# Patient Record
Sex: MALE | Race: BLACK OR AFRICAN AMERICAN | Employment: OTHER | URBAN - NONMETROPOLITAN AREA
[De-identification: names, ages, dates, MRNs, and addresses within clinical notes are randomized per-mention and may not be internally consistent; named-entity substitution may affect disease eponyms.]

---

## 2017-02-09 ENCOUNTER — OFFICE VISIT (OUTPATIENT)
Dept: PHYSICAL MEDICINE AND REHAB | Age: 82
End: 2017-02-09

## 2017-02-09 VITALS
DIASTOLIC BLOOD PRESSURE: 77 MMHG | WEIGHT: 158 LBS | SYSTOLIC BLOOD PRESSURE: 134 MMHG | HEART RATE: 71 BPM | HEIGHT: 71 IN | BODY MASS INDEX: 22.12 KG/M2

## 2017-02-09 DIAGNOSIS — R41.840 POOR CONCENTRATION: ICD-10-CM

## 2017-02-09 DIAGNOSIS — R41.3 MEMORY PROBLEM: Primary | ICD-10-CM

## 2017-02-09 PROCEDURE — 4040F PNEUMOC VAC/ADMIN/RCVD: CPT | Performed by: PSYCHIATRY & NEUROLOGY

## 2017-02-09 PROCEDURE — G8484 FLU IMMUNIZE NO ADMIN: HCPCS | Performed by: PSYCHIATRY & NEUROLOGY

## 2017-02-09 PROCEDURE — 99213 OFFICE O/P EST LOW 20 MIN: CPT | Performed by: PSYCHIATRY & NEUROLOGY

## 2017-02-09 PROCEDURE — G8419 CALC BMI OUT NRM PARAM NOF/U: HCPCS | Performed by: PSYCHIATRY & NEUROLOGY

## 2017-02-09 PROCEDURE — 1123F ACP DISCUSS/DSCN MKR DOCD: CPT | Performed by: PSYCHIATRY & NEUROLOGY

## 2017-02-09 PROCEDURE — G8427 DOCREV CUR MEDS BY ELIG CLIN: HCPCS | Performed by: PSYCHIATRY & NEUROLOGY

## 2017-02-09 PROCEDURE — 1036F TOBACCO NON-USER: CPT | Performed by: PSYCHIATRY & NEUROLOGY

## 2017-02-09 RX ORDER — DONEPEZIL HYDROCHLORIDE 10 MG/1
TABLET, FILM COATED ORAL
Refills: 0 | Status: ON HOLD | COMMUNITY
Start: 2016-11-17 | End: 2017-04-28 | Stop reason: HOSPADM

## 2017-02-09 RX ORDER — TRAZODONE HYDROCHLORIDE 50 MG/1
TABLET ORAL
Refills: 0 | Status: ON HOLD | COMMUNITY
Start: 2017-01-19 | End: 2017-04-28 | Stop reason: HOSPADM

## 2017-02-14 ENCOUNTER — OFFICE VISIT (OUTPATIENT)
Dept: AUDIOLOGY | Age: 82
End: 2017-02-14

## 2017-02-14 DIAGNOSIS — H90.3 SENSORINEURAL HEARING LOSS, BILATERAL: Primary | ICD-10-CM

## 2017-02-23 ENCOUNTER — OFFICE VISIT (OUTPATIENT)
Dept: AUDIOLOGY | Age: 82
End: 2017-02-23

## 2017-02-23 DIAGNOSIS — H90.3 SENSORINEURAL HEARING LOSS, BILATERAL: Primary | ICD-10-CM

## 2017-02-28 ENCOUNTER — TELEPHONE (OUTPATIENT)
Dept: AUDIOLOGY | Age: 82
End: 2017-02-28

## 2017-03-20 ENCOUNTER — OFFICE VISIT (OUTPATIENT)
Dept: UROLOGY | Age: 82
End: 2017-03-20

## 2017-03-20 VITALS — BODY MASS INDEX: 22.05 KG/M2 | HEIGHT: 70 IN | WEIGHT: 154 LBS

## 2017-03-20 DIAGNOSIS — N40.1 BPH WITH URINARY OBSTRUCTION: Primary | ICD-10-CM

## 2017-03-20 DIAGNOSIS — N13.8 BPH WITH URINARY OBSTRUCTION: Primary | ICD-10-CM

## 2017-03-20 DIAGNOSIS — N52.9 ERECTILE DYSFUNCTION, UNSPECIFIED ERECTILE DYSFUNCTION TYPE: ICD-10-CM

## 2017-03-20 LAB
BILIRUBIN URINE: NEGATIVE
BLOOD URINE, POC: NEGATIVE
CHARACTER, URINE: CLEAR
COLOR, URINE: YELLOW
GLUCOSE URINE: NEGATIVE MG/DL
KETONES, URINE: NEGATIVE
LEUKOCYTE CLUMPS, URINE: NEGATIVE
NITRITE, URINE: NEGATIVE
PH, URINE: 5.5
POST VOID RESIDUAL (PVR): 81 ML
PROTEIN, URINE: NEGATIVE MG/DL
SPECIFIC GRAVITY, URINE: 1.01 (ref 1–1.03)
UROBILINOGEN, URINE: 0.2 EU/DL

## 2017-03-20 PROCEDURE — G8427 DOCREV CUR MEDS BY ELIG CLIN: HCPCS | Performed by: NURSE PRACTITIONER

## 2017-03-20 PROCEDURE — 51798 US URINE CAPACITY MEASURE: CPT | Performed by: NURSE PRACTITIONER

## 2017-03-20 PROCEDURE — 1036F TOBACCO NON-USER: CPT | Performed by: NURSE PRACTITIONER

## 2017-03-20 PROCEDURE — 4040F PNEUMOC VAC/ADMIN/RCVD: CPT | Performed by: NURSE PRACTITIONER

## 2017-03-20 PROCEDURE — 99213 OFFICE O/P EST LOW 20 MIN: CPT | Performed by: NURSE PRACTITIONER

## 2017-03-20 PROCEDURE — 1123F ACP DISCUSS/DSCN MKR DOCD: CPT | Performed by: NURSE PRACTITIONER

## 2017-03-20 PROCEDURE — G8484 FLU IMMUNIZE NO ADMIN: HCPCS | Performed by: NURSE PRACTITIONER

## 2017-03-20 PROCEDURE — G8419 CALC BMI OUT NRM PARAM NOF/U: HCPCS | Performed by: NURSE PRACTITIONER

## 2017-03-20 PROCEDURE — 81003 URINALYSIS AUTO W/O SCOPE: CPT | Performed by: NURSE PRACTITIONER

## 2017-04-27 PROBLEM — E86.0 DEHYDRATION: Status: ACTIVE | Noted: 2017-04-27

## 2017-04-27 PROBLEM — R29.6 FALLS: Status: ACTIVE | Noted: 2017-04-27
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
## 2017-05-24 ENCOUNTER — OFFICE VISIT (OUTPATIENT)
Dept: AUDIOLOGY | Age: 82
End: 2017-05-24

## 2017-05-24 DIAGNOSIS — H90.3 SENSORINEURAL HEARING LOSS, BILATERAL: Primary | ICD-10-CM

## 2017-05-25 ENCOUNTER — OFFICE VISIT (OUTPATIENT)
Dept: CARDIOLOGY | Age: 82
End: 2017-05-25

## 2017-05-25 VITALS
HEART RATE: 80 BPM | DIASTOLIC BLOOD PRESSURE: 68 MMHG | BODY MASS INDEX: 21 KG/M2 | WEIGHT: 150 LBS | HEIGHT: 71 IN | SYSTOLIC BLOOD PRESSURE: 134 MMHG

## 2017-05-25 DIAGNOSIS — I10 ESSENTIAL HYPERTENSION: Primary | ICD-10-CM

## 2017-05-25 DIAGNOSIS — I95.1 ORTHOSTATIC HYPOTENSION: ICD-10-CM

## 2017-05-25 DIAGNOSIS — E78.00 PURE HYPERCHOLESTEROLEMIA: ICD-10-CM

## 2017-05-25 DIAGNOSIS — R55 NEAR SYNCOPE: ICD-10-CM

## 2017-05-25 DIAGNOSIS — E86.0 DEHYDRATION: Primary | ICD-10-CM

## 2017-05-25 PROCEDURE — 1036F TOBACCO NON-USER: CPT | Performed by: INTERNAL MEDICINE

## 2017-05-25 PROCEDURE — 99214 OFFICE O/P EST MOD 30 MIN: CPT | Performed by: INTERNAL MEDICINE

## 2017-05-25 PROCEDURE — 1123F ACP DISCUSS/DSCN MKR DOCD: CPT | Performed by: INTERNAL MEDICINE

## 2017-05-25 PROCEDURE — G8427 DOCREV CUR MEDS BY ELIG CLIN: HCPCS | Performed by: INTERNAL MEDICINE

## 2017-05-25 PROCEDURE — 4040F PNEUMOC VAC/ADMIN/RCVD: CPT | Performed by: INTERNAL MEDICINE

## 2017-05-25 PROCEDURE — G8419 CALC BMI OUT NRM PARAM NOF/U: HCPCS | Performed by: INTERNAL MEDICINE

## 2017-05-25 PROCEDURE — 1111F DSCHRG MED/CURRENT MED MERGE: CPT | Performed by: INTERNAL MEDICINE

## 2017-05-25 RX ORDER — POTASSIUM CHLORIDE 20MEQ/15ML
40 LIQUID (ML) ORAL DAILY
Qty: 450 ML | Refills: 0 | Status: SHIPPED | OUTPATIENT
Start: 2017-05-25 | End: 2017-06-06 | Stop reason: ALTCHOICE

## 2017-05-25 RX ORDER — LANSOPRAZOLE 30 MG/1
30 CAPSULE, DELAYED RELEASE ORAL 2 TIMES DAILY
COMMUNITY
End: 2017-12-01 | Stop reason: ALTCHOICE

## 2017-06-06 RX ORDER — POTASSIUM CHLORIDE 20MEQ/15ML
LIQUID (ML) ORAL
COMMUNITY
End: 2017-06-06 | Stop reason: SDUPTHER

## 2017-06-06 RX ORDER — FLUDROCORTISONE ACETATE 0.1 MG/1
0.1 TABLET ORAL DAILY
COMMUNITY
End: 2017-06-06 | Stop reason: SDUPTHER

## 2017-06-06 RX ORDER — SODIUM CHLORIDE 1000 MG
1 TABLET, SOLUBLE MISCELLANEOUS 2 TIMES DAILY
COMMUNITY
End: 2017-06-06 | Stop reason: SDUPTHER

## 2017-06-06 RX ORDER — POTASSIUM CHLORIDE 20 MEQ/1
TABLET, EXTENDED RELEASE ORAL
COMMUNITY
End: 2017-06-06 | Stop reason: CLARIF

## 2017-06-07 RX ORDER — SODIUM CHLORIDE 1000 MG
1 TABLET, SOLUBLE MISCELLANEOUS 2 TIMES DAILY
Qty: 60 TABLET | Refills: 0 | Status: ON HOLD | OUTPATIENT
Start: 2017-06-07 | End: 2017-09-11 | Stop reason: ALTCHOICE

## 2017-06-07 RX ORDER — POTASSIUM CHLORIDE 20MEQ/15ML
LIQUID (ML) ORAL
Qty: 180 ML | Refills: 0 | Status: ON HOLD | OUTPATIENT
Start: 2017-06-07 | End: 2017-09-11 | Stop reason: ALTCHOICE

## 2017-06-07 RX ORDER — FLUDROCORTISONE ACETATE 0.1 MG/1
0.1 TABLET ORAL DAILY
Qty: 7 TABLET | Refills: 0 | Status: ON HOLD | OUTPATIENT
Start: 2017-06-07 | End: 2018-03-02 | Stop reason: HOSPADM

## 2017-06-13 ENCOUNTER — TELEPHONE (OUTPATIENT)
Dept: CARDIOLOGY | Age: 82
End: 2017-06-13

## 2017-06-19 ENCOUNTER — OFFICE VISIT (OUTPATIENT)
Dept: CARDIOLOGY | Age: 82
End: 2017-06-19

## 2017-06-19 VITALS
DIASTOLIC BLOOD PRESSURE: 62 MMHG | BODY MASS INDEX: 20.85 KG/M2 | SYSTOLIC BLOOD PRESSURE: 152 MMHG | HEART RATE: 64 BPM | WEIGHT: 147.4 LBS

## 2017-06-19 DIAGNOSIS — R55 NEAR SYNCOPE: ICD-10-CM

## 2017-06-19 DIAGNOSIS — I95.1 ORTHOSTATIC HYPOTENSION: Primary | ICD-10-CM

## 2017-06-19 DIAGNOSIS — E87.6 HYPOKALEMIA: Primary | ICD-10-CM

## 2017-06-19 DIAGNOSIS — E78.00 PURE HYPERCHOLESTEROLEMIA: ICD-10-CM

## 2017-06-19 DIAGNOSIS — I10 ESSENTIAL HYPERTENSION: ICD-10-CM

## 2017-06-19 DIAGNOSIS — E87.6 HYPOKALEMIA: ICD-10-CM

## 2017-06-19 PROCEDURE — G8428 CUR MEDS NOT DOCUMENT: HCPCS | Performed by: INTERNAL MEDICINE

## 2017-06-19 PROCEDURE — 99213 OFFICE O/P EST LOW 20 MIN: CPT | Performed by: INTERNAL MEDICINE

## 2017-06-19 PROCEDURE — G8420 CALC BMI NORM PARAMETERS: HCPCS | Performed by: INTERNAL MEDICINE

## 2017-06-19 PROCEDURE — 1123F ACP DISCUSS/DSCN MKR DOCD: CPT | Performed by: INTERNAL MEDICINE

## 2017-06-19 PROCEDURE — 4040F PNEUMOC VAC/ADMIN/RCVD: CPT | Performed by: INTERNAL MEDICINE

## 2017-06-19 PROCEDURE — 1036F TOBACCO NON-USER: CPT | Performed by: INTERNAL MEDICINE

## 2017-06-21 RX ORDER — POTASSIUM CHLORIDE 750 MG/1
10 TABLET, EXTENDED RELEASE ORAL DAILY
Qty: 30 TABLET | Refills: 12 | Status: ON HOLD | OUTPATIENT
Start: 2017-06-21 | End: 2021-07-15

## 2017-07-18 ENCOUNTER — HOSPITAL ENCOUNTER (OUTPATIENT)
Dept: NEUROLOGY | Age: 82
Discharge: HOME | End: 2017-07-18
Payer: MEDICARE

## 2017-07-18 PROCEDURE — 95819 EEG AWAKE AND ASLEEP: CPT

## 2017-07-22 ENCOUNTER — HOSPITAL ENCOUNTER (OUTPATIENT)
Age: 82
Discharge: HOME | End: 2017-07-22
Payer: MEDICARE

## 2017-07-22 DIAGNOSIS — E87.6 HYPOKALEMIA: ICD-10-CM

## 2017-07-22 LAB
ANION GAP SERPL CALCULATED.3IONS-SCNC: 12 MEQ/L (ref 8–16)
BUN BLDV-MCNC: 19 MG/DL (ref 7–22)
CALCIUM SERPL-MCNC: 8.6 MG/DL (ref 8.5–10.5)
CHLORIDE BLD-SCNC: 106 MEQ/L (ref 98–111)
CO2: 28 MEQ/L (ref 23–33)
CREAT SERPL-MCNC: 1 MG/DL (ref 0.4–1.2)
GFR SERPL CREATININE-BSD FRML MDRD: 87 ML/MIN/1.73M2
GLUCOSE BLD-MCNC: 89 MG/DL (ref 70–108)
POTASSIUM SERPL-SCNC: 3.4 MEQ/L (ref 3.5–5.2)
SODIUM BLD-SCNC: 146 MEQ/L (ref 135–145)

## 2017-07-22 PROCEDURE — 80048 BASIC METABOLIC PNL TOTAL CA: CPT

## 2017-07-22 PROCEDURE — 36415 COLL VENOUS BLD VENIPUNCTURE: CPT

## 2017-08-10 ENCOUNTER — APPOINTMENT (OUTPATIENT)
Dept: GENERAL RADIOLOGY | Age: 82
End: 2017-08-10
Payer: MEDICARE

## 2017-08-10 ENCOUNTER — HOSPITAL ENCOUNTER (EMERGENCY)
Age: 82
Discharge: HOME | End: 2017-08-10
Attending: FAMILY MEDICINE
Payer: MEDICARE

## 2017-08-10 VITALS
DIASTOLIC BLOOD PRESSURE: 71 MMHG | RESPIRATION RATE: 16 BRPM | OXYGEN SATURATION: 100 % | TEMPERATURE: 98.7 F | SYSTOLIC BLOOD PRESSURE: 151 MMHG | HEART RATE: 70 BPM

## 2017-08-10 DIAGNOSIS — R11.11 NON-INTRACTABLE VOMITING WITHOUT NAUSEA, UNSPECIFIED VOMITING TYPE: Primary | ICD-10-CM

## 2017-08-10 DIAGNOSIS — E87.0 HYPERNATREMIA: ICD-10-CM

## 2017-08-10 LAB
ALBUMIN SERPL-MCNC: 3.5 G/DL (ref 3.5–5.1)
ALP BLD-CCNC: 55 U/L (ref 38–126)
ALT SERPL-CCNC: 27 U/L (ref 11–66)
AMYLASE: 33 U/L (ref 20–104)
ANION GAP SERPL CALCULATED.3IONS-SCNC: 19 MEQ/L (ref 8–16)
APTT: 20.6 SECONDS (ref 22–38)
AST SERPL-CCNC: 46 U/L (ref 5–40)
BASOPHILS # BLD: 0.3 %
BASOPHILS ABSOLUTE: 0 THOU/MM3 (ref 0–0.1)
BILIRUB SERPL-MCNC: 1.1 MG/DL (ref 0.3–1.2)
BILIRUBIN URINE: NEGATIVE
BLOOD, URINE: NEGATIVE
BUN BLDV-MCNC: 17 MG/DL (ref 7–22)
CALCIUM SERPL-MCNC: 8.4 MG/DL (ref 8.5–10.5)
CHARACTER, URINE: CLEAR
CHLORIDE BLD-SCNC: 100 MEQ/L (ref 98–111)
CO2: 29 MEQ/L (ref 23–33)
COLOR: YELLOW
CREAT SERPL-MCNC: 0.9 MG/DL (ref 0.4–1.2)
EOSINOPHIL # BLD: 0.5 %
EOSINOPHILS ABSOLUTE: 0 THOU/MM3 (ref 0–0.4)
ETHYL ALCOHOL, SERUM: 0.04 %
GLUCOSE BLD-MCNC: 97 MG/DL (ref 70–108)
GLUCOSE URINE: NEGATIVE MG/DL
HCT VFR BLD CALC: 33 % (ref 42–52)
HEMOGLOBIN: 11.4 GM/DL (ref 14–18)
INR BLD: 1.33 (ref 0.85–1.13)
KETONES, URINE: NEGATIVE
LEUKOCYTE ESTERASE, URINE: NEGATIVE
LIPASE: 37.8 U/L (ref 5.6–51.3)
LYMPHOCYTES # BLD: 18.6 %
LYMPHOCYTES ABSOLUTE: 1.1 THOU/MM3 (ref 1–4.8)
MCH RBC QN AUTO: 33.8 PG (ref 27–31)
MCHC RBC AUTO-ENTMCNC: 34.5 GM/DL (ref 33–37)
MCV RBC AUTO: 98 FL (ref 80–94)
MONOCYTES # BLD: 8.7 %
MONOCYTES ABSOLUTE: 0.5 THOU/MM3 (ref 0.4–1.3)
NITRITE, URINE: NEGATIVE
NUCLEATED RED BLOOD CELLS: 0 /100 WBC
OSMOLALITY CALCULATION: 295.7 MOSMOL/KG (ref 275–300)
PDW BLD-RTO: 13.1 % (ref 11.5–14.5)
PH UA: 6
PLATELET # BLD: 179 THOU/MM3 (ref 130–400)
PMV BLD AUTO: 7.5 MCM (ref 7.4–10.4)
POTASSIUM SERPL-SCNC: 3.2 MEQ/L (ref 3.5–5.2)
PROTEIN UA: NEGATIVE
RBC # BLD: 3.37 MILL/MM3 (ref 4.7–6.1)
RBC # BLD: NORMAL 10*6/UL
SEG NEUTROPHILS: 71.9 %
SEGMENTED NEUTROPHILS ABSOLUTE COUNT: 4.2 THOU/MM3 (ref 1.8–7.7)
SODIUM BLD-SCNC: 148 MEQ/L (ref 135–145)
SPECIFIC GRAVITY, URINE: 1.01 (ref 1–1.03)
TOTAL PROTEIN: 6.5 G/DL (ref 6.1–8)
TROPONIN T: < 0.01 NG/ML
UROBILINOGEN, URINE: 0.2 EU/DL
WBC # BLD: 5.8 THOU/MM3 (ref 4.8–10.8)

## 2017-08-10 PROCEDURE — 96361 HYDRATE IV INFUSION ADD-ON: CPT

## 2017-08-10 PROCEDURE — 99284 EMERGENCY DEPT VISIT MOD MDM: CPT

## 2017-08-10 PROCEDURE — 96365 THER/PROPH/DIAG IV INF INIT: CPT

## 2017-08-10 PROCEDURE — G0480 DRUG TEST DEF 1-7 CLASSES: HCPCS

## 2017-08-10 PROCEDURE — 85730 THROMBOPLASTIN TIME PARTIAL: CPT

## 2017-08-10 PROCEDURE — 84484 ASSAY OF TROPONIN QUANT: CPT

## 2017-08-10 PROCEDURE — 2580000003 HC RX 258: Performed by: FAMILY MEDICINE

## 2017-08-10 PROCEDURE — 82150 ASSAY OF AMYLASE: CPT

## 2017-08-10 PROCEDURE — 96375 TX/PRO/DX INJ NEW DRUG ADDON: CPT

## 2017-08-10 PROCEDURE — 6360000002 HC RX W HCPCS: Performed by: FAMILY MEDICINE

## 2017-08-10 PROCEDURE — 85025 COMPLETE CBC W/AUTO DIFF WBC: CPT

## 2017-08-10 PROCEDURE — 71020 XR CHEST STANDARD TWO VW: CPT

## 2017-08-10 PROCEDURE — 93005 ELECTROCARDIOGRAM TRACING: CPT

## 2017-08-10 PROCEDURE — 80320 DRUG SCREEN QUANTALCOHOLS: CPT

## 2017-08-10 PROCEDURE — 83690 ASSAY OF LIPASE: CPT

## 2017-08-10 PROCEDURE — 80053 COMPREHEN METABOLIC PANEL: CPT

## 2017-08-10 PROCEDURE — 85610 PROTHROMBIN TIME: CPT

## 2017-08-10 PROCEDURE — 36415 COLL VENOUS BLD VENIPUNCTURE: CPT

## 2017-08-10 PROCEDURE — 81003 URINALYSIS AUTO W/O SCOPE: CPT

## 2017-08-10 PROCEDURE — 2500000003 HC RX 250 WO HCPCS: Performed by: FAMILY MEDICINE

## 2017-08-10 RX ORDER — ONDANSETRON 4 MG/1
4 TABLET, ORALLY DISINTEGRATING ORAL EVERY 8 HOURS PRN
Qty: 10 TABLET | Refills: 0 | Status: ON HOLD | OUTPATIENT
Start: 2017-08-10 | End: 2017-09-11 | Stop reason: ALTCHOICE

## 2017-08-10 RX ORDER — 0.9 % SODIUM CHLORIDE 0.9 %
1000 INTRAVENOUS SOLUTION INTRAVENOUS ONCE
Status: COMPLETED | OUTPATIENT
Start: 2017-08-10 | End: 2017-08-10

## 2017-08-10 RX ORDER — ONDANSETRON 2 MG/ML
4 INJECTION INTRAMUSCULAR; INTRAVENOUS EVERY 30 MIN PRN
Status: DISCONTINUED | OUTPATIENT
Start: 2017-08-10 | End: 2017-08-11 | Stop reason: HOSPADM

## 2017-08-10 RX ADMIN — ONDANSETRON 4 MG: 2 INJECTION INTRAMUSCULAR; INTRAVENOUS at 21:35

## 2017-08-10 RX ADMIN — SODIUM CHLORIDE 1000 ML: 9 INJECTION, SOLUTION INTRAVENOUS at 20:46

## 2017-08-10 RX ADMIN — FOLIC ACID: 5 INJECTION, SOLUTION INTRAMUSCULAR; INTRAVENOUS; SUBCUTANEOUS at 21:43

## 2017-08-10 ASSESSMENT — ENCOUNTER SYMPTOMS
SORE THROAT: 0
EYE DISCHARGE: 0
RHINORRHEA: 0
VOMITING: 1
DIARRHEA: 0
BACK PAIN: 0
WHEEZING: 0
ABDOMINAL PAIN: 0
EYE REDNESS: 0
SHORTNESS OF BREATH: 0
COUGH: 0
NAUSEA: 1

## 2017-08-11 ENCOUNTER — INITIAL CONSULT (OUTPATIENT)
Dept: NEUROLOGY | Age: 82
End: 2017-08-11
Payer: MEDICARE

## 2017-08-11 VITALS
HEART RATE: 74 BPM | WEIGHT: 145.6 LBS | BODY MASS INDEX: 20.38 KG/M2 | DIASTOLIC BLOOD PRESSURE: 69 MMHG | HEIGHT: 71 IN | SYSTOLIC BLOOD PRESSURE: 115 MMHG

## 2017-08-11 DIAGNOSIS — F10.930 ALCOHOL WITHDRAWAL, UNCOMPLICATED (HCC): Primary | ICD-10-CM

## 2017-08-11 LAB
EKG ATRIAL RATE: 74 BPM
EKG P AXIS: 72 DEGREES
EKG P-R INTERVAL: 142 MS
EKG Q-T INTERVAL: 458 MS
EKG QRS DURATION: 86 MS
EKG QTC CALCULATION (BAZETT): 508 MS
EKG R AXIS: -28 DEGREES
EKG T AXIS: 16 DEGREES
EKG VENTRICULAR RATE: 74 BPM

## 2017-08-11 PROCEDURE — G8427 DOCREV CUR MEDS BY ELIG CLIN: HCPCS | Performed by: PSYCHIATRY & NEUROLOGY

## 2017-08-11 PROCEDURE — 4040F PNEUMOC VAC/ADMIN/RCVD: CPT | Performed by: PSYCHIATRY & NEUROLOGY

## 2017-08-11 PROCEDURE — G8420 CALC BMI NORM PARAMETERS: HCPCS | Performed by: PSYCHIATRY & NEUROLOGY

## 2017-08-11 PROCEDURE — 1036F TOBACCO NON-USER: CPT | Performed by: PSYCHIATRY & NEUROLOGY

## 2017-08-11 PROCEDURE — 99214 OFFICE O/P EST MOD 30 MIN: CPT | Performed by: PSYCHIATRY & NEUROLOGY

## 2017-08-11 PROCEDURE — 1123F ACP DISCUSS/DSCN MKR DOCD: CPT | Performed by: PSYCHIATRY & NEUROLOGY

## 2017-08-25 ENCOUNTER — OFFICE VISIT (OUTPATIENT)
Dept: NEUROLOGY | Age: 82
End: 2017-08-25
Payer: MEDICARE

## 2017-08-25 VITALS
HEIGHT: 70 IN | DIASTOLIC BLOOD PRESSURE: 84 MMHG | SYSTOLIC BLOOD PRESSURE: 139 MMHG | HEART RATE: 80 BPM | WEIGHT: 146 LBS | BODY MASS INDEX: 20.9 KG/M2

## 2017-08-25 DIAGNOSIS — F10.930 ALCOHOL WITHDRAWAL, UNCOMPLICATED (HCC): Primary | ICD-10-CM

## 2017-08-25 PROCEDURE — 99214 OFFICE O/P EST MOD 30 MIN: CPT | Performed by: PSYCHIATRY & NEUROLOGY

## 2017-08-25 PROCEDURE — G8427 DOCREV CUR MEDS BY ELIG CLIN: HCPCS | Performed by: PSYCHIATRY & NEUROLOGY

## 2017-08-25 PROCEDURE — 4040F PNEUMOC VAC/ADMIN/RCVD: CPT | Performed by: PSYCHIATRY & NEUROLOGY

## 2017-08-25 PROCEDURE — 1123F ACP DISCUSS/DSCN MKR DOCD: CPT | Performed by: PSYCHIATRY & NEUROLOGY

## 2017-08-25 PROCEDURE — G8420 CALC BMI NORM PARAMETERS: HCPCS | Performed by: PSYCHIATRY & NEUROLOGY

## 2017-08-25 PROCEDURE — 1036F TOBACCO NON-USER: CPT | Performed by: PSYCHIATRY & NEUROLOGY

## 2017-08-27 RX ORDER — DISULFIRAM 250 MG/1
250 TABLET ORAL DAILY
Qty: 30 TABLET | Refills: 0 | Status: ON HOLD | OUTPATIENT
Start: 2017-08-27 | End: 2017-09-11

## 2017-09-01 ENCOUNTER — OFFICE VISIT (OUTPATIENT)
Dept: CARDIOLOGY CLINIC | Age: 82
End: 2017-09-01
Payer: MEDICARE

## 2017-09-01 VITALS — DIASTOLIC BLOOD PRESSURE: 72 MMHG | SYSTOLIC BLOOD PRESSURE: 148 MMHG | HEART RATE: 96 BPM

## 2017-09-01 DIAGNOSIS — I95.1 ORTHOSTATIC HYPOTENSION: Primary | ICD-10-CM

## 2017-09-01 DIAGNOSIS — E78.00 PURE HYPERCHOLESTEROLEMIA: ICD-10-CM

## 2017-09-01 DIAGNOSIS — I10 ESSENTIAL HYPERTENSION: ICD-10-CM

## 2017-09-01 LAB — GFR SERPL CREATININE-BSD FRML MDRD: 81 ML/MIN/1.73M2

## 2017-09-01 PROCEDURE — 4040F PNEUMOC VAC/ADMIN/RCVD: CPT | Performed by: INTERNAL MEDICINE

## 2017-09-01 PROCEDURE — 1036F TOBACCO NON-USER: CPT | Performed by: INTERNAL MEDICINE

## 2017-09-01 PROCEDURE — 99214 OFFICE O/P EST MOD 30 MIN: CPT | Performed by: INTERNAL MEDICINE

## 2017-09-01 PROCEDURE — G8427 DOCREV CUR MEDS BY ELIG CLIN: HCPCS | Performed by: INTERNAL MEDICINE

## 2017-09-01 PROCEDURE — 1123F ACP DISCUSS/DSCN MKR DOCD: CPT | Performed by: INTERNAL MEDICINE

## 2017-09-01 PROCEDURE — G8420 CALC BMI NORM PARAMETERS: HCPCS | Performed by: INTERNAL MEDICINE

## 2017-09-01 RX ORDER — DONEPEZIL HYDROCHLORIDE 10 MG/1
10 TABLET, FILM COATED ORAL NIGHTLY
Status: ON HOLD | COMMUNITY
Start: 2017-05-17 | End: 2018-03-02 | Stop reason: HOSPADM

## 2017-09-01 NOTE — MR AVS SNAPSHOT
After Visit Summary             Florentin Gauthier   2017 11:00 AM   Office Visit    Description:  Male : 1935   Provider:  Patti Bernal MD   Department:  [facility] [place] [address] and Future Appointments         Below is a list of your follow-up and future appointments. This may not be a complete list as you may have made appointments directly with providers that we are not aware of or your providers may have made some for you. Please call your providers to confirm appointments. It is important to keep your appointments. Please bring your current insurance card, photo ID, co-pay, and all medication bottles to your appointment. If self-pay, payment is expected at the time of service. Your To-Do List     Future Appointments Provider Department Dept Phone    9/15/2017 10:45 AM Judy Samuels MD  [facility] [phone]    Please arrive 15 minutes prior to appointment, bring photo ID and insurance card. 2017 12:00 PM Patti Bernal MD [facility]charity: water [phone]    Please arrive 15 minutes prior to appointment, bring photo ID and insurance card. 3/21/2018 10:45 AM Jv Coronel NP [facility] [employer] [facility] [phone]    Please arrive 15 minutes prior to appointment, bring photo ID and insurance card. Please arrive 15 minutes prior to appointment, bring photo ID and insurance card. Follow-Up    Return in about 3 months (around 2017). Information from Your Visit        Department     Name Address Phone Fax    [facility] 1  [facility] [employer] [address] [phone]      You Were Seen for:         Comments    Orthostatic hypotension   [458. 0. ICD-9-CM]         Vital Signs     Blood Pressure Pulse Smoking Status             148/72 80 Former Smoker            Medications and Orders      Your Current Medications Are              donepezil (ARICEPT) 10 MG tablet Take 10 mg by mouth disulfiram (ANTABUSE) 250 MG tablet Take 1 tablet by mouth daily    ondansetron (ZOFRAN ODT) 4 MG disintegrating tablet Take 1 tablet by mouth every 8 hours as needed for Nausea    potassium chloride (KLOR-CON M10) 10 MEQ extended release tablet Take 1 tablet by mouth daily    fludrocortisone (FLORINEF) 0.1 MG tablet Take 1 tablet by mouth daily    sodium chloride 1 G tablet Take 1 tablet by mouth 2 times daily    potassium chloride 20 MEQ/15ML (10%) oral solution Take 30 mLs by mouth twice daily for 3 days    lansoprazole (PREVACID) 30 MG delayed release capsule Take 30 mg by mouth 2 times daily    metoprolol tartrate (LOPRESSOR) 50 MG tablet Take 1 tablet by mouth 2 times daily    midodrine (PROAMATINE) 10 MG tablet Take 1 tablet by mouth 3 times daily (with meals)    atorvastatin (LIPITOR) 10 MG tablet Take 1 tablet by mouth daily    NIFEdipine (NIFEDICAL XL) 30 MG extended release tablet Take 1 tablet by mouth nightly    vitamin D (CHOLECALCIFEROL) 1000 UNIT TABS tablet Take 1 tablet by mouth daily    docusate sodium (COLACE) 100 MG capsule Take 1 capsule by mouth 2 times daily    pantoprazole (PROTONIX) 40 MG tablet Take 1 tablet by mouth every morning (before breakfast)    oxybutynin (DITROPAN-XL) 10 MG extended release tablet Take 1 tablet by mouth nightly    celecoxib (CELEBREX) 200 MG capsule Take 1 capsule by mouth daily    donepezil (ARICEPT) 10 MG tablet Take 1 tablet by mouth nightly    acetaminophen (TYLENOL) 325 MG tablet Take 2 tablets by mouth every 4 hours as needed for Pain    Multiple Vitamin (MULTIVITAMIN) tablet Take 1 tablet by mouth daily    vitamin B-1 (THIAMINE) 100 MG tablet Take 1 tablet by mouth daily      Allergies           No Known Allergies         Additional Information        Basic Information     Date Of Birth Sex Race Ethnicity Preferred Language Preferred Written Language    1935 Male Black Non-/Non  [place]

## 2017-09-01 NOTE — PROGRESS NOTES
Chief Complaint   Patient presents with    Other     6 week follow up-labs         The patient is a [de-identified] y.o. male patient with PMH HTN, HLP, CKD, dementia who presents to [facility] with frequent falls     Pt is ETOH and has been having frequent falls, poor appettite with component of dehydration - he is under hydration but is noted to have orthostatic hypotension   This am he stood up and had a near syncopal episode -      Cardiology asked to see DT this     Orthostatic vitals:yesterday  lying 146/69  Sitting 116/70  standing 96/46    In the last few days  There is orthostatic drop  But from HTN range to Normotensive state with no dizziness  The above is inhospital documentation    Patient here for 6 week follow up    Patient denies:chest pain,heart palpitations,SOB,peripheral edema    Patient complains of dizziness    \"Wife states he is not eating very much at all\"  Lost 7 pounds in 6 weeks.     Seen in  hosp  for orthostatic hypotension and near syncope          Patient Active Problem List   Diagnosis    Erectile dysfunction    BPH with urinary obstruction    Lower urinary tract symptoms (LUTS)    Nocturia    Urinary retention    Feeling of incomplete bladder emptying    Hyperlipemia    Frequency of urination    ETD (eustachian tube dysfunction)    Dysphagia    Dysphonia    GERD (gastroesophageal reflux disease)    Zenker diverticula    Presbyesophagus    Voice disturbance    Disease of larynx    Subjective tinnitus    Sensorineural hearing loss, bilateral    Otalgia of left ear    supine Hypertension    CKD (chronic kidney disease) stage 2, GFR 60-89 ml/min    Metabolic bone disease    Insomnia    Alcohol withdrawal (HCC)    Falls    Dehydration    Hist of Near syncope    Orthostatic hypotension    Low serum cortisol level (HCC)    Hypokalemia       Past Surgical History:   Procedure Laterality Date    COLONOSCOPY      ENDOSCOPY, COLON, DIAGNOSTIC      ESOPHAGUS SURGERY      NECK tablet by mouth 2 times daily 60 tablet 3    midodrine (PROAMATINE) 10 MG tablet Take 1 tablet by mouth 3 times daily (with meals)      atorvastatin (LIPITOR) 10 MG tablet Take 1 tablet by mouth daily 30 tablet 3    NIFEdipine (NIFEDICAL XL) 30 MG extended release tablet Take 1 tablet by mouth nightly 30 tablet 3    vitamin D (CHOLECALCIFEROL) 1000 UNIT TABS tablet Take 1 tablet by mouth daily 60 tablet     docusate sodium (COLACE) 100 MG capsule Take 1 capsule by mouth 2 times daily 100 capsule 3    pantoprazole (PROTONIX) 40 MG tablet Take 1 tablet by mouth every morning (before breakfast) 30 tablet 3    oxybutynin (DITROPAN-XL) 10 MG extended release tablet Take 1 tablet by mouth nightly 30 tablet 3    celecoxib (CELEBREX) 200 MG capsule Take 1 capsule by mouth daily 60 capsule 3    donepezil (ARICEPT) 10 MG tablet Take 1 tablet by mouth nightly 30 tablet 3    acetaminophen (TYLENOL) 325 MG tablet Take 2 tablets by mouth every 4 hours as needed for Pain 120 tablet 3    Multiple Vitamin (MULTIVITAMIN) tablet Take 1 tablet by mouth daily  0    vitamin B-1 (THIAMINE) 100 MG tablet Take 1 tablet by mouth daily 30 tablet 3     No current facility-administered medications for this visit. Review of Systems -     General ROS: negative  Psychological ROS: negative  Hematological and Lymphatic ROS: No history of blood clots or bleeding disorder. Respiratory ROS: no cough, shortness of breath, or wheezing  Cardiovascular ROS: no chest pain or dyspnea on exertion  Gastrointestinal ROS: negative  Genito-Urinary ROS: no dysuria, trouble voiding, or hematuria  Musculoskeletal ROS: negative  Neurological ROS: no TIA or stroke symptoms  Dermatological ROS: negative      Blood pressure (!) 148/72, pulse 96.         Physical Examination:    General appearance - alert, well appearing, and in no distress  Mental status - alert, oriented to person, place, and time  Neck - supple, no significant adenopathy, no JVD,

## 2017-09-09 ENCOUNTER — APPOINTMENT (OUTPATIENT)
Dept: CT IMAGING | Age: 82
End: 2017-09-09
Payer: MEDICARE

## 2017-09-09 ENCOUNTER — HOSPITAL ENCOUNTER (EMERGENCY)
Age: 82
Discharge: HOME | End: 2017-09-09
Attending: INTERNAL MEDICINE
Payer: MEDICARE

## 2017-09-09 ENCOUNTER — APPOINTMENT (OUTPATIENT)
Dept: GENERAL RADIOLOGY | Age: 82
End: 2017-09-09
Payer: MEDICARE

## 2017-09-09 VITALS
TEMPERATURE: 97.7 F | DIASTOLIC BLOOD PRESSURE: 77 MMHG | SYSTOLIC BLOOD PRESSURE: 181 MMHG | RESPIRATION RATE: 15 BRPM | HEART RATE: 74 BPM | OXYGEN SATURATION: 94 %

## 2017-09-09 DIAGNOSIS — E87.6 HYPOKALEMIA: ICD-10-CM

## 2017-09-09 DIAGNOSIS — M89.8X1 CHRONIC SCAPULAR PAIN: Primary | ICD-10-CM

## 2017-09-09 DIAGNOSIS — G89.29 CHRONIC SCAPULAR PAIN: Primary | ICD-10-CM

## 2017-09-09 LAB
ALBUMIN SERPL-MCNC: 3.7 G/DL (ref 3.5–5.1)
ALP BLD-CCNC: 58 U/L (ref 38–126)
ALT SERPL-CCNC: 26 U/L (ref 11–66)
ANION GAP SERPL CALCULATED.3IONS-SCNC: 14 MEQ/L (ref 8–16)
AST SERPL-CCNC: 59 U/L (ref 5–40)
BASOPHILS # BLD: 0.5 %
BASOPHILS ABSOLUTE: 0 THOU/MM3 (ref 0–0.1)
BILIRUB SERPL-MCNC: 1.4 MG/DL (ref 0.3–1.2)
BUN BLDV-MCNC: 12 MG/DL (ref 7–22)
CALCIUM SERPL-MCNC: 8.4 MG/DL (ref 8.5–10.5)
CHLORIDE BLD-SCNC: 98 MEQ/L (ref 98–111)
CO2: 30 MEQ/L (ref 23–33)
CREAT SERPL-MCNC: 0.9 MG/DL (ref 0.4–1.2)
D-DIMER QUANTITATIVE: 907 NG/ML FEU (ref 0–500)
EOSINOPHIL # BLD: 0.7 %
EOSINOPHILS ABSOLUTE: 0 THOU/MM3 (ref 0–0.4)
GFR SERPL CREATININE-BSD FRML MDRD: > 90 ML/MIN/1.73M2
GLUCOSE BLD-MCNC: 104 MG/DL (ref 70–108)
HCT VFR BLD CALC: 33.4 % (ref 42–52)
HEMOGLOBIN: 11.3 GM/DL (ref 14–18)
LYMPHOCYTES # BLD: 17.5 %
LYMPHOCYTES ABSOLUTE: 0.8 THOU/MM3 (ref 1–4.8)
MACROCYTES: ABNORMAL
MCH RBC QN AUTO: 34.3 PG (ref 27–31)
MCHC RBC AUTO-ENTMCNC: 33.9 GM/DL (ref 33–37)
MCV RBC AUTO: 101.1 FL (ref 80–94)
MONOCYTES # BLD: 9.5 %
MONOCYTES ABSOLUTE: 0.4 THOU/MM3 (ref 0.4–1.3)
NUCLEATED RED BLOOD CELLS: 0 /100 WBC
OSMOLALITY CALCULATION: 283.2 MOSMOL/KG (ref 275–300)
PDW BLD-RTO: 12.9 % (ref 11.5–14.5)
PLATELET # BLD: 140 THOU/MM3 (ref 130–400)
PMV BLD AUTO: 8.1 MCM (ref 7.4–10.4)
POTASSIUM SERPL-SCNC: 3.1 MEQ/L (ref 3.5–5.2)
RBC # BLD: 3.3 MILL/MM3 (ref 4.7–6.1)
RBC # BLD: NORMAL 10*6/UL
SEG NEUTROPHILS: 71.8 %
SEGMENTED NEUTROPHILS ABSOLUTE COUNT: 3.2 THOU/MM3 (ref 1.8–7.7)
SODIUM BLD-SCNC: 142 MEQ/L (ref 135–145)
TOTAL PROTEIN: 6.5 G/DL (ref 6.1–8)
TROPONIN T: < 0.01 NG/ML
WBC # BLD: 4.5 THOU/MM3 (ref 4.8–10.8)

## 2017-09-09 PROCEDURE — 96361 HYDRATE IV INFUSION ADD-ON: CPT

## 2017-09-09 PROCEDURE — 71101 X-RAY EXAM UNILAT RIBS/CHEST: CPT

## 2017-09-09 PROCEDURE — 85379 FIBRIN DEGRADATION QUANT: CPT

## 2017-09-09 PROCEDURE — 6360000004 HC RX CONTRAST MEDICATION: Performed by: INTERNAL MEDICINE

## 2017-09-09 PROCEDURE — 6370000000 HC RX 637 (ALT 250 FOR IP): Performed by: INTERNAL MEDICINE

## 2017-09-09 PROCEDURE — 85025 COMPLETE CBC W/AUTO DIFF WBC: CPT

## 2017-09-09 PROCEDURE — 96374 THER/PROPH/DIAG INJ IV PUSH: CPT

## 2017-09-09 PROCEDURE — 80053 COMPREHEN METABOLIC PANEL: CPT

## 2017-09-09 PROCEDURE — 84484 ASSAY OF TROPONIN QUANT: CPT

## 2017-09-09 PROCEDURE — 2580000003 HC RX 258: Performed by: INTERNAL MEDICINE

## 2017-09-09 PROCEDURE — 36415 COLL VENOUS BLD VENIPUNCTURE: CPT

## 2017-09-09 PROCEDURE — 99284 EMERGENCY DEPT VISIT MOD MDM: CPT

## 2017-09-09 PROCEDURE — 93005 ELECTROCARDIOGRAM TRACING: CPT

## 2017-09-09 PROCEDURE — 71275 CT ANGIOGRAPHY CHEST: CPT

## 2017-09-09 PROCEDURE — 6360000002 HC RX W HCPCS: Performed by: INTERNAL MEDICINE

## 2017-09-09 RX ORDER — 0.9 % SODIUM CHLORIDE 0.9 %
1000 INTRAVENOUS SOLUTION INTRAVENOUS ONCE
Status: COMPLETED | OUTPATIENT
Start: 2017-09-09 | End: 2017-09-09

## 2017-09-09 RX ORDER — KETOROLAC TROMETHAMINE 30 MG/ML
15 INJECTION, SOLUTION INTRAMUSCULAR; INTRAVENOUS ONCE
Status: COMPLETED | OUTPATIENT
Start: 2017-09-09 | End: 2017-09-09

## 2017-09-09 RX ORDER — POTASSIUM CHLORIDE 20 MEQ/1
TABLET, EXTENDED RELEASE ORAL
Status: DISCONTINUED
Start: 2017-09-09 | End: 2017-09-09 | Stop reason: HOSPADM

## 2017-09-09 RX ORDER — TIZANIDINE 4 MG/1
4 TABLET ORAL EVERY 6 HOURS PRN
Status: DISCONTINUED | OUTPATIENT
Start: 2017-09-09 | End: 2017-09-09 | Stop reason: HOSPADM

## 2017-09-09 RX ORDER — TIZANIDINE 4 MG/1
4 TABLET ORAL EVERY 8 HOURS PRN
Qty: 30 TABLET | Refills: 0 | Status: ON HOLD | OUTPATIENT
Start: 2017-09-09 | End: 2018-03-02 | Stop reason: HOSPADM

## 2017-09-09 RX ORDER — POTASSIUM CHLORIDE 20 MEQ/1
20 TABLET, EXTENDED RELEASE ORAL 2 TIMES DAILY WITH MEALS
Status: DISCONTINUED | OUTPATIENT
Start: 2017-09-09 | End: 2017-09-09 | Stop reason: HOSPADM

## 2017-09-09 RX ADMIN — IOPAMIDOL 80 ML: 755 INJECTION, SOLUTION INTRAVENOUS at 12:42

## 2017-09-09 RX ADMIN — TIZANIDINE 4 MG: 4 TABLET ORAL at 12:04

## 2017-09-09 RX ADMIN — POTASSIUM CHLORIDE 20 MEQ: 1500 TABLET, EXTENDED RELEASE ORAL at 13:15

## 2017-09-09 RX ADMIN — SODIUM CHLORIDE 1000 ML: 9 INJECTION, SOLUTION INTRAVENOUS at 13:13

## 2017-09-09 RX ADMIN — KETOROLAC TROMETHAMINE 15 MG: 30 INJECTION, SOLUTION INTRAMUSCULAR at 12:04

## 2017-09-09 ASSESSMENT — PAIN DESCRIPTION - ORIENTATION
ORIENTATION: LEFT;POSTERIOR
ORIENTATION: LEFT;POSTERIOR

## 2017-09-09 ASSESSMENT — PAIN DESCRIPTION - PAIN TYPE
TYPE: ACUTE PAIN

## 2017-09-09 ASSESSMENT — ENCOUNTER SYMPTOMS
SORE THROAT: 0
VOMITING: 0
DIARRHEA: 0
EYE PAIN: 0
SHORTNESS OF BREATH: 0
EYE DISCHARGE: 0
RHINORRHEA: 0
BACK PAIN: 1
WHEEZING: 0
COUGH: 0
NAUSEA: 0
ABDOMINAL PAIN: 0

## 2017-09-09 ASSESSMENT — PAIN SCALES - GENERAL
PAINLEVEL_OUTOF10: 3
PAINLEVEL_OUTOF10: 4
PAINLEVEL_OUTOF10: 3

## 2017-09-09 ASSESSMENT — PAIN DESCRIPTION - LOCATION
LOCATION: BACK;RIB CAGE
LOCATION: BACK;RIB CAGE

## 2017-09-09 NOTE — ED TRIAGE NOTES
Patient presents to ER with CC of back pain and rib pain on the left medial posterior side. Patient states this pain really began three weeks ago but since Thursday has become \"a lot worse\". Dr. Jay Jay Garcia at bedside to evaluation patient. EKG in process.

## 2017-09-09 NOTE — ED NOTES
Patient asleep at this time. No distress noted. Bolus Continues.       Tobi Quiroz, GLENROY  09/09/17 9290

## 2017-09-09 NOTE — ED NOTES
Patient resting with eyes closed at this time. No distress noted.       Zully Zacarias RN  09/09/17 1047

## 2017-09-09 NOTE — ED AVS SNAPSHOT
Take 1 tablet by mouth 3 times daily (with meals)       multivitamin tablet   Take 1 tablet by mouth daily       NIFEdipine 30 MG extended release tablet   Commonly known as:  NIFEDICAL XL   Take 1 tablet by mouth nightly       ondansetron 4 MG disintegrating tablet   Commonly known as:  ZOFRAN ODT   Take 1 tablet by mouth every 8 hours as needed for Nausea       oxybutynin 10 MG extended release tablet   Commonly known as:  DITROPAN-XL   Take 1 tablet by mouth nightly       pantoprazole 40 MG tablet   Commonly known as:  PROTONIX   Take 1 tablet by mouth every morning (before breakfast)       potassium chloride 10 MEQ extended release tablet   Commonly known as:  KLOR-CON M10   Take 1 tablet by mouth daily       potassium chloride 20 MEQ/15ML (10%) oral solution   Take 30 mLs by mouth twice daily for 3 days       sodium chloride 1 g tablet   Take 1 tablet by mouth 2 times daily       vitamin B-1 100 MG tablet   Commonly known as:  THIAMINE   Take 1 tablet by mouth daily       vitamin D 1000 UNIT Tabs tablet   Commonly known as:  CHOLECALCIFEROL   Take 1 tablet by mouth daily       * Notice: This list has 2 medication(s) that are the same as other medications prescribed for you. Read the directions carefully, and ask your doctor or other care provider to review them with you. Where to Get Your Medications      You can get these medications from any pharmacy     Bring a paper prescription for each of these medications     diclofenac 3 % gel    tiZANidine 4 MG tablet               Allergies as of 9/9/2017     No Known Allergies      Immunizations as of 9/9/2017     Name Date Dose VIS Date Route    Influenza Vaccine, unspecified formulation 11/3/2015 -- -- --    Comment: uploaded via claim file    Td 10/26/2014 0.5 mL 2/4/2014 Intramuscular         After Visit Summary    This summary was created for you. Thank you for entrusting your care to us.   The following information insurance card, photo ID, co-pay, and all medication bottles to your appointment. If self-pay, payment is expected at the time of service. Follow-up Information     Follow up with Abdiel Mackey MD. Go in 2 days. Specialty:  Internal Medicine    Contact information:    [address], [address] [place] [address]  [phone]        Future Appointments     9/15/2017 10:45 AM     Appointment with Hellen Schaumann, MD at [address] [place] [address] Neuro and Rehab ([phone])   Please arrive 15 minutes prior to appointment, bring photo ID and insurance card. [address] [facility] [address] [facility] [address]  [place] [address]       12/1/2017 12:00 PM     Appointment with Marcos Tian MD at [facility] ([phone])   Please arrive 15 minutes prior to appointment, bring photo ID and insurance card. [address] [facility] [address] [facility] [address]       3/21/2018 10:45 AM     Appointment with Kacy Chaparro NP at [facility] Urology ([phone])   Please arrive 15 minutes prior to appointment, bring photo ID and insurance card. Please arrive 15 minutes prior to appointment, bring photo ID and insurance card. [address] [facility]  [address]  [place] [address]         Preventive Care        Date Due    Zoster Vaccine 5/24/1996    Pneumococcal Vaccines (two) for all adults aged 72 and over (1 of 2 - PCV13) 5/24/2001    Tetanus Combination Vaccine (1 - Tdap) 10/27/2014    Yearly Flu Vaccine (1) 9/1/2017                 Care Plan Once You Return Home    This section includes instructions you will need to follow once you leave the hospital.  Your care team will discuss these with you, so you and those caring for you know how to best care for your health needs at home. This section may also include educational information about certain health topics that may be of help to you. Important Information if you smoke or are exposed to smoking       SMOKING: QUIT SMOKING.   THIS IS THE MOST IMPORTANT ACTION YOU CAN TAKE TO IMPROVE YOUR CURRENT AND FUTURE HEALTH. Call the [facility] at [place] NOW ([phone])    Smoking harms nonsmokers. When nonsmokers are around people who smoke, they absorb nicotine, carbon monoxide, and other ingredients of tobacco smoke. DO NOT SMOKE AROUND CHILDREN     Children exposed to secondhand smoke are at an increased risk of:  Sudden Infant Death Syndrome (SIDS), acute respiratory infections, inflammation of the middle ear, and severe asthma. Over a longer time, it causes heart disease and lung cancer. There is no safe level of exposure to secondhand smoke. Important information for a smoker       SMOKING: QUIT SMOKING. THIS IS THE MOST IMPORTANT ACTION YOU CAN TAKE TO IMPROVE YOUR CURRENT AND FUTURE HEALTH. Call the [address] [place] [address] at [place] NOW ([phone])    Smoking harms nonsmokers. When nonsmokers are around people who smoke, they absorb nicotine, carbon monoxide, and other ingredients of tobacco smoke. DO NOT SMOKE AROUND CHILDREN     Children exposed to secondhand smoke are at an increased risk of:  Sudden Infant Death Syndrome (SIDS), acute respiratory infections, inflammation of the middle ear, and severe asthma. Over a longer time, it causes heart disease and lung cancer. There is no safe level of exposure to secondhand smoke. [employer] Signup     Our records indicate that you have an active [employer] account. You can view your After Visit Summary by going to [email or web address] and logging in with your [employer] username and password. If you don't have a [employer] username and password but a parent or guardian has access to your record, the parent or guardian should login with their own [employer] username and password and access your record to view the After Visit Summary.      Additional Information If you have questions, please contact the physician practice where you receive care. Remember, MyChart is NOT to be used for urgent needs. For medical emergencies, dial 911. For questions regarding your MyChart account call [phone]. If you have a clinical question, please call your doctor's office. View your information online  ? Review your current list of  medications, immunization, and allergies. ? Review your future test results online . ? Review your discharge instructions provided by your caregivers at discharge    Certain functionality such as prescription refills, scheduling appointments or sending messages to your provider are not activated if your provider does not use [employer] in his/her office    For questions regarding your MyChart account call [phone]. If you have a clinical question, please call your doctor's office. The information on all pages of the After Visit Summary has been reviewed with me, the patient and/or responsible adult, by my health care provider(s). I had the opportunity to ask questions regarding this information. I understand I should dispose of my armband safely at home to protect my health information. A complete copy of the After Visit Summary has been given to me, the patient and/or responsible adult. Patient Signature/Responsible Adult: ___________________________________    Nurse Signature: ___________________________________  Resident/MLP Signature: ___________________________________  Attending Signature: ___________________________________    Date:____________Time:____________              Discharge Instructions            Hypokalemia: Care Instructions  Your Care Instructions  Hypokalemia (say \"zr-[id number]-qhw-WESLEY-april-uh\") is a low level of potassium. The heart, muscles, kidneys, and nervous system all need potassium to work well. This problem has many different causes.  Kidney problems, diet, and medicines like diuretics and laxatives can cause it. So can vomiting or diarrhea. In some cases, cancer is the cause. Your doctor may do tests to find the cause of your low potassium levels. You may need medicines to bring your potassium levels back to normal. You may also need regular blood tests to check your potassium. If you have very low potassium, you may need intravenous (IV) medicines. You also may need tests to check the electrical activity of your heart. Heart problems caused by low potassium levels can be very serious. Follow-up care is a key part of your treatment and safety. Be sure to make and go to all appointments, and call your doctor if you are having problems. It's also a good idea to know your test results and keep a list of the medicines you take. How can you care for yourself at home? · If your doctor recommends it, eat foods that have a lot of potassium. These include fresh fruits, juices, and vegetables. They also include nuts, beans, and milk. · Be safe with medicines. If your doctor prescribes medicines or potassium supplements, take them exactly as directed. Call your doctor if you have any problems with your medicines. · Get your potassium levels tested as often as your doctor tells you. When should you call for help? Call 911 anytime you think you may need emergency care. For example, call if:  · You feel like your heart is missing beats. Heart problems caused by low potassium can cause death. · You passed out (lost consciousness). · You have a seizure. Call your doctor now or seek immediate medical care if:  · You feel weak or unusually tired. · You have severe arm or leg cramps. · You have tingling or numbness. · You feel sick to your stomach, or you vomit. · You have belly cramps. · You feel bloated or constipated. · You have to urinate a lot. · You feel very thirsty most of the time. · You are dizzy or lightheaded, or you feel like you may faint. license by [facility] ([facility]). If you have questions about a medical condition or this instruction, always ask your healthcare professional. [place] [address] any warranty or liability for your use of this information.

## 2017-09-09 NOTE — ED PROVIDER NOTES
hypertrophy); CKD (chronic kidney disease) stage 2, GFR 60-89 ml/min; Dementia; Erectile dysfunction; [place] (hard of hearing); Hyperlipidemia; Hypertension; and Metabolic bone disease. SURGICAL HISTORY      has a past surgical history that includes shoulder surgery; Neck surgery; Tonsillectomy; TURP (5-31-12); Colonoscopy; Thyroid surgery; ostate surgery; Endoscopy, colon, diagnostic; and Esophagus surgery.     CURRENT MEDICATIONS       Previous Medications    ACETAMINOPHEN (TYLENOL) 325 MG TABLET    Take 2 tablets by mouth every 4 hours as needed for Pain    ATORVASTATIN (LIPITOR) 10 MG TABLET    Take 1 tablet by mouth daily    CELECOXIB (CELEBREX) 200 MG CAPSULE    Take 1 capsule by mouth daily    DISULFIRAM (ANTABUSE) 250 MG TABLET    Take 1 tablet by mouth daily    DOCUSATE SODIUM (COLACE) 100 MG CAPSULE    Take 1 capsule by mouth 2 times daily    DONEPEZIL (ARICEPT) 10 MG TABLET    Take 1 tablet by mouth nightly    DONEPEZIL (ARICEPT) 10 MG TABLET    Take 10 mg by mouth    FLUDROCORTISONE (FLORINEF) 0.1 MG TABLET    Take 1 tablet by mouth daily    LANSOPRAZOLE (PREVACID) 30 MG DELAYED RELEASE CAPSULE    Take 30 mg by mouth 2 times daily    METOPROLOL TARTRATE (LOPRESSOR) 50 MG TABLET    Take 1 tablet by mouth 2 times daily    MIDODRINE (PROAMATINE) 10 MG TABLET    Take 1 tablet by mouth 3 times daily (with meals)    MULTIPLE VITAMIN (MULTIVITAMIN) TABLET    Take 1 tablet by mouth daily    NIFEDIPINE (NIFEDICAL XL) 30 MG EXTENDED RELEASE TABLET    Take 1 tablet by mouth nightly    ONDANSETRON (ZOFRAN ODT) 4 MG DISINTEGRATING TABLET    Take 1 tablet by mouth every 8 hours as needed for Nausea    OXYBUTYNIN (DITROPAN-XL) 10 MG EXTENDED RELEASE TABLET    Take 1 tablet by mouth nightly    PANTOPRAZOLE (PROTONIX) 40 MG TABLET    Take 1 tablet by mouth every morning (before breakfast)    POTASSIUM CHLORIDE (KLOR-CON M10) 10 MEQ EXTENDED RELEASE TABLET    Take 1 tablet by mouth daily    POTASSIUM CHLORIDE 20 MEQ/15ML (10%) ORAL SOLUTION    Take 30 mLs by mouth twice daily for 3 days    SODIUM CHLORIDE 1 G TABLET    Take 1 tablet by mouth 2 times daily    VITAMIN B-1 (THIAMINE) 100 MG TABLET    Take 1 tablet by mouth daily    VITAMIN D (CHOLECALCIFEROL) 1000 UNIT TABS TABLET    Take 1 tablet by mouth daily       ALLERGIES     has No Known Allergies. FAMILY HISTORY     indicated that his mother is . He indicated that his father is . He indicated that three of his five sisters are alive. He indicated that all of his four brothers are alive. family history includes Alzheimer's Disease in his sister; Arthritis in his sister and sister; Cancer in his sister; Dementia in his brother; Heart Disease in his sister; High Blood Pressure in his father; No Known Problems in his brother and brother; Other in his brother; Stroke in his father and sister. SOCIAL HISTORY      reports that he quit smoking about 25 years ago. He has never used smokeless tobacco. He reports that he drinks alcohol. He reports that he does not use illicit drugs. PHYSICAL EXAM     INITIAL VITALS:  oral temperature is 97.7 °F (36.5 °C). His blood pressure is 181/77 (abnormal) and his pulse is 74. His respiration is 15 and oxygen saturation is 94%. Physical Exam   Constitutional: He is oriented to person, place, and time. He appears well-developed and well-nourished. HENT:   Head: Normocephalic and atraumatic. Right Ear: External ear normal.   Left Ear: External ear normal.   Eyes: Conjunctivae are normal. Right eye exhibits no discharge. Left eye exhibits no discharge. No scleral icterus. Neck: Normal range of motion. Neck supple. No JVD present. Cardiovascular: Normal rate, regular rhythm and normal heart sounds. Exam reveals no gallop and no friction rub. No murmur heard. Pulmonary/Chest: Effort normal and breath sounds normal. No respiratory distress. He has no wheezes. He has no rales.    Musculoskeletal:   Tenderness medial RBC 3.30 (*)     Hemoglobin 11.3 (*)     Hematocrit 33.4 (*)     .1 (*)     MCH 34.3 (*)     Lymphocytes # 0.8 (*)     All other components within normal limits   COMPREHENSIVE METABOLIC PANEL - Abnormal; Notable for the following:     Potassium 3.1 (*)     Calcium 8.4 (*)     AST 59 (*)     Total Bilirubin 1.4 (*)     All other components within normal limits   TROPONIN   ANION GAP   GLOMERULAR FILTRATION RATE, ESTIMATED   OSMOLALITY   URINE RT REFLEX TO CULTURE       EMERGENCY DEPARTMENT COURSE:   Vitals:    Vitals:    09/09/17 1122 09/09/17 1304 09/09/17 1414   BP: (!) 166/103 (!) 178/77 (!) 181/77   Pulse: 80 55 74   Resp: 20 15 15   Temp: 97.7 °F (36.5 °C)     TempSrc: Oral     SpO2: 98% 94% 94%       12:11 PM: The patient was seen and evaluated. Labs and imaging were ordered. The patient was given Toradol and Zanaflex while in the ED. MDM:  Patient was given Toradol and Zanaflex in the emergency department and patient pain was completely gone. Patient has been sleeping comfortably in the emergency department. Patient's x-ray was negative for any fracture. CT of the chest was done because of elevated d-dimer and it was negative for any pulmonary embolism. This is troponin was also negative. Patient's pain was just medial to the medial border of left scapula and was reproducible. Patient will be discharged home to follow with his primary care physician. Patient's wife was told to bring the patient back if the symptoms get worse. Patient CT scan also showed mild dependent bibasilar opacities they were more consistent with atelectasis since patient does not have leukocytosis or fever. CRITICAL CARE:   None     CONSULTS:  None    PROCEDURES:  None     FINAL IMPRESSION      1. Chronic scapular pain    2.  Hypokalemia          DISPOSITION/PLAN   Discharge    PATIENT REFERRED TO:  Marissa Hillman MD  [facility], [address]  [facility] [place] [facility] [address]  [phone]    Go in 2 days        DISCHARGE

## 2017-09-10 ENCOUNTER — HOSPITAL ENCOUNTER (INPATIENT)
Age: 82
LOS: 1 days | Discharge: HOME HEALTH | DRG: 884 | End: 2017-09-11
Attending: FAMILY MEDICINE | Admitting: INTERNAL MEDICINE
Payer: MEDICARE

## 2017-09-10 ENCOUNTER — APPOINTMENT (OUTPATIENT)
Dept: CT IMAGING | Age: 82
DRG: 884 | End: 2017-09-10
Payer: MEDICARE

## 2017-09-10 ENCOUNTER — APPOINTMENT (OUTPATIENT)
Dept: GENERAL RADIOLOGY | Age: 82
DRG: 884 | End: 2017-09-10
Payer: MEDICARE
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
## 2017-09-10 PROBLEM — R29.6 FREQUENT FALLS: Status: ACTIVE | Noted: 2017-09-10

## 2017-09-10 PROBLEM — E87.6 HYPOKALEMIA: Status: ACTIVE | Noted: 2017-09-10

## 2017-09-10 PROBLEM — M54.9 INTRACTABLE BACK PAIN: Status: ACTIVE | Noted: 2017-09-10

## 2017-09-10 LAB
ACETAMINOPHEN LEVEL: < 5 UG/ML (ref 0–20)
AMMONIA: 23 UMOL/L (ref 11–60)
ANION GAP SERPL CALCULATED.3IONS-SCNC: 11 MEQ/L (ref 8–16)
BACTERIA: NORMAL /HPF
BASOPHILS # BLD: 1.1 %
BASOPHILS ABSOLUTE: 0 THOU/MM3 (ref 0–0.1)
BILIRUBIN URINE: NEGATIVE
BLOOD, URINE: NEGATIVE
BUN BLDV-MCNC: 10 MG/DL (ref 7–22)
CALCIUM SERPL-MCNC: 8.4 MG/DL (ref 8.5–10.5)
CASTS 2: NORMAL /LPF
CASTS UA: NORMAL /LPF
CHARACTER, URINE: CLEAR
CHLORIDE BLD-SCNC: 98 MEQ/L (ref 98–111)
CO2: 30 MEQ/L (ref 23–33)
COLOR: YELLOW
CREAT SERPL-MCNC: 1 MG/DL (ref 0.4–1.2)
CRYSTALS, UA: NORMAL
EKG ATRIAL RATE: 89 BPM
EKG P AXIS: 69 DEGREES
EKG P-R INTERVAL: 132 MS
EKG Q-T INTERVAL: 418 MS
EKG QRS DURATION: 84 MS
EKG QTC CALCULATION (BAZETT): 508 MS
EKG R AXIS: 3 DEGREES
EKG T AXIS: -6 DEGREES
EKG VENTRICULAR RATE: 89 BPM
EOSINOPHIL # BLD: 0.6 %
EOSINOPHILS ABSOLUTE: 0 THOU/MM3 (ref 0–0.4)
EPITHELIAL CELLS, UA: NORMAL /HPF
ETHYL ALCOHOL, SERUM: < 0.01 %
FLU A ANTIGEN: NEGATIVE
FLU B ANTIGEN: NEGATIVE
GFR SERPL CREATININE-BSD FRML MDRD: 87 ML/MIN/1.73M2
GLUCOSE BLD-MCNC: 117 MG/DL (ref 70–108)
GLUCOSE URINE: NEGATIVE MG/DL
HCT VFR BLD CALC: 32.1 % (ref 42–52)
HEMOGLOBIN: 11 GM/DL (ref 14–18)
KETONES, URINE: NEGATIVE
LACTIC ACID: 1.6 MMOL/L (ref 0.5–2.2)
LEUKOCYTE ESTERASE, URINE: NEGATIVE
LYMPHOCYTES # BLD: 23.2 %
LYMPHOCYTES ABSOLUTE: 1 THOU/MM3 (ref 1–4.8)
MACROCYTES: ABNORMAL
MCH RBC QN AUTO: 34.6 PG (ref 27–31)
MCHC RBC AUTO-ENTMCNC: 34.1 GM/DL (ref 33–37)
MCV RBC AUTO: 101.4 FL (ref 80–94)
MISCELLANEOUS 2: NORMAL
MONOCYTES # BLD: 12.7 %
MONOCYTES ABSOLUTE: 0.5 THOU/MM3 (ref 0.4–1.3)
NITRITE, URINE: NEGATIVE
NUCLEATED RED BLOOD CELLS: 0 /100 WBC
OSMOLALITY CALCULATION: 277.6 MOSMOL/KG (ref 275–300)
PDW BLD-RTO: 13.2 % (ref 11.5–14.5)
PH UA: 8
PLATELET # BLD: 139 THOU/MM3 (ref 130–400)
PMV BLD AUTO: 8.3 MCM (ref 7.4–10.4)
POTASSIUM SERPL-SCNC: 3.2 MEQ/L (ref 3.5–5.2)
PROCALCITONIN: 0.07 NG/ML (ref 0.01–0.09)
PROLACTIN: 10 NG/ML
PROTEIN UA: NEGATIVE
RBC # BLD: 3.17 MILL/MM3 (ref 4.7–6.1)
RBC # BLD: NORMAL 10*6/UL
RBC URINE: NORMAL /HPF
RENAL EPITHELIAL, UA: NORMAL
SEG NEUTROPHILS: 62.4 %
SEGMENTED NEUTROPHILS ABSOLUTE COUNT: 2.6 THOU/MM3 (ref 1.8–7.7)
SODIUM BLD-SCNC: 139 MEQ/L (ref 135–145)
SPECIFIC GRAVITY, URINE: 1.01 (ref 1–1.03)
TROPONIN T: < 0.01 NG/ML
TSH SERPL DL<=0.05 MIU/L-ACNC: 1.47 UIU/ML (ref 0.4–4.2)
UROBILINOGEN, URINE: 1 EU/DL
WBC # BLD: 4.2 THOU/MM3 (ref 4.8–10.8)
WBC UA: NORMAL /HPF
YEAST: NORMAL

## 2017-09-10 PROCEDURE — 84146 ASSAY OF PROLACTIN: CPT

## 2017-09-10 PROCEDURE — 72128 CT CHEST SPINE W/O DYE: CPT

## 2017-09-10 PROCEDURE — 99285 EMERGENCY DEPT VISIT HI MDM: CPT

## 2017-09-10 PROCEDURE — 82140 ASSAY OF AMMONIA: CPT

## 2017-09-10 PROCEDURE — 84145 PROCALCITONIN (PCT): CPT

## 2017-09-10 PROCEDURE — 83605 ASSAY OF LACTIC ACID: CPT

## 2017-09-10 PROCEDURE — G0480 DRUG TEST DEF 1-7 CLASSES: HCPCS

## 2017-09-10 PROCEDURE — 36415 COLL VENOUS BLD VENIPUNCTURE: CPT

## 2017-09-10 PROCEDURE — 70450 CT HEAD/BRAIN W/O DYE: CPT

## 2017-09-10 PROCEDURE — 72125 CT NECK SPINE W/O DYE: CPT

## 2017-09-10 PROCEDURE — 84443 ASSAY THYROID STIM HORMONE: CPT

## 2017-09-10 PROCEDURE — 6370000000 HC RX 637 (ALT 250 FOR IP): Performed by: FAMILY MEDICINE

## 2017-09-10 PROCEDURE — 71020 XR CHEST STANDARD TWO VW: CPT

## 2017-09-10 PROCEDURE — 87804 INFLUENZA ASSAY W/OPTIC: CPT

## 2017-09-10 PROCEDURE — 81001 URINALYSIS AUTO W/SCOPE: CPT

## 2017-09-10 PROCEDURE — 80048 BASIC METABOLIC PNL TOTAL CA: CPT

## 2017-09-10 PROCEDURE — 85025 COMPLETE CBC W/AUTO DIFF WBC: CPT

## 2017-09-10 PROCEDURE — 1200000003 HC TELEMETRY R&B

## 2017-09-10 PROCEDURE — 84484 ASSAY OF TROPONIN QUANT: CPT

## 2017-09-10 PROCEDURE — 93005 ELECTROCARDIOGRAM TRACING: CPT

## 2017-09-10 RX ORDER — POTASSIUM CHLORIDE 750 MG/1
40 TABLET, FILM COATED, EXTENDED RELEASE ORAL ONCE
Status: COMPLETED | OUTPATIENT
Start: 2017-09-10 | End: 2017-09-10

## 2017-09-10 RX ADMIN — POTASSIUM CHLORIDE 40 MEQ: 750 TABLET, FILM COATED, EXTENDED RELEASE ORAL at 21:15

## 2017-09-10 ASSESSMENT — PAIN DESCRIPTION - ORIENTATION: ORIENTATION: LEFT;MID

## 2017-09-10 ASSESSMENT — ENCOUNTER SYMPTOMS
RHINORRHEA: 0
SHORTNESS OF BREATH: 0
BACK PAIN: 1
ABDOMINAL PAIN: 0
SORE THROAT: 0
WHEEZING: 0
DIARRHEA: 0
EYE REDNESS: 0
EYE DISCHARGE: 0
COUGH: 0
NAUSEA: 0
VOMITING: 0

## 2017-09-10 ASSESSMENT — PAIN SCALES - GENERAL: PAINLEVEL_OUTOF10: 4

## 2017-09-10 ASSESSMENT — PAIN DESCRIPTION - LOCATION: LOCATION: BACK

## 2017-09-10 ASSESSMENT — PAIN DESCRIPTION - PAIN TYPE: TYPE: ACUTE PAIN

## 2017-09-11 VITALS
OXYGEN SATURATION: 98 % | TEMPERATURE: 98.2 F | DIASTOLIC BLOOD PRESSURE: 79 MMHG | HEART RATE: 65 BPM | WEIGHT: 134.6 LBS | RESPIRATION RATE: 18 BRPM | BODY MASS INDEX: 18.84 KG/M2 | HEIGHT: 71 IN | SYSTOLIC BLOOD PRESSURE: 170 MMHG

## 2017-09-11 LAB
ANION GAP SERPL CALCULATED.3IONS-SCNC: 13 MEQ/L (ref 8–16)
BUN BLDV-MCNC: 8 MG/DL (ref 7–22)
CALCIUM SERPL-MCNC: 8.4 MG/DL (ref 8.5–10.5)
CHLORIDE BLD-SCNC: 103 MEQ/L (ref 98–111)
CO2: 27 MEQ/L (ref 23–33)
CREAT SERPL-MCNC: 0.9 MG/DL (ref 0.4–1.2)
EKG ATRIAL RATE: 54 BPM
EKG P AXIS: 66 DEGREES
EKG P-R INTERVAL: 124 MS
EKG Q-T INTERVAL: 510 MS
EKG QRS DURATION: 88 MS
EKG QTC CALCULATION (BAZETT): 483 MS
EKG R AXIS: -8 DEGREES
EKG T AXIS: -13 DEGREES
EKG VENTRICULAR RATE: 54 BPM
GFR SERPL CREATININE-BSD FRML MDRD: > 90 ML/MIN/1.73M2
GLUCOSE BLD-MCNC: 87 MG/DL (ref 70–108)
MAGNESIUM: 1.2 MG/DL (ref 1.6–2.4)
POTASSIUM SERPL-SCNC: 3.3 MEQ/L (ref 3.5–5.2)
SODIUM BLD-SCNC: 143 MEQ/L (ref 135–145)

## 2017-09-11 PROCEDURE — 97535 SELF CARE MNGMENT TRAINING: CPT

## 2017-09-11 PROCEDURE — 36415 COLL VENOUS BLD VENIPUNCTURE: CPT

## 2017-09-11 PROCEDURE — G8980 MOBILITY D/C STATUS: HCPCS

## 2017-09-11 PROCEDURE — 6370000000 HC RX 637 (ALT 250 FOR IP): Performed by: INTERNAL MEDICINE

## 2017-09-11 PROCEDURE — 97161 PT EVAL LOW COMPLEX 20 MIN: CPT

## 2017-09-11 PROCEDURE — 97530 THERAPEUTIC ACTIVITIES: CPT

## 2017-09-11 PROCEDURE — 97110 THERAPEUTIC EXERCISES: CPT

## 2017-09-11 PROCEDURE — 97165 OT EVAL LOW COMPLEX 30 MIN: CPT

## 2017-09-11 PROCEDURE — 80048 BASIC METABOLIC PNL TOTAL CA: CPT

## 2017-09-11 PROCEDURE — G8979 MOBILITY GOAL STATUS: HCPCS

## 2017-09-11 PROCEDURE — 2580000003 HC RX 258: Performed by: INTERNAL MEDICINE

## 2017-09-11 PROCEDURE — G8978 MOBILITY CURRENT STATUS: HCPCS

## 2017-09-11 PROCEDURE — 83735 ASSAY OF MAGNESIUM: CPT

## 2017-09-11 PROCEDURE — 6360000002 HC RX W HCPCS: Performed by: INTERNAL MEDICINE

## 2017-09-11 RX ORDER — METOPROLOL TARTRATE 50 MG/1
50 TABLET, FILM COATED ORAL 2 TIMES DAILY
Status: DISCONTINUED | OUTPATIENT
Start: 2017-09-11 | End: 2017-09-11 | Stop reason: HOSPADM

## 2017-09-11 RX ORDER — POTASSIUM CHLORIDE 750 MG/1
20 CAPSULE, EXTENDED RELEASE ORAL ONCE
Qty: 1 CAPSULE | Refills: 0 | Status: SHIPPED | OUTPATIENT
Start: 2017-09-11 | End: 2017-09-11 | Stop reason: HOSPADM

## 2017-09-11 RX ORDER — MAGNESIUM SULFATE IN WATER 40 MG/ML
4 INJECTION, SOLUTION INTRAVENOUS ONCE
Status: COMPLETED | OUTPATIENT
Start: 2017-09-11 | End: 2017-09-11

## 2017-09-11 RX ORDER — MIDODRINE HYDROCHLORIDE 10 MG/1
10 TABLET ORAL
Status: DISCONTINUED | OUTPATIENT
Start: 2017-09-11 | End: 2017-09-11 | Stop reason: HOSPADM

## 2017-09-11 RX ORDER — NIFEDIPINE 30 MG/1
30 TABLET, EXTENDED RELEASE ORAL NIGHTLY
Status: DISCONTINUED | OUTPATIENT
Start: 2017-09-11 | End: 2017-09-11 | Stop reason: HOSPADM

## 2017-09-11 RX ORDER — DISULFIRAM 250 MG/1
250 TABLET ORAL DAILY
Status: DISCONTINUED | OUTPATIENT
Start: 2017-09-11 | End: 2017-09-11 | Stop reason: HOSPADM

## 2017-09-11 RX ORDER — SODIUM CHLORIDE 0.9 % (FLUSH) 0.9 %
10 SYRINGE (ML) INJECTION EVERY 12 HOURS SCHEDULED
Status: DISCONTINUED | OUTPATIENT
Start: 2017-09-11 | End: 2017-09-11 | Stop reason: HOSPADM

## 2017-09-11 RX ORDER — OXYBUTYNIN CHLORIDE 10 MG/1
10 TABLET, EXTENDED RELEASE ORAL NIGHTLY
Status: DISCONTINUED | OUTPATIENT
Start: 2017-09-11 | End: 2017-09-11 | Stop reason: HOSPADM

## 2017-09-11 RX ORDER — DONEPEZIL HYDROCHLORIDE 10 MG/1
10 TABLET, FILM COATED ORAL NIGHTLY
Status: DISCONTINUED | OUTPATIENT
Start: 2017-09-11 | End: 2017-09-11 | Stop reason: HOSPADM

## 2017-09-11 RX ORDER — ONDANSETRON 2 MG/ML
4 INJECTION INTRAMUSCULAR; INTRAVENOUS EVERY 6 HOURS PRN
Status: DISCONTINUED | OUTPATIENT
Start: 2017-09-11 | End: 2017-09-11 | Stop reason: HOSPADM

## 2017-09-11 RX ORDER — FLUDROCORTISONE ACETATE 0.1 MG/1
0.1 TABLET ORAL DAILY
Status: DISCONTINUED | OUTPATIENT
Start: 2017-09-11 | End: 2017-09-11 | Stop reason: HOSPADM

## 2017-09-11 RX ORDER — ACETAMINOPHEN 325 MG/1
650 TABLET ORAL EVERY 4 HOURS PRN
Status: DISCONTINUED | OUTPATIENT
Start: 2017-09-11 | End: 2017-09-11 | Stop reason: HOSPADM

## 2017-09-11 RX ORDER — PANTOPRAZOLE SODIUM 40 MG/1
40 TABLET, DELAYED RELEASE ORAL
Status: DISCONTINUED | OUTPATIENT
Start: 2017-09-11 | End: 2017-09-11 | Stop reason: HOSPADM

## 2017-09-11 RX ORDER — SODIUM CHLORIDE 450 MG/100ML
INJECTION, SOLUTION INTRAVENOUS CONTINUOUS
Status: DISCONTINUED | OUTPATIENT
Start: 2017-09-11 | End: 2017-09-11 | Stop reason: HOSPADM

## 2017-09-11 RX ORDER — SODIUM CHLORIDE 0.9 % (FLUSH) 0.9 %
10 SYRINGE (ML) INJECTION PRN
Status: DISCONTINUED | OUTPATIENT
Start: 2017-09-11 | End: 2017-09-11 | Stop reason: HOSPADM

## 2017-09-11 RX ORDER — CELECOXIB 200 MG/1
200 CAPSULE ORAL DAILY
Status: DISCONTINUED | OUTPATIENT
Start: 2017-09-11 | End: 2017-09-11 | Stop reason: HOSPADM

## 2017-09-11 RX ORDER — MIDODRINE HYDROCHLORIDE 10 MG/1
10 TABLET ORAL 2 TIMES DAILY
COMMUNITY
End: 2017-12-01 | Stop reason: ALTCHOICE

## 2017-09-11 RX ORDER — DRONABINOL 2.5 MG/1
2.5 CAPSULE ORAL
Status: ON HOLD | COMMUNITY
End: 2018-03-02 | Stop reason: HOSPADM

## 2017-09-11 RX ORDER — SODIUM CHLORIDE 1000 MG
1 TABLET, SOLUBLE MISCELLANEOUS 2 TIMES DAILY
Status: DISCONTINUED | OUTPATIENT
Start: 2017-09-11 | End: 2017-09-11 | Stop reason: HOSPADM

## 2017-09-11 RX ORDER — POTASSIUM CHLORIDE 20 MEQ/1
20 TABLET, EXTENDED RELEASE ORAL ONCE
Status: COMPLETED | OUTPATIENT
Start: 2017-09-11 | End: 2017-09-11

## 2017-09-11 RX ORDER — DOCUSATE SODIUM 100 MG/1
100 CAPSULE, LIQUID FILLED ORAL 2 TIMES DAILY
Status: DISCONTINUED | OUTPATIENT
Start: 2017-09-11 | End: 2017-09-11 | Stop reason: HOSPADM

## 2017-09-11 RX ORDER — METOPROLOL TARTRATE 50 MG/1
50 TABLET, FILM COATED ORAL DAILY
Status: ON HOLD | COMMUNITY
End: 2018-03-02 | Stop reason: HOSPADM

## 2017-09-11 RX ORDER — MULTIVITAMIN WITH FOLIC ACID 400 MCG
1 TABLET ORAL DAILY
Status: DISCONTINUED | OUTPATIENT
Start: 2017-09-11 | End: 2017-09-11 | Stop reason: HOSPADM

## 2017-09-11 RX ORDER — TIZANIDINE 4 MG/1
4 TABLET ORAL EVERY 8 HOURS PRN
Status: DISCONTINUED | OUTPATIENT
Start: 2017-09-11 | End: 2017-09-11 | Stop reason: HOSPADM

## 2017-09-11 RX ORDER — TRAMADOL HYDROCHLORIDE 50 MG/1
50 TABLET ORAL EVERY 6 HOURS PRN
Qty: 30 TABLET | Refills: 0 | Status: SHIPPED | OUTPATIENT
Start: 2017-09-11 | End: 2017-09-21

## 2017-09-11 RX ORDER — ATORVASTATIN CALCIUM 10 MG/1
10 TABLET, FILM COATED ORAL DAILY
Status: DISCONTINUED | OUTPATIENT
Start: 2017-09-11 | End: 2017-09-11 | Stop reason: HOSPADM

## 2017-09-11 RX ORDER — POTASSIUM CHLORIDE 750 MG/1
10 TABLET, FILM COATED, EXTENDED RELEASE ORAL DAILY
Status: DISCONTINUED | OUTPATIENT
Start: 2017-09-12 | End: 2017-09-11 | Stop reason: HOSPADM

## 2017-09-11 RX ORDER — TRAMADOL HYDROCHLORIDE 50 MG/1
50 TABLET ORAL EVERY 6 HOURS PRN
Status: DISCONTINUED | OUTPATIENT
Start: 2017-09-11 | End: 2017-09-11 | Stop reason: HOSPADM

## 2017-09-11 RX ADMIN — TRAMADOL HYDROCHLORIDE 50 MG: 50 TABLET, FILM COATED ORAL at 04:25

## 2017-09-11 RX ADMIN — FLUDROCORTISONE ACETATE 0.1 MG: 0.1 TABLET ORAL at 09:25

## 2017-09-11 RX ADMIN — METOPROLOL TARTRATE 50 MG: 50 TABLET, FILM COATED ORAL at 09:25

## 2017-09-11 RX ADMIN — SODIUM CHLORIDE TAB 1 GM 1 G: 1 TAB at 09:28

## 2017-09-11 RX ADMIN — THERA TABS 1 TABLET: TAB at 09:25

## 2017-09-11 RX ADMIN — METOPROLOL TARTRATE 50 MG: 50 TABLET, FILM COATED ORAL at 04:19

## 2017-09-11 RX ADMIN — POTASSIUM CHLORIDE 20 MEQ: 1500 TABLET, EXTENDED RELEASE ORAL at 16:30

## 2017-09-11 RX ADMIN — CELECOXIB 200 MG: 200 CAPSULE ORAL at 09:25

## 2017-09-11 RX ADMIN — VITAMIN D, TAB 1000IU (100/BT) 1000 UNITS: 25 TAB at 09:25

## 2017-09-11 RX ADMIN — SODIUM CHLORIDE: 4.5 INJECTION, SOLUTION INTRAVENOUS at 02:28

## 2017-09-11 RX ADMIN — MAGNESIUM SULFATE HEPTAHYDRATE 4 G: 40 INJECTION, SOLUTION INTRAVENOUS at 09:25

## 2017-09-11 RX ADMIN — ENOXAPARIN SODIUM 40 MG: 40 INJECTION SUBCUTANEOUS at 09:40

## 2017-09-11 RX ADMIN — DOCUSATE SODIUM 100 MG: 100 CAPSULE ORAL at 09:25

## 2017-09-11 RX ADMIN — DISULFIRAM 250 MG: 250 TABLET ORAL at 09:25

## 2017-09-11 ASSESSMENT — PAIN SCALES - GENERAL
PAINLEVEL_OUTOF10: 2
PAINLEVEL_OUTOF10: 5
PAINLEVEL_OUTOF10: 6
PAINLEVEL_OUTOF10: 4
PAINLEVEL_OUTOF10: 0
PAINLEVEL_OUTOF10: 2

## 2017-09-11 ASSESSMENT — PAIN DESCRIPTION - ORIENTATION
ORIENTATION: LEFT;MID
ORIENTATION: LEFT;MID

## 2017-09-11 ASSESSMENT — PAIN DESCRIPTION - LOCATION
LOCATION: BACK;RIB CAGE
LOCATION: BACK;RIB CAGE

## 2017-09-11 ASSESSMENT — PAIN DESCRIPTION - DESCRIPTORS: DESCRIPTORS: ACHING;TENDER

## 2017-09-11 ASSESSMENT — PAIN DESCRIPTION - PROGRESSION: CLINICAL_PROGRESSION: NOT CHANGED

## 2017-09-11 ASSESSMENT — PAIN DESCRIPTION - PAIN TYPE
TYPE: ACUTE PAIN
TYPE: ACUTE PAIN

## 2017-09-11 ASSESSMENT — PAIN DESCRIPTION - FREQUENCY: FREQUENCY: CONTINUOUS

## 2017-09-29 ENCOUNTER — HOSPITAL ENCOUNTER (OUTPATIENT)
Age: 82
Setting detail: OBSERVATION
Discharge: HOME | End: 2017-09-30
Attending: FAMILY MEDICINE | Admitting: INTERNAL MEDICINE
Payer: MEDICARE

## 2017-09-29 ENCOUNTER — APPOINTMENT (OUTPATIENT)
Dept: CT IMAGING | Age: 82
End: 2017-09-29
Payer: MEDICARE

## 2017-09-29 DIAGNOSIS — F10.920 ACUTE ALCOHOLIC INTOXICATION, UNCOMPLICATED (HCC): Primary | ICD-10-CM

## 2017-09-29 LAB
ALBUMIN SERPL-MCNC: 4.1 G/DL (ref 3.5–5.1)
ALP BLD-CCNC: 51 U/L (ref 38–126)
ALT SERPL-CCNC: 15 U/L (ref 11–66)
AMMONIA: 34 UMOL/L (ref 11–60)
ANION GAP SERPL CALCULATED.3IONS-SCNC: 17 MEQ/L (ref 8–16)
AST SERPL-CCNC: 30 U/L (ref 5–40)
BASOPHILS # BLD: 0.1 %
BASOPHILS ABSOLUTE: 0 THOU/MM3 (ref 0–0.1)
BILIRUB SERPL-MCNC: 0.9 MG/DL (ref 0.3–1.2)
BILIRUBIN DIRECT: < 0.2 MG/DL (ref 0–0.3)
BUN BLDV-MCNC: 12 MG/DL (ref 7–22)
CALCIUM SERPL-MCNC: 8.7 MG/DL (ref 8.5–10.5)
CHLORIDE BLD-SCNC: 102 MEQ/L (ref 98–111)
CO2: 26 MEQ/L (ref 23–33)
CREAT SERPL-MCNC: 0.7 MG/DL (ref 0.4–1.2)
EOSINOPHIL # BLD: 0.5 %
EOSINOPHILS ABSOLUTE: 0 THOU/MM3 (ref 0–0.4)
ETHYL ALCOHOL, SERUM: 0.22 %
GFR SERPL CREATININE-BSD FRML MDRD: > 90 ML/MIN/1.73M2
GLUCOSE BLD-MCNC: 93 MG/DL (ref 70–108)
HCT VFR BLD CALC: 32 % (ref 42–52)
HEMOGLOBIN: 10.8 GM/DL (ref 14–18)
LIPASE: 46.1 U/L (ref 5.6–51.3)
LYMPHOCYTES # BLD: 11.8 %
LYMPHOCYTES ABSOLUTE: 0.8 THOU/MM3 (ref 1–4.8)
MACROCYTES: ABNORMAL
MAGNESIUM: 2 MG/DL (ref 1.6–2.4)
MCH RBC QN AUTO: 34 PG (ref 27–31)
MCHC RBC AUTO-ENTMCNC: 33.7 GM/DL (ref 33–37)
MCV RBC AUTO: 100.9 FL (ref 80–94)
MONOCYTES # BLD: 3.4 %
MONOCYTES ABSOLUTE: 0.2 THOU/MM3 (ref 0.4–1.3)
NUCLEATED RED BLOOD CELLS: 0 /100 WBC
OSMOLALITY CALCULATION: 288.2 MOSMOL/KG (ref 275–300)
PDW BLD-RTO: 12.8 % (ref 11.5–14.5)
PLATELET # BLD: 189 THOU/MM3 (ref 130–400)
PMV BLD AUTO: 8.1 MCM (ref 7.4–10.4)
POTASSIUM SERPL-SCNC: 3.3 MEQ/L (ref 3.5–5.2)
RBC # BLD: 3.17 MILL/MM3 (ref 4.7–6.1)
RBC # BLD: NORMAL 10*6/UL
SEG NEUTROPHILS: 84.2 %
SEGMENTED NEUTROPHILS ABSOLUTE COUNT: 6.1 THOU/MM3 (ref 1.8–7.7)
SODIUM BLD-SCNC: 145 MEQ/L (ref 135–145)
TOTAL CK: 135 U/L (ref 55–170)
TOTAL PROTEIN: 7 G/DL (ref 6.1–8)
TROPONIN T: < 0.01 NG/ML
WBC # BLD: 7.2 THOU/MM3 (ref 4.8–10.8)

## 2017-09-29 PROCEDURE — G0480 DRUG TEST DEF 1-7 CLASSES: HCPCS

## 2017-09-29 PROCEDURE — 96374 THER/PROPH/DIAG INJ IV PUSH: CPT

## 2017-09-29 PROCEDURE — 85025 COMPLETE CBC W/AUTO DIFF WBC: CPT

## 2017-09-29 PROCEDURE — 82550 ASSAY OF CK (CPK): CPT

## 2017-09-29 PROCEDURE — 82140 ASSAY OF AMMONIA: CPT

## 2017-09-29 PROCEDURE — 84484 ASSAY OF TROPONIN QUANT: CPT

## 2017-09-29 PROCEDURE — G0378 HOSPITAL OBSERVATION PER HR: HCPCS

## 2017-09-29 PROCEDURE — 72125 CT NECK SPINE W/O DYE: CPT

## 2017-09-29 PROCEDURE — 80053 COMPREHEN METABOLIC PANEL: CPT

## 2017-09-29 PROCEDURE — 82248 BILIRUBIN DIRECT: CPT

## 2017-09-29 PROCEDURE — 99285 EMERGENCY DEPT VISIT HI MDM: CPT

## 2017-09-29 PROCEDURE — 83735 ASSAY OF MAGNESIUM: CPT

## 2017-09-29 PROCEDURE — 6360000002 HC RX W HCPCS: Performed by: FAMILY MEDICINE

## 2017-09-29 PROCEDURE — 70450 CT HEAD/BRAIN W/O DYE: CPT

## 2017-09-29 PROCEDURE — 2580000003 HC RX 258: Performed by: FAMILY MEDICINE

## 2017-09-29 PROCEDURE — 36415 COLL VENOUS BLD VENIPUNCTURE: CPT

## 2017-09-29 PROCEDURE — 83690 ASSAY OF LIPASE: CPT

## 2017-09-29 RX ORDER — THIAMINE MONONITRATE (VIT B1) 100 MG
100 TABLET ORAL DAILY
Status: DISCONTINUED | OUTPATIENT
Start: 2017-09-30 | End: 2017-09-30 | Stop reason: HOSPADM

## 2017-09-29 RX ORDER — CELECOXIB 200 MG/1
200 CAPSULE ORAL DAILY
Status: DISCONTINUED | OUTPATIENT
Start: 2017-09-30 | End: 2017-09-30 | Stop reason: HOSPADM

## 2017-09-29 RX ORDER — PANTOPRAZOLE SODIUM 40 MG/1
40 TABLET, DELAYED RELEASE ORAL
Status: DISCONTINUED | OUTPATIENT
Start: 2017-09-30 | End: 2017-09-30 | Stop reason: HOSPADM

## 2017-09-29 RX ORDER — ATORVASTATIN CALCIUM 10 MG/1
10 TABLET, FILM COATED ORAL DAILY
Status: DISCONTINUED | OUTPATIENT
Start: 2017-09-30 | End: 2017-09-30 | Stop reason: HOSPADM

## 2017-09-29 RX ORDER — DOCUSATE SODIUM 100 MG/1
100 CAPSULE, LIQUID FILLED ORAL 2 TIMES DAILY
Status: DISCONTINUED | OUTPATIENT
Start: 2017-09-30 | End: 2017-09-30 | Stop reason: HOSPADM

## 2017-09-29 RX ORDER — MULTIVITAMIN WITH FOLIC ACID 400 MCG
1 TABLET ORAL DAILY
Status: DISCONTINUED | OUTPATIENT
Start: 2017-09-30 | End: 2017-09-30 | Stop reason: HOSPADM

## 2017-09-29 RX ORDER — NIFEDIPINE 30 MG/1
30 TABLET, EXTENDED RELEASE ORAL NIGHTLY
Status: DISCONTINUED | OUTPATIENT
Start: 2017-09-30 | End: 2017-09-30 | Stop reason: HOSPADM

## 2017-09-29 RX ORDER — 0.9 % SODIUM CHLORIDE 0.9 %
1000 INTRAVENOUS SOLUTION INTRAVENOUS ONCE
Status: COMPLETED | OUTPATIENT
Start: 2017-09-29 | End: 2017-09-30

## 2017-09-29 RX ORDER — FLUDROCORTISONE ACETATE 0.1 MG/1
0.1 TABLET ORAL DAILY
Status: DISCONTINUED | OUTPATIENT
Start: 2017-09-30 | End: 2017-09-30 | Stop reason: HOSPADM

## 2017-09-29 RX ORDER — DONEPEZIL HYDROCHLORIDE 10 MG/1
10 TABLET, FILM COATED ORAL NIGHTLY
Status: DISCONTINUED | OUTPATIENT
Start: 2017-09-30 | End: 2017-09-30 | Stop reason: HOSPADM

## 2017-09-29 RX ORDER — METOPROLOL TARTRATE 50 MG/1
50 TABLET, FILM COATED ORAL DAILY
Status: DISCONTINUED | OUTPATIENT
Start: 2017-09-30 | End: 2017-09-30 | Stop reason: HOSPADM

## 2017-09-29 RX ORDER — MIDODRINE HYDROCHLORIDE 10 MG/1
10 TABLET ORAL 2 TIMES DAILY
Status: DISCONTINUED | OUTPATIENT
Start: 2017-09-30 | End: 2017-09-30 | Stop reason: HOSPADM

## 2017-09-29 RX ORDER — SODIUM CHLORIDE 9 MG/ML
INJECTION, SOLUTION INTRAVENOUS CONTINUOUS
Status: DISCONTINUED | OUTPATIENT
Start: 2017-09-30 | End: 2017-09-30 | Stop reason: HOSPADM

## 2017-09-29 RX ORDER — ONDANSETRON 2 MG/ML
4 INJECTION INTRAMUSCULAR; INTRAVENOUS ONCE
Status: COMPLETED | OUTPATIENT
Start: 2017-09-29 | End: 2017-09-29

## 2017-09-29 RX ORDER — TIZANIDINE 4 MG/1
4 TABLET ORAL EVERY 8 HOURS PRN
Status: DISCONTINUED | OUTPATIENT
Start: 2017-09-29 | End: 2017-09-30 | Stop reason: HOSPADM

## 2017-09-29 RX ORDER — OXYBUTYNIN CHLORIDE 10 MG/1
10 TABLET, EXTENDED RELEASE ORAL NIGHTLY
Status: DISCONTINUED | OUTPATIENT
Start: 2017-09-30 | End: 2017-09-30 | Stop reason: HOSPADM

## 2017-09-29 RX ORDER — POTASSIUM CHLORIDE 750 MG/1
10 TABLET, FILM COATED, EXTENDED RELEASE ORAL DAILY
Status: DISCONTINUED | OUTPATIENT
Start: 2017-09-30 | End: 2017-09-30 | Stop reason: HOSPADM

## 2017-09-29 RX ORDER — ACETAMINOPHEN 325 MG/1
650 TABLET ORAL EVERY 4 HOURS PRN
Status: DISCONTINUED | OUTPATIENT
Start: 2017-09-29 | End: 2017-09-30 | Stop reason: HOSPADM

## 2017-09-29 RX ORDER — DRONABINOL 2.5 MG/1
2.5 CAPSULE ORAL
Status: DISCONTINUED | OUTPATIENT
Start: 2017-09-30 | End: 2017-09-30 | Stop reason: HOSPADM

## 2017-09-29 RX ADMIN — ONDANSETRON 4 MG: 2 INJECTION INTRAMUSCULAR; INTRAVENOUS at 19:57

## 2017-09-29 RX ADMIN — SODIUM CHLORIDE 1000 ML: 9 INJECTION, SOLUTION INTRAVENOUS at 19:55

## 2017-09-29 ASSESSMENT — ENCOUNTER SYMPTOMS
COUGH: 0
BLOOD IN STOOL: 0
WHEEZING: 0
SHORTNESS OF BREATH: 0
NAUSEA: 0
SORE THROAT: 0
VOMITING: 0
BACK PAIN: 0
ABDOMINAL PAIN: 0
DIARRHEA: 0

## 2017-09-29 ASSESSMENT — PAIN SCALES - GENERAL: PAINLEVEL_OUTOF10: 0

## 2017-09-29 NOTE — IP AVS SNAPSHOT
Patient Information     Patient Name ALIDA Trevino 1935         This is your updated medication list to keep with you all times      TAKE these medications     acetaminophen 325 MG tablet   Commonly known as:  TYLENOL   Take 2 tablets by mouth every 4 hours as needed for Pain       atorvastatin 10 MG tablet   Commonly known as:  LIPITOR   Take 1 tablet by mouth daily       celecoxib 200 MG capsule   Commonly known as:  CELEBREX   Take 1 capsule by mouth daily       docusate sodium 100 MG capsule   Commonly known as:  COLACE   Take 1 capsule by mouth 2 times daily       donepezil 10 MG tablet   Commonly known as:  ARICEPT       dronabinol 2.5 MG capsule   Commonly known as:  MARINOL       fludrocortisone 0.1 MG tablet   Commonly known as:  FLORINEF   Take 1 tablet by mouth daily       lansoprazole 30 MG delayed release capsule   Commonly known as:  PREVACID       metoprolol tartrate 50 MG tablet   Commonly known as:  LOPRESSOR       midodrine 10 MG tablet   Commonly known as:  PROAMATINE       multivitamin tablet   Take 1 tablet by mouth daily       NIFEdipine 30 MG extended release tablet   Commonly known as:  NIFEDICAL XL   Take 1 tablet by mouth nightly       oxybutynin 10 MG extended release tablet   Commonly known as:  DITROPAN-XL   Take 1 tablet by mouth nightly       potassium chloride 10 MEQ extended release tablet   Commonly known as:  KLOR-CON M10   Take 1 tablet by mouth daily       tiZANidine 4 MG tablet   Commonly known as:  ZANAFLEX   Take 1 tablet by mouth every 8 hours as needed (Pain)       vitamin B-1 100 MG tablet   Commonly known as:  THIAMINE   Take 1 tablet by mouth daily       vitamin D 1000 UNIT Tabs tablet   Commonly known as:  CHOLECALCIFEROL   Take 1 tablet by mouth daily

## 2017-09-29 NOTE — IP AVS SNAPSHOT
Take 30 mg by mouth 2 times daily                  09/30/17                           metoprolol tartrate 50 MG tablet   Commonly known as:  LOPRESSOR   Take 50 mg by mouth daily                50 mg on 9/30/2017  8:24 AM     .10/01/17  AM                          midodrine 10 MG tablet   Commonly known as:  PROAMATINE   Take 10 mg by mouth 2 times daily                10 mg on 9/30/2017  8:20 AM     09/30/17  PM                          multivitamin tablet   Take 1 tablet by mouth daily                1 tablet on 9/30/2017  8:20 AM     10/01/17  AM                          NIFEdipine 30 MG extended release tablet   Commonly known as:  NIFEDICAL XL   Take 1 tablet by mouth nightly                30 mg on 9/30/2017  1:00 AM     09/30/17  NIGHT                          oxybutynin 10 MG extended release tablet   Commonly known as:  DITROPAN-XL   Take 1 tablet by mouth nightly                10 mg on 9/30/2017  1:00 AM     09/30/17  PM                          potassium chloride 10 MEQ extended release tablet   Commonly known as:  KLOR-CON M10   Take 1 tablet by mouth daily                40 mEq on 9/30/2017  1:00 AM     09/30/17  PM                          tiZANidine 4 MG tablet   Commonly known as:  ZANAFLEX   Take 1 tablet by mouth every 8 hours as needed (Pain)                  As needed.                        vitamin B-1 100 MG tablet   Commonly known as:  THIAMINE   Take 1 tablet by mouth daily                100 mg on 9/30/2017  8:20 AM     10/01/17  AM                          vitamin D 1000 UNIT Tabs tablet   Commonly known as:  CHOLECALCIFEROL   Take 1 tablet by mouth daily                1,000 Units on 9/30/2017  8:20 AM     10/01/17  AM                                  Allergies as of 9/30/2017     No Known Allergies      Immunizations as of 9/30/2017     Name Date Dose VIS Date Route    Influenza Vaccine, unspecified formulation 11/3/2015 -- -- --    Comment: uploaded via claim file topics that may be of help to you. Discharge Instructions            Alcohol and Drug Problems: Care Instructions  Your Care Instructions  You can improve your life and health by stopping your use of alcohol or drugs. Ending dependency on alcohol or drugs may help you feel and sleep better. You may get along better with your family, friends, and coworkers. There are medicines and programs that can help. Follow-up care is a key part of your treatment and safety. Be sure to make and go to all appointments, and call your doctor if you are having problems. It's also a good idea to know your test results and keep a list of the medicines you take. How can you care for yourself at home? · If you have been given medicine to help keep you sober or reduce your cravings, be sure to take it as prescribed. · Talk to your doctor about programs that can help you stop using drugs or drinking alcohol. · If your doctor prescribes disulfiram (Antabuse), do not drink any alcohol while you are taking this medicine. You may have severe, even life-threatening, side effects from even small amounts of alcohol. · Do not tempt yourself by keeping alcohol or drugs in your home. · Learn how to say no when other people drink or use drugs. Or don't spend time with people who drink or use drugs. · Use the time and money spent on drinking or drugs to do something fun with your family or friends. Preventing a relapse  · Do not drink alcohol or use drugs at all. Using any amount of alcohol or drugs greatly increases your risk for relapse. · Seek help from organizations such as Alcoholics Anonymous, Narcotics Anonymous, or treatment facilities if you feel the need to drink alcohol or use drugs again. · Remember that recovery is a lifelong process. · Stay away from situations, friends, or places that may lead you to drink or use drugs. · Have a plan to spot and deal with relapse.  Learn to recognize changes in Call your doctor now or seek immediate medical care if:  · You have trembling, restlessness, sweating, and other withdrawal symptoms that are new or that get worse. · Your withdrawal symptoms come back after not bothering you for days or weeks. · You can't stop vomiting. Watch closely for changes in your health, and be sure to contact your doctor if:  · You need help to stop drinking. Where can you learn more? Go to https://Limos.compepiceweb.[employer]eTek. org and sign in to your [employer] account. Enter T102 in the [employer] box to learn more about \"Acute Alcohol Intoxication: Care Instructions. \"     If you do not have an account, please click on the \"Sign Up Now\" link. Current as of: March 20, 2017  Content Version: 11.3  © 8320-9108 [employer]. Care instructions adapted under license by [facility] ([facility]). If you have questions about a medical condition or this instruction, always ask your healthcare professional. [place] [address] any warranty or liability for your use of this information. Diet Instructions     ? Good nutrition is important when healing from an illness, injury, or surgery. Follow any nutrition recommendations given to you during your hospital stay. ? If you were given an oral nutrition supplement while in the hospital, continue to take this supplement at home. You can take it with meals, in-between meals, and/or before bedtime. These supplements can be purchased at most local grocery stores, pharmacies, and chain super-stores. ? If you have any questions about your diet or nutrition, call the hospital and ask for the dietitian. Activity Instructions     As tolerated           Important information for a smoker       SMOKING: QUIT SMOKING. THIS IS THE MOST IMPORTANT ACTION YOU CAN TAKE TO IMPROVE YOUR CURRENT AND FUTURE HEALTH.      Call the Critical access hospital[facility] [place] NOW ([phone])

## 2017-09-29 NOTE — IP AVS SNAPSHOT
Take 30 mg by mouth 2 times daily                  09/30/17                           metoprolol tartrate 50 MG tablet   Commonly known as:  LOPRESSOR   Take 50 mg by mouth daily                50 mg on 9/30/2017  8:24 AM     .10/01/17  AM                          midodrine 10 MG tablet   Commonly known as:  PROAMATINE   Take 10 mg by mouth 2 times daily                10 mg on 9/30/2017  8:20 AM     09/30/17  PM                          multivitamin tablet   Take 1 tablet by mouth daily                1 tablet on 9/30/2017  8:20 AM     10/01/17  AM                          NIFEdipine 30 MG extended release tablet   Commonly known as:  NIFEDICAL XL   Take 1 tablet by mouth nightly                30 mg on 9/30/2017  1:00 AM     09/30/17  NIGHT                          oxybutynin 10 MG extended release tablet   Commonly known as:  DITROPAN-XL   Take 1 tablet by mouth nightly                10 mg on 9/30/2017  1:00 AM     09/30/17  PM                          potassium chloride 10 MEQ extended release tablet   Commonly known as:  KLOR-CON M10   Take 1 tablet by mouth daily                40 mEq on 9/30/2017  1:00 AM     09/30/17  PM                          tiZANidine 4 MG tablet   Commonly known as:  ZANAFLEX   Take 1 tablet by mouth every 8 hours as needed (Pain)                  As needed.                        vitamin B-1 100 MG tablet   Commonly known as:  THIAMINE   Take 1 tablet by mouth daily                100 mg on 9/30/2017  8:20 AM     10/01/17  AM                          vitamin D 1000 UNIT Tabs tablet   Commonly known as:  CHOLECALCIFEROL   Take 1 tablet by mouth daily                1,000 Units on 9/30/2017  8:20 AM     10/01/17  AM                                  Allergies as of 9/30/2017     No Known Allergies      Immunizations as of 9/30/2017     Name Date Dose VIS Date Route    Influenza Vaccine, unspecified formulation 11/3/2015 -- -- --    Comment: uploaded via claim file Td 10/26/2014 0.5 mL 2014 Intramuscular      Last Vitals          Most Recent Value    Temp  97.6 °F (36.4 °C)    Pulse  72    Resp  19    BP  (!)  155/62         After Visit Summary    This summary was created for you. Thank you for entrusting your care to us. The following information includes details about your hospital/visit stay along with steps you should take to help with your recovery once you leave the hospital.  In this packet, you will find information about the topics listed below:    · Instructions about your medications including a list of your home medications  · A summary of your hospital visit  · Follow-up appointments once you have left the hospital  · Your care plan at home      You may receive a survey regarding the care you received during your stay. Your input is valuable to us. We encourage you to complete and return your survey in the envelope provided. We hope you will choose us in the future for your healthcare needs. Patient Information     Patient Name ALIDA Benitez 1935      Care Provided at:     Name Address Phone       [address] [phone]            Your Visit    Here you will find information about your visit, including the reason for your visit. Please take this sheet with you when you visit your doctor or other health care provider in the future. It will help determine the best possible medical care for you at that time. If you have any questions once you leave the hospital, please call the department phone number listed below. Why you were here     Your primary diagnosis was:  Alcohol Intoxication (Hcc)    Your diagnoses also included:  Fall      Visit Information     Date & Time Provider Department Dept.  Phone    2017 Ada Hodgson MD [facility] [phone]       Follow-up Appointments Below is a list of your follow-up and future appointments. This may not be a complete list as you may have made appointments directly with providers that we are not aware of or your providers may have made some for you. Please call your providers to confirm appointments. It is important to keep your appointments. Please bring your current insurance card, photo ID, co-pay, and all medication bottles to your appointment. If self-pay, payment is expected at the time of service. Follow-up Information     Follow up with Robin Smith MD In 1 week. Specialty:  Internal Medicine    Why:  Office will call you with appointment date & time on Tuesday. Contact information:    [address]", [address] [place] [address]  [phone]        Future Appointments     12/1/2017 12:00 PM     Appointment with Mohit Sullivan MD at [facility] ([phone])   Please arrive 15 minutes prior to appointment, bring photo ID and insurance card. [facility] [facility] [phone]       3/21/2018 10:45 AM     Appointment with Ankita Mejia NP at [facility] ([phone])   Please arrive 15 minutes prior to appointment, bring photo ID and insurance card. Please arrive 15 minutes prior to appointment, bring photo ID and insurance card. [address] [address]         Preventive Care        Date Due    Zoster Vaccine 5/24/1996    Pneumococcal Vaccines (two) for all adults aged 72 and over (1 of 2 - PCV13) 5/24/2001    Tetanus Combination Vaccine (1 - Tdap) 10/27/2014    Yearly Flu Vaccine (1) 9/1/2017                 Care Plan Once You Return Home    This section includes instructions you will need to follow once you leave the hospital.  Your care team will discuss these with you, so you and those caring for you know how to best care for your health needs at home.   This section may also include educational information about certain health topics that may be of help to you. Discharge Instructions            Alcohol and Drug Problems: Care Instructions  Your Care Instructions  You can improve your life and health by stopping your use of alcohol or drugs. Ending dependency on alcohol or drugs may help you feel and sleep better. You may get along better with your family, friends, and coworkers. There are medicines and programs that can help. Follow-up care is a key part of your treatment and safety. Be sure to make and go to all appointments, and call your doctor if you are having problems. It's also a good idea to know your test results and keep a list of the medicines you take. How can you care for yourself at home? · If you have been given medicine to help keep you sober or reduce your cravings, be sure to take it as prescribed. · Talk to your doctor about programs that can help you stop using drugs or drinking alcohol. · If your doctor prescribes disulfiram (Antabuse), do not drink any alcohol while you are taking this medicine. You may have severe, even life-threatening, side effects from even small amounts of alcohol. · Do not tempt yourself by keeping alcohol or drugs in your home. · Learn how to say no when other people drink or use drugs. Or don't spend time with people who drink or use drugs. · Use the time and money spent on drinking or drugs to do something fun with your family or friends. Preventing a relapse  · Do not drink alcohol or use drugs at all. Using any amount of alcohol or drugs greatly increases your risk for relapse. · Seek help from organizations such as Alcoholics Anonymous, Narcotics Anonymous, or treatment facilities if you feel the need to drink alcohol or use drugs again. · Remember that recovery is a lifelong process. · Stay away from situations, friends, or places that may lead you to drink or use drugs. · Have a plan to spot and deal with relapse.  Learn to recognize changes in your thinking that lead you to drink or use drugs. These are warning signs. Get help before you start to drink or use drugs again. · Get help as soon as you can if you relapse. Some people make a plan with another person that outlines what they want that person to do for them if they relapse. The plan usually includes how to handle the relapse and who to notify in case of relapse. · Don't give up. Remember that a relapse does not mean that you have failed. Use the experience to learn the triggers that lead you to drink or use drugs. Then quit again. Many people have several relapses before they are able to quit for good. When should you call for help? Call 911 anytime you think you may need emergency care. For example, call if:  · You feel you cannot stop from hurting yourself or someone else. Call your doctor now or seek immediate medical care if:  · You have serious withdrawal symptoms such as confusion, hallucinations, or severe trembling. Watch closely for changes in your health, and be sure to contact your doctor if:  · You have a relapse. · You need more help or support to stop. Where can you learn more? Go to https://Colondeepepiceweb.[employer]. org and sign in to your [employer] account. Enter [id number] in the doUdeal box to learn more about \"Alcohol and Drug Problems: Care Instructions. \"     If you do not have an account, please click on the \"Sign Up Now\" link. Current as of: November 3, 2016  Content Version: 11.3  © 8768-0925 "[employer]", Send Word [employer]. Care instructions adapted under license by [facility] ([facility]). If you have questions about a medical condition or this instruction, always ask your healthcare professional. [place] [address] any warranty or liability for your use of this information. Acute Alcohol Intoxication: Care Instructions  Your Care Instructions  You have had treatment to help your body rid itself of alcohol.  Too much alcohol upsets the body's fluid balance. Your doctor may have given you fluids and vitamins. For some people, drinking too much alcohol is a one-time event. For others, it is an ongoing problem. In either case, it is serious. It can be life-threatening. Follow-up care is a key part of your treatment and safety. Be sure to make and go to all appointments, and call your doctor if you are having problems. It's also a good idea to know your test results and keep a list of the medicines you take. How can you care for yourself at home? · Be safe with medicines. Take your medicines exactly as prescribed. Call your doctor if you think you are having a problem with your medicine. · Your doctor may have prescribed disulfiram (Antabuse). Do not drink any alcohol while you are taking this medicine. You may have severe or even life-threatening side effects from even small amounts of alcohol. · If you were given medicine to prevent nausea, be sure to take it exactly as prescribed. · Before you take any medicine, tell your doctor if:  ¨ You have had a bad reaction to any medicines in the past.  ¨ You are taking other medicines, including over-the-counter ones, or have other health problems. ¨ You are or could be pregnant. · Be prepared to have some symptoms of withdrawal in the next few days. · Drink plenty of liquids in the next few days. · Seek help if you need it to stop drinking. Getting counseling and joining a support group can help you stay sober. Try a support group such as Alcoholics Anonymous. · Avoid alcohol when you take medicines. It can react with many medicines and cause serious problems. When should you call for help? Call 911 anytime you think you may need emergency care. For example, call if:  · You feel confused and are seeing things that are not there. · You are thinking about killing yourself or hurting others. · You have a seizure. · You vomit blood or what looks like coffee grounds.

## 2017-09-29 NOTE — ED PROVIDER NOTES
[facility]  eMERGENCY dEPARTMENT eNCOUnter          CHIEF COMPLAINT       Chief Complaint   Patient presents with    Alcohol Intoxication       Nurses Notes reviewed and I agree except as noted in the HPI. HISTORY OF PRESENT ILLNESS    Elaina Pelletier is a 81 y.o. male who presents to the Emergency Department for the evaluation of alcohol intoxication. Patient has a history of GERD, CKD, Hypertension, and Dementia. Wife states patient has a history of alcohol abuse. She states she went out of town this morning around 11:30 and came back at 4:30 pm and found patient on the floor. She states she could not get him up and patient was confused and intoxicated. She states patient also urinated on himself. Patient denies any alcohol use today. Patient denies any pain and states he is fine. The HPI was provided by the patient. REVIEW OF SYSTEMS     Review of Systems   Constitutional: Negative for chills, fever and unexpected weight change. HENT: Negative for congestion, ear pain and sore throat. Eyes: Negative for visual disturbance. Respiratory: Negative for cough, shortness of breath and wheezing. Cardiovascular: Negative for chest pain, palpitations and leg swelling. Gastrointestinal: Negative for abdominal pain, blood in stool, diarrhea, nausea and vomiting. Genitourinary: Negative for dysuria and hematuria. Musculoskeletal: Negative for back pain, joint swelling and neck pain. Skin: Negative for rash. Allergic/Immunologic: Negative for environmental allergies. Neurological: Negative for dizziness, syncope, weakness and headaches. Hematological: Does not bruise/bleed easily. Psychiatric/Behavioral: Negative for confusion and dysphoric mood. The patient is not nervous/anxious. All other systems reviewed and are negative.       PAST MEDICAL HISTORY    has a past medical history of BPH (benign prostatic hypertrophy); CKD (chronic kidney disease) stage 2, GFR 60-89 ml/min; Dementia; Erectile dysfunction; [place] (hard of hearing); Hyperlipidemia; Hypertension; and Metabolic bone disease. SURGICAL HISTORY      has a past surgical history that includes shoulder surgery; Neck surgery; Tonsillectomy; TURP (5-31-12); Colonoscopy; Thyroid surgery; ostate surgery; Endoscopy, colon, diagnostic; and Esophagus surgery.     CURRENT MEDICATIONS       Current Discharge Medication List      CONTINUE these medications which have NOT CHANGED    Details   midodrine (PROAMATINE) 10 MG tablet Take 10 mg by mouth 2 times daily      dronabinol (MARINOL) 2.5 MG capsule Take 2.5 mg by mouth 2 times daily (before meals)      metoprolol tartrate (LOPRESSOR) 50 MG tablet Take 50 mg by mouth daily      tiZANidine (ZANAFLEX) 4 MG tablet Take 1 tablet by mouth every 8 hours as needed (Pain)  Qty: 30 tablet, Refills: 0      donepezil (ARICEPT) 10 MG tablet Take 10 mg by mouth nightly       potassium chloride (KLOR-CON M10) 10 MEQ extended release tablet Take 1 tablet by mouth daily  Qty: 30 tablet, Refills: 12      fludrocortisone (FLORINEF) 0.1 MG tablet Take 1 tablet by mouth daily  Qty: 7 tablet, Refills: 0      lansoprazole (PREVACID) 30 MG delayed release capsule Take 30 mg by mouth 2 times daily      atorvastatin (LIPITOR) 10 MG tablet Take 1 tablet by mouth daily  Qty: 30 tablet, Refills: 3      NIFEdipine (NIFEDICAL XL) 30 MG extended release tablet Take 1 tablet by mouth nightly  Qty: 30 tablet, Refills: 3      vitamin D (CHOLECALCIFEROL) 1000 UNIT TABS tablet Take 1 tablet by mouth daily  Qty: 60 tablet      docusate sodium (COLACE) 100 MG capsule Take 1 capsule by mouth 2 times daily  Qty: 100 capsule, Refills: 3      oxybutynin (DITROPAN-XL) 10 MG extended release tablet Take 1 tablet by mouth nightly  Qty: 30 tablet, Refills: 3      celecoxib (CELEBREX) 200 MG capsule Take 1 capsule by mouth daily  Qty: 60 capsule, Refills: 3      acetaminophen (TYLENOL) 325 MG tablet Take 2 tablets by (Please note that portions of this note were completed with a voice recognition program.  Efforts were made to edit the dictations but occasionally words are mis-transcribed.)    Scribe:  Chris Poon 9/29/17 6:17 PM Scribing for and in the presence of Jitendra Tatum MD    Signed: Graciela Velasquez. 9/29/17 6:17 PM    Provider:  I personally performed the services described in the documentation, reviewed and edited the documentation which was dictated to the scribe in my presence, and it accurately records my words and actions.     Jitendra Tatum MD 9/29/17 2:52 AM       Jitendra Tatum MD  09/30/17 [id number]

## 2017-09-29 NOTE — ED NOTES
Pt to rm 06 per EMS for reported ETOH intoxication. Wife at bedside states he is a daily drinker and she wants him to be admitted for detox. States he passes out on the floor and she was unable to get him up d/t recent surgery herself. Pt is A&Ox3, admits to drinking but states he doesn't want to stay and he has no complaints- he doesn't know why he is here. EMS reports that he had one emesis for them and was incontinent of urine. Pt cleaned up and placed in gown- assessment complete.       Arun Mclaughlin RN  09/29/17 [id number]

## 2017-09-30 VITALS
TEMPERATURE: 97.6 F | OXYGEN SATURATION: 100 % | WEIGHT: 138.1 LBS | RESPIRATION RATE: 19 BRPM | SYSTOLIC BLOOD PRESSURE: 155 MMHG | DIASTOLIC BLOOD PRESSURE: 62 MMHG | HEIGHT: 71 IN | BODY MASS INDEX: 19.33 KG/M2 | HEART RATE: 72 BPM

## 2017-09-30 PROBLEM — F10.929 ALCOHOL INTOXICATION (HCC): Status: ACTIVE | Noted: 2017-09-30
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
## 2017-09-30 LAB — POTASSIUM SERPL-SCNC: 3.8 MEQ/L (ref 3.5–5.2)

## 2017-09-30 PROCEDURE — 96372 THER/PROPH/DIAG INJ SC/IM: CPT

## 2017-09-30 PROCEDURE — 84132 ASSAY OF SERUM POTASSIUM: CPT

## 2017-09-30 PROCEDURE — 6360000002 HC RX W HCPCS: Performed by: INTERNAL MEDICINE

## 2017-09-30 PROCEDURE — 6370000000 HC RX 637 (ALT 250 FOR IP): Performed by: INTERNAL MEDICINE

## 2017-09-30 PROCEDURE — 36415 COLL VENOUS BLD VENIPUNCTURE: CPT

## 2017-09-30 PROCEDURE — G0378 HOSPITAL OBSERVATION PER HR: HCPCS

## 2017-09-30 RX ORDER — POTASSIUM CHLORIDE 20 MEQ/1
40 TABLET, EXTENDED RELEASE ORAL ONCE
Status: COMPLETED | OUTPATIENT
Start: 2017-09-30 | End: 2017-09-30

## 2017-09-30 RX ADMIN — CELECOXIB 200 MG: 200 CAPSULE ORAL at 08:20

## 2017-09-30 RX ADMIN — MIDODRINE HYDROCHLORIDE 10 MG: 10 TABLET ORAL at 08:20

## 2017-09-30 RX ADMIN — MIDODRINE HYDROCHLORIDE 10 MG: 10 TABLET ORAL at 01:00

## 2017-09-30 RX ADMIN — THERA TABS 1 TABLET: TAB at 08:20

## 2017-09-30 RX ADMIN — OXYBUTYNIN CHLORIDE 10 MG: 10 TABLET, FILM COATED, EXTENDED RELEASE ORAL at 01:00

## 2017-09-30 RX ADMIN — FLUDROCORTISONE ACETATE 0.1 MG: 0.1 TABLET ORAL at 08:20

## 2017-09-30 RX ADMIN — ATORVASTATIN CALCIUM 10 MG: 10 TABLET, FILM COATED ORAL at 08:20

## 2017-09-30 RX ADMIN — DONEPEZIL HYDROCHLORIDE 10 MG: 10 TABLET, FILM COATED ORAL at 01:01

## 2017-09-30 RX ADMIN — VITAMIN D, TAB 1000IU (100/BT) 1000 UNITS: 25 TAB at 08:20

## 2017-09-30 RX ADMIN — POTASSIUM CHLORIDE 40 MEQ: 1500 TABLET, EXTENDED RELEASE ORAL at 01:00

## 2017-09-30 RX ADMIN — POTASSIUM CHLORIDE 10 MEQ: 750 TABLET, FILM COATED, EXTENDED RELEASE ORAL at 08:20

## 2017-09-30 RX ADMIN — METOPROLOL TARTRATE 50 MG: 50 TABLET, FILM COATED ORAL at 08:24

## 2017-09-30 RX ADMIN — Medication 100 MG: at 08:20

## 2017-09-30 RX ADMIN — NIFEDIPINE 30 MG: 30 TABLET, FILM COATED, EXTENDED RELEASE ORAL at 01:00

## 2017-09-30 RX ADMIN — ENOXAPARIN SODIUM 40 MG: 40 INJECTION SUBCUTANEOUS at 08:23

## 2017-09-30 RX ADMIN — PANTOPRAZOLE SODIUM 40 MG: 40 TABLET, DELAYED RELEASE ORAL at 08:20

## 2017-09-30 RX ADMIN — DRONABINOL 2.5 MG: 2.5 CAPSULE ORAL at 08:23

## 2017-09-30 ASSESSMENT — PAIN SCALES - GENERAL
PAINLEVEL_OUTOF10: 0

## 2017-09-30 NOTE — ED NOTES
Both side rails up and Pt attempting to get out of bed to use urinal. Pt instructed that IV will get pulled out if he keeps walking. Wife verbalized understanding, pt did not. Pt back in bed. Both siderails up.       Emilie Tijerina RN  09/29/17 2012

## 2017-09-30 NOTE — H&P
Take 10 mg by mouth nightly       potassium chloride (KLOR-CON M10) 10 MEQ extended release tablet Take 1 tablet by mouth daily 30 tablet 12    fludrocortisone (FLORINEF) 0.1 MG tablet Take 1 tablet by mouth daily 7 tablet 0    lansoprazole (PREVACID) 30 MG delayed release capsule Take 30 mg by mouth 2 times daily      atorvastatin (LIPITOR) 10 MG tablet Take 1 tablet by mouth daily 30 tablet 3    NIFEdipine (NIFEDICAL XL) 30 MG extended release tablet Take 1 tablet by mouth nightly 30 tablet 3    vitamin D (CHOLECALCIFEROL) 1000 UNIT TABS tablet Take 1 tablet by mouth daily 60 tablet     docusate sodium (COLACE) 100 MG capsule Take 1 capsule by mouth 2 times daily 100 capsule 3    oxybutynin (DITROPAN-XL) 10 MG extended release tablet Take 1 tablet by mouth nightly 30 tablet 3    celecoxib (CELEBREX) 200 MG capsule Take 1 capsule by mouth daily 60 capsule 3    acetaminophen (TYLENOL) 325 MG tablet Take 2 tablets by mouth every 4 hours as needed for Pain 120 tablet 3    Multiple Vitamin (MULTIVITAMIN) tablet Take 1 tablet by mouth daily  0    vitamin B-1 (THIAMINE) 100 MG tablet Take 1 tablet by mouth daily 30 tablet 3       Allergies:  Review of patient's allergies indicates no known allergies. Social History:    reports that he quit smoking about 25 years ago. He has never used smokeless tobacco. He reports that he drinks alcohol. He reports that he does not use illicit drugs.     Family History:       Problem Relation Age of Onset    High Blood Pressure Father     Stroke Father     Stroke Sister     Arthritis Sister     Alzheimer's Disease Sister     Cancer Sister     Heart Disease Sister     Arthritis Sister     Other Brother      parkinsons    Dementia Brother     No Known Problems Brother     No Known Problems Brother        Review of systems:    CNS: No seizures, no dizziness, no facial droop,  no paresthesia, numbness or muscle  Weakness, no dysphasia or  Dysphagia,  CARDIOVASCULAR: Diagnostic Testing:    Recent Results (from the past 24 hour(s))   CBC auto differential    Collection Time: 09/29/17  7:22 PM   Result Value Ref Range    WBC 7.2 4.8 - 10.8 thou/mm3    RBC 3.17 (L) 4.70 - 6.10 mill/mm3    Hemoglobin 10.8 (L) 14.0 - 18.0 gm/dl    Hematocrit 32.0 (L) 42.0 - 52.0 %    .9 (H) 80.0 - 94.0 fL    MCH 34.0 (H) 27.0 - 31.0 pg    MCHC 33.7 33.0 - 37.0 gm/dl    RDW 12.8 11.5 - 14.5 %    Platelets 179 881 - 932 thou/mm3    MPV 8.1 7.4 - 10.4 mcm    RBC Morphology NORMAL     Seg Neutrophils 84.2 %    Lymphocytes 11.8 %    Monocytes 3.4 %    Eosinophils 0.5 %    Basophils 0.1 %    nRBC 0 /100 wbc    Macrocytes 1+     Segs Absolute 6.1 1.8 - 7.7 thou/mm3    Lymphocytes # 0.8 (L) 1.0 - 4.8 thou/mm3    Monocytes # 0.2 (L) 0.4 - 1.3 thou/mm3    Eosinophils # 0.0 0.0 - 0.4 thou/mm3    Basophils # 0.0 0.0 - 0.1 thou/mm3   Basic Metabolic Panel    Collection Time: 09/29/17  7:22 PM   Result Value Ref Range    Sodium 145 135 - 145 meq/L    Potassium 3.3 (L) 3.5 - 5.2 meq/L    Chloride 102 98 - 111 meq/L    CO2 26 23 - 33 meq/L    Glucose 93 70 - 108 mg/dL    BUN 12 7 - 22 mg/dL    CREATININE 0.7 0.4 - 1.2 mg/dL    Calcium 8.7 8.5 - 10.5 mg/dL   Hepatic function panel    Collection Time: 09/29/17  7:22 PM   Result Value Ref Range    Alb 4.1 3.5 - 5.1 g/dL    Total Bilirubin 0.9 0.3 - 1.2 mg/dL    Bilirubin, Direct <0.2 0.0 - 0.3 mg/dL    Alkaline Phosphatase 51 38 - 126 U/L    AST 30 5 - 40 U/L    ALT 15 11 - 66 U/L    Total Protein 7.0 6.1 - 8.0 g/dL   Lipase    Collection Time: 09/29/17  7:22 PM   Result Value Ref Range    Lipase 46.1 5.6 - 51.3 U/L   Troponin    Collection Time: 09/29/17  7:22 PM   Result Value Ref Range    Troponin T < 0.010 ng/ml   Ethanol    Collection Time: 09/29/17  7:22 PM   Result Value Ref Range    ETHYL ALCOHOL, SERUM 0.22 0.00 %   Ammonia    Collection Time: 09/29/17  7:22 PM   Result Value Ref Range    Ammonia 34 11 - 60 umol/L   CK    Collection Time: 09/29/17  7:22 destructive process. Stable senescent changes are present without acute intracranial abnormality identified. **This report has been created using voice recognition software. It may contain minor errors which are inherent in voice recognition technology. ** Final report electronically signed by Dr. Flor Velazquez on 9/29/2017 8:37 PM    Ct Cervical Spine Wo Contrast    Result Date: 9/29/2017  PROCEDURE: CT CERVICAL SPINE WO CONTRAST CLINICAL INFORMATION: C-SPINE TRAUMA, NEXUS/CCR NEGATIVE, LOW RISK, . Alcohol intoxication. Fall. COMPARISON: CT of the cervical spine dated September 10, 2017. TECHNIQUE: 3 mm noncontrast axial images were obtained through the cervical spine with sagittal and coronal reconstructions. All CT scans at this facility use dose modulation, iterative reconstruction, and/or weight-based dosing when appropriate to reduce radiation dose to as low as reasonably achievable. FINDINGS: There is straightening of the expected cervical lordosis. The cervical vertebral bodies are normally aligned. There is no fracture or traumatic subluxation. Multilevel degenerative changes are present with disc space narrowing, endplate spurring and facet arthropathy. Uncovertebral joint spurring is also noted at every level. This results in mild to moderate spinal canal narrowing at C5-6 and C6-7. No suspicious osseous lesions are present. There is no prevertebral soft tissue swelling. There are no suspicious findings in the cervical soft tissues. There are no suspicious findings in the lung apices. 1. No acute fracture or traumatic malalignment is identified. 2. Multilevel degenerative changes throughout the cervical spine which result in mild to moderate spinal canal narrowing at C5-6 and C6-7. **This report has been created using voice recognition software. It may contain minor errors which are inherent in voice recognition technology. ** Final report electronically signed by Dr. Flor Velazquez on 9/29/2017 11:21 PM          Principal Problem:    Alcohol intoxication ([address]  Active Problems:    Fall    Hypokalemia - resolved      Plan:  Has been hydrated , continue thiamine, ambulate in [place] . PT/OT.   D/C home later today if he does well      DVT prophylaxis: [x] Lovenox                                 [] SCDs                                 [] SQ Heparin                                 [] Encourage ambulation, low risk for DVT, no chemical or mechanical prophylaxis necessary              [] Already on Anticoagulation                Anticipated Disposition upon discharge: [x] Home                                                                         [] Home with Home Health                                                                         [] [facility]                                                                         [] [address] [place] [address],[address]          Electronically signed by Taylor Webb MD on 9/30/2017 at 8:25 AM

## 2017-09-30 NOTE — PROGRESS NOTES
Discharge teaching and instructions given for alcohol intoxication will be completed with patient using teachback method. Reviewed AVS. Printed prescriptions will be given to the patient. Patient will be asked for understanding regarding new prescriptions and follow up appointments and care of self at home. Awaiting ride for discharge to come.

## 2017-09-30 NOTE — PROGRESS NOTES
Pt arrived in 8b33 from ED and via cart/stretcher. Complaints: alcohol intoxication. IV normal saline infusing into the antecubital right, condition patent and no redness at a rate of 50 mls/ hour with about 500 mls remaining in the bag. The best day to schedule a follow up Dr appointment is:  Monday p.m.

## 2017-10-06 NOTE — DISCHARGE SUMMARY
[address] [place] [address]                              DISCHARGE SUMMARY    PATIENT NAME: Mariah Ortiz                     :             1935  MED REC NO:   [id number]                            ROOM:            0033  ACCOUNT NO:   [de-identified]                            ADMISSION DATE:  2017  PROVIDER:     Janice Perez MD               DISCHARGE DATE:  2017      DISCHARGE DIAGNOSES:  1. Alcohol intoxication. 2.  Status post fall. 3.  Hypokalemia - resolved. CONDITION ON DISCHARGE:  Improved. HISTORY OF PRESENT ILLNESS:  The patient is an 81-year-old male with a  history of dementia, found on the floor intoxicated with alcohol by  his wife. She apparently had been out of town the morning prior and  came back by 4:30 p.m. and found the patient on the floor. She could  not get him up, called EMS and the patient was brought to the ER. He  was found to be intoxicated. The patient was admitted overnight, was  hydrated, maintained on the alcohol cocktail and next morning was  ambulated, did well and was discharged home in a stable condition.         Baudilio Reynoso MD    D: 10/05/2017 13:51:39       T: 10/06/2017 2:26:11     [email or web address]  Job#: [id number]     Doc#: [id number]

## 2017-10-11 ENCOUNTER — HOSPITAL ENCOUNTER (OUTPATIENT)
Dept: CT IMAGING | Age: 82
Discharge: HOME | End: 2017-10-11
Payer: MEDICARE

## 2017-10-11 DIAGNOSIS — R07.81 PLEURITIC CHEST PAIN: ICD-10-CM

## 2017-10-11 LAB — POC CREATININE WHOLE BLOOD: 0.9 MG/DL (ref 0.5–1.2)

## 2017-10-11 PROCEDURE — 82565 ASSAY OF CREATININE: CPT

## 2017-10-11 PROCEDURE — 6360000004 HC RX CONTRAST MEDICATION: Performed by: INTERNAL MEDICINE

## 2017-10-11 PROCEDURE — 71260 CT THORAX DX C+: CPT

## 2017-10-11 RX ADMIN — IOHEXOL 50 ML: 240 INJECTION, SOLUTION INTRATHECAL; INTRAVASCULAR; INTRAVENOUS; ORAL at 13:18

## 2017-10-11 RX ADMIN — IOPAMIDOL 85 ML: 755 INJECTION, SOLUTION INTRAVENOUS at 12:42

## 2017-11-09 ENCOUNTER — HOSPITAL ENCOUNTER (OUTPATIENT)
Dept: AUDIOLOGY | Age: 82
Discharge: HOME | End: 2017-11-09

## 2017-11-09 PROCEDURE — 9990000010 HC NO CHARGE VISIT: Performed by: AUDIOLOGIST

## 2017-12-01 ENCOUNTER — OFFICE VISIT (OUTPATIENT)
Dept: CARDIOLOGY CLINIC | Age: 82
End: 2017-12-01
Payer: MEDICARE

## 2017-12-01 VITALS
BODY MASS INDEX: 19.15 KG/M2 | HEIGHT: 71 IN | DIASTOLIC BLOOD PRESSURE: 68 MMHG | WEIGHT: 136.8 LBS | HEART RATE: 78 BPM | SYSTOLIC BLOOD PRESSURE: 148 MMHG

## 2017-12-01 DIAGNOSIS — I95.1 ORTHOSTATIC HYPOTENSION: Primary | ICD-10-CM

## 2017-12-01 DIAGNOSIS — I10 ESSENTIAL HYPERTENSION: ICD-10-CM

## 2017-12-01 DIAGNOSIS — E78.00 PURE HYPERCHOLESTEROLEMIA: ICD-10-CM

## 2017-12-01 PROCEDURE — G8420 CALC BMI NORM PARAMETERS: HCPCS | Performed by: INTERNAL MEDICINE

## 2017-12-01 PROCEDURE — 1123F ACP DISCUSS/DSCN MKR DOCD: CPT | Performed by: INTERNAL MEDICINE

## 2017-12-01 PROCEDURE — G8427 DOCREV CUR MEDS BY ELIG CLIN: HCPCS | Performed by: INTERNAL MEDICINE

## 2017-12-01 PROCEDURE — 1036F TOBACCO NON-USER: CPT | Performed by: INTERNAL MEDICINE

## 2017-12-01 PROCEDURE — 4040F PNEUMOC VAC/ADMIN/RCVD: CPT | Performed by: INTERNAL MEDICINE

## 2017-12-01 PROCEDURE — G8484 FLU IMMUNIZE NO ADMIN: HCPCS | Performed by: INTERNAL MEDICINE

## 2017-12-01 PROCEDURE — 99214 OFFICE O/P EST MOD 30 MIN: CPT | Performed by: INTERNAL MEDICINE

## 2017-12-01 RX ORDER — MIDODRINE HYDROCHLORIDE 10 MG/1
10 TABLET ORAL 2 TIMES DAILY
Status: ON HOLD | COMMUNITY
End: 2018-03-02 | Stop reason: HOSPADM

## 2017-12-01 RX ORDER — LANSOPRAZOLE 15 MG/1
30 CAPSULE, DELAYED RELEASE ORAL DAILY
Status: ON HOLD | COMMUNITY
End: 2021-07-15

## 2017-12-01 RX ORDER — DOXAZOSIN 2 MG/1
2 TABLET ORAL DAILY
COMMUNITY

## 2017-12-01 NOTE — PROGRESS NOTES
Batsheva Love in the last 72 hours. CBC:   Lab Results   Component Value Date    WBC 7.2 09/29/2017    RBC 3.17 09/29/2017    RBC 3.70 03/31/2017    HGB 10.8 09/29/2017    HCT 32.0 09/29/2017    .9 09/29/2017    MCH 34.0 09/29/2017    MCHC 33.7 09/29/2017    RDW 12.8 09/29/2017     09/29/2017    MPV 8.1 09/29/2017     BMP:    Lab Results   Component Value Date     09/29/2017    K 3.8 09/30/2017     09/29/2017    CO2 26 09/29/2017    BUN 12 09/29/2017    LABALBU 4.1 09/29/2017    LABALBU 4.0 05/30/2012    CREATININE 0.7 09/29/2017    CALCIUM 8.7 09/29/2017    LABGLOM >90 09/29/2017    GLUCOSE 93 09/29/2017    GLUCOSE 73 05/30/2012     Hepatic Function Panel:    Lab Results   Component Value Date    ALKPHOS 51 09/29/2017    ALT 15 09/29/2017    AST 30 09/29/2017    PROT 7.0 09/29/2017    BILITOT 0.9 09/29/2017    BILIDIR <0.2 09/29/2017    LABALBU 4.1 09/29/2017    LABALBU 4.0 05/30/2012     Magnesium:    Lab Results   Component Value Date    MG 2.0 09/29/2017     Warfarin PT/INR:  No components found for: PTPATWAR, PTINRWAR  HgBA1c:  No results found for: LABA1C  FLP:  No results found for: TRIG, HDL, LDLCALC, LDLDIRECT, LABVLDL  TSH:    Lab Results   Component Value Date    TSH 1.470 09/10/2017     Supine Bp 168/70    Standing bp 138/66        Assessment    1. Orthostatic hypotension     2. Essential hypertension     3.  Pure hypercholesterolemia            Near syncope    Supine HTN    Autonomic dysfunction     Marked orthostatic hypotension- sbp drop from 146 to 96 and from 139 to 84      Hx Falls       ETOH abuse     HLP    PLAN:    Dizziness improved markedly  Doing much better  NO fall since hospital d/c      The current meds and labs reviewed      Orthostatic Hypotension controlled  offife today  Supine Bp 168/70  Standing bp 138/66    etoh abuse   Advised rehab or stop it on his own slowely  BMP and MG and LP and LFT    Hypokalemia recurrent may related to florinef and dose

## 2017-12-16 ENCOUNTER — APPOINTMENT (OUTPATIENT)
Dept: CT IMAGING | Age: 82
End: 2017-12-16
Payer: MEDICARE

## 2017-12-16 ENCOUNTER — HOSPITAL ENCOUNTER (EMERGENCY)
Age: 82
Discharge: HOME | End: 2017-12-16
Payer: MEDICARE

## 2017-12-16 VITALS
OXYGEN SATURATION: 99 % | TEMPERATURE: 98 F | WEIGHT: 136 LBS | DIASTOLIC BLOOD PRESSURE: 85 MMHG | RESPIRATION RATE: 16 BRPM | HEART RATE: 80 BPM | BODY MASS INDEX: 19.04 KG/M2 | HEIGHT: 71 IN | SYSTOLIC BLOOD PRESSURE: 155 MMHG

## 2017-12-16 DIAGNOSIS — K22.5 ZENKER'S DIVERTICULUM: ICD-10-CM

## 2017-12-16 DIAGNOSIS — R13.10 DYSPHAGIA, UNSPECIFIED TYPE: Primary | ICD-10-CM

## 2017-12-16 LAB
ANION GAP SERPL CALCULATED.3IONS-SCNC: 16 MEQ/L (ref 8–16)
BASOPHILS # BLD: 0.3 %
BASOPHILS ABSOLUTE: 0 THOU/MM3 (ref 0–0.1)
BUN BLDV-MCNC: 13 MG/DL (ref 7–22)
CALCIUM SERPL-MCNC: 8.3 MG/DL (ref 8.5–10.5)
CHLORIDE BLD-SCNC: 92 MEQ/L (ref 98–111)
CO2: 32 MEQ/L (ref 23–33)
CREAT SERPL-MCNC: 0.7 MG/DL (ref 0.4–1.2)
EKG ATRIAL RATE: 81 BPM
EKG P AXIS: 68 DEGREES
EKG P-R INTERVAL: 130 MS
EKG Q-T INTERVAL: 444 MS
EKG QRS DURATION: 86 MS
EKG QTC CALCULATION (BAZETT): 515 MS
EKG R AXIS: -31 DEGREES
EKG T AXIS: 48 DEGREES
EKG VENTRICULAR RATE: 81 BPM
EOSINOPHIL # BLD: 0 %
EOSINOPHILS ABSOLUTE: 0 THOU/MM3 (ref 0–0.4)
GFR SERPL CREATININE-BSD FRML MDRD: > 90 ML/MIN/1.73M2
GLUCOSE BLD-MCNC: 90 MG/DL (ref 70–108)
HCT VFR BLD CALC: 34.5 % (ref 42–52)
HEMOGLOBIN: 12 GM/DL (ref 14–18)
LYMPHOCYTES # BLD: 8.8 %
LYMPHOCYTES ABSOLUTE: 0.6 THOU/MM3 (ref 1–4.8)
MACROCYTES: ABNORMAL
MCH RBC QN AUTO: 34.5 PG (ref 27–31)
MCHC RBC AUTO-ENTMCNC: 34.8 GM/DL (ref 33–37)
MCV RBC AUTO: 99.3 FL (ref 80–94)
MONOCYTES # BLD: 5.7 %
MONOCYTES ABSOLUTE: 0.4 THOU/MM3 (ref 0.4–1.3)
NUCLEATED RED BLOOD CELLS: 0 /100 WBC
OSMOLALITY CALCULATION: 279 MOSMOL/KG (ref 275–300)
PDW BLD-RTO: 12.7 % (ref 11.5–14.5)
PLATELET # BLD: 155 THOU/MM3 (ref 130–400)
PMV BLD AUTO: 8.4 MCM (ref 7.4–10.4)
POTASSIUM SERPL-SCNC: 3.1 MEQ/L (ref 3.5–5.2)
RBC # BLD: 3.47 MILL/MM3 (ref 4.7–6.1)
SEG NEUTROPHILS: 85.2 %
SEGMENTED NEUTROPHILS ABSOLUTE COUNT: 5.8 THOU/MM3 (ref 1.8–7.7)
SODIUM BLD-SCNC: 140 MEQ/L (ref 135–145)
TROPONIN T: < 0.01 NG/ML
WBC # BLD: 6.8 THOU/MM3 (ref 4.8–10.8)

## 2017-12-16 PROCEDURE — 6360000004 HC RX CONTRAST MEDICATION: Performed by: PHYSICIAN ASSISTANT

## 2017-12-16 PROCEDURE — 84484 ASSAY OF TROPONIN QUANT: CPT

## 2017-12-16 PROCEDURE — 99215 OFFICE O/P EST HI 40 MIN: CPT

## 2017-12-16 PROCEDURE — 6370000000 HC RX 637 (ALT 250 FOR IP): Performed by: PHYSICIAN ASSISTANT

## 2017-12-16 PROCEDURE — 85025 COMPLETE CBC W/AUTO DIFF WBC: CPT

## 2017-12-16 PROCEDURE — 93005 ELECTROCARDIOGRAM TRACING: CPT

## 2017-12-16 PROCEDURE — 36415 COLL VENOUS BLD VENIPUNCTURE: CPT

## 2017-12-16 PROCEDURE — 99284 EMERGENCY DEPT VISIT MOD MDM: CPT

## 2017-12-16 PROCEDURE — 70450 CT HEAD/BRAIN W/O DYE: CPT

## 2017-12-16 PROCEDURE — 70491 CT SOFT TISSUE NECK W/DYE: CPT

## 2017-12-16 PROCEDURE — 80048 BASIC METABOLIC PNL TOTAL CA: CPT

## 2017-12-16 RX ORDER — POTASSIUM CHLORIDE 20 MEQ/1
40 TABLET, EXTENDED RELEASE ORAL ONCE
Status: COMPLETED | OUTPATIENT
Start: 2017-12-16 | End: 2017-12-16

## 2017-12-16 RX ADMIN — IOPAMIDOL 80 ML: 755 INJECTION, SOLUTION INTRAVENOUS at 17:37

## 2017-12-16 RX ADMIN — POTASSIUM CHLORIDE 40 MEQ: 20 TABLET, EXTENDED RELEASE ORAL at 19:00

## 2017-12-16 ASSESSMENT — PAIN SCALES - WONG BAKER: WONGBAKER_NUMERICALRESPONSE: 4

## 2017-12-16 ASSESSMENT — ENCOUNTER SYMPTOMS
TROUBLE SWALLOWING: 1
NAUSEA: 0
VOMITING: 0
SINUS PRESSURE: 0
SORE THROAT: 0
COUGH: 0
RHINORRHEA: 0
BACK PAIN: 0
WHEEZING: 0
ABDOMINAL PAIN: 0
EYE REDNESS: 0
CHEST TIGHTNESS: 0
DIARRHEA: 0
SHORTNESS OF BREATH: 0
EYE DISCHARGE: 0

## 2017-12-16 NOTE — ED PROVIDER NOTES
[facility]  eMERGENCY dEPARTMENT eNCOUnter          CHIEF COMPLAINT       Chief Complaint   Patient presents with    Dysphagia       Nurses Notes reviewed and I agree except as noted in the HPI. HISTORY OF PRESENT ILLNESS    Calvin Lucero is a 81 y.o. male who presents to the Emergency Department for the evaluation of trouble swallowing, onset two days ago. The patient's wife states he keeps saying \"I can't swallow my food\" while eating. They went to an urgent care today, and they advised him to come to the ER. Patient can swallow liquids fine, but has trouble only with solids. His wife also says he has a decreased appetite recently. Patient has a history of Zenker diverticula. He had esophagus surgery for this, as well as removal of a nodule off of his thyroid. Patient denies any fever, chills, nausea, vomiting, diarrhea, chest pain, shortness of breath or any other symptoms at this time. Dr. Brissa Mclean is the patient's gastroenterologist.     Location/Symptom: Trouble swallowing  Timing/Onset: 2 days ago  Context/Setting: The patient's wife states he keeps saying \"I can't swallow my food\" while eating. Quality: None  Duration: Acute  Modifying Factors: Patient can swallow liquids fine, but has trouble only with solids. Severity: None. The HPI was provided by the patient and his wife. REVIEW OF SYSTEMS     Review of Systems   Constitutional: Positive for appetite change (decreased). Negative for chills, fatigue and fever. HENT: Positive for trouble swallowing. Negative for congestion, ear pain, rhinorrhea and sore throat. Eyes: Negative for discharge, redness and visual disturbance. Respiratory: Negative for cough, shortness of breath and wheezing. Cardiovascular: Negative for chest pain, palpitations and leg swelling. Gastrointestinal: Negative for abdominal pain, diarrhea, nausea and vomiting.    Genitourinary: Negative for decreased urine volume, difficulty urinating and dysuria. Musculoskeletal: Negative for arthralgias, back pain, joint swelling and neck pain. Skin: Negative for pallor and rash. Allergic/Immunologic: Negative for environmental allergies. Neurological: Negative for dizziness, syncope, weakness, light-headedness and headaches. Hematological: Negative for adenopathy. Psychiatric/Behavioral: Negative for agitation, confusion, dysphoric mood and suicidal ideas. The patient is not nervous/anxious. 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 SURGICAL HISTORY      has a past surgical history that includes shoulder surgery; Neck surgery; Tonsillectomy; TURP (5-31-12); Colonoscopy; Thyroid surgery; ostate surgery; Endoscopy, colon, diagnostic; and Esophagus surgery.     CURRENT MEDICATIONS       Previous Medications    ACETAMINOPHEN (TYLENOL) 325 MG TABLET    Take 2 tablets by mouth every 4 hours as needed for Pain    ATORVASTATIN (LIPITOR) 10 MG TABLET    Take 1 tablet by mouth daily    CELECOXIB (CELEBREX) 200 MG CAPSULE    Take 1 capsule by mouth daily    DOCUSATE SODIUM (COLACE PO)    Take by mouth as needed    DONEPEZIL (ARICEPT) 10 MG TABLET    Take 10 mg by mouth nightly     DOXAZOSIN (CARDURA) 2 MG TABLET    Take 2 mg by mouth daily    DRONABINOL (MARINOL) 2.5 MG CAPSULE    Take 2.5 mg by mouth 2 times daily (before meals)    FLUDROCORTISONE (FLORINEF) 0.1 MG TABLET    Take 1 tablet by mouth daily    LANSOPRAZOLE (PREVACID) 15 MG DELAYED RELEASE CAPSULE    Take 30 mg by mouth daily    METOPROLOL TARTRATE (LOPRESSOR) 50 MG TABLET    Take 50 mg by mouth daily    MIDODRINE (PROAMATINE) 10 MG TABLET    Take 5 mg by mouth 2 times daily    MULTIPLE VITAMIN (MULTIVITAMIN) TABLET    Take 1 tablet by mouth daily    OXYBUTYNIN (DITROPAN-XL) 10 MG EXTENDED RELEASE TABLET    Take 1 tablet by mouth nightly    POTASSIUM CHLORIDE (KLOR-CON M10) 10 MEQ EXTENDED RELEASE TABLET    Take 1 tablet by mouth daily    TIZANIDINE (ZANAFLEX) 4 MG TABLET    Take 1 tablet by mouth every 8 hours as needed (Pain)       ALLERGIES     has No Known Allergies. FAMILY HISTORY     indicated that his mother is . He indicated that his father is . He indicated that three of his five sisters are alive. He indicated that all of his four brothers are alive. family history includes Alzheimer's Disease in his sister; Arthritis in his sister and sister; Cancer in his sister; Dementia in his brother; Heart Disease in his sister; High Blood Pressure in his father; No Known Problems in his brother and brother; Other in his brother; Stroke in his father and sister. SOCIAL HISTORY      reports that he quit smoking about 25 years ago. He has never used smokeless tobacco. He reports that he drinks alcohol. He reports that he does not use drugs. PHYSICAL EXAM     INITIAL VITALS:  height is 5' 11\" (1.803 m) and weight is 136 lb (61.7 kg). His temperature is 98 °F (36.7 °C). His blood pressure is 183/82 (abnormal) and his pulse is 86. His respiration is 17 and oxygen saturation is 99%. Physical Exam   Constitutional: He is oriented to person, place, and time. He appears well-developed and well-nourished. HENT:   Head: Normocephalic and atraumatic. Right Ear: External ear normal.   Left Ear: External ear normal.   Mouth/Throat: Oropharynx is clear and moist and mucous membranes are normal. Mucous membranes are not pale, not dry and not cyanotic. No oral lesions. Normal dentition. No dental abscesses or lacerations. No oropharyngeal exudate, posterior oropharyngeal edema, posterior oropharyngeal erythema or tonsillar abscesses. Eyes: Conjunctivae are normal. Right eye exhibits no discharge. Left eye exhibits no discharge. No scleral icterus.    Neck: Normal range of motion. Neck supple. No JVD present. Cardiovascular: Normal rate, regular rhythm and normal heart sounds. Exam reveals no gallop and no friction rub. No murmur heard. Pulmonary/Chest: Effort normal and breath sounds normal. No respiratory distress. He has no decreased breath sounds. He has no wheezes. He has no rhonchi. He has no rales. Abdominal: Soft. He exhibits no distension. There is no tenderness. There is no rebound and no guarding. Musculoskeletal: Normal range of motion. He exhibits no edema. Neurological: He is alert and oriented to person, place, and time. He exhibits normal muscle tone. He displays no seizure activity. GCS eye subscore is 4. GCS verbal subscore is 5. GCS motor subscore is 6. Skin: Skin is warm and dry. No rash noted. He is not diaphoretic. Psychiatric: He has a normal mood and affect. His behavior is normal. Thought content normal.   Nursing note and vitals reviewed. DIFFERENTIAL DIAGNOSIS:   Including but not limited to: stricture, diverticulum, or Zenker diverticula. Not likely, stroke. DIAGNOSTIC RESULTS     EKG: All EKG's are interpreted by the Emergency Department Physician who either signs or Co-signs this chart in the absence of a cardiologist.  None. RADIOLOGY: non-plain film images(s) such as CT, Ultrasound and MRI are read by the radiologist.    No orders to display         LABS:     Labs Reviewed - No data to display    EMERGENCY DEPARTMENT COURSE:   Vitals:    Vitals:    12/16/17 1259 12/16/17 1338   BP: (!) 187/91 (!) 183/82   Pulse: 81 86   Resp: 16 17   Temp: 98 °F (36.7 °C)    SpO2: 97% 99%   Weight:  136 lb (61.7 kg)   Height:  5' 11\" (1.803 m)       1:38 PM: The patient was seen and evaluated. MDM:  Dr Ana Gordon was consulted. Pt can swallow but difficult. Will follow up in outpatient clinic. Case was signed out to Brenda Santa pending CT scan results. If negative will DC    CRITICAL CARE:   None.      CONSULTS:   Nathaniel    PROCEDURES:  None. FINAL IMPRESSION      1. Dysphagia, unspecified type    2. Elevated blood pressure reading in office with diagnosis of hypertension    3. Zenker's diverticulum          DISPOSITION/PLAN   Dishcharge      PATIENT REFERRED TO:  [facility], EMERGENCY DEPT  [address]  [phone]            DISCHARGE MEDICATIONS:  New Prescriptions    No medications on file       (Please note that portions of this note were completed with a voice recognition program.  Efforts were made to edit the dictations but occasionally words are mis-transcribed.)    The patient was given an opportunity to see the Emergency Attending. The patient voiced understanding that I was a Mid-Level Provider and was in agreement with being seen independently by myself. Scribe:  Aurelio Marquez 12/16/17 1:38 PM Scribing for and in the presence of [employer], PA-C. Signed by: Graciela Lam, 12/16/17 1:50 PM    Provider:  I personally performed the services described in the documentation, reviewed and edited the documentation which was dictated to the scribe in my presence, and it accurately records my words and actions.     Jacob Bernal PA-C 12/16/17 1:50 PM       JUAN Laguerre  12/17/17 3637

## 2017-12-16 NOTE — ED TRIAGE NOTES
Wife states he is not eating well and has not taking pills today.  Pt did not want to eat this morning due to discomfort, was able to drink coffee

## 2017-12-16 NOTE — Clinical Note
The patient's extensive history it is felt that his symptoms need evaluated in the emergency room as they cannot be evaluated here in the urgent care center. Patient presents with his wife who will drive him by private vehicle per the request.  They  have chosen site [facility]. Report called to [facility]. Epic notes reviewed, the below portion of the note was taken from 2/12/13 from Dr. King More part of the evaluation for his pharyngeal symptoms, he also underwent x-ray bariu m swallow and modified barium swallow studies. These suggested an upper esophageal diverticulum that appears to have gotten slightly larger now along with changes suggestive of presbyesophagus. No nasal or throat sx were reported. He reports having  had some mass removed from his lower neck, not sure what it was, but from his description it appears that he might have had a thyroid surgery. He had an EGD procedure done by Dr Belkis Faulkner that also has confirmed the presence of a large Zenker's d iverticulum. He apparently had dilation of the upper esophageal sphincter at the same time and felt some improvement in his dysphagic sx. \"

## 2017-12-16 NOTE — ED PROVIDER NOTES
[address] [facility] [address] [address] EMERGENCY DEPT  Urgent Care Encounter      CHIEF COMPLAINT       Chief Complaint   Patient presents with    Pharyngitis     difficulty swallowing, state it hurts    Eating Disorder     not eating last few days       Nurses Notes reviewed and I agree except as noted in the HPI. HISTORY OF PRESENT ILLNESS   Agus Hernandez is a 81 y.o. male who presents to the urgent care, accompanied by his wife who contributes to this visit, for evaluation of trouble swallowing for the past couple of days but worsened this morning while at [employer]. They both report that he was unable to swallow food, due to pain, but was able to swallow liquids. They both denies previous history of trouble swallowing, but report that he had \"some kind of neck surgery years ago. \"     REVIEW OF SYSTEMS     Review of Systems   Constitutional: Positive for appetite change (decreased). Negative for activity change, chills, fatigue and fever. HENT: Positive for trouble swallowing. Negative for congestion, ear pain, postnasal drip, rhinorrhea, sinus pressure and sore throat. Respiratory: Negative for cough, chest tightness and shortness of breath. Cardiovascular: Negative for chest pain. Gastrointestinal: Negative for abdominal pain, diarrhea, nausea and vomiting. Skin: Negative for rash. Allergic/Immunologic: Negative for environmental allergies and food allergies. Neurological: Negative for headaches. PAST MEDICAL HISTORY         Diagnosis Date    BPH (benign prostatic hypertrophy)     CKD (chronic kidney disease) stage 2, GFR 60-89 ml/min     Dementia     Erectile dysfunction     [place] (hard of hearing)     Hyperlipidemia     Hypertension     Metabolic bone disease        SURGICAL HISTORY     Patient  has a past surgical history that includes shoulder surgery; Neck surgery; Tonsillectomy; TURP (5-31-12); Colonoscopy; Thyroid surgery; ostate surgery;  Endoscopy, colon, diagnostic; and Esophagus surgery. CURRENT MEDICATIONS       Previous Medications    ACETAMINOPHEN (TYLENOL) 325 MG TABLET    Take 2 tablets by mouth every 4 hours as needed for Pain    ATORVASTATIN (LIPITOR) 10 MG TABLET    Take 1 tablet by mouth daily    CELECOXIB (CELEBREX) 200 MG CAPSULE    Take 1 capsule by mouth daily    DOCUSATE SODIUM (COLACE PO)    Take by mouth as needed    DONEPEZIL (ARICEPT) 10 MG TABLET    Take 10 mg by mouth nightly     DOXAZOSIN (CARDURA) 2 MG TABLET    Take 2 mg by mouth daily    DRONABINOL (MARINOL) 2.5 MG CAPSULE    Take 2.5 mg by mouth 2 times daily (before meals)    FLUDROCORTISONE (FLORINEF) 0.1 MG TABLET    Take 1 tablet by mouth daily    LANSOPRAZOLE (PREVACID) 15 MG DELAYED RELEASE CAPSULE    Take 30 mg by mouth daily    METOPROLOL TARTRATE (LOPRESSOR) 50 MG TABLET    Take 50 mg by mouth daily    MIDODRINE (PROAMATINE) 10 MG TABLET    Take 5 mg by mouth 2 times daily    MULTIPLE VITAMIN (MULTIVITAMIN) TABLET    Take 1 tablet by mouth daily    OXYBUTYNIN (DITROPAN-XL) 10 MG EXTENDED RELEASE TABLET    Take 1 tablet by mouth nightly    POTASSIUM CHLORIDE (KLOR-CON M10) 10 MEQ EXTENDED RELEASE TABLET    Take 1 tablet by mouth daily    TIZANIDINE (ZANAFLEX) 4 MG TABLET    Take 1 tablet by mouth every 8 hours as needed (Pain)       ALLERGIES     Patient is has No Known Allergies. FAMILY HISTORY     Patient's family history includes Alzheimer's Disease in his sister; Arthritis in his sister and sister; Cancer in his sister; Dementia in his brother; Heart Disease in his sister; High Blood Pressure in his father; No Known Problems in his brother and brother; Other in his brother; Stroke in his father and sister. SOCIAL HISTORY     Patient  reports that he quit smoking about 25 years ago. He has never used smokeless tobacco. He reports that he drinks alcohol. He reports that he does not use drugs.     PHYSICAL EXAM     ED TRIAGE VITALS  BP: (!) 187/91, Temp: 98 °F (36.7 °C), Pulse: 81, Resp: 16, SpO2: 97 %  Physical Exam   Constitutional: Vital signs are normal. He appears well-developed and well-nourished. He is cooperative. Non-toxic appearance. He does not have a sickly appearance. He does not appear ill. No distress. HENT:   Head: Normocephalic and atraumatic. Right Ear: External ear normal.   Left Ear: External ear normal.   Nose: Nose normal.   Mouth/Throat: Mucous membranes are normal.   Eyes: Conjunctivae are normal.   Neck: Normal range of motion and full passive range of motion without pain. No neck rigidity. Cardiovascular: Normal rate. Pulmonary/Chest: Effort normal and breath sounds normal. No accessory muscle usage. No respiratory distress. Abdominal: Normal appearance and bowel sounds are normal.   Neurological: He is alert. He is disoriented (intermittent confusion noted during exam). Skin: Skin is warm and dry. No rash (on exposed surfaces) noted. He is not diaphoretic. Psychiatric: He has a normal mood and affect. His speech is normal.   Nursing note and vitals reviewed. DIAGNOSTIC RESULTS   Labs:No results found for this visit on 12/16/17. IMAGING:  No orders to display     URGENT CARE COURSE:     Vitals:    12/16/17 1259   BP: (!) 187/91   Pulse: 81   Resp: 16   Temp: 98 °F (36.7 °C)   SpO2: 97%       Medications - No data to display  PROCEDURES:  None  FINAL IMPRESSION      1. Dysphagia, unspecified type    2. Elevated blood pressure reading in office with diagnosis of hypertension    3.  Zenker's diverticulum        DISPOSITION/PLAN   DISPOSITION    Transfer        PATIENT REFERRED TO:  [facility], EMERGENCY DEPT  [address]  [phone]          DISCHARGE MEDICATIONS:  New Prescriptions    No medications on file     Current Discharge Medication List          Catherine Prater, [address], [address] [place] [address]  12/16/17 8512

## 2017-12-17 NOTE — ED PROVIDER NOTES
[facility]  eMERGENCY dEPARTMENT eNCOUnter     Pt Name: Mary Biggs  MRN: [id number]  Carltongfradha 1935  Date of evaluation: 12/16/17        Mid-level provider Note:    I have personally performed and/or participated in the history, exam and medical decision making and agree with all pertinent clinical information as noted by the previous provider. I have also reviewed and agree with the past medical, family and social history unless otherwise noted. I have personally performed a face to face diagnostic evaluation on this patient. I have reviewed the previous provider's findings and agree. Evaluation:  Patient is awake and alert and oriented. He is sitting up in bed. Nurse is administering potassium replacement. I discussed with Dr. Maye Grace the results of the CT. He indicates the patient has adequate for discharge with a soft/puréed/liquid diet. Patient is to call their office on Monday at 8:30 AM for a follow-up with Dr. Princess Sheikh. Patient and wife verbalized understanding. They're discharged in stable condition. 1. Dysphagia, unspecified type    2. Elevated blood pressure reading in office with diagnosis of hypertension    3.  Zenker's diverticulum          DISPOSITION/PLAN  PATIENT REFERRED TO:  MD Bhavna Bang 46  [facility] [place] [address]    In 2 days      Quynh Chahal MD  [address] [address] [phone]    In 2 days      DISCHARGE MEDICATIONS:  New Prescriptions    No medications on file         EVARISTO Escobar NP  12/16/17 3799

## 2017-12-18 ENCOUNTER — CARE COORDINATION (OUTPATIENT)
Dept: CASE MANAGEMENT | Age: 82
End: 2017-12-18

## 2018-01-30 ENCOUNTER — HOSPITAL ENCOUNTER (OUTPATIENT)
Dept: CT IMAGING | Age: 83
Discharge: HOME | End: 2018-01-30
Payer: MEDICARE

## 2018-01-30 DIAGNOSIS — G44.319 ACUTE POST-TRAUMATIC HEADACHE, NOT INTRACTABLE: ICD-10-CM

## 2018-01-30 PROCEDURE — 70450 CT HEAD/BRAIN W/O DYE: CPT

## 2018-02-26 ENCOUNTER — APPOINTMENT (OUTPATIENT)
Dept: GENERAL RADIOLOGY | Age: 83
DRG: 896 | End: 2018-02-26
Payer: MEDICARE

## 2018-02-26 ENCOUNTER — APPOINTMENT (OUTPATIENT)
Dept: CT IMAGING | Age: 83
DRG: 896 | End: 2018-02-26
Payer: MEDICARE

## 2018-02-26 ENCOUNTER — HOSPITAL ENCOUNTER (INPATIENT)
Age: 83
LOS: 7 days | Discharge: NURSING, REHAB OR LONG-TERM CARE | DRG: 896 | End: 2018-03-06
Attending: FAMILY MEDICINE | Admitting: INTERNAL MEDICINE
Payer: MEDICARE

## 2018-02-26 DIAGNOSIS — F10.920 ACUTE ALCOHOLIC INTOXICATION WITHOUT COMPLICATION (HCC): ICD-10-CM

## 2018-02-26 DIAGNOSIS — R29.6 MULTIPLE FALLS: Primary | ICD-10-CM

## 2018-02-26 PROBLEM — F10.929 ALCOHOL INTOXICATION (HCC): Status: ACTIVE | Noted: 2018-02-26
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
## 2018-02-26 PROBLEM — F10.121 ALCOHOL INTOXICATION DELIRIUM (HCC): Status: ACTIVE | Noted: 2018-02-26

## 2018-02-26 LAB
ALBUMIN SERPL-MCNC: 3.9 G/DL (ref 3.5–5.1)
ALP BLD-CCNC: 61 U/L (ref 38–126)
ALT SERPL-CCNC: 28 U/L (ref 11–66)
AMPHETAMINE+METHAMPHETAMINE URINE SCREEN: NEGATIVE
ANION GAP SERPL CALCULATED.3IONS-SCNC: 16 MEQ/L (ref 8–16)
AST SERPL-CCNC: 47 U/L (ref 5–40)
BARBITURATE QUANTITATIVE URINE: NEGATIVE
BASOPHILS # BLD: 0.6 %
BASOPHILS ABSOLUTE: 0 THOU/MM3 (ref 0–0.1)
BENZODIAZEPINE QUANTITATIVE URINE: NEGATIVE
BILIRUB SERPL-MCNC: 1.4 MG/DL (ref 0.3–1.2)
BUN BLDV-MCNC: 16 MG/DL (ref 7–22)
CALCIUM SERPL-MCNC: 8.7 MG/DL (ref 8.5–10.5)
CANNABINOID QUANTITATIVE URINE: NEGATIVE
CHLORIDE BLD-SCNC: 96 MEQ/L (ref 98–111)
CO2: 28 MEQ/L (ref 23–33)
COCAINE METABOLITE QUANTITATIVE URINE: NEGATIVE
CREAT SERPL-MCNC: 0.8 MG/DL (ref 0.4–1.2)
EOSINOPHIL # BLD: 0.5 %
EOSINOPHILS ABSOLUTE: 0 THOU/MM3 (ref 0–0.4)
ETHYL ALCOHOL, SERUM: 0.24 %
GFR SERPL CREATININE-BSD FRML MDRD: > 90 ML/MIN/1.73M2
GLUCOSE BLD-MCNC: 75 MG/DL (ref 70–108)
HCT VFR BLD CALC: 36.9 % (ref 42–52)
HEMOGLOBIN: 12.6 GM/DL (ref 14–18)
LYMPHOCYTES # BLD: 33.1 %
LYMPHOCYTES ABSOLUTE: 1.5 THOU/MM3 (ref 1–4.8)
MACROCYTES: ABNORMAL
MCH RBC QN AUTO: 34.6 PG (ref 27–31)
MCHC RBC AUTO-ENTMCNC: 34.2 GM/DL (ref 33–37)
MCV RBC AUTO: 101 FL (ref 80–94)
MONOCYTES # BLD: 10 %
MONOCYTES ABSOLUTE: 0.4 THOU/MM3 (ref 0.4–1.3)
NUCLEATED RED BLOOD CELLS: 0 /100 WBC
OPIATES, URINE: NEGATIVE
OSMOLALITY CALCULATION: 279.3 MOSMOL/KG (ref 275–300)
OXYCODONE: NEGATIVE
PDW BLD-RTO: 12.2 % (ref 11.5–14.5)
PHENCYCLIDINE QUANTITATIVE URINE: NEGATIVE
PLATELET # BLD: 145 THOU/MM3 (ref 130–400)
PMV BLD AUTO: 7.8 FL (ref 7.4–10.4)
POTASSIUM SERPL-SCNC: 3.2 MEQ/L (ref 3.5–5.2)
RBC # BLD: 3.65 MILL/MM3 (ref 4.7–6.1)
SEG NEUTROPHILS: 55.8 %
SEGMENTED NEUTROPHILS ABSOLUTE COUNT: 2.5 THOU/MM3 (ref 1.8–7.7)
SODIUM BLD-SCNC: 140 MEQ/L (ref 135–145)
TOTAL PROTEIN: 6.7 G/DL (ref 6.1–8)
WBC # BLD: 4.4 THOU/MM3 (ref 4.8–10.8)

## 2018-02-26 PROCEDURE — 6370000000 HC RX 637 (ALT 250 FOR IP): Performed by: FAMILY MEDICINE

## 2018-02-26 PROCEDURE — 36415 COLL VENOUS BLD VENIPUNCTURE: CPT

## 2018-02-26 PROCEDURE — 80053 COMPREHEN METABOLIC PANEL: CPT

## 2018-02-26 PROCEDURE — 80307 DRUG TEST PRSMV CHEM ANLYZR: CPT

## 2018-02-26 PROCEDURE — 72170 X-RAY EXAM OF PELVIS: CPT

## 2018-02-26 PROCEDURE — G0378 HOSPITAL OBSERVATION PER HR: HCPCS

## 2018-02-26 PROCEDURE — 85025 COMPLETE CBC W/AUTO DIFF WBC: CPT

## 2018-02-26 PROCEDURE — 73090 X-RAY EXAM OF FOREARM: CPT

## 2018-02-26 PROCEDURE — 99285 EMERGENCY DEPT VISIT HI MDM: CPT

## 2018-02-26 PROCEDURE — 70450 CT HEAD/BRAIN W/O DYE: CPT

## 2018-02-26 PROCEDURE — G0480 DRUG TEST DEF 1-7 CLASSES: HCPCS

## 2018-02-26 PROCEDURE — 72125 CT NECK SPINE W/O DYE: CPT

## 2018-02-26 RX ORDER — MIDODRINE HYDROCHLORIDE 10 MG/1
10 TABLET ORAL 2 TIMES DAILY
Status: DISCONTINUED | OUTPATIENT
Start: 2018-02-27 | End: 2018-03-05

## 2018-02-26 RX ORDER — NIFEDIPINE 30 MG/1
30 TABLET, EXTENDED RELEASE ORAL EVERY MORNING
Status: DISCONTINUED | OUTPATIENT
Start: 2018-02-27 | End: 2018-03-06 | Stop reason: HOSPADM

## 2018-02-26 RX ORDER — PANTOPRAZOLE SODIUM 40 MG/1
40 GRANULE, DELAYED RELEASE ORAL
Status: ON HOLD | COMMUNITY
End: 2018-03-02 | Stop reason: HOSPADM

## 2018-02-26 RX ORDER — NIFEDIPINE 30 MG/1
30 TABLET, FILM COATED, EXTENDED RELEASE ORAL DAILY
Status: ON HOLD | COMMUNITY
End: 2018-03-02 | Stop reason: HOSPADM

## 2018-02-26 RX ORDER — POTASSIUM CHLORIDE 20 MEQ/1
40 TABLET, EXTENDED RELEASE ORAL ONCE
Status: COMPLETED | OUTPATIENT
Start: 2018-02-26 | End: 2018-02-26

## 2018-02-26 RX ORDER — POTASSIUM CHLORIDE 20 MEQ/1
40 TABLET, EXTENDED RELEASE ORAL 2 TIMES DAILY WITH MEALS
Status: DISCONTINUED | OUTPATIENT
Start: 2018-02-27 | End: 2018-02-26

## 2018-02-26 RX ADMIN — POTASSIUM CHLORIDE 40 MEQ: 20 TABLET, EXTENDED RELEASE ORAL at 23:07

## 2018-02-26 ASSESSMENT — ENCOUNTER SYMPTOMS
BACK PAIN: 0
SHORTNESS OF BREATH: 0
COUGH: 0
SORE THROAT: 0
ABDOMINAL PAIN: 0
RHINORRHEA: 0
DIARRHEA: 0
EYE DISCHARGE: 0
WHEEZING: 0
VOMITING: 0
EYE REDNESS: 0
NAUSEA: 0

## 2018-02-27 PROBLEM — F10.229 ACUTE ALCOHOL INTOXICATION WITH ALCOHOLISM, WITH UNSPECIFIED COMPLICATION (HCC): Status: ACTIVE | Noted: 2018-02-27

## 2018-02-27 LAB
ALBUMIN SERPL-MCNC: 4 G/DL (ref 3.5–5.1)
ALP BLD-CCNC: 64 U/L (ref 38–126)
ALT SERPL-CCNC: 26 U/L (ref 11–66)
ANION GAP SERPL CALCULATED.3IONS-SCNC: 19 MEQ/L (ref 8–16)
AST SERPL-CCNC: 47 U/L (ref 5–40)
BASOPHILS # BLD: 0.3 %
BASOPHILS ABSOLUTE: 0 THOU/MM3 (ref 0–0.1)
BILIRUB SERPL-MCNC: 1.2 MG/DL (ref 0.3–1.2)
BUN BLDV-MCNC: 15 MG/DL (ref 7–22)
CALCIUM IONIZED: 1.01 MMOL/L (ref 1.12–1.32)
CALCIUM SERPL-MCNC: 9 MG/DL (ref 8.5–10.5)
CHLORIDE BLD-SCNC: 98 MEQ/L (ref 98–111)
CO2: 26 MEQ/L (ref 23–33)
CREAT SERPL-MCNC: 0.7 MG/DL (ref 0.4–1.2)
EOSINOPHIL # BLD: 0.7 %
EOSINOPHILS ABSOLUTE: 0 THOU/MM3 (ref 0–0.4)
GFR SERPL CREATININE-BSD FRML MDRD: > 90 ML/MIN/1.73M2
GLUCOSE BLD-MCNC: 52 MG/DL (ref 70–108)
GLUCOSE BLD-MCNC: 55 MG/DL (ref 70–108)
GLUCOSE BLD-MCNC: 77 MG/DL (ref 70–108)
GLUCOSE BLD-MCNC: 85 MG/DL (ref 70–108)
GLUCOSE BLD-MCNC: 88 MG/DL (ref 70–108)
GLUCOSE BLD-MCNC: 95 MG/DL (ref 70–108)
GLUCOSE BLD-MCNC: 99 MG/DL (ref 70–108)
HCT VFR BLD CALC: 37.2 % (ref 42–52)
HEMOGLOBIN: 12.6 GM/DL (ref 14–18)
LIPASE: 30 U/L (ref 5.6–51.3)
LYMPHOCYTES # BLD: 41.3 %
LYMPHOCYTES ABSOLUTE: 2.2 THOU/MM3 (ref 1–4.8)
MACROCYTES: ABNORMAL
MAGNESIUM: 1.8 MG/DL (ref 1.6–2.4)
MCH RBC QN AUTO: 33.5 PG (ref 27–31)
MCHC RBC AUTO-ENTMCNC: 33.8 GM/DL (ref 33–37)
MCV RBC AUTO: 99.2 FL (ref 80–94)
MONOCYTES # BLD: 10.5 %
MONOCYTES ABSOLUTE: 0.6 THOU/MM3 (ref 0.4–1.3)
NUCLEATED RED BLOOD CELLS: 0 /100 WBC
PDW BLD-RTO: 12.5 % (ref 11.5–14.5)
PLATELET # BLD: 176 THOU/MM3 (ref 130–400)
PMV BLD AUTO: 7.9 FL (ref 7.4–10.4)
POTASSIUM REFLEX MAGNESIUM: 3.5 MEQ/L (ref 3.5–5.2)
RBC # BLD: 3.76 MILL/MM3 (ref 4.7–6.1)
SEG NEUTROPHILS: 47.2 %
SEGMENTED NEUTROPHILS ABSOLUTE COUNT: 2.5 THOU/MM3 (ref 1.8–7.7)
SODIUM BLD-SCNC: 143 MEQ/L (ref 135–145)
TOTAL PROTEIN: 7.1 G/DL (ref 6.1–8)
WBC # BLD: 5.4 THOU/MM3 (ref 4.8–10.8)

## 2018-02-27 PROCEDURE — 2580000003 HC RX 258: Performed by: INTERNAL MEDICINE

## 2018-02-27 PROCEDURE — 96376 TX/PRO/DX INJ SAME DRUG ADON: CPT

## 2018-02-27 PROCEDURE — 6370000000 HC RX 637 (ALT 250 FOR IP): Performed by: INTERNAL MEDICINE

## 2018-02-27 PROCEDURE — 82330 ASSAY OF CALCIUM: CPT

## 2018-02-27 PROCEDURE — 36415 COLL VENOUS BLD VENIPUNCTURE: CPT

## 2018-02-27 PROCEDURE — 1200000003 HC TELEMETRY R&B

## 2018-02-27 PROCEDURE — 80053 COMPREHEN METABOLIC PANEL: CPT

## 2018-02-27 PROCEDURE — 6360000002 HC RX W HCPCS

## 2018-02-27 PROCEDURE — 83735 ASSAY OF MAGNESIUM: CPT

## 2018-02-27 PROCEDURE — 85025 COMPLETE CBC W/AUTO DIFF WBC: CPT

## 2018-02-27 PROCEDURE — 6360000002 HC RX W HCPCS: Performed by: INTERNAL MEDICINE

## 2018-02-27 PROCEDURE — 82948 REAGENT STRIP/BLOOD GLUCOSE: CPT

## 2018-02-27 PROCEDURE — 83690 ASSAY OF LIPASE: CPT

## 2018-02-27 PROCEDURE — 96374 THER/PROPH/DIAG INJ IV PUSH: CPT

## 2018-02-27 RX ORDER — ONDANSETRON 2 MG/ML
4 INJECTION INTRAMUSCULAR; INTRAVENOUS EVERY 6 HOURS PRN
Status: DISCONTINUED | OUTPATIENT
Start: 2018-02-27 | End: 2018-03-06 | Stop reason: HOSPADM

## 2018-02-27 RX ORDER — ACETAMINOPHEN 325 MG/1
650 TABLET ORAL EVERY 4 HOURS PRN
Status: DISCONTINUED | OUTPATIENT
Start: 2018-02-27 | End: 2018-03-06 | Stop reason: HOSPADM

## 2018-02-27 RX ORDER — DRONABINOL 2.5 MG/1
2.5 CAPSULE ORAL
Status: DISCONTINUED | OUTPATIENT
Start: 2018-02-27 | End: 2018-03-06 | Stop reason: HOSPADM

## 2018-02-27 RX ORDER — FLUDROCORTISONE ACETATE 0.1 MG/1
0.1 TABLET ORAL DAILY
Status: DISCONTINUED | OUTPATIENT
Start: 2018-02-27 | End: 2018-03-06

## 2018-02-27 RX ORDER — DEXTROSE AND SODIUM CHLORIDE 5; .9 G/100ML; G/100ML
INJECTION, SOLUTION INTRAVENOUS CONTINUOUS
Status: DISCONTINUED | OUTPATIENT
Start: 2018-02-27 | End: 2018-03-06

## 2018-02-27 RX ORDER — LORAZEPAM 2 MG/ML
2 INJECTION INTRAMUSCULAR
Status: DISCONTINUED | OUTPATIENT
Start: 2018-02-27 | End: 2018-03-05

## 2018-02-27 RX ORDER — DEXTROSE AND SODIUM CHLORIDE 5; .9 G/100ML; G/100ML
100 INJECTION, SOLUTION INTRAVENOUS PRN
Status: DISCONTINUED | OUTPATIENT
Start: 2018-02-27 | End: 2018-03-06 | Stop reason: HOSPADM

## 2018-02-27 RX ORDER — POTASSIUM CHLORIDE 20 MEQ/1
10 TABLET, EXTENDED RELEASE ORAL DAILY
Status: DISCONTINUED | OUTPATIENT
Start: 2018-02-27 | End: 2018-02-27 | Stop reason: CLARIF

## 2018-02-27 RX ORDER — LORAZEPAM 2 MG/ML
3 INJECTION INTRAMUSCULAR
Status: DISCONTINUED | OUTPATIENT
Start: 2018-02-27 | End: 2018-03-05

## 2018-02-27 RX ORDER — LORAZEPAM 2 MG/ML
4 INJECTION INTRAMUSCULAR
Status: DISCONTINUED | OUTPATIENT
Start: 2018-02-27 | End: 2018-03-05

## 2018-02-27 RX ORDER — THIAMINE MONONITRATE (VIT B1) 100 MG
100 TABLET ORAL DAILY
COMMUNITY

## 2018-02-27 RX ORDER — ATORVASTATIN CALCIUM 10 MG/1
10 TABLET, FILM COATED ORAL DAILY
Status: DISCONTINUED | OUTPATIENT
Start: 2018-02-27 | End: 2018-03-06 | Stop reason: HOSPADM

## 2018-02-27 RX ORDER — LORAZEPAM 1 MG/1
4 TABLET ORAL
Status: DISCONTINUED | OUTPATIENT
Start: 2018-02-27 | End: 2018-03-05

## 2018-02-27 RX ORDER — LORAZEPAM 2 MG/ML
1 INJECTION INTRAMUSCULAR
Status: DISCONTINUED | OUTPATIENT
Start: 2018-02-27 | End: 2018-03-05

## 2018-02-27 RX ORDER — FOLIC ACID 1 MG/1
1 TABLET ORAL DAILY
Status: DISCONTINUED | OUTPATIENT
Start: 2018-02-27 | End: 2018-03-06 | Stop reason: HOSPADM

## 2018-02-27 RX ORDER — NICOTINE POLACRILEX 4 MG
15 LOZENGE BUCCAL PRN
Status: DISCONTINUED | OUTPATIENT
Start: 2018-02-27 | End: 2018-03-06 | Stop reason: HOSPADM

## 2018-02-27 RX ORDER — MULTIVITAMIN WITH FOLIC ACID 400 MCG
1 TABLET ORAL DAILY
Status: DISCONTINUED | OUTPATIENT
Start: 2018-02-27 | End: 2018-03-06 | Stop reason: HOSPADM

## 2018-02-27 RX ORDER — POTASSIUM CHLORIDE 750 MG/1
10 TABLET, FILM COATED, EXTENDED RELEASE ORAL DAILY
Status: DISCONTINUED | OUTPATIENT
Start: 2018-02-27 | End: 2018-03-02 | Stop reason: ALTCHOICE

## 2018-02-27 RX ORDER — LORAZEPAM 1 MG/1
3 TABLET ORAL
Status: DISCONTINUED | OUTPATIENT
Start: 2018-02-27 | End: 2018-03-05

## 2018-02-27 RX ORDER — METOPROLOL TARTRATE 50 MG/1
50 TABLET, FILM COATED ORAL DAILY
Status: DISCONTINUED | OUTPATIENT
Start: 2018-02-27 | End: 2018-03-06 | Stop reason: HOSPADM

## 2018-02-27 RX ORDER — LORAZEPAM 1 MG/1
1 TABLET ORAL
Status: DISCONTINUED | OUTPATIENT
Start: 2018-02-27 | End: 2018-03-06 | Stop reason: HOSPADM

## 2018-02-27 RX ORDER — SODIUM CHLORIDE 0.9 % (FLUSH) 0.9 %
10 SYRINGE (ML) INJECTION PRN
Status: DISCONTINUED | OUTPATIENT
Start: 2018-02-27 | End: 2018-03-06 | Stop reason: HOSPADM

## 2018-02-27 RX ORDER — DOCUSATE SODIUM 100 MG/1
100 CAPSULE, LIQUID FILLED ORAL DAILY
Status: DISCONTINUED | OUTPATIENT
Start: 2018-02-27 | End: 2018-03-06 | Stop reason: HOSPADM

## 2018-02-27 RX ORDER — DONEPEZIL HYDROCHLORIDE 10 MG/1
10 TABLET, FILM COATED ORAL NIGHTLY
Status: DISCONTINUED | OUTPATIENT
Start: 2018-02-27 | End: 2018-03-06 | Stop reason: HOSPADM

## 2018-02-27 RX ORDER — OXYBUTYNIN CHLORIDE 10 MG/1
10 TABLET, EXTENDED RELEASE ORAL NIGHTLY
Status: DISCONTINUED | OUTPATIENT
Start: 2018-02-27 | End: 2018-03-06 | Stop reason: HOSPADM

## 2018-02-27 RX ORDER — PANTOPRAZOLE SODIUM 40 MG/1
40 TABLET, DELAYED RELEASE ORAL
Status: DISCONTINUED | OUTPATIENT
Start: 2018-02-27 | End: 2018-03-02 | Stop reason: CLARIF

## 2018-02-27 RX ORDER — THIAMINE HYDROCHLORIDE 100 MG/ML
100 INJECTION, SOLUTION INTRAMUSCULAR; INTRAVENOUS DAILY
Status: DISCONTINUED | OUTPATIENT
Start: 2018-02-27 | End: 2018-02-27 | Stop reason: CLARIF

## 2018-02-27 RX ORDER — SODIUM CHLORIDE 0.9 % (FLUSH) 0.9 %
10 SYRINGE (ML) INJECTION EVERY 12 HOURS SCHEDULED
Status: DISCONTINUED | OUTPATIENT
Start: 2018-02-27 | End: 2018-03-06 | Stop reason: HOSPADM

## 2018-02-27 RX ORDER — DEXTROSE MONOHYDRATE 25 G/50ML
12.5 INJECTION, SOLUTION INTRAVENOUS PRN
Status: DISCONTINUED | OUTPATIENT
Start: 2018-02-27 | End: 2018-03-06 | Stop reason: HOSPADM

## 2018-02-27 RX ORDER — LORAZEPAM 1 MG/1
2 TABLET ORAL
Status: DISCONTINUED | OUTPATIENT
Start: 2018-02-27 | End: 2018-03-05

## 2018-02-27 RX ORDER — LORAZEPAM 2 MG/ML
INJECTION INTRAMUSCULAR
Status: COMPLETED
Start: 2018-02-27 | End: 2018-02-27

## 2018-02-27 RX ORDER — SODIUM CHLORIDE 9 MG/ML
INJECTION, SOLUTION INTRAVENOUS CONTINUOUS
Status: DISCONTINUED | OUTPATIENT
Start: 2018-02-27 | End: 2018-02-27

## 2018-02-27 RX ADMIN — THIAMINE HYDROCHLORIDE 100 MG: 100 INJECTION, SOLUTION INTRAMUSCULAR; INTRAVENOUS at 09:26

## 2018-02-27 RX ADMIN — LORAZEPAM 2 MG: 2 INJECTION INTRAMUSCULAR; INTRAVENOUS at 06:18

## 2018-02-27 RX ADMIN — SODIUM CHLORIDE: 9 INJECTION, SOLUTION INTRAVENOUS at 07:10

## 2018-02-27 RX ADMIN — LORAZEPAM 2 MG: 2 INJECTION INTRAMUSCULAR; INTRAVENOUS at 04:10

## 2018-02-27 RX ADMIN — DRONABINOL 2.5 MG: 2.5 CAPSULE ORAL at 09:26

## 2018-02-27 RX ADMIN — LORAZEPAM 2 MG: 2 INJECTION INTRAMUSCULAR at 02:52

## 2018-02-27 RX ADMIN — LORAZEPAM 4 MG: 2 INJECTION INTRAMUSCULAR; INTRAVENOUS at 05:12

## 2018-02-27 RX ADMIN — DEXTROSE AND SODIUM CHLORIDE: 5; 900 INJECTION, SOLUTION INTRAVENOUS at 19:26

## 2018-02-27 RX ADMIN — PANTOPRAZOLE SODIUM 40 MG: 40 TABLET, DELAYED RELEASE ORAL at 09:26

## 2018-02-27 RX ADMIN — LORAZEPAM 4 MG: 2 INJECTION INTRAMUSCULAR; INTRAVENOUS at 12:20

## 2018-02-27 RX ADMIN — ENOXAPARIN SODIUM 40 MG: 40 INJECTION SUBCUTANEOUS at 09:26

## 2018-02-27 RX ADMIN — FOLIC ACID 1 MG: 1 TABLET ORAL at 09:26

## 2018-02-27 RX ADMIN — LORAZEPAM 4 MG: 2 INJECTION INTRAMUSCULAR; INTRAVENOUS at 16:25

## 2018-02-27 RX ADMIN — DEXTROSE MONOHYDRATE 12.5 G: 500 INJECTION PARENTERAL at 07:57

## 2018-02-27 RX ADMIN — LORAZEPAM 4 MG: 2 INJECTION INTRAMUSCULAR; INTRAVENOUS at 09:15

## 2018-02-27 RX ADMIN — DEXTROSE AND SODIUM CHLORIDE 100 ML/HR: 5; 900 INJECTION, SOLUTION INTRAVENOUS at 09:06

## 2018-02-27 RX ADMIN — NIFEDIPINE 30 MG: 30 TABLET, FILM COATED, EXTENDED RELEASE ORAL at 00:45

## 2018-02-27 RX ADMIN — METOPROLOL TARTRATE 50 MG: 50 TABLET, FILM COATED ORAL at 08:14

## 2018-02-27 RX ADMIN — DRONABINOL 2.5 MG: 2.5 CAPSULE ORAL at 16:25

## 2018-02-27 RX ADMIN — NIFEDIPINE 30 MG: 30 TABLET, FILM COATED, EXTENDED RELEASE ORAL at 08:14

## 2018-02-27 RX ADMIN — LORAZEPAM 4 MG: 2 INJECTION INTRAMUSCULAR; INTRAVENOUS at 08:00

## 2018-02-27 RX ADMIN — MIDODRINE HYDROCHLORIDE 10 MG: 10 TABLET ORAL at 09:26

## 2018-02-27 RX ADMIN — FLUDROCORTISONE ACETATE 0.1 MG: 0.1 TABLET ORAL at 09:26

## 2018-02-27 RX ADMIN — POTASSIUM CHLORIDE 10 MEQ: 750 TABLET, FILM COATED, EXTENDED RELEASE ORAL at 09:26

## 2018-02-27 RX ADMIN — Medication 2 MG: at 02:52

## 2018-02-27 RX ADMIN — THERA TABS 1 TABLET: TAB at 09:26

## 2018-02-27 ASSESSMENT — PAIN SCALES - WONG BAKER
WONGBAKER_NUMERICALRESPONSE: 0

## 2018-02-27 ASSESSMENT — PAIN SCALES - GENERAL: PAINLEVEL_OUTOF10: 0

## 2018-02-27 NOTE — ED NOTES
Patient removed telemonitor and attempting to get out of bed. RN attempted to reorient patient, and maintain patient safety. Patient back in bed, telemonitor reapplied.   Will continue to monitor patient      Red Wilhelm RN  02/27/18 1779

## 2018-02-27 NOTE — PROGRESS NOTES
Pharmacy Medication History Note      List of current medications patient is taking is incomplete. Source of information: Epic Complete Dispensary Report    Changes made to medication list:  Medications removed (include reason, ex. therapy complete or physician discontinued):  · Celecoxib 200 mg - patient choice  · Doxazosin 2 mg - patient choice  · Lansoprazole 15 mg - patient choice  · Tizanidine 4 mg - patient choice    Medications added/doses adjusted:  · Thiamine 100 mg - take 1 tablet PO daily    Other notes (ex. Recent course of antibiotics, Coumadin dosing):  · Patient not picking up metoprolol tartrate 50 mg since 8/11/17; BP has been high this visit (190's)  · Patient not picking up midodrine 10 mg since 10/13/17  · Patient not picking up potassium chloride 10 mEq since 8/9/17  · Denies use of other OTC or herbal medications.       Allergies reviewed      Electronically signed by Cristofer Cortez on 2/27/2018 at 10:50 AM

## 2018-02-27 NOTE — ED NOTES
Patient attempting to get out of bed, patient ripping off tele patches. Monitor reapplied and patient placed back in bed. RN will continue to monitor.       [address] [place] [address]  02/27/18 8021

## 2018-02-27 NOTE — H&P
review the medication that he is currently  taking and most of which has been resumed. I will change his IV fluid to  D5 saline _____. Also, the patient is placed on thiamine daily. We will  convert his admission to inpatient and we will consult the social service  for placement at this time. The patient obviously will benefit quite a  bit, in fact he has been going to some kind of program to help him with his  current alcoholism. [address] [place] [address]  Carissa Colvin M.D.    D: 02/27/2018 9:07:41       T: 02/27/2018 11:33:12     [email or web address]  Job#: [id number]     Doc#: [id number]    CC:

## 2018-02-27 NOTE — PROGRESS NOTES
0715-Morning lab results show glucose of 55  0719-Glucose rechecked-52  0726-Dr. Sammy Clark text to inform of low glucose level  0737-Order received from Dr. Sammy Clark to begin hypoglycemia protocol  0740-Bag of D5W requested from distribution  0745-VS and assessment completed. 0757-12.5g D50% given via IV.  0815-Glucose rechecked-99  0830-Dr. Sammy Clark in to see patient. 0858-D5W received from distribution. Clarified with Dr. Sammy Clark that he would like D5W started.  Dr. Sammy Clark gave verbal order to change order to D5NS

## 2018-02-27 NOTE — ED NOTES
Bed: 024A  Expected date: 2/26/18  Expected time: 8:21 PM  Means of arrival: [facility]  Comments:     Sandy Riley RN  02/26/18 2027

## 2018-02-27 NOTE — ED PROVIDER NOTES
[facility]  eMERGENCY dEPARTMENT eNCOUnter          CHIEF COMPLAINT       Chief Complaint   Patient presents with    Fall    Alcohol Intoxication       Nurses Notes reviewed and I agree except as noted in the HPI. HISTORY OF PRESENT ILLNESS    Adal Daniel is a 81 y.o. male with a past medical history of Dementia, alcohol abuse, HTN, and CKD who presents to the ED via EMS for evaluation following a fall. The patient's wife states the patient fell this evening at home. The fall was unwitnessed as the wife states she heard a noise from the bedroom and found the patient propped up against the door. The wife states the patient has been falling frequently. The wife states the patient abuses alcohol and this is likely contributing to his falls. The patient does not have any complaints at this time. Patient specifically denies injuries to the head , neck , chest , abdomen and extremities . He has a skin tear on there posterior right fore arm that is uncomplicated at this time. Although he is intoxicated , he appears very happy and very pleasant . REVIEW OF SYSTEMS     Review of Systems   Constitutional: Negative for appetite change, chills, fatigue and fever. HENT: Negative for congestion, ear pain, rhinorrhea and sore throat. Eyes: Negative for discharge, redness and visual disturbance. Respiratory: Negative for cough, shortness of breath and wheezing. Cardiovascular: Negative for chest pain, palpitations and leg swelling. Gastrointestinal: Negative for abdominal pain, diarrhea, nausea and vomiting. Genitourinary: Negative for decreased urine volume, difficulty urinating and dysuria. Musculoskeletal: Negative for arthralgias, back pain, joint swelling and neck pain. Skin: Negative for pallor and rash. Allergic/Immunologic: Negative for environmental allergies. Neurological: Negative for dizziness, syncope, weakness, light-headedness and headaches.    Hematological: EXTENDED RELEASE TABLET    Take 1 tablet by mouth daily    TIZANIDINE (ZANAFLEX) 4 MG TABLET    Take 1 tablet by mouth every 8 hours as needed (Pain)       ALLERGIES     has No Known Allergies. FAMILY HISTORY     indicated that his mother is . He indicated that his father is . He indicated that three of his five sisters are alive. He indicated that all of his four brothers are alive. family history includes Alzheimer's Disease in his sister; Arthritis in his sister and sister; Cancer in his sister; Dementia in his brother; Heart Disease in his sister; High Blood Pressure in his father; No Known Problems in his brother and brother; Other in his brother; Stroke in his father and sister. SOCIAL HISTORY      reports that he quit smoking about 26 years ago. He has never used smokeless tobacco. He reports that he drinks alcohol. He reports that he does not use drugs. PHYSICAL EXAM     INITIAL VITALS:  height is 5' 11\" (1.803 m) and weight is 130 lb (59 kg). His oral temperature is 97.4 °F (36.3 °C). His blood pressure is 172/89 (abnormal) and his pulse is 77. His respiration is 18 and oxygen saturation is 97%. Physical Exam   Constitutional: He appears well-developed and well-nourished. HENT:   Head: Normocephalic and atraumatic. Eyes: EOM are normal.   Neck: Normal range of motion. Neck supple. No spinous process tenderness and no muscular tenderness present. Cardiovascular: Normal rate, regular rhythm, normal heart sounds and intact distal pulses. Pulmonary/Chest: Effort normal and breath sounds normal. No respiratory distress. He has no wheezes. He has no rales. Abdominal: Soft. He exhibits no distension. There is no tenderness. There is no rebound. Musculoskeletal: Normal range of motion. He exhibits no tenderness or deformity. Neurological: He is alert. Skin: Skin is warm and dry. No rash noted.    Skin tear to right forearm    Nursing note and vitals reviewed. DIFFERENTIAL DIAGNOSIS:   Alcoholism, closed head injury,     DIAGNOSTIC RESULTS     EKG: All EKG's are interpreted by the Emergency Department Physician who either signs or Co-signs this chart in the absence of a cardiologist.    None     RADIOLOGY: non-plain film images(s) such as CT, Ultrasound and MRI are read by the radiologist.    CT Cervical Spine WO Contrast   Final Result   1. No acute fractures no acute subluxations. 2.  Moderate loss of disc height at all levels. There is straightening of the cervical lordosis likely degenerative. 3.  Moderate to severe bilateral neural foraminal stenosis at all levels. 4.  Moderate central spinal canal stenosis at C5-C6 and C6-C7 unchanged. **This report has been created using voice recognition software. It may contain minor errors which are inherent in voice recognition technology. **      Final report electronically signed by Dr. Chava Dia on 2/26/2018 10:32 PM      CT Head WO Contrast   Final Result   1. No acute intracranial hemorrhage, infarction, or mass. 2.  No obvious fractures. **This report has been created using voice recognition software. It may contain minor errors which are inherent in voice recognition technology. **      Final report electronically signed by Dr. Chava Dia on 2/26/2018 10:15 PM      XR PELVIS (1-2 VIEWS)   Final Result   1. No acute fractures. No dislocations. 2.  No pelvic diastases. 3.  Osteoarthritic changes as described. **This report has been created using voice recognition software. It may contain minor errors which are inherent in voice recognition technology. **      Final report electronically signed by Dr. Chava Dia on 2/26/2018 10:14 PM      XR RADIUS ULNA RIGHT (2 VIEWS)   Final Result    No acute findings. **This report has been created using voice recognition software.  It may contain minor errors which are inherent in voice recognition

## 2018-02-28 PROBLEM — E43 SEVERE MALNUTRITION (HCC): Chronic | Status: ACTIVE | Noted: 2018-02-28

## 2018-02-28 LAB
AMYLASE: 42 U/L (ref 20–104)
ANION GAP SERPL CALCULATED.3IONS-SCNC: 13 MEQ/L (ref 8–16)
BASOPHILS # BLD: 0.4 %
BASOPHILS ABSOLUTE: 0 THOU/MM3 (ref 0–0.1)
BUN BLDV-MCNC: 6 MG/DL (ref 7–22)
CALCIUM SERPL-MCNC: 8.7 MG/DL (ref 8.5–10.5)
CHLORIDE BLD-SCNC: 103 MEQ/L (ref 98–111)
CO2: 29 MEQ/L (ref 23–33)
CREAT SERPL-MCNC: 0.7 MG/DL (ref 0.4–1.2)
EOSINOPHIL # BLD: 0.5 %
EOSINOPHILS ABSOLUTE: 0 THOU/MM3 (ref 0–0.4)
GFR SERPL CREATININE-BSD FRML MDRD: > 90 ML/MIN/1.73M2
GLUCOSE BLD-MCNC: 110 MG/DL (ref 70–108)
GLUCOSE BLD-MCNC: 123 MG/DL (ref 70–108)
GLUCOSE BLD-MCNC: 128 MG/DL (ref 70–108)
GLUCOSE BLD-MCNC: 132 MG/DL (ref 70–108)
GLUCOSE BLD-MCNC: 146 MG/DL (ref 70–108)
GLUCOSE BLD-MCNC: 96 MG/DL (ref 70–108)
HCT VFR BLD CALC: 36.8 % (ref 42–52)
HEMOGLOBIN: 12.5 GM/DL (ref 14–18)
LYMPHOCYTES # BLD: 19.4 %
LYMPHOCYTES ABSOLUTE: 1.1 THOU/MM3 (ref 1–4.8)
MACROCYTES: ABNORMAL
MCH RBC QN AUTO: 34.4 PG (ref 27–31)
MCHC RBC AUTO-ENTMCNC: 33.9 GM/DL (ref 33–37)
MCV RBC AUTO: 101.5 FL (ref 80–94)
MONOCYTES # BLD: 9.6 %
MONOCYTES ABSOLUTE: 0.6 THOU/MM3 (ref 0.4–1.3)
NUCLEATED RED BLOOD CELLS: 0 /100 WBC
PDW BLD-RTO: 11.9 % (ref 11.5–14.5)
PLATELET # BLD: 143 THOU/MM3 (ref 130–400)
PMV BLD AUTO: 8 FL (ref 7.4–10.4)
POTASSIUM SERPL-SCNC: 3.2 MEQ/L (ref 3.5–5.2)
RBC # BLD: 3.63 MILL/MM3 (ref 4.7–6.1)
SEG NEUTROPHILS: 70.1 %
SEGMENTED NEUTROPHILS ABSOLUTE COUNT: 4.1 THOU/MM3 (ref 1.8–7.7)
SODIUM BLD-SCNC: 145 MEQ/L (ref 135–145)
WBC # BLD: 5.9 THOU/MM3 (ref 4.8–10.8)

## 2018-02-28 PROCEDURE — 6370000000 HC RX 637 (ALT 250 FOR IP): Performed by: INTERNAL MEDICINE

## 2018-02-28 PROCEDURE — G8988 SELF CARE GOAL STATUS: HCPCS

## 2018-02-28 PROCEDURE — 97110 THERAPEUTIC EXERCISES: CPT

## 2018-02-28 PROCEDURE — 6360000002 HC RX W HCPCS: Performed by: INTERNAL MEDICINE

## 2018-02-28 PROCEDURE — 2580000003 HC RX 258: Performed by: INTERNAL MEDICINE

## 2018-02-28 PROCEDURE — 85025 COMPLETE CBC W/AUTO DIFF WBC: CPT

## 2018-02-28 PROCEDURE — 97166 OT EVAL MOD COMPLEX 45 MIN: CPT

## 2018-02-28 PROCEDURE — 97162 PT EVAL MOD COMPLEX 30 MIN: CPT

## 2018-02-28 PROCEDURE — G8978 MOBILITY CURRENT STATUS: HCPCS

## 2018-02-28 PROCEDURE — 82150 ASSAY OF AMYLASE: CPT

## 2018-02-28 PROCEDURE — 1200000003 HC TELEMETRY R&B

## 2018-02-28 PROCEDURE — 82948 REAGENT STRIP/BLOOD GLUCOSE: CPT

## 2018-02-28 PROCEDURE — G8987 SELF CARE CURRENT STATUS: HCPCS

## 2018-02-28 PROCEDURE — G8979 MOBILITY GOAL STATUS: HCPCS

## 2018-02-28 PROCEDURE — 80048 BASIC METABOLIC PNL TOTAL CA: CPT

## 2018-02-28 PROCEDURE — 36415 COLL VENOUS BLD VENIPUNCTURE: CPT

## 2018-02-28 RX ORDER — POTASSIUM CHLORIDE 750 MG/1
40 TABLET, FILM COATED, EXTENDED RELEASE ORAL ONCE
Status: COMPLETED | OUTPATIENT
Start: 2018-02-28 | End: 2018-02-28

## 2018-02-28 RX ADMIN — NIFEDIPINE 30 MG: 30 TABLET, FILM COATED, EXTENDED RELEASE ORAL at 09:05

## 2018-02-28 RX ADMIN — THERA TABS 1 TABLET: TAB at 09:05

## 2018-02-28 RX ADMIN — DEXTROSE AND SODIUM CHLORIDE: 5; 900 INJECTION, SOLUTION INTRAVENOUS at 23:59

## 2018-02-28 RX ADMIN — ENOXAPARIN SODIUM 40 MG: 40 INJECTION SUBCUTANEOUS at 09:15

## 2018-02-28 RX ADMIN — METOPROLOL TARTRATE 50 MG: 50 TABLET, FILM COATED ORAL at 09:05

## 2018-02-28 RX ADMIN — THIAMINE HYDROCHLORIDE 100 MG: 100 INJECTION, SOLUTION INTRAMUSCULAR; INTRAVENOUS at 06:18

## 2018-02-28 RX ADMIN — FLUDROCORTISONE ACETATE 0.1 MG: 0.1 TABLET ORAL at 09:06

## 2018-02-28 RX ADMIN — LORAZEPAM 4 MG: 2 INJECTION INTRAMUSCULAR; INTRAVENOUS at 18:49

## 2018-02-28 RX ADMIN — DRONABINOL 2.5 MG: 2.5 CAPSULE ORAL at 06:20

## 2018-02-28 RX ADMIN — POTASSIUM CHLORIDE 40 MEQ: 750 TABLET, FILM COATED, EXTENDED RELEASE ORAL at 13:14

## 2018-02-28 RX ADMIN — OXYBUTYNIN CHLORIDE 10 MG: 10 TABLET, FILM COATED, EXTENDED RELEASE ORAL at 20:14

## 2018-02-28 RX ADMIN — LORAZEPAM 2 MG: 2 INJECTION INTRAMUSCULAR; INTRAVENOUS at 03:30

## 2018-02-28 RX ADMIN — LORAZEPAM 2 MG: 2 INJECTION INTRAMUSCULAR; INTRAVENOUS at 00:48

## 2018-02-28 RX ADMIN — ATORVASTATIN CALCIUM 10 MG: 10 TABLET, FILM COATED ORAL at 20:14

## 2018-02-28 RX ADMIN — MIDODRINE HYDROCHLORIDE 10 MG: 10 TABLET ORAL at 09:05

## 2018-02-28 RX ADMIN — FOLIC ACID 1 MG: 1 TABLET ORAL at 09:05

## 2018-02-28 RX ADMIN — DONEPEZIL HYDROCHLORIDE 10 MG: 10 TABLET, FILM COATED ORAL at 20:14

## 2018-02-28 RX ADMIN — LORAZEPAM 3 MG: 1 TABLET ORAL at 23:57

## 2018-02-28 RX ADMIN — MIDODRINE HYDROCHLORIDE 10 MG: 10 TABLET ORAL at 20:14

## 2018-02-28 RX ADMIN — LORAZEPAM 2 MG: 2 INJECTION INTRAMUSCULAR; INTRAVENOUS at 05:32

## 2018-02-28 RX ADMIN — PANTOPRAZOLE SODIUM 40 MG: 40 TABLET, DELAYED RELEASE ORAL at 06:18

## 2018-02-28 RX ADMIN — LORAZEPAM 4 MG: 2 INJECTION INTRAMUSCULAR; INTRAVENOUS at 20:15

## 2018-02-28 RX ADMIN — Medication 10 ML: at 20:15

## 2018-02-28 RX ADMIN — POTASSIUM CHLORIDE 10 MEQ: 750 TABLET, FILM COATED, EXTENDED RELEASE ORAL at 09:05

## 2018-02-28 RX ADMIN — DRONABINOL 2.5 MG: 2.5 CAPSULE ORAL at 18:23

## 2018-02-28 RX ADMIN — LORAZEPAM 2 MG: 2 INJECTION INTRAMUSCULAR; INTRAVENOUS at 04:32

## 2018-02-28 ASSESSMENT — PAIN SCALES - GENERAL
PAINLEVEL_OUTOF10: 0

## 2018-02-28 NOTE — PLAN OF CARE
Problem: Nutrition  Goal: Optimal nutrition therapy  Outcome: Ongoing  Nutrition Problem: Severe malnutrition, in context of chronic illness  Intervention: Food and/or Nutrient Delivery: Continue current diet, Start ONS  Nutritional Goals: Pt will consume 75% or more of meals during LOS.

## 2018-02-28 NOTE — PROGRESS NOTES
Dr. Tommy Canavan Progress note        2/28/2018                  2:22 PM        Patient:  Jenn Cha  YOB: 1935    MRN: [id number]   Acct:  [de-identified]   [id number]-03/003-A  Primary Care Physician: Eddie Herrera MD    Admit Date: 2/26/2018           Subjective: wife at the bed side . Patient is resting. Objective:   Vitals: BP (!) 115/58   Pulse 79   Temp 98.3 °F (36.8 °C) (Oral)   Resp 16   Ht 5' 11\" (1.803 m)   Wt 131 lb 9.6 oz (59.7 kg)   SpO2 97%   BMI 18.35 kg/m²   Physical Exam:  General appearance: sleeping  Skin: Skin color, texture, turgor abnormal.   HEENT: Head: Normal, normocephalic, atraumatic. Neck: no adenopathy, no carotid bruit and no JVD  Lungs: clear to auscultation bilaterally  Heart: S1, S2 normal  Abdomen: pos bs. Extremities: extremities normal, atraumatic, no cyanosis or edema  Lymphatic: No significant lymph node enlargement papable  Neurologic: Mental status: sleeping.       Diet: DIET GENERAL;  Dietary Nutrition Supplements: Standard High Calorie Oral Supplement        Data:   Scheduled Meds: Scheduled Meds:   potassium replacement protocol   Other RX Placeholder    atorvastatin  10 mg Oral Daily    docusate sodium  100 mg Oral Daily    donepezil  10 mg Oral Nightly    dronabinol  2.5 mg Oral BID AC    fludrocortisone  0.1 mg Oral Daily    metoprolol tartrate  50 mg Oral Daily    multivitamin  1 tablet Oral Daily    oxybutynin  10 mg Oral Nightly    pantoprazole  40 mg Oral QAM AC    sodium chloride flush  10 mL Intravenous 2 times per day    enoxaparin  40 mg Subcutaneous Q54L    folic acid  1 mg Oral Daily    potassium chloride  10 mEq Oral Daily    thiamine (VITAMIN B1) IVPB  100 mg Intravenous Q24H    midodrine  10 mg Oral BID    NIFEdipine  30 mg Oral QAM     Continuous Infusions:   dextrose 5 % and 0.9 % NaCl 100 mL/hr (02/27/18 0906)    dextrose 5 % and 0.9 % NaCl 75 mL/hr at 02/27/18 1926 PRN Meds:.sodium chloride flush, acetaminophen, magnesium hydroxide, ondansetron, LORazepam **OR** LORazepam **OR** LORazepam **OR** LORazepam **OR** LORazepam **OR** LORazepam **OR** LORazepam **OR** LORazepam, glucose, dextrose, glucagon (rDNA), dextrose 5 % and 0.9 % NaCl  Continuous Infusions:   dextrose 5 % and 0.9 % NaCl 100 mL/hr (02/27/18 0906)    dextrose 5 % and 0.9 % NaCl 75 mL/hr at 02/27/18 1926       Intake/Output Summary (Last 24 hours) at 02/28/18 1422  Last data filed at 02/28/18 1357   Gross per 24 hour   Intake          2291.31 ml   Output                0 ml   Net          2291.31 ml       CBC:   Recent Labs      02/26/18 2104 02/27/18 0456  02/28/18   0519   WBC  4.4*  5.4  5.9   HGB  12.6*  12.6*  12.5*   HCT  36.9*  37.2*  36.8*   PLT  145  176  143     BMP:  Recent Labs      02/26/18 2104 02/27/18 0456 02/28/18   0519   NA  140  143  145   K  3.2*  3.5  3.2*   CL  96*  98  103   CO2  28  26  29   BUN  16  15  6*   CREATININE  0.8  0.7  0.7   GLUCOSE  75  55*  110*     Hepatic: Recent Labs      02/26/18 2104 02/27/18   0456   AST  47*  47*   ALT  28  26   BILITOT  1.4*  1.2   ALKPHOS  61  64     Urine: urine culture  ABGs: No results found for: PHART, PO2ART, LEN2DAW  INR: No results for input(s): INR in the last 72 hours.   -----------------------------------------------------------------  Data Review Recent Labs      02/27/18 0456 02/28/18   0519   WBC  5.4  5.9   HGB  12.6*  12.5*   HCT  37.2*  36.8*   PLT  176  143     Recent Labs      02/26/18   2104  02/27/18   0456  02/27/18   1012  02/28/18   0519   NA  140  143   --   145   CL  96*  98   --   103   K  3.2*  3.5   --   3.2*   CO2  28  26   --   29   BUN  16  15   --   6*   CREATININE  0.8  0.7   --   0.7   GLUCOSE  75  55*   --   110*   MG   --   1.8   --    --    CALCIUM  8.7  9.0   --   8.7   AMYLASE   --    --    --   42   LIPASE   --    --   30.0   --    PROT  6.7  7.1   --    --    BILITOT  1.4*  1.2   -- --    ALKPHOS  61  64   --    --      No results for input(s): CKTOTAL, CKMB, CKMBINDEX, CKMBCALCMETH, TROPONINI in the last 72 hours. No results for input(s): HDL, LDLDIRECT, TRIG in the last 72 hours. Invalid input(s): TOTALCHLTL, LDLCHLC  No results for input(s): BNP, TSH, FT4, ACETONE in the last 72 hours. Assessment   Active Problems:    Alcohol intoxication ([address]    Fall    Alcohol intoxication delirium ([address]    Acute alcohol intoxication with alcoholism, with unspecified complication ([address]    Severe malnutrition ([address]  Resolved Problems:    * No resolved hospital problems. *  Alcohol dependence with withdrawal delirium in the setting of ETOH 0.24, Confusion, falls, removing monitoring equipment, frequent reorientation  being treated with CIWA scale. Severe protein-calorie malnutrition   in the setting of the patient meets ASPEN CRITERIA being treated with Dietician following.       Patient Active Problem List   Diagnosis    Erectile dysfunction    BPH with urinary obstruction    Lower urinary tract symptoms (LUTS)    Nocturia    Urinary retention    Feeling of incomplete bladder emptying    Hyperlipemia    Frequency of urination    ETD (eustachian tube dysfunction)    Dysphagia    Dysphonia    GERD (gastroesophageal reflux disease)    Zenker diverticula    Presbyesophagus    Voice disturbance    Disease of larynx    Subjective tinnitus    Sensorineural hearing loss, bilateral    Otalgia of left ear    supine Hypertension    CKD (chronic kidney disease) stage 2, GFR 60-89 ml/min    Metabolic bone disease    Insomnia    Alcohol withdrawal (HCC)    Falls    Dehydration    Hist of Near syncope    Orthostatic hypotension    Low serum cortisol level (HCC)    Hypokalemia    Intractable back pain    Frequent falls    Hypokalemia    Alcohol intoxication ([address]    Fall    Alcohol intoxication ([address]    Fall    Alcohol intoxication delirium ([address]    Acute alcohol intoxication with alcoholism, with unspecified complication [facility]    Severe malnutrition ([facility]        Plan   Will keep on the ivf  Cont ciwa  Pt/ot  ecf ravin Medellin MD

## 2018-02-28 NOTE — PROGRESS NOTES
lethargic throughout exercises. Activity Tolerance:  Activity Tolerance: Patient limited by fatigue  Activity Tolerance: Patient lethargic at beginning of session, able to open eyes and participate with ankle pumps. Patient then began L heel slides, became increasingly lethargic and further mobiltiy discontinued until patient's alertness improves. Treatment Initiated: see above exercises    Assessment: Body structures, Functions, Activity limitations: Decreased functional mobility   Assessment: Patient was lethargic at beginning of evaluation and required tactile cues to keep eyes open. Patient performed ankle pumps actively in hopes of improving alertness. Patient performed heel slides on LLE before becoming increasingly lethargic and unable to perform any further mobility. PT to assess mobility at next session to maximize his functional potential.   Prognosis: Fair    Clinical Presentation: Moderate - Evolving with Changing Characteristics: Patient was lethargic at beginning of evaluation and required tactile cues to keep eyes open. Patient performed ankle pumps actively in hopes of improving alertness. Patient performed heel slides on LLE before becoming increasingly lethargic and unable to perform any further mobility. PT to assess mobility at next session to maximize his functional potential.     Decision Making: High Complexitybased on patient assessment and decision making process of determining plan of care and establishing reasonable expectations for measurable functional outcomes    REQUIRES PT FOLLOW UP: Yes  Discharge Recommendations: Continue to assess pending progress    Patient Education:  Patient Education: discussed discharge planning with wife, wife is hopeful that patient will go to [place] [id number] for therapy and then [facility] rehab for alcoholism    Equipment Recommendations:  Equipment Needed: No (continue to montior needs as gait is assessed)    Safety:  Type of devices:  All fall

## 2018-02-28 NOTE — CONSULTS
Patient incoherent and unable to participate. Did speak with wife. Provided information for [facility] inpatient and [facility] outpatient treatment. She plans on looking into this after his discharge from rehab.

## 2018-02-28 NOTE — CARE COORDINATION
2/28/18, 10:15 AM    DISCHARGE BARRIERS  MICHELLE spoke with Raul Hayes' spouse Kristofer. She requested SW make a referral to [facility]. MICHELLE left a message for Becca Hernandez in admissions at [facility]. Update 11:40pm: Made referral to Becca Hernandez at [facility]. They will have a male bed available on Saturday.

## 2018-02-28 NOTE — FLOWSHEET NOTE
02/28/18 7312   Provider Notification   Reason for Communication Evaluate  (K+ level 3.2)   Provider Name Dr. Rochelle Williamson   Provider Notification Physician   Method of Communication Call   Response See orders   Notification Time [phone]     Notified Dr. Rochelle Williamson that K+ level 3.2. Orders received for potassium protocol.

## 2018-02-28 NOTE — PROGRESS NOTES
Social/Functional:  Lives With: Spouse  Type of Home: House      ADL Assistance: Independent          Ambulation Assistance: Independent  Transfer Assistance: Independent       Additional Comments: Pt unable to give home info, wife stepped away for lunch. Objective        Overall Cognitive Status: Exceptions (confusion, poor safety awareness, short attention span)         Sensation  Overall Sensation Status: WNL                           LUE AROM (degrees)  LUE AROM : WNL          RUE AROM (degrees)  RUE AROM : WNL       LUE Strength  Gross LUE Strength:  (appears 4/5)                RUE Strength  Gross RUE Strength:  (appears 4/5)           ADL  LE Dressing: Dependent/Total     Bed mobility  Supine to Sit: Moderate assistance  Sit to Supine: Maximum assistance    Transfers  Sit to stand: Minimal assistance  Stand to sit: Minimal assistance    Balance  Sitting Balance: Stand by assistance (close)  Standing Balance: Moderate assistance (unsteady, leaning to R side & LLE slipping on floor)           Functional Mobility  Functional - Mobility Device: No device  Activity: Other (1 side step toward [facility] )  Assist Level: Moderate assistance  Functional Mobility Comments: poor safety awareness \"if you step back, I can walk\"                                                    Activity Tolerance:  Activity Tolerance: Treatment limited secondary to decreased cognition    Treatment Initiated:  Pt sat EOB x 12 min with close SBA. Poor safety awareness noted, slow to respond with extra time to process commands. Assessment:  Assessment: Pt demo decreased safety, balance, & cognition for ADLs at [facility]. Continued OT recommended to increase indep & safety awareness for returning home with wife.    Performance deficits / Impairments: Decreased functional mobility , Decreased cognition, Decreased ADL status, Decreased endurance, Decreased safe awareness, Decreased balance  Prognosis: Fair  Discharge Recommendations: Continue

## 2018-03-01 LAB
GLUCOSE BLD-MCNC: 100 MG/DL (ref 70–108)
GLUCOSE BLD-MCNC: 109 MG/DL (ref 70–108)
GLUCOSE BLD-MCNC: 117 MG/DL (ref 70–108)
POTASSIUM SERPL-SCNC: 3 MEQ/L (ref 3.5–5.2)

## 2018-03-01 PROCEDURE — 6360000002 HC RX W HCPCS: Performed by: INTERNAL MEDICINE

## 2018-03-01 PROCEDURE — 97110 THERAPEUTIC EXERCISES: CPT

## 2018-03-01 PROCEDURE — 84132 ASSAY OF SERUM POTASSIUM: CPT

## 2018-03-01 PROCEDURE — 36415 COLL VENOUS BLD VENIPUNCTURE: CPT

## 2018-03-01 PROCEDURE — G8978 MOBILITY CURRENT STATUS: HCPCS

## 2018-03-01 PROCEDURE — 6370000000 HC RX 637 (ALT 250 FOR IP): Performed by: INTERNAL MEDICINE

## 2018-03-01 PROCEDURE — 1200000003 HC TELEMETRY R&B

## 2018-03-01 PROCEDURE — G8979 MOBILITY GOAL STATUS: HCPCS

## 2018-03-01 PROCEDURE — 2580000003 HC RX 258: Performed by: INTERNAL MEDICINE

## 2018-03-01 PROCEDURE — 97530 THERAPEUTIC ACTIVITIES: CPT

## 2018-03-01 PROCEDURE — 82948 REAGENT STRIP/BLOOD GLUCOSE: CPT

## 2018-03-01 RX ORDER — POTASSIUM CHLORIDE 20MEQ/15ML
40 LIQUID (ML) ORAL ONCE
Status: COMPLETED | OUTPATIENT
Start: 2018-03-01 | End: 2018-03-01

## 2018-03-01 RX ORDER — POTASSIUM CHLORIDE 20 MEQ/1
40 TABLET, EXTENDED RELEASE ORAL ONCE
Status: DISCONTINUED | OUTPATIENT
Start: 2018-03-01 | End: 2018-03-01 | Stop reason: SDUPTHER

## 2018-03-01 RX ADMIN — LORAZEPAM 4 MG: 2 INJECTION INTRAMUSCULAR; INTRAVENOUS at 11:53

## 2018-03-01 RX ADMIN — METOPROLOL TARTRATE 50 MG: 50 TABLET, FILM COATED ORAL at 08:24

## 2018-03-01 RX ADMIN — POTASSIUM CHLORIDE 40 MEQ: 40 SOLUTION ORAL at 14:13

## 2018-03-01 RX ADMIN — NIFEDIPINE 30 MG: 30 TABLET, FILM COATED, EXTENDED RELEASE ORAL at 08:24

## 2018-03-01 RX ADMIN — DOCUSATE SODIUM 100 MG: 100 CAPSULE ORAL at 08:24

## 2018-03-01 RX ADMIN — FLUDROCORTISONE ACETATE 0.1 MG: 0.1 TABLET ORAL at 08:24

## 2018-03-01 RX ADMIN — LORAZEPAM 3 MG: 1 TABLET ORAL at 08:24

## 2018-03-01 RX ADMIN — DRONABINOL 2.5 MG: 2.5 CAPSULE ORAL at 05:13

## 2018-03-01 RX ADMIN — LORAZEPAM 3 MG: 2 INJECTION INTRAMUSCULAR; INTRAVENOUS at 21:15

## 2018-03-01 RX ADMIN — LORAZEPAM 4 MG: 2 INJECTION INTRAMUSCULAR; INTRAVENOUS at 15:41

## 2018-03-01 RX ADMIN — DEXTROSE AND SODIUM CHLORIDE: 5; 900 INJECTION, SOLUTION INTRAVENOUS at 10:55

## 2018-03-01 RX ADMIN — LORAZEPAM 4 MG: 2 INJECTION INTRAMUSCULAR; INTRAVENOUS at 23:13

## 2018-03-01 RX ADMIN — PANTOPRAZOLE SODIUM 40 MG: 40 TABLET, DELAYED RELEASE ORAL at 05:14

## 2018-03-01 RX ADMIN — ENOXAPARIN SODIUM 40 MG: 40 INJECTION SUBCUTANEOUS at 14:13

## 2018-03-01 RX ADMIN — FOLIC ACID 1 MG: 1 TABLET ORAL at 08:24

## 2018-03-01 RX ADMIN — THIAMINE HYDROCHLORIDE 100 MG: 100 INJECTION, SOLUTION INTRAMUSCULAR; INTRAVENOUS at 05:14

## 2018-03-01 RX ADMIN — LORAZEPAM 4 MG: 2 INJECTION INTRAMUSCULAR; INTRAVENOUS at 01:16

## 2018-03-01 RX ADMIN — LORAZEPAM 3 MG: 1 TABLET ORAL at 05:13

## 2018-03-01 RX ADMIN — LORAZEPAM 4 MG: 2 INJECTION INTRAMUSCULAR; INTRAVENOUS at 09:48

## 2018-03-01 RX ADMIN — LORAZEPAM 3 MG: 2 INJECTION INTRAMUSCULAR; INTRAVENOUS at 14:21

## 2018-03-01 RX ADMIN — DONEPEZIL HYDROCHLORIDE 10 MG: 10 TABLET, FILM COATED ORAL at 21:14

## 2018-03-01 RX ADMIN — THERA TABS 1 TABLET: TAB at 08:24

## 2018-03-01 RX ADMIN — ATORVASTATIN CALCIUM 10 MG: 10 TABLET, FILM COATED ORAL at 21:14

## 2018-03-01 RX ADMIN — OXYBUTYNIN CHLORIDE 10 MG: 10 TABLET, FILM COATED, EXTENDED RELEASE ORAL at 21:15

## 2018-03-01 RX ADMIN — MIDODRINE HYDROCHLORIDE 10 MG: 10 TABLET ORAL at 21:15

## 2018-03-01 ASSESSMENT — PAIN DESCRIPTION - LOCATION: LOCATION: GENERALIZED

## 2018-03-01 ASSESSMENT — PAIN DESCRIPTION - PAIN TYPE: TYPE: ACUTE PAIN

## 2018-03-01 ASSESSMENT — PAIN DESCRIPTION - ONSET: ONSET: GRADUAL

## 2018-03-01 ASSESSMENT — PAIN SCALES - GENERAL
PAINLEVEL_OUTOF10: 0
PAINLEVEL_OUTOF10: 3

## 2018-03-01 ASSESSMENT — PAIN DESCRIPTION - PROGRESSION: CLINICAL_PROGRESSION: GRADUALLY WORSENING

## 2018-03-01 ASSESSMENT — PAIN DESCRIPTION - FREQUENCY: FREQUENCY: INTERMITTENT

## 2018-03-01 NOTE — PROGRESS NOTES
8.7  9.0   --   8.7   --    AMYLASE   --    --    --   42   --    LIPASE   --    --   30.0   --    --    PROT  6.7  7.1   --    --    --    BILITOT  1.4*  1.2   --    --    --    ALKPHOS  61  64   --    --    --      No results for input(s): CKTOTAL, CKMB, CKMBINDEX, CKMBCALCMETH, TROPONINI in the last 72 hours. No results for input(s): HDL, LDLDIRECT, TRIG in the last 72 hours. Invalid input(s): TOTALCHLTL, LDLCHLC  No results for input(s): BNP, TSH, FT4, ACETONE in the last 72 hours. Assessment   Active Problems:    Alcohol intoxication ([facility]    Fall    Alcohol intoxication delirium ([facility]    Acute alcohol intoxication with alcoholism, with unspecified complication ([facility]    Severe malnutrition ([facility]  Resolved Problems:    * No resolved hospital problems. *  Alcohol dependence with withdrawal delirium in the setting of ETOH 0.24, Confusion, falls, removing monitoring equipment, frequent reorientation  being treated with CIWA scale. Severe protein-calorie malnutrition   in the setting of the patient meets ASPEN CRITERIA being treated with Dietician following.       Patient Active Problem List   Diagnosis    Erectile dysfunction    BPH with urinary obstruction    Lower urinary tract symptoms (LUTS)    Nocturia    Urinary retention    Feeling of incomplete bladder emptying    Hyperlipemia    Frequency of urination    ETD (eustachian tube dysfunction)    Dysphagia    Dysphonia    GERD (gastroesophageal reflux disease)    Zenker diverticula    Presbyesophagus    Voice disturbance    Disease of larynx    Subjective tinnitus    Sensorineural hearing loss, bilateral    Otalgia of left ear    supine Hypertension    CKD (chronic kidney disease) stage 2, GFR 60-89 ml/min    Metabolic bone disease    Insomnia    Alcohol withdrawal (HCC)    Falls    Dehydration    Hist of Near syncope    Orthostatic hypotension    Low serum cortisol level (HCC)    Hypokalemia    Intractable back pain

## 2018-03-01 NOTE — PROGRESS NOTES
grooming tasks with s/u & min vcs for initiation, sequencing, & safety  Long term goals  Time Frame for Long term goals : No LTG set d/t short ELOS         AM-PAC Inpatient Daily Activity Raw Score: 12  AM-PAC Inpatient ADL T-Scale Score : 30.6  ADL Inpatient CMS 0-100% Score: 66.57  ADL Inpatient CMS G-Code Modifier : CL

## 2018-03-01 NOTE — PLAN OF CARE
Nutrition  Goal: Optimal nutrition therapy  Outcome: Ongoing  Patient only consuming approximately 25% of meals. Comments: Care plan reviewed with patient's wife  Patient's wife verbalizes understanding of the plan of care and contributes to goal setting.

## 2018-03-01 NOTE — PROGRESS NOTES
[address] 4A - 4A-03/003-A    Time In: 8731  Time Out: 4579  Timed Code Treatment Minutes: 25 Minutes  Minutes: 25          Date: 3/1/2018  Patient Name: Lisa Carrion,  Gender:  male        MRN: [id number]  : 1935  (81 y.o.)     Referring Practitioner: Alejandra Bautista  Diagnosis: alcohol intoxication delirium   Additional Pertinent Hx: Per ER note on 18:  81 y.o. male with a past medical history of Dementia, alcohol abuse, HTN, and CKD who presents to the ED via EMS for evaluation following a fall. The patient's wife states the patient fell this evening at home. The fall was unwitnessed as the wife states she heard a noise from the bedroom and found the patient propped up against the door. The wife states the patient has been falling frequently. The wife states the patient abuses alcohol and this is likely contributing to his falls. Past Medical History:   Diagnosis Date    BPH (benign prostatic hypertrophy)     CKD (chronic kidney disease) stage 2, GFR 60-89 ml/min     Dementia     Erectile dysfunction     [place] (hard of hearing)     Hyperlipidemia     Hypertension     Metabolic bone disease      Past Surgical History:   Procedure Laterality Date    COLONOSCOPY      ENDOSCOPY, COLON, DIAGNOSTIC      ESOPHAGUS SURGERY      NECK SURGERY      PROSTATE SURGERY      SHOULDER SURGERY      THYROID SURGERY      removal of a nodule    TONSILLECTOMY      TURP  12    Dr Suzan Rocha       Restrictions/Precautions:  Restrictions/Precautions: General Precautions, Fall Risk    Subjective:     Subjective: RN approved session and wished that patient be returned to bed at completion of session. Patient resting in bed upon arrival. Patient very lethargic but able to improve alertness when sitting EOB. Patient mumbling throughout session and difficult to understand. Patient needs HHA to initiate movements. Pain:  Denies.

## 2018-03-02 LAB
ANION GAP SERPL CALCULATED.3IONS-SCNC: 12 MEQ/L (ref 8–16)
BUN BLDV-MCNC: 3 MG/DL (ref 7–22)
CALCIUM SERPL-MCNC: 8.9 MG/DL (ref 8.5–10.5)
CHLORIDE BLD-SCNC: 105 MEQ/L (ref 98–111)
CO2: 28 MEQ/L (ref 23–33)
CREAT SERPL-MCNC: 0.6 MG/DL (ref 0.4–1.2)
GFR SERPL CREATININE-BSD FRML MDRD: > 90 ML/MIN/1.73M2
GLUCOSE BLD-MCNC: 103 MG/DL (ref 70–108)
GLUCOSE BLD-MCNC: 105 MG/DL (ref 70–108)
GLUCOSE BLD-MCNC: 114 MG/DL (ref 70–108)
GLUCOSE BLD-MCNC: 89 MG/DL (ref 70–108)
POTASSIUM SERPL-SCNC: 3.5 MEQ/L (ref 3.5–5.2)
SODIUM BLD-SCNC: 145 MEQ/L (ref 135–145)

## 2018-03-02 PROCEDURE — 6370000000 HC RX 637 (ALT 250 FOR IP): Performed by: INTERNAL MEDICINE

## 2018-03-02 PROCEDURE — 6360000002 HC RX W HCPCS: Performed by: INTERNAL MEDICINE

## 2018-03-02 PROCEDURE — 97110 THERAPEUTIC EXERCISES: CPT

## 2018-03-02 PROCEDURE — 2580000003 HC RX 258: Performed by: INTERNAL MEDICINE

## 2018-03-02 PROCEDURE — 1200000003 HC TELEMETRY R&B

## 2018-03-02 PROCEDURE — 82948 REAGENT STRIP/BLOOD GLUCOSE: CPT

## 2018-03-02 PROCEDURE — 80048 BASIC METABOLIC PNL TOTAL CA: CPT

## 2018-03-02 PROCEDURE — 97530 THERAPEUTIC ACTIVITIES: CPT

## 2018-03-02 PROCEDURE — 36415 COLL VENOUS BLD VENIPUNCTURE: CPT

## 2018-03-02 RX ORDER — NIFEDIPINE 30 MG/1
30 TABLET, FILM COATED, EXTENDED RELEASE ORAL EVERY MORNING
Qty: 30 TABLET | Refills: 3 | DISCHARGE
Start: 2018-03-03

## 2018-03-02 RX ORDER — POTASSIUM CHLORIDE 1500 MG/1
40 TABLET, FILM COATED, EXTENDED RELEASE ORAL ONCE
Qty: 60 TABLET | Refills: 3 | Status: ON HOLD | DISCHARGE
Start: 2018-03-02 | End: 2021-07-15

## 2018-03-02 RX ORDER — DONEPEZIL HYDROCHLORIDE 10 MG/1
10 TABLET, FILM COATED ORAL NIGHTLY
Qty: 30 TABLET | Refills: 3 | DISCHARGE
Start: 2018-03-02

## 2018-03-02 RX ORDER — DRONABINOL 2.5 MG/1
2.5 CAPSULE ORAL
Qty: 60 CAPSULE | Refills: 0 | Status: SHIPPED | OUTPATIENT
Start: 2018-03-02 | End: 2018-04-01

## 2018-03-02 RX ORDER — MULTIVITAMIN WITH FOLIC ACID 400 MCG
1 TABLET ORAL DAILY
Refills: 0 | DISCHARGE
Start: 2018-03-03

## 2018-03-02 RX ORDER — POTASSIUM CHLORIDE 750 MG/1
40 TABLET, FILM COATED, EXTENDED RELEASE ORAL ONCE
Status: DISCONTINUED | OUTPATIENT
Start: 2018-03-02 | End: 2018-03-02 | Stop reason: CLARIF

## 2018-03-02 RX ORDER — POTASSIUM CHLORIDE 750 MG/1
10 TABLET, FILM COATED, EXTENDED RELEASE ORAL DAILY
Qty: 60 TABLET | Refills: 3 | Status: ON HOLD | DISCHARGE
Start: 2018-03-03 | End: 2021-07-15

## 2018-03-02 RX ORDER — ATORVASTATIN CALCIUM 10 MG/1
10 TABLET, FILM COATED ORAL DAILY
Qty: 30 TABLET | Refills: 3 | DISCHARGE
Start: 2018-03-02

## 2018-03-02 RX ORDER — POTASSIUM CHLORIDE 20MEQ/15ML
40 LIQUID (ML) ORAL ONCE
Status: COMPLETED | OUTPATIENT
Start: 2018-03-02 | End: 2018-03-02

## 2018-03-02 RX ORDER — MIDODRINE HYDROCHLORIDE 10 MG/1
10 TABLET ORAL 2 TIMES DAILY
DISCHARGE
Start: 2018-03-02 | End: 2018-03-06

## 2018-03-02 RX ORDER — DEXTROSE AND SODIUM CHLORIDE 5; .9 G/100ML; G/100ML
100 INJECTION, SOLUTION INTRAVENOUS PRN
Status: ON HOLD | DISCHARGE
Start: 2018-03-02 | End: 2021-07-15

## 2018-03-02 RX ORDER — PANTOPRAZOLE SODIUM 40 MG/1
40 TABLET, DELAYED RELEASE ORAL
Qty: 30 TABLET | Refills: 3 | Status: ON HOLD | DISCHARGE
Start: 2018-03-03 | End: 2021-07-15

## 2018-03-02 RX ORDER — PSEUDOEPHEDRINE HCL 30 MG
100 TABLET ORAL DAILY
DISCHARGE
Start: 2018-03-03

## 2018-03-02 RX ORDER — DEXTROSE MONOHYDRATE 25 G/50ML
12.5 INJECTION, SOLUTION INTRAVENOUS PRN
Status: ON HOLD | DISCHARGE
Start: 2018-03-02 | End: 2021-07-15

## 2018-03-02 RX ORDER — METOPROLOL TARTRATE 50 MG/1
50 TABLET, FILM COATED ORAL DAILY
Qty: 60 TABLET | Refills: 3 | Status: ON HOLD | DISCHARGE
Start: 2018-03-03 | End: 2021-07-15

## 2018-03-02 RX ORDER — FOLIC ACID 1 MG/1
1 TABLET ORAL DAILY
Qty: 30 TABLET | Refills: 3 | DISCHARGE
Start: 2018-03-03 | End: 2018-10-09

## 2018-03-02 RX ORDER — OXYBUTYNIN CHLORIDE 10 MG/1
10 TABLET, EXTENDED RELEASE ORAL NIGHTLY
Qty: 30 TABLET | Refills: 3 | DISCHARGE
Start: 2018-03-02

## 2018-03-02 RX ORDER — FLUDROCORTISONE ACETATE 0.1 MG/1
0.1 TABLET ORAL DAILY
Refills: 3 | DISCHARGE
Start: 2018-03-03 | End: 2018-03-06 | Stop reason: HOSPADM

## 2018-03-02 RX ORDER — POTASSIUM CHLORIDE 20MEQ/15ML
10 LIQUID (ML) ORAL DAILY
Status: DISCONTINUED | OUTPATIENT
Start: 2018-03-03 | End: 2018-03-06 | Stop reason: HOSPADM

## 2018-03-02 RX ORDER — LORAZEPAM 1 MG/1
1 TABLET ORAL
Status: SHIPPED | OUTPATIENT
Start: 2018-03-02 | End: 2018-04-01

## 2018-03-02 RX ADMIN — DEXTROSE AND SODIUM CHLORIDE: 5; 900 INJECTION, SOLUTION INTRAVENOUS at 19:08

## 2018-03-02 RX ADMIN — THERA TABS 1 TABLET: TAB at 12:55

## 2018-03-02 RX ADMIN — LORAZEPAM 2 MG: 2 INJECTION INTRAMUSCULAR; INTRAVENOUS at 21:28

## 2018-03-02 RX ADMIN — FLUDROCORTISONE ACETATE 0.1 MG: 0.1 TABLET ORAL at 12:49

## 2018-03-02 RX ADMIN — POTASSIUM CHLORIDE 40 MEQ: 40 SOLUTION ORAL at 18:38

## 2018-03-02 RX ADMIN — ATORVASTATIN CALCIUM 10 MG: 10 TABLET, FILM COATED ORAL at 20:27

## 2018-03-02 RX ADMIN — LORAZEPAM 4 MG: 2 INJECTION INTRAMUSCULAR; INTRAVENOUS at 23:04

## 2018-03-02 RX ADMIN — MIDODRINE HYDROCHLORIDE 10 MG: 10 TABLET ORAL at 12:55

## 2018-03-02 RX ADMIN — METOPROLOL TARTRATE 50 MG: 50 TABLET, FILM COATED ORAL at 12:47

## 2018-03-02 RX ADMIN — DEXTROSE AND SODIUM CHLORIDE: 5; 900 INJECTION, SOLUTION INTRAVENOUS at 00:59

## 2018-03-02 RX ADMIN — OXYBUTYNIN CHLORIDE 10 MG: 10 TABLET, FILM COATED, EXTENDED RELEASE ORAL at 20:26

## 2018-03-02 RX ADMIN — ENOXAPARIN SODIUM 40 MG: 40 INJECTION SUBCUTANEOUS at 16:23

## 2018-03-02 RX ADMIN — THIAMINE HYDROCHLORIDE 100 MG: 100 INJECTION, SOLUTION INTRAMUSCULAR; INTRAVENOUS at 08:41

## 2018-03-02 RX ADMIN — LORAZEPAM 3 MG: 2 INJECTION INTRAMUSCULAR; INTRAVENOUS at 09:55

## 2018-03-02 RX ADMIN — DONEPEZIL HYDROCHLORIDE 10 MG: 10 TABLET, FILM COATED ORAL at 20:26

## 2018-03-02 RX ADMIN — FOLIC ACID 1 MG: 1 TABLET ORAL at 12:54

## 2018-03-02 RX ADMIN — MIDODRINE HYDROCHLORIDE 10 MG: 10 TABLET ORAL at 20:26

## 2018-03-02 RX ADMIN — Medication 10 ML: at 08:44

## 2018-03-02 RX ADMIN — LORAZEPAM 2 MG: 2 INJECTION INTRAMUSCULAR; INTRAVENOUS at 12:53

## 2018-03-02 RX ADMIN — DRONABINOL 2.5 MG: 2.5 CAPSULE ORAL at 16:44

## 2018-03-02 ASSESSMENT — PAIN SCALES - GENERAL
PAINLEVEL_OUTOF10: 0

## 2018-03-02 NOTE — DISCHARGE SUMMARY
5.9 02/28/2018    RBC 3.63 02/28/2018    RBC 3.70 03/31/2017    HGB 12.5 02/28/2018    HCT 36.8 02/28/2018    .5 02/28/2018    MCH 34.4 02/28/2018    MCHC 33.9 02/28/2018    RDW 11.9 02/28/2018     02/28/2018    MPV 8.0 02/28/2018     Lab Results   Component Value Date     03/02/2018    K 3.5 03/02/2018    K 3.5 02/27/2018     03/02/2018    CO2 28 03/02/2018    BUN 3 03/02/2018    CREATININE 0.6 03/02/2018    GLUCOSE 89 03/02/2018    GLUCOSE 73 05/30/2012    CALCIUM 8.9 03/02/2018     Lab Results   Component Value Date    CALCIUM 8.9 03/02/2018     No results found for: IONCA  Lab Results   Component Value Date    MG 1.8 02/27/2018     No results found for: PHOS  No results found for: BNP  Lab Results   Component Value Date    ALKPHOS 64 02/27/2018    ALT 26 02/27/2018    AST 47 02/27/2018    PROT 7.1 02/27/2018    BILITOT 1.2 02/27/2018    BILIDIR <0.2 09/29/2017    LABALBU 4.0 02/27/2018    LABALBU 4.0 05/30/2012     Lab Results   Component Value Date    LACTA 1.6 09/10/2017     Lab Results   Component Value Date    AMYLASE 42 02/28/2018     Lab Results   Component Value Date    LIPASE 30.0 02/27/2018     No results found for: CHOL, TRIG, HDL, LDLCALC  Recent Labs      03/01/18   1209  03/01/18   1737  03/02/18   0836   POCGLU  117*  109*  103     No results for input(s): CKTOTAL, CKMB, TROPONINI in the last 72 hours. No results found for: LABA1C  Lab Results   Component Value Date    INR 1.33 08/10/2017     No results found for: PHART, PO2ART, SWR4EON, JRV7PRZ, [facility] Course: clinical course has been stable. Patient is not capable of taking care of himself as he continues to drink excessively leading to falls. His family has decided that he will benefit from an ecf as the pursue alcohol rehab program. A repeat bp is 150/90 as he is yet to take his oral med.  Will recheck it in an hr and will be informed if remain elevated and unable to take orally due to his sleepiness from

## 2018-03-02 NOTE — FLOWSHEET NOTE
18 1740   Encounter Summary   Services provided to: Family   Referral/Consult From: [employer]   Support System Spouse   Continue Visiting Yes  (3/1)   Complexity of Encounter High   Length of Encounter 45 minutes   Grief and Life Adjustment   Type New Diagnosis; Adjustment to illness   Assessment Approachable   Intervention Prayer;Discussed illness/injury and it's impact; Discussed relationship with God   Outcome Expressed gratitude;Engaged in conversation; Shared life review   3/1/18  * This conversation was with the patient's wife. Patient was sleeping during the entire conversation.  - Asked the wife what brought the patient into the hospital. She stated \"his alcoholism. \" She went on to say, [facility] has dementia also but his drinking caused him to fall and now he is in this state. \" She appeared distraught over the situation so I asked if she wanted to talk about it more. She said she would but wanted to get home soon as she lives alone and doesn't like to drive in the dark. This staff affirmed that decision.   - She went on to explain, he was a good provider but lately the drinking is just so bad. She took vows to stay with him so she has. She felt like he had a very bad childhood and he never felt as though he measured up to his siblings. Three of which have  in the last 2 years. She also explained his infidelity is something she still can't get past no matter how much she prays about it. She just doesn't trust him anymore. - We talked about how he has changed in the last 6 months and what she may be looking at down the road. He has quit eating, doesn't really socialize anymore, he sleeps most of the day. She knows it frustrates him with the dementia and he has repeatedly told her he doesn't want to live anymore. - He will be going to rehab so she feels she will finally be able to sleep because she feels he will be safe there.  She stated, \"I haven't slept through a night in almost 5 years, that's when he started drinking so bad. \" This staff asked her what she sees her future like should he  or not return from the ECF. She stated she hadn't really thought that far ahead but guesses she needs to start thinking about it. She asked for prayers of guidance prior to this staff leaving. No follow-up necessary as patient will be discharged tomorrow or the next day.

## 2018-03-02 NOTE — PLAN OF CARE
with patient and patient's wife. Patient's wife verbalizes understanding of the plan of care and contributes to goal setting.

## 2018-03-02 NOTE — PLAN OF CARE
Problem: Nutrition  Goal: Optimal nutrition therapy  Outcome: Ongoing  Nutrition Problem: Severe malnutrition, in context of chronic illness  Intervention: Food and/or Nutrient Delivery: Continue current diet, Continue current ONS (Added another ONS.)  Nutritional Goals: Pt will consume 75% or more of meals during LOS.

## 2018-03-03 PROBLEM — F03.90 DEMENTIA (HCC): Status: ACTIVE | Noted: 2018-03-03

## 2018-03-03 PROBLEM — F10.931 DELIRIUM TREMENS (HCC): Status: ACTIVE | Noted: 2018-03-03

## 2018-03-03 LAB
GLUCOSE BLD-MCNC: 115 MG/DL (ref 70–108)
GLUCOSE BLD-MCNC: 127 MG/DL (ref 70–108)
GLUCOSE BLD-MCNC: 131 MG/DL (ref 70–108)
GLUCOSE BLD-MCNC: 86 MG/DL (ref 70–108)

## 2018-03-03 PROCEDURE — 6360000002 HC RX W HCPCS: Performed by: INTERNAL MEDICINE

## 2018-03-03 PROCEDURE — 82948 REAGENT STRIP/BLOOD GLUCOSE: CPT

## 2018-03-03 PROCEDURE — 1200000003 HC TELEMETRY R&B

## 2018-03-03 PROCEDURE — 6370000000 HC RX 637 (ALT 250 FOR IP): Performed by: INTERNAL MEDICINE

## 2018-03-03 PROCEDURE — 2580000003 HC RX 258: Performed by: INTERNAL MEDICINE

## 2018-03-03 RX ADMIN — LORAZEPAM 4 MG: 2 INJECTION INTRAMUSCULAR; INTRAVENOUS at 04:48

## 2018-03-03 RX ADMIN — THIAMINE HYDROCHLORIDE 100 MG: 100 INJECTION, SOLUTION INTRAMUSCULAR; INTRAVENOUS at 04:28

## 2018-03-03 RX ADMIN — DRONABINOL 2.5 MG: 2.5 CAPSULE ORAL at 18:24

## 2018-03-03 RX ADMIN — ATORVASTATIN CALCIUM 10 MG: 10 TABLET, FILM COATED ORAL at 20:46

## 2018-03-03 RX ADMIN — METOPROLOL TARTRATE 50 MG: 50 TABLET, FILM COATED ORAL at 09:27

## 2018-03-03 RX ADMIN — DOCUSATE SODIUM 100 MG: 100 CAPSULE ORAL at 09:27

## 2018-03-03 RX ADMIN — FOLIC ACID 1 MG: 1 TABLET ORAL at 09:27

## 2018-03-03 RX ADMIN — MIDODRINE HYDROCHLORIDE 10 MG: 10 TABLET ORAL at 12:11

## 2018-03-03 RX ADMIN — MIDODRINE HYDROCHLORIDE 10 MG: 10 TABLET ORAL at 20:46

## 2018-03-03 RX ADMIN — DRONABINOL 2.5 MG: 2.5 CAPSULE ORAL at 09:26

## 2018-03-03 RX ADMIN — NIFEDIPINE 30 MG: 30 TABLET, FILM COATED, EXTENDED RELEASE ORAL at 09:27

## 2018-03-03 RX ADMIN — DEXTROSE AND SODIUM CHLORIDE: 5; 900 INJECTION, SOLUTION INTRAVENOUS at 17:11

## 2018-03-03 RX ADMIN — ENOXAPARIN SODIUM 40 MG: 40 INJECTION SUBCUTANEOUS at 09:26

## 2018-03-03 RX ADMIN — LORAZEPAM 4 MG: 2 INJECTION INTRAMUSCULAR; INTRAVENOUS at 00:37

## 2018-03-03 RX ADMIN — FLUDROCORTISONE ACETATE 0.1 MG: 0.1 TABLET ORAL at 12:11

## 2018-03-03 RX ADMIN — OXYBUTYNIN CHLORIDE 10 MG: 10 TABLET, FILM COATED, EXTENDED RELEASE ORAL at 20:46

## 2018-03-03 RX ADMIN — LANSOPRAZOLE 30 MG: 30 TABLET, ORALLY DISINTEGRATING, DELAYED RELEASE ORAL at 09:27

## 2018-03-03 RX ADMIN — POTASSIUM CHLORIDE 10 MEQ: 40 SOLUTION ORAL at 09:27

## 2018-03-03 RX ADMIN — THERA TABS 1 TABLET: TAB at 09:27

## 2018-03-03 RX ADMIN — DONEPEZIL HYDROCHLORIDE 10 MG: 10 TABLET, FILM COATED ORAL at 20:46

## 2018-03-03 NOTE — PROGRESS NOTES
INTERNAL MEDICINE SPECIALTIES  Progress Note For Dr Elena Alonzo       Patient:  Laurie Hope  YOB: 1935  Date of Service: 3/3/2018  MRN: [id number]   Acct:  [de-identified]   Primary Care Physician: Antonino Velazquez MD    SUBJECTIVE: Awaits EC, [facility] so [facility] is being sought. Home Medications:   No current facility-administered medications on file prior to encounter.       Current Outpatient Prescriptions on File Prior to Encounter   Medication Sig Dispense Refill    doxazosin (CARDURA) 2 MG tablet Take 2 mg by mouth daily      lansoprazole (PREVACID) 15 MG delayed release capsule Take 30 mg by mouth daily      potassium chloride (KLOR-CON M10) 10 MEQ extended release tablet Take 1 tablet by mouth daily 30 tablet 12    celecoxib (CELEBREX) 200 MG capsule Take 1 capsule by mouth daily 60 capsule 3    acetaminophen (TYLENOL) 325 MG tablet Take 2 tablets by mouth every 4 hours as needed for Pain 120 tablet 3         Scheduled Meds:   potassium chloride  10 mEq Oral Daily    lansoprazole  30 mg Oral QAM AC    potassium replacement protocol   Other RX Placeholder    atorvastatin  10 mg Oral Daily    docusate sodium  100 mg Oral Daily    donepezil  10 mg Oral Nightly    dronabinol  2.5 mg Oral BID AC    fludrocortisone  0.1 mg Oral Daily    metoprolol tartrate  50 mg Oral Daily    multivitamin  1 tablet Oral Daily    oxybutynin  10 mg Oral Nightly    sodium chloride flush  10 mL Intravenous 2 times per day    enoxaparin  40 mg Subcutaneous Z87H    folic acid  1 mg Oral Daily    thiamine (VITAMIN B1) IVPB  100 mg Intravenous Q24H    midodrine  10 mg Oral BID    NIFEdipine  30 mg Oral QAM     Continuous Infusions:   dextrose 5 % and 0.9 % NaCl 100 mL/hr (02/27/18 0906)    dextrose 5 % and 0.9 % NaCl 75 mL/hr at 03/02/18 1908     PRN Meds:sodium chloride flush, acetaminophen, magnesium hydroxide, ondansetron, LORazepam **OR** LORazepam **OR** LORazepam **OR** intact. There is no fracture or dislocation of either hip. The superior and inferior pubic rami are intact. The sacrum and sacroiliac joints appear normal. Moderate osteophyte formation around the subcapital region of the right femoral head. Mild osteophytic formation along the superior lateral margins of both acetabula along the inferior medial margins as well. Incidentally noted is some barium within some of the diverticula within the colon. There are moderate to large osteophytes laterally at the level of L3-L4 and L4-L5. 1.  No acute fractures. No dislocations. 2.  No pelvic diastases. 3.  Osteoarthritic changes as described. **This report has been created using voice recognition software. It may contain minor errors which are inherent in voice recognition technology. ** Final report electronically signed by Dr. Juan Pfeiffer on 2/26/2018 10:14 PM    Xr Radius Ulna Right (2 Views)    Result Date: 2/26/2018  PROCEDURE: XR RADIUS ULNA RIGHT (2 VIEWS) CLINICAL INFORMATION: fall, . COMPARISON: No prior study. TECHNIQUE: AP and lateral views of the forearm. FINDINGS: The radius and ulna are intact. There is no fracture. The soft tissues are normal. There are no abnormalities at the level of the elbow or wrist joint. No acute findings. **This report has been created using voice recognition software. It may contain minor errors which are inherent in voice recognition technology. ** Final report electronically signed by Dr. Juan Pfeiffer on 2/26/2018 10:12 PM    Ct Head Wo Contrast    Result Date: 2/26/2018  PROCEDURE: CT HEAD WO CONTRAST CLINICAL INFORMATION: Fall. COMPARISON: CT head January 30, 2018. TECHNIQUE: Noncontrast 5 mm axial images were obtained through the brain. All CT scans at this facility use dose modulation, iterative reconstruction, and/or weight-based dosing when appropriate to reduce radiation dose to as low as reasonably achievable. FINDINGS: There is no hemorrhage.  There are no intra-or extra-axial collections. There is no hydrocephalus, midline shift or mass effect. The gray-white matter differentiation is preserved. Stable moderate hypodensity in the white matter and lateral ventricles and in the deeper subcortical white matter. The paranasal sinuses and mastoid air cells are normally aerated. There is no suspicious calvarial abnormality. 1.   No acute intracranial hemorrhage, infarction, or mass. 2.  No obvious fractures. **This report has been created using voice recognition software. It may contain minor errors which are inherent in voice recognition technology. ** Final report electronically signed by Dr. Ronny Iraheta on 2/26/2018 10:15 PM    Ct Cervical Spine Wo Contrast    Result Date: 2/26/2018  PROCEDURE: CT CERVICAL SPINE WO CONTRAST CLINICAL INFORMATION: fall. COMPARISON: CT cervical spine September 29, 2017. TECHNIQUE: 3 mm noncontrast axial images were obtained through the cervical spine with sagittal and coronal reconstructions. All CT scans at this facility use dose modulation, iterative reconstruction, and/or weight-based dosing when appropriate to reduce radiation dose to as low as reasonably achievable. FINDINGS: There is no fracture. There is no prevertebral soft tissue swelling. There is moderate to severe to severe neural foraminal stenosis at all levels of the cervical spine due to osteophytic ridging and uncovertebral joint hypertrophic change at some levels as well as facet joint hypertrophic changes at all levels. There is moderate central spinal canal stenosis at C5-C6 and C6-C7. There is stable moderate reduction in the disc space height at all levels. There is stable straightening of the cervical lordosis likely degenerative in nature. No suspicious osseous lesions are present. There are no suspicious findings in the cervical soft tissues. There are no suspicious findings in the lung apices. 1.  No acute fractures no acute subluxations.  2.  Moderate loss of disc height at all levels. There is straightening of the cervical lordosis likely degenerative. 3.  Moderate to severe bilateral neural foraminal stenosis at all levels. 4.  Moderate central spinal canal stenosis at C5-C6 and C6-C7 unchanged. **This report has been created using voice recognition software. It may contain minor errors which are inherent in voice recognition technology. ** Final report electronically signed by Dr. Chrissy Carroll on 2/26/2018 10:32 PM        ASSESMENT:      Active Problems:    Delirium tremens ([address]    Fall    Alcohol intoxication delirium ([address]    Severe malnutrition ([address]    Acute alcohol intoxication with alcoholism, with unspecified complication ([address]    Dementia    Alcohol intoxication ([address]  Resolved Problems:    * No resolved hospital problems. *      PLAN:  Continue ciwa scale, thiamine and current meds.  PT/OT    DVT prophylaxis: [x] Lovenox                                 [] SCDs                                 [] SQ Heparin                                 [] Encourage ambulation, low risk for DVT, no chemical or mechanical prophylaxis necessary              [] Already on Anticoagulation                Anticipated Disposition upon discharge: [] Home                                                                         [] Home with Home Health                                                                         [x] [facility]                                                                         [] [address]          Electronically signed by Irma Richardson MD on 3/3/2018 at 3:45 PM

## 2018-03-04 LAB
GLUCOSE BLD-MCNC: 111 MG/DL (ref 70–108)
GLUCOSE BLD-MCNC: 116 MG/DL (ref 70–108)
GLUCOSE BLD-MCNC: 120 MG/DL (ref 70–108)
GLUCOSE BLD-MCNC: 99 MG/DL (ref 70–108)

## 2018-03-04 PROCEDURE — 82948 REAGENT STRIP/BLOOD GLUCOSE: CPT

## 2018-03-04 PROCEDURE — 2580000003 HC RX 258: Performed by: INTERNAL MEDICINE

## 2018-03-04 PROCEDURE — 6370000000 HC RX 637 (ALT 250 FOR IP): Performed by: INTERNAL MEDICINE

## 2018-03-04 PROCEDURE — 6360000002 HC RX W HCPCS: Performed by: INTERNAL MEDICINE

## 2018-03-04 PROCEDURE — 1200000003 HC TELEMETRY R&B

## 2018-03-04 RX ORDER — ZIPRASIDONE MESYLATE 20 MG/ML
10 INJECTION, POWDER, LYOPHILIZED, FOR SOLUTION INTRAMUSCULAR EVERY 6 HOURS PRN
Status: DISCONTINUED | OUTPATIENT
Start: 2018-03-04 | End: 2018-03-05

## 2018-03-04 RX ADMIN — LANSOPRAZOLE 30 MG: 30 TABLET, ORALLY DISINTEGRATING, DELAYED RELEASE ORAL at 08:05

## 2018-03-04 RX ADMIN — ZIPRASIDONE MESYLATE 10 MG: 20 INJECTION, POWDER, LYOPHILIZED, FOR SOLUTION INTRAMUSCULAR at 19:21

## 2018-03-04 RX ADMIN — DOCUSATE SODIUM 100 MG: 100 CAPSULE ORAL at 08:05

## 2018-03-04 RX ADMIN — FOLIC ACID 1 MG: 1 TABLET ORAL at 08:05

## 2018-03-04 RX ADMIN — POTASSIUM CHLORIDE 10 MEQ: 40 SOLUTION ORAL at 08:05

## 2018-03-04 RX ADMIN — DRONABINOL 2.5 MG: 2.5 CAPSULE ORAL at 08:05

## 2018-03-04 RX ADMIN — MIDODRINE HYDROCHLORIDE 10 MG: 10 TABLET ORAL at 20:26

## 2018-03-04 RX ADMIN — MIDODRINE HYDROCHLORIDE 10 MG: 10 TABLET ORAL at 08:05

## 2018-03-04 RX ADMIN — ZIPRASIDONE MESYLATE 10 MG: 20 INJECTION, POWDER, LYOPHILIZED, FOR SOLUTION INTRAMUSCULAR at 00:46

## 2018-03-04 RX ADMIN — ENOXAPARIN SODIUM 40 MG: 40 INJECTION SUBCUTANEOUS at 08:06

## 2018-03-04 RX ADMIN — THERA TABS 1 TABLET: TAB at 08:05

## 2018-03-04 RX ADMIN — NIFEDIPINE 30 MG: 30 TABLET, FILM COATED, EXTENDED RELEASE ORAL at 08:05

## 2018-03-04 RX ADMIN — THIAMINE HYDROCHLORIDE 100 MG: 100 INJECTION, SOLUTION INTRAMUSCULAR; INTRAVENOUS at 07:50

## 2018-03-04 RX ADMIN — ATORVASTATIN CALCIUM 10 MG: 10 TABLET, FILM COATED ORAL at 20:27

## 2018-03-04 RX ADMIN — FLUDROCORTISONE ACETATE 0.1 MG: 0.1 TABLET ORAL at 08:05

## 2018-03-04 RX ADMIN — WATER 1.2 ML: 1 INJECTION INTRAMUSCULAR; INTRAVENOUS; SUBCUTANEOUS at 00:46

## 2018-03-04 RX ADMIN — DEXTROSE AND SODIUM CHLORIDE: 5; 900 INJECTION, SOLUTION INTRAVENOUS at 07:51

## 2018-03-04 RX ADMIN — WATER 1.2 ML: 1 INJECTION INTRAMUSCULAR; INTRAVENOUS; SUBCUTANEOUS at 19:21

## 2018-03-04 RX ADMIN — OXYBUTYNIN CHLORIDE 10 MG: 10 TABLET, FILM COATED, EXTENDED RELEASE ORAL at 20:27

## 2018-03-04 RX ADMIN — METOPROLOL TARTRATE 50 MG: 50 TABLET, FILM COATED ORAL at 08:05

## 2018-03-04 RX ADMIN — DONEPEZIL HYDROCHLORIDE 10 MG: 10 TABLET, FILM COATED ORAL at 20:27

## 2018-03-04 ASSESSMENT — PAIN SCALES - GENERAL: PAINLEVEL_OUTOF10: 0

## 2018-03-04 NOTE — PLAN OF CARE
Problem: Falls - Risk of  Goal: Absence of falls  Outcome: Ongoing  Bed in lowest position and locked. Bed alarm activated. Educated on use of call light when in need of assistance- pt does not use. Utilizing telesitter. Visual checks performed and charted. No falls during shift at this time. Armband and falling star in place. Call light within reach. Bedrest this shift. Problem: Discharge Planning:  Goal: Discharged to appropriate level of care  Discharged to appropriate level of care   Outcome: Ongoing  Case management involved in coordinating discharge needs. Possible d/c to [facility]. Problem: Fluid Volume - Deficit:  Goal: Absence of fluid volume deficit signs and symptoms  Absence of fluid volume deficit signs and symptoms   Outcome: Ongoing  IV fluid as ordered. PO intakes encouraged. Problem: Nutrition Deficit:  Goal: Ability to achieve adequate nutritional intake will improve  Ability to achieve adequate nutritional intake will improve   Outcome: Ongoing  Encouraged to eat meals. Assistance to help feed. Problem: DISCHARGE BARRIERS  Goal: Patient's continuum of care needs are met  Outcome: Ongoing  Poor judgement. Poor Safety awareness. Impulsive. Problem: Risk for Impaired Skin Integrity  Goal: Tissue integrity - skin and mucous membranes  Structural intactness and normal physiological function of skin and  mucous membranes. Outcome: Ongoing  No signs of new skin breakdown with each assessment. Skin remains warm, dry. Right forearm skin tear. Mucous membranes pink & moist. Patient is able to turn self. Active with all 4 extremities. Problem: Nutrition  Goal: Optimal nutrition therapy  Outcome: Ongoing  Bowel sounds active x4. Patient denies nausea/vomiting. No abdominal distention. Abdomen soft, non-tender. Meal intakes documented as 0-100% consumed yesterday. Marinol as ordered. Comments: Patient is unable to participate in care planning.  Family has been updated and understands.

## 2018-03-04 NOTE — PROGRESS NOTES
COMPARISON: No prior study. TECHNIQUE: AP view of the pelvis. FINDINGS: The pelvic ring is intact. There is no fracture or dislocation of either hip. The superior and inferior pubic rami are intact. The sacrum and sacroiliac joints appear normal. Moderate osteophyte formation around the subcapital region of the right femoral head. Mild osteophytic formation along the superior lateral margins of both acetabula along the inferior medial margins as well. Incidentally noted is some barium within some of the diverticula within the colon. There are moderate to large osteophytes laterally at the level of L3-L4 and L4-L5. 1.  No acute fractures. No dislocations. 2.  No pelvic diastases. 3.  Osteoarthritic changes as described. **This report has been created using voice recognition software. It may contain minor errors which are inherent in voice recognition technology. ** Final report electronically signed by Dr. Juana Stacy on 2/26/2018 10:14 PM    Xr Radius Ulna Right (2 Views)    Result Date: 2/26/2018  PROCEDURE: XR RADIUS ULNA RIGHT (2 VIEWS) CLINICAL INFORMATION: fall, . COMPARISON: No prior study. TECHNIQUE: AP and lateral views of the forearm. FINDINGS: The radius and ulna are intact. There is no fracture. The soft tissues are normal. There are no abnormalities at the level of the elbow or wrist joint. No acute findings. **This report has been created using voice recognition software. It may contain minor errors which are inherent in voice recognition technology. ** Final report electronically signed by Dr. Juana Stacy on 2/26/2018 10:12 PM    Ct Head Wo Contrast    Result Date: 2/26/2018  PROCEDURE: CT HEAD WO CONTRAST CLINICAL INFORMATION: Fall. COMPARISON: CT head January 30, 2018. TECHNIQUE: Noncontrast 5 mm axial images were obtained through the brain.  All CT scans at this facility use dose modulation, iterative reconstruction, and/or weight-based dosing when appropriate to reduce radiation dose to

## 2018-03-05 LAB
GLUCOSE BLD-MCNC: 108 MG/DL (ref 70–108)
GLUCOSE BLD-MCNC: 130 MG/DL (ref 70–108)
GLUCOSE BLD-MCNC: 88 MG/DL (ref 70–108)

## 2018-03-05 PROCEDURE — 97116 GAIT TRAINING THERAPY: CPT

## 2018-03-05 PROCEDURE — 97110 THERAPEUTIC EXERCISES: CPT

## 2018-03-05 PROCEDURE — 97530 THERAPEUTIC ACTIVITIES: CPT

## 2018-03-05 PROCEDURE — 6360000002 HC RX W HCPCS: Performed by: INTERNAL MEDICINE

## 2018-03-05 PROCEDURE — 2580000003 HC RX 258: Performed by: INTERNAL MEDICINE

## 2018-03-05 PROCEDURE — 6370000000 HC RX 637 (ALT 250 FOR IP): Performed by: INTERNAL MEDICINE

## 2018-03-05 PROCEDURE — 82948 REAGENT STRIP/BLOOD GLUCOSE: CPT

## 2018-03-05 PROCEDURE — 1210000002 HC MED SURG R&B - NEUROSCIENCE

## 2018-03-05 PROCEDURE — 97535 SELF CARE MNGMENT TRAINING: CPT

## 2018-03-05 RX ORDER — QUETIAPINE FUMARATE 25 MG/1
25 TABLET, FILM COATED ORAL NIGHTLY
Status: DISCONTINUED | OUTPATIENT
Start: 2018-03-05 | End: 2018-03-06 | Stop reason: HOSPADM

## 2018-03-05 RX ORDER — MIDODRINE HYDROCHLORIDE 10 MG/1
10 TABLET ORAL 2 TIMES DAILY PRN
Status: DISCONTINUED | OUTPATIENT
Start: 2018-03-05 | End: 2018-03-06 | Stop reason: HOSPADM

## 2018-03-05 RX ADMIN — Medication 10 ML: at 21:15

## 2018-03-05 RX ADMIN — POTASSIUM CHLORIDE 10 MEQ: 40 SOLUTION ORAL at 09:15

## 2018-03-05 RX ADMIN — THERA TABS 1 TABLET: TAB at 09:14

## 2018-03-05 RX ADMIN — MIDODRINE HYDROCHLORIDE 10 MG: 10 TABLET ORAL at 09:14

## 2018-03-05 RX ADMIN — DOCUSATE SODIUM 100 MG: 100 CAPSULE ORAL at 09:15

## 2018-03-05 RX ADMIN — METOPROLOL TARTRATE 50 MG: 50 TABLET, FILM COATED ORAL at 09:14

## 2018-03-05 RX ADMIN — ATORVASTATIN CALCIUM 10 MG: 10 TABLET, FILM COATED ORAL at 21:15

## 2018-03-05 RX ADMIN — NIFEDIPINE 30 MG: 30 TABLET, FILM COATED, EXTENDED RELEASE ORAL at 09:15

## 2018-03-05 RX ADMIN — LANSOPRAZOLE 30 MG: 30 TABLET, ORALLY DISINTEGRATING, DELAYED RELEASE ORAL at 09:14

## 2018-03-05 RX ADMIN — DEXTROSE AND SODIUM CHLORIDE: 5; 900 INJECTION, SOLUTION INTRAVENOUS at 00:23

## 2018-03-05 RX ADMIN — OXYBUTYNIN CHLORIDE 10 MG: 10 TABLET, FILM COATED, EXTENDED RELEASE ORAL at 21:15

## 2018-03-05 RX ADMIN — QUETIAPINE FUMARATE 25 MG: 25 TABLET ORAL at 21:15

## 2018-03-05 RX ADMIN — DRONABINOL 2.5 MG: 2.5 CAPSULE ORAL at 09:18

## 2018-03-05 RX ADMIN — FOLIC ACID 1 MG: 1 TABLET ORAL at 09:15

## 2018-03-05 RX ADMIN — DONEPEZIL HYDROCHLORIDE 10 MG: 10 TABLET, FILM COATED ORAL at 21:15

## 2018-03-05 RX ADMIN — THIAMINE HYDROCHLORIDE 100 MG: 100 INJECTION, SOLUTION INTRAMUSCULAR; INTRAVENOUS at 06:22

## 2018-03-05 RX ADMIN — FLUDROCORTISONE ACETATE 0.1 MG: 0.1 TABLET ORAL at 09:15

## 2018-03-05 RX ADMIN — ENOXAPARIN SODIUM 40 MG: 40 INJECTION SUBCUTANEOUS at 09:16

## 2018-03-05 RX ADMIN — DRONABINOL 2.5 MG: 2.5 CAPSULE ORAL at 17:37

## 2018-03-05 ASSESSMENT — PAIN SCALES - GENERAL: PAINLEVEL_OUTOF10: 0

## 2018-03-05 NOTE — FLOWSHEET NOTE
Subjective: The patient was Unable to respond but the pts. family was actively                       present. The pts. family shared that the pt. does have support and that                      they are praying for improved health. The pt's son asked for prayer. Objective: The patient and the family was actively present. The pt was sleeping but the  patients family                     stated that they would like prayer. The pt was in the chair resting peacefully. Assessment: Because the patient was going through a life adjustment, I offered                       emotional support, nurtured hope, provided words of encouragement and           a prayer of healing/comfort to the pt. The patients family was            genuinely grateful for the support but would like continual emotional and           spiritual care. Plan:           I shared with the patients family that we will continue to follow up with the                       patient and I gave a spiritual care contact card in the event that there was                       an immediate need during their hospitalization. The pt is a member of [employer] and the        03/05/18 1428   Encounter Summary   Services provided to: Family   Referral/Consult From: [id number]; Children;Family members; Confucianism/[facility]anna community   [facility] ([address])   Continue Visiting Yes  (3/5/18 )   Complexity of Encounter Moderate   Length of Encounter 30 minutes   Spiritual/Rastafarian   Type Spiritual support   Assessment Calm   Intervention Nurtured hope;Prayer   Outcome Coping    is aware.

## 2018-03-05 NOTE — PROGRESS NOTES
Learning: decreased cognition    Equipment Recommendations: Other: Continue to assess pending progress    Safety:  Safety Devices in place: Yes  Type of devices:  All fall risk precautions in place, Call light within reach, Chair alarm in place, Left in chair, Gait belt (left with tech for feeding breakfasat )    Plan:  Times per week: 5x  Current Treatment Recommendations: Balance Training, Functional Mobility Training, Endurance Training, Cognitive Reorientation, Patient/Caregiver Education & Training, Self-Care / ADL    Goals:  Patient goals : Pt did not state    Short term goals  Time Frame for Short term goals: 2 weeks  Short term goal 1: Complete various t/fs including toilet with S & 0-2 vcs for safety  Short term goal 2: Complete 4-5 min standing ADL with S for increased ease of sinkside grooming  Short term goal 3: Complete mobility to [facility] or bedside chair with CGA, RW, & min A  Short term goal 4: Complete grooming tasks with s/u & min vcs for initiation, sequencing, & safety  Long term goals  Time Frame for Long term goals : No LTG set d/t short ELOS

## 2018-03-05 NOTE — PROGRESS NOTES
Live on Main level with bedroom/bathroom  Home Access: Stairs to enter with rails  Entrance Stairs - Number of Steps: 2  Entrance Stairs - Rails: Left  Home Equipment:  (none)     Objective:       Transfers  Sit to Stand: Moderate Assistance  Stand to sit: Contact guard assistance       Ambulation 1  Surface: level tile  Device: Rolling Walker  Assistance: Contact guard assistance;Minimal assistance  Quality of Gait: Very slow pedro. Decreased B step length. Forward flexed posture and decreased B knee extension. Mildly unsteady, occasional min A to correct posterior lean. Distance: 30 feet x1      Balance  Comments: Static stand at AD initially requiring min A to correct posterior lean, with cuing improved to contact guard assist.     Exercises:  Exercises  Comments: Pt completed BLE including ankle pumps, hip abd/add, heel slides, SLR all x10 reps to increase strength for improved functional mobility. Activity Tolerance:  Activity Tolerance: Patient limited by cognitive status    Assessment: Body structures, Functions, Activity limitations: Decreased functional mobility , Decreased ROM, Decreased strength, Decreased safe awareness, Decreased cognition, Decreased balance, Decreased endurance, Decreased high-level IADLs  Assessment: Pt tolerated session fair. Limited by decreased strength, decreased endurance, decreased mobility. Somewhat slow to respond throughout session. Pt occasionally difficult to understand due to mumbling speech. Prognosis: Fair  REQUIRES PT FOLLOW UP: Yes  Discharge Recommendations: Continue to assess pending progress, ECF with PT    Patient Education:  Patient Education: POC, Ther ex, Bed Mobility. Equipment Recommendations:  Equipment Needed: No    Safety:  Type of devices:  All fall risk precautions in place, Call light within reach, Gait belt, Chair alarm in place, Left in chair  Restraints  Initially in place: No    Plan:  Times per week: 3-5x GM  Times per day:

## 2018-03-06 VITALS
WEIGHT: 131.2 LBS | HEIGHT: 71 IN | OXYGEN SATURATION: 98 % | DIASTOLIC BLOOD PRESSURE: 92 MMHG | BODY MASS INDEX: 18.37 KG/M2 | TEMPERATURE: 98 F | SYSTOLIC BLOOD PRESSURE: 182 MMHG | RESPIRATION RATE: 18 BRPM | HEART RATE: 93 BPM

## 2018-03-06 PROCEDURE — 6370000000 HC RX 637 (ALT 250 FOR IP): Performed by: INTERNAL MEDICINE

## 2018-03-06 PROCEDURE — 97530 THERAPEUTIC ACTIVITIES: CPT

## 2018-03-06 PROCEDURE — 6360000002 HC RX W HCPCS: Performed by: INTERNAL MEDICINE

## 2018-03-06 PROCEDURE — 97535 SELF CARE MNGMENT TRAINING: CPT

## 2018-03-06 PROCEDURE — 2580000003 HC RX 258: Performed by: INTERNAL MEDICINE

## 2018-03-06 RX ORDER — THIAMINE HCL 50 MG
100 TABLET ORAL DAILY
Status: DISCONTINUED | OUTPATIENT
Start: 2018-03-07 | End: 2018-03-06 | Stop reason: HOSPADM

## 2018-03-06 RX ORDER — MIDODRINE HYDROCHLORIDE 10 MG/1
10 TABLET ORAL 2 TIMES DAILY PRN
Status: ON HOLD | DISCHARGE
Start: 2018-03-06 | End: 2021-07-15

## 2018-03-06 RX ORDER — QUETIAPINE FUMARATE 25 MG/1
25 TABLET, FILM COATED ORAL NIGHTLY
Qty: 60 TABLET | Refills: 3 | DISCHARGE
Start: 2018-03-06 | End: 2020-09-21

## 2018-03-06 RX ADMIN — THIAMINE HYDROCHLORIDE 100 MG: 100 INJECTION, SOLUTION INTRAMUSCULAR; INTRAVENOUS at 09:58

## 2018-03-06 RX ADMIN — FOLIC ACID 1 MG: 1 TABLET ORAL at 09:58

## 2018-03-06 RX ADMIN — METOPROLOL TARTRATE 50 MG: 50 TABLET, FILM COATED ORAL at 09:58

## 2018-03-06 RX ADMIN — POTASSIUM CHLORIDE 10 MEQ: 40 SOLUTION ORAL at 09:58

## 2018-03-06 RX ADMIN — DRONABINOL 2.5 MG: 2.5 CAPSULE ORAL at 09:58

## 2018-03-06 RX ADMIN — DOCUSATE SODIUM 100 MG: 100 CAPSULE ORAL at 09:58

## 2018-03-06 RX ADMIN — Medication 10 ML: at 09:59

## 2018-03-06 RX ADMIN — THERA TABS 1 TABLET: TAB at 09:57

## 2018-03-06 RX ADMIN — NIFEDIPINE 30 MG: 30 TABLET, FILM COATED, EXTENDED RELEASE ORAL at 09:58

## 2018-03-06 RX ADMIN — ENOXAPARIN SODIUM 40 MG: 40 INJECTION SUBCUTANEOUS at 09:58

## 2018-03-06 RX ADMIN — MIDODRINE HYDROCHLORIDE 10 MG: 10 TABLET ORAL at 09:58

## 2018-03-06 ASSESSMENT — PAIN DESCRIPTION - LOCATION: LOCATION: GENERALIZED

## 2018-03-06 ASSESSMENT — PAIN SCALES - GENERAL: PAINLEVEL_OUTOF10: 3

## 2018-03-06 NOTE — PROGRESS NOTES
CLINICAL PHARMACY: DISCHARGE MED RECONCILIATION/REVIEW    [facility] ([facility] Select Patient?: Yes  Total # of Interventions Recommended: 0     Total # Interventions Accepted: 0  Intervention Severity:   - Level 1 Intervention Present?: No   - Level 2 #: 0   - Level 3 #: 0   Time Spent (min): 30    Additional Documentation:  Patient to be discharged to [facility] today. Dr Jennifer Heath continued Ativan q1h prn (no doses given on [facility] but patient appears to be impulsive at times). No other clarifications noted.   Janna Coleman [facility], BCPS  3/6/2018     3:03 PM

## 2018-03-06 NOTE — PROGRESS NOTES
Nimco Bonilla 61  INPATIENT OCCUPATIONAL THERAPY  [facility] NEUROSCIENCES 4A  DAILY NOTE    Time:  Time In: 1055  Time Out: 1135  Timed Code Treatment Minutes: 40 Minutes  Minutes: 40        Date: 3/6/2018  Patient Name: Michael Yang,   Gender: male      Room: [facility]006-A  MRN: [id number]  : 1935  (81 y.o.)  Referring Practitioner: Dr. Nikhil Soto  Diagnosis: alcohol intoxication delirium  Additional Pertinent Hx: Per ER note on 18:  81 y.o. male with a past medical history of Dementia, alcohol abuse, HTN, and CKD who presents to the ED via EMS for evaluation following a fall. The patient's wife states the patient fell this evening at home. The fall was unwitnessed as the wife states she heard a noise from the bedroom and found the patient propped up against the door. The wife states the patient has been falling frequently. The wife states the patient abuses alcohol and this is likely contributing to his falls. Restrictions/Precautions:  Restrictions/Precautions: General Precautions, Fall Risk  Required Braces or Orthoses?: No       Past Medical History:   Diagnosis Date    BPH (benign prostatic hypertrophy)     CKD (chronic kidney disease) stage 2, GFR 60-89 ml/min     Dementia     Erectile dysfunction     [place] (hard of hearing)     Hyperlipidemia     Hypertension     Metabolic bone disease      Past Surgical History:   Procedure Laterality Date    COLONOSCOPY      ENDOSCOPY, COLON, DIAGNOSTIC      ESOPHAGUS SURGERY      NECK SURGERY      PROSTATE SURGERY      SHOULDER SURGERY      THYROID SURGERY      removal of a nodule    TONSILLECTOMY      TURP  12    Dr Faizan Raines           Subjective   Subjective: Pt resting in chair and agreeable to OT treatment. Wife present.   Comments: Nurse ok'd activity    Pain:  Pain Assessment  Patient Currently in Pain: Yes  Pain Assessment: 0-10  Pain Level: 3  Pain Location: Generalized     Objective  Overall Cognitive Status: Exceptions (confusion, delayed processing, decreased safety and insight, decreased problem solving)   Pt requires max cues and lengthy time for participation in activities    ADL  Toileting: Maximum assistance     Bed mobility  Sit to Supine: Moderate assistance (with max vcs for technique)    Transfers  Sit to stand: Moderate assistance (from chair and EOB with max vcs for hand placement and safe tech)  Stand to sit: Moderate assistance (to chair and EOB with max cues for hand placement and safe tech)  Transfer Comments: Pt requires tactile cues to follow safe hand placement for transfers  Toilet Transfers  Toilet - Technique: Ambulating  Equipment Used: Standard toilet  Toilet Transfer: Moderate assistance  Toilet Transfers Comments: with max cues for safe transfer. Pt would benefit from RTS for increased ease and safety. Nurse notified    Balance  Sitting Balance: Stand by assistance  Standing Balance: Minimal assistance     Functional Mobility  Functional - Mobility Device: Rolling Walker  Assist Level: Moderate assistance  Functional Mobility Comments: Pt ambulated to/from bathroom and short distance into hallway this date. Pt ambulates at slow pace with small shuffeled steps.  Pt presents with poor processing and requires max cues for task as well as mod assist to manuever walker while turning at this time     Activity Tolerance:  Activity Tolerance: Treatment limited secondary to decreased cognition    Assessment:     Performance deficits / Impairments: Decreased functional mobility , Decreased cognition, Decreased ADL status, Decreased endurance, Decreased safe awareness, Decreased balance  Prognosis: Fair  Discharge Recommendations: Continue to assess pending progress, Patient would benefit from continued therapy after discharge, 24 hour supervision or assist, Subacute/Skilled Nursing Facility    Patient Education:  Patient Education: safety with transfers and ambulation, walker safety, ADLs, role of OT,

## 2018-03-06 NOTE — PROGRESS NOTES
Pharmacy Intravenous to Oral Protocol  Medication changed per P&T protocol: thiamine    Patient meets criteria based on the followin. IV therapy > 24 hours - yes  2. Nausea/vomiting - no  3. Regular diet - yes  4. Tolerating other oral medications: yes  5. Afebrile for 24 hours: n/a  6.  WBC trending downward: n/a    Janna Coleman [facility], BCPS  3/6/2018     2:03 PM

## 2018-03-06 NOTE — CARE COORDINATION
3/6/18, 11:33 AM    Discharge plan discussed by  and . Discharge plan reviewed with patient/ family. Patient/ family verbalize understanding of discharge plan and are in agreement with plan. Understanding was demonstrated using the teach back method. IMM Letter  IMM Letter given to Patient/Family/Significant other/Guardian/POA/by[de-identified] CM  IMM Letter date given[de-identified] 03/06/18  IMM Letter time given[de-identified] Jillian Chaney has been accepted to [facility]. He will be skilled under his Medicare benefit. MICHELLE updated nurse Alexandra and Celia Summers. Drake told MICHELLE that patients spouse just left, so she will give her a call. MICHELLE will be setting up [facility] for transport. Update 2:00pm: MICHELLE set up [facility] for transport. MICHELLE spoke with spouse Kaela La and she will update her son Renee Eduardo on discharge plan. MICHELLE faxed AVS to Buffy Nguyen and gave her a call with potential discharge time.

## 2018-03-06 NOTE — CONSULTS
Per Alexandra patient remains incoherent and unable to participate in AOD assessment. Did speak with wife and provided information to her during this admission for AOD treatment options.

## 2018-03-09 ENCOUNTER — APPOINTMENT (OUTPATIENT)
Dept: CT IMAGING | Age: 83
End: 2018-03-09
Payer: MEDICARE

## 2018-03-09 ENCOUNTER — HOSPITAL ENCOUNTER (EMERGENCY)
Age: 83
Discharge: HOME | End: 2018-03-09
Attending: FAMILY MEDICINE
Payer: MEDICARE

## 2018-03-09 VITALS
SYSTOLIC BLOOD PRESSURE: 142 MMHG | OXYGEN SATURATION: 98 % | RESPIRATION RATE: 16 BRPM | TEMPERATURE: 98.3 F | HEART RATE: 76 BPM | BODY MASS INDEX: 18.83 KG/M2 | WEIGHT: 135 LBS | DIASTOLIC BLOOD PRESSURE: 76 MMHG

## 2018-03-09 DIAGNOSIS — S00.01XA ABRASION OF SCALP, INITIAL ENCOUNTER: ICD-10-CM
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
## 2018-03-09 DIAGNOSIS — S39.012A STRAIN OF LUMBAR PARASPINOUS MUSCLE, INITIAL ENCOUNTER: ICD-10-CM

## 2018-03-09 DIAGNOSIS — S09.90XA INJURY OF HEAD, INITIAL ENCOUNTER: ICD-10-CM

## 2018-03-09 LAB
ALBUMIN SERPL-MCNC: 3.7 G/DL (ref 3.5–5.1)
ALP BLD-CCNC: 61 U/L (ref 38–126)
ALT SERPL-CCNC: 18 U/L (ref 11–66)
AMPHETAMINE+METHAMPHETAMINE URINE SCREEN: NEGATIVE
ANION GAP SERPL CALCULATED.3IONS-SCNC: 11 MEQ/L (ref 8–16)
AST SERPL-CCNC: 39 U/L (ref 5–40)
BARBITURATE QUANTITATIVE URINE: NEGATIVE
BASOPHILS # BLD: 0.3 %
BASOPHILS ABSOLUTE: 0 THOU/MM3 (ref 0–0.1)
BENZODIAZEPINE QUANTITATIVE URINE: NEGATIVE
BILIRUB SERPL-MCNC: 0.4 MG/DL (ref 0.3–1.2)
BILIRUBIN URINE: NEGATIVE
BLOOD, URINE: NEGATIVE
BUN BLDV-MCNC: 10 MG/DL (ref 7–22)
CALCIUM SERPL-MCNC: 9.4 MG/DL (ref 8.5–10.5)
CANNABINOID QUANTITATIVE URINE: POSITIVE
CHARACTER, URINE: CLEAR
CHLORIDE BLD-SCNC: 101 MEQ/L (ref 98–111)
CO2: 31 MEQ/L (ref 23–33)
COCAINE METABOLITE QUANTITATIVE URINE: NEGATIVE
COLOR: YELLOW
CREAT SERPL-MCNC: 0.7 MG/DL (ref 0.4–1.2)
EKG ATRIAL RATE: 96 BPM
EKG P AXIS: 78 DEGREES
EKG P-R INTERVAL: 156 MS
EKG Q-T INTERVAL: 386 MS
EKG QRS DURATION: 82 MS
EKG QTC CALCULATION (BAZETT): 487 MS
EKG R AXIS: -13 DEGREES
EKG T AXIS: 75 DEGREES
EKG VENTRICULAR RATE: 96 BPM
EOSINOPHIL # BLD: 1 %
EOSINOPHILS ABSOLUTE: 0 THOU/MM3 (ref 0–0.4)
ETHYL ALCOHOL, SERUM: < 0.01 %
GFR SERPL CREATININE-BSD FRML MDRD: > 90 ML/MIN/1.73M2
GLUCOSE BLD-MCNC: 103 MG/DL (ref 70–108)
GLUCOSE URINE: NEGATIVE MG/DL
HCT VFR BLD CALC: 32.2 % (ref 42–52)
HEMOGLOBIN: 10.7 GM/DL (ref 14–18)
KETONES, URINE: NEGATIVE
LEUKOCYTE ESTERASE, URINE: NEGATIVE
LYMPHOCYTES # BLD: 13 %
LYMPHOCYTES ABSOLUTE: 0.6 THOU/MM3 (ref 1–4.8)
MACROCYTES: ABNORMAL
MCH RBC QN AUTO: 33.1 PG (ref 27–31)
MCHC RBC AUTO-ENTMCNC: 33.4 GM/DL (ref 33–37)
MCV RBC AUTO: 99.2 FL (ref 80–94)
MONOCYTES # BLD: 9.1 %
MONOCYTES ABSOLUTE: 0.4 THOU/MM3 (ref 0.4–1.3)
NITRITE, URINE: NEGATIVE
NUCLEATED RED BLOOD CELLS: 0 /100 WBC
OPIATES, URINE: NEGATIVE
OSMOLALITY CALCULATION: 284.3 MOSMOL/KG (ref 275–300)
OXYCODONE: NEGATIVE
PDW BLD-RTO: 12.4 % (ref 11.5–14.5)
PH UA: 7.5
PHENCYCLIDINE QUANTITATIVE URINE: NEGATIVE
PLATELET # BLD: 173 THOU/MM3 (ref 130–400)
PMV BLD AUTO: 7.8 FL (ref 7.4–10.4)
POTASSIUM SERPL-SCNC: 3.6 MEQ/L (ref 3.5–5.2)
PRO-BNP: 92.8 PG/ML (ref 0–1800)
PROTEIN UA: NEGATIVE
RBC # BLD: 3.24 MILL/MM3 (ref 4.7–6.1)
SEG NEUTROPHILS: 76.6 %
SEGMENTED NEUTROPHILS ABSOLUTE COUNT: 3.8 THOU/MM3 (ref 1.8–7.7)
SODIUM BLD-SCNC: 143 MEQ/L (ref 135–145)
SPECIFIC GRAVITY, URINE: 1.01 (ref 1–1.03)
TOTAL PROTEIN: 7.2 G/DL (ref 6.1–8)
TROPONIN T: < 0.01 NG/ML
UROBILINOGEN, URINE: 0.2 EU/DL
WBC # BLD: 4.9 THOU/MM3 (ref 4.8–10.8)

## 2018-03-09 PROCEDURE — 80053 COMPREHEN METABOLIC PANEL: CPT

## 2018-03-09 PROCEDURE — 6360000002 HC RX W HCPCS: Performed by: FAMILY MEDICINE

## 2018-03-09 PROCEDURE — 93010 ELECTROCARDIOGRAM REPORT: CPT | Performed by: INTERNAL MEDICINE

## 2018-03-09 PROCEDURE — 84484 ASSAY OF TROPONIN QUANT: CPT

## 2018-03-09 PROCEDURE — 70450 CT HEAD/BRAIN W/O DYE: CPT

## 2018-03-09 PROCEDURE — 93005 ELECTROCARDIOGRAM TRACING: CPT | Performed by: FAMILY MEDICINE

## 2018-03-09 PROCEDURE — 96375 TX/PRO/DX INJ NEW DRUG ADDON: CPT

## 2018-03-09 PROCEDURE — 2500000003 HC RX 250 WO HCPCS: Performed by: FAMILY MEDICINE

## 2018-03-09 PROCEDURE — 96374 THER/PROPH/DIAG INJ IV PUSH: CPT

## 2018-03-09 PROCEDURE — 81003 URINALYSIS AUTO W/O SCOPE: CPT

## 2018-03-09 PROCEDURE — 83880 ASSAY OF NATRIURETIC PEPTIDE: CPT

## 2018-03-09 PROCEDURE — 85025 COMPLETE CBC W/AUTO DIFF WBC: CPT

## 2018-03-09 PROCEDURE — 99285 EMERGENCY DEPT VISIT HI MDM: CPT

## 2018-03-09 PROCEDURE — 36415 COLL VENOUS BLD VENIPUNCTURE: CPT

## 2018-03-09 PROCEDURE — 72125 CT NECK SPINE W/O DYE: CPT

## 2018-03-09 PROCEDURE — 80307 DRUG TEST PRSMV CHEM ANLYZR: CPT

## 2018-03-09 PROCEDURE — 72131 CT LUMBAR SPINE W/O DYE: CPT

## 2018-03-09 PROCEDURE — G0480 DRUG TEST DEF 1-7 CLASSES: HCPCS

## 2018-03-09 RX ORDER — LABETALOL HYDROCHLORIDE 5 MG/ML
40 INJECTION, SOLUTION INTRAVENOUS ONCE
Status: DISCONTINUED | OUTPATIENT
Start: 2018-03-09 | End: 2018-03-09

## 2018-03-09 RX ORDER — LABETALOL HYDROCHLORIDE 5 MG/ML
20 INJECTION, SOLUTION INTRAVENOUS ONCE
Status: COMPLETED | OUTPATIENT
Start: 2018-03-09 | End: 2018-03-09

## 2018-03-09 RX ORDER — MORPHINE SULFATE 2 MG/ML
2 INJECTION, SOLUTION INTRAMUSCULAR; INTRAVENOUS ONCE
Status: COMPLETED | OUTPATIENT
Start: 2018-03-09 | End: 2018-03-09

## 2018-03-09 RX ADMIN — MORPHINE SULFATE 2 MG: 2 INJECTION, SOLUTION INTRAMUSCULAR; INTRAVENOUS at 01:35

## 2018-03-09 RX ADMIN — LABETALOL HYDROCHLORIDE 20 MG: 5 INJECTION INTRAVENOUS at 03:10

## 2018-03-09 ASSESSMENT — ENCOUNTER SYMPTOMS
CHOKING: 0
ABDOMINAL PAIN: 0
CHEST TIGHTNESS: 0
VOMITING: 0
DIARRHEA: 0
CONSTIPATION: 0
APNEA: 0
STRIDOR: 0
BACK PAIN: 0
NAUSEA: 0
WHEEZING: 0
SHORTNESS OF BREATH: 0
COUGH: 0

## 2018-03-09 ASSESSMENT — PAIN SCALES - GENERAL
PAINLEVEL_OUTOF10: 8
PAINLEVEL_OUTOF10: 8
PAINLEVEL_OUTOF10: 4
PAINLEVEL_OUTOF10: 2

## 2018-03-09 ASSESSMENT — PAIN DESCRIPTION - LOCATION: LOCATION: HEAD

## 2018-03-09 ASSESSMENT — PAIN DESCRIPTION - PAIN TYPE
TYPE: ACUTE PAIN

## 2018-03-09 NOTE — ED TRIAGE NOTES
Pt to ed per ems on back board and c-collar on and aligned. Ems report that pt was walking around and the staff at the nursing home heard a loud thud and found pt lay on the floor with a laceration to the back of the head. Ems report that the bleeding was under control upon arrival and that pt's only complaint has been head and neck pain. Upon arrival to the ed Dr. Kennedy Artis cleared the pt off the back board pt able to answer all questions appropriately and pt reports that he stood and became very dizzy and fell and hit his head on the brick wall. ekg obtained and pt placed on the cardiac monitor. Family at the bedside report that pt was recently discharged from our facility for etoh detox and is concerned that pt either has had some etoh or is still experiencing DT's. Dr. Kennedy Artis informed the pt and family on the POC. Will continue to monitor.

## 2018-03-09 NOTE — ED NOTES
[facility] and report received by Simba Denson RN that pt will be returning to the facility once transport is available.      Asad Cohen RN  03/09/18 [id number]

## 2018-03-09 NOTE — ED NOTES
Bed: 004A  Expected date: 3/9/18  Expected time: 12:48 AM  Means of arrival: [facility]  Comments:     Riaz Freeman RN  03/09/18 [id number]

## 2018-03-09 NOTE — ED PROVIDER NOTES
rash.   Neurological: Positive for headaches. Negative for dizziness, tremors, seizures, syncope, facial asymmetry, speech difficulty, weakness, light-headedness and numbness. Psychiatric/Behavioral: Positive for decreased concentration. Negative for sleep disturbance and suicidal ideas. The patient is not nervous/anxious. PAST MEDICAL HISTORY    has a past medical history of BPH (benign prostatic hypertrophy); CKD (chronic kidney disease) stage 2, GFR 60-89 ml/min; Dementia; Erectile dysfunction; Frequent falls; [place] (hard of hearing); Hyperlipidemia; Hypertension; Metabolic bone disease; and Orthostatic hypotension. SURGICAL HISTORY      has a past surgical history that includes shoulder surgery; Neck surgery; Tonsillectomy; TURP (5-31-12); Colonoscopy; Thyroid surgery; Prostate surgery; Endoscopy, colon, diagnostic; and Esophagus surgery. CURRENT MEDICATIONS       Previous Medications    ACETAMINOPHEN (TYLENOL) 325 MG TABLET    Take 2 tablets by mouth every 4 hours as needed for Pain    ATORVASTATIN (LIPITOR) 10 MG TABLET    Take 1 tablet by mouth daily    CELECOXIB (CELEBREX) 200 MG CAPSULE    Take 1 capsule by mouth daily    DEXTROSE 50 % SOLUTION    Infuse 25 mLs intravenously as needed (Blood glucose less than 70 mg/dL and patient NOT ALERT or NPO.)    DEXTROSE-SODIUM CHLORIDE (DEXTROSE 5 % AND 0.9 % NACL) 5-0.9 % INFUSION    Infuse 100 mL/hr intravenously as needed (Low blood sugar)    DOCUSATE (COLACE, DULCOLAX) 100 MG CAPS    Take 100 mg by mouth daily    DONEPEZIL (ARICEPT) 10 MG TABLET    Take 1 tablet by mouth nightly    DOXAZOSIN (CARDURA) 2 MG TABLET    Take 2 mg by mouth daily    DRONABINOL (MARINOL) 2.5 MG CAPSULE    Take 1 capsule by mouth 2 times daily (before meals) for 30 days.     FOLIC ACID (FOLVITE) 1 MG TABLET    Take 1 tablet by mouth daily    LANSOPRAZOLE (PREVACID) 15 MG DELAYED RELEASE CAPSULE    Take 30 mg by mouth daily    LORAZEPAM (ATIVAN) 1 MG TABLET    Take 1 tablet by

## 2018-03-23 ENCOUNTER — HOSPITAL ENCOUNTER (OUTPATIENT)
Age: 83
Setting detail: SPECIMEN
Discharge: HOME | End: 2018-03-23
Payer: MEDICARE

## 2018-03-23 LAB
CHOLESTEROL/HDL RATIO: 2.2
CHOLESTEROL: 143 MG/DL
HDLC SERPL-MCNC: 66 MG/DL
LDL CHOLESTEROL: 51 MG/DL (ref 0–130)
TRIGL SERPL-MCNC: 129 MG/DL
VLDLC SERPL CALC-MCNC: NORMAL MG/DL (ref 1–30)

## 2018-03-23 PROCEDURE — P9604 ONE-WAY ALLOW PRORATED TRIP: HCPCS

## 2018-03-23 PROCEDURE — 36415 COLL VENOUS BLD VENIPUNCTURE: CPT

## 2018-03-23 PROCEDURE — 80061 LIPID PANEL: CPT
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
## 2018-06-06 ENCOUNTER — HOSPITAL ENCOUNTER (OUTPATIENT)
Dept: AUDIOLOGY | Age: 83
Discharge: HOME | End: 2018-06-06
Payer: MEDICARE

## 2018-06-06 PROCEDURE — 9990000010 HC NO CHARGE VISIT: Performed by: AUDIOLOGIST

## 2018-07-06 ENCOUNTER — HOSPITAL ENCOUNTER (OUTPATIENT)
Dept: CT IMAGING | Age: 83
Discharge: HOME | End: 2018-07-06
Payer: MEDICARE

## 2018-07-06 DIAGNOSIS — R10.9 ABDOMINAL DISCOMFORT: ICD-10-CM

## 2018-07-06 PROCEDURE — 74176 CT ABD & PELVIS W/O CONTRAST: CPT

## 2018-09-14 ENCOUNTER — HOSPITAL ENCOUNTER (OUTPATIENT)
Dept: CT IMAGING | Age: 83
Discharge: HOME | End: 2018-09-14
Payer: MEDICARE

## 2018-09-14 DIAGNOSIS — M54.50 BILATERAL LOW BACK PAIN WITHOUT SCIATICA, UNSPECIFIED CHRONICITY: ICD-10-CM

## 2018-09-14 LAB — PROSTATE SPECIFIC ANTIGEN: 0.68 NG/ML (ref 0–1)

## 2018-09-14 PROCEDURE — 72131 CT LUMBAR SPINE W/O DYE: CPT

## 2018-09-14 PROCEDURE — 84153 ASSAY OF PSA TOTAL: CPT

## 2018-09-14 PROCEDURE — 36415 COLL VENOUS BLD VENIPUNCTURE: CPT

## 2018-09-26 PROBLEM — E86.0 DEHYDRATION: Status: RESOLVED | Noted: 2017-04-27 | Resolved: 2018-09-26

## 2018-10-02 ENCOUNTER — HOSPITAL ENCOUNTER (OUTPATIENT)
Dept: AUDIOLOGY | Age: 83
Discharge: HOME | End: 2018-10-02

## 2018-10-02 PROCEDURE — 9990000010 HC NO CHARGE VISIT: Performed by: AUDIOLOGIST

## 2018-10-09 ENCOUNTER — HOSPITAL ENCOUNTER (OUTPATIENT)
Dept: OCCUPATIONAL THERAPY | Age: 83
Setting detail: THERAPIES SERIES
Discharge: HOME | End: 2018-10-09
Payer: MEDICARE

## 2018-10-09 ENCOUNTER — HOSPITAL ENCOUNTER (EMERGENCY)
Age: 83
Discharge: HOME | End: 2018-10-09
Attending: EMERGENCY MEDICINE
Payer: MEDICARE

## 2018-10-09 VITALS
TEMPERATURE: 98.1 F | DIASTOLIC BLOOD PRESSURE: 78 MMHG | HEART RATE: 78 BPM | RESPIRATION RATE: 16 BRPM | OXYGEN SATURATION: 98 % | SYSTOLIC BLOOD PRESSURE: 135 MMHG

## 2018-10-09 DIAGNOSIS — H61.21 HEARING LOSS OF RIGHT EAR DUE TO CERUMEN IMPACTION: Primary | ICD-10-CM

## 2018-10-09 DIAGNOSIS — H90.3 SENSORINEURAL HEARING LOSS (SNHL) OF BOTH EARS: ICD-10-CM

## 2018-10-09 PROCEDURE — 99213 OFFICE O/P EST LOW 20 MIN: CPT | Performed by: EMERGENCY MEDICINE

## 2018-10-09 PROCEDURE — 2709999900 HC NON-CHARGEABLE SUPPLY

## 2018-10-09 PROCEDURE — 99212 OFFICE O/P EST SF 10 MIN: CPT

## 2018-10-09 PROCEDURE — G8991 OTHER PT/OT GOAL STATUS: HCPCS

## 2018-10-09 PROCEDURE — 69209 REMOVE IMPACTED EAR WAX UNI: CPT

## 2018-10-09 PROCEDURE — G8992 OTHER PT/OT  D/C STATUS: HCPCS

## 2018-10-09 PROCEDURE — 97165 OT EVAL LOW COMPLEX 30 MIN: CPT

## 2018-10-09 PROCEDURE — 69209 REMOVE IMPACTED EAR WAX UNI: CPT | Performed by: EMERGENCY MEDICINE

## 2018-10-09 PROCEDURE — G8990 OTHER PT/OT CURRENT STATUS: HCPCS

## 2018-10-09 ASSESSMENT — ENCOUNTER SYMPTOMS
TROUBLE SWALLOWING: 0
EYE DISCHARGE: 0
DIARRHEA: 0
STRIDOR: 0
VOICE CHANGE: 0
VOMITING: 0
EYE REDNESS: 0
SHORTNESS OF BREATH: 0
COUGH: 0
NAUSEA: 0
EYE PAIN: 0
SORE THROAT: 0
ABDOMINAL PAIN: 0
BACK PAIN: 0
WHEEZING: 0
SINUS PRESSURE: 0

## 2018-10-09 NOTE — PROGRESS NOTES
driving  AMBULATION: Within functional limits for driving    IN VEHICLE MANIPULATION SKILLS:  Steering Wheel:Within functional limits for driving   Gas Pedal:  Within functional limits for driving  Brake Pedal: Within functional limits for driving  Turn Signal: Within functional limits for driving  Seat Belt: Within functional limits for driving     COGNITIVE SKILLS  ORIENTATION:  Impaired: Patient not oriented to year or month. ATTENTION SPAN:Impaired: Patient with great diffiuclty with divided attention, during both formal divided attention assessment and during other aspects of the assessment. FRUSTRATION TOLERANCE:Within functional limits for driving  IMPULSIVITY:Within functional limits for driving  DIRECTION FOLLOWING:Impaired: Patient required multiple repetitions of directions for every task. Poor initiation of tasks without multiple instructions. R/L DISCRIMINATION:Within functional limits for driving  MEMORY:Impaired: Patient did not recall a majority of the cognitive testing within 20 minutes of task. JUDGMENT: Impaired       COGNITIVE TESTING:    Clock Test: abnormally completed clock. TRAIL MAKING TEST: [place] making part A in 96 seconds. Greater than 78 seconds is deficient     SHORT BLESSED TEST:   The Short Blessed Test is a screening tool to assess orientation, short term memory, long term memory, and reversal of learning. Overall this patient received a score of  Ó10 or more which indicates a cognitive impairment. IF SCORE IS IMPAIRED OR QUESTIONABLY IMPAIRED (See Below):    Patient areas of difficulty were:  [x] short-term memory    [] long-term memory  [x] reversal of learning   [x] orientation. SIMULATED DRIVING ASSESSMENT:    BRAKE REACTION TIME:  (The brake reaction time test consists of presenting a large stop sign in the center of the visual display.  The appropriate response if for the  to release the gas and apply the brakes as quickly as possible.) Total pedal reaction time: 1.17 seconds (Average value is below 0.7 seconds.)   Gas pedal reaction time: 0.91  seconds (For good performance, this should be less than 0.4 seconds; poor performance is above 0.55 seconds)   Stopping distance: 225 feet (Good performance is less than 175 feet, moderate is between 175 and 220 feet, greater than 220 feet is considered poor.)   Speed at stimulus: 51       ASSESSMENT AND PLAN    RESULTS: Patient tested unsafe to return to independent driving. Patient demonstrated difficulty with executive functioning during the assessment, as indicated above. In addition, as indicated by testing on the simulator, pedal reaction time and stopping distance are poor. RECOMMENDATIONS:    Patient has been instructed to contact referring physician to discuss results of this evaluation. Patient has been informed that his or her physician is responsible for making the final decision regarding driving status. Thank you for this referral.    History: Low Complexity: brief history completed. Reviewed medical and therapy records related to presenting problem    Performance Deficits/Examination: Low Complexity: 1-2 performance deficits related to presenting problem that result in activity limitations or participation restrictions. Deficits include: decreased cognition    Clinical Decision Making: Clinical Decision making was of Low Complexity as the result of analysis of data from a problem focused assessment, a consideration of a limited number of treatment options, no significant comorbidities affecting the plan of care and no modification or assistance required to complete the evaluation. Evaluation Complexity: Based on the findings of patient history, examination, clinical presentation, and decision making during this evaluation, this patient is of low complexity.     Time in: 1000   Time out: 1105  Timed treatment: 0  Total time: 65    OT G-codes  Functional Assessment Tool Used:

## 2018-10-09 NOTE — ED NOTES
Pt verbalized discharge instructions. Pt informed to go to ER if develop chest pain, shortness of breath or abdominal pain. Pt ambulatory out in stable condition. Assessment unchanged.        Rose Mary Callahan RN  10/09/18 0021

## 2018-10-31 RX ORDER — BUPIVACAINE HYDROCHLORIDE 2.5 MG/ML
5 INJECTION, SOLUTION EPIDURAL; INFILTRATION; INTRACAUDAL ONCE
Status: CANCELLED | OUTPATIENT
Start: 2018-10-31 | End: 2018-10-31

## 2018-10-31 RX ORDER — METHYLPREDNISOLONE ACETATE 80 MG/ML
80 INJECTION, SUSPENSION INTRA-ARTICULAR; INTRALESIONAL; INTRAMUSCULAR; SOFT TISSUE ONCE
Status: CANCELLED | OUTPATIENT
Start: 2018-10-31 | End: 2018-10-31

## 2018-11-01 ENCOUNTER — HOSPITAL ENCOUNTER (OUTPATIENT)
Dept: INTERVENTIONAL RADIOLOGY/VASCULAR | Age: 83
Discharge: HOME | End: 2018-11-01
Payer: MEDICARE

## 2018-11-01 VITALS
RESPIRATION RATE: 18 BRPM | TEMPERATURE: 97.2 F | WEIGHT: 172 LBS | BODY MASS INDEX: 23.99 KG/M2 | DIASTOLIC BLOOD PRESSURE: 84 MMHG | HEART RATE: 66 BPM | OXYGEN SATURATION: 98 % | SYSTOLIC BLOOD PRESSURE: 149 MMHG

## 2018-11-01 PROCEDURE — 2709999900 HC NON-CHARGEABLE SUPPLY

## 2018-11-01 PROCEDURE — 6360000002 HC RX W HCPCS

## 2018-11-01 PROCEDURE — 62323 NJX INTERLAMINAR LMBR/SAC: CPT | Performed by: RADIOLOGY

## 2018-11-01 PROCEDURE — 6360000004 HC RX CONTRAST MEDICATION: Performed by: RADIOLOGY

## 2018-11-01 PROCEDURE — 2500000003 HC RX 250 WO HCPCS

## 2018-11-01 RX ORDER — BUPIVACAINE HYDROCHLORIDE 2.5 MG/ML
5 INJECTION, SOLUTION EPIDURAL; INFILTRATION; INTRACAUDAL ONCE
Status: COMPLETED | OUTPATIENT
Start: 2018-11-01 | End: 2018-11-01

## 2018-11-01 RX ORDER — METHYLPREDNISOLONE ACETATE 80 MG/ML
80 INJECTION, SUSPENSION INTRA-ARTICULAR; INTRALESIONAL; INTRAMUSCULAR; SOFT TISSUE ONCE
Status: COMPLETED | OUTPATIENT
Start: 2018-11-01 | End: 2018-11-01

## 2018-11-01 RX ADMIN — IOHEXOL 1 ML: 180 INJECTION INTRAVENOUS at 10:34

## 2018-11-01 RX ADMIN — BUPIVACAINE HYDROCHLORIDE 2 ML: 2.5 INJECTION, SOLUTION EPIDURAL; INFILTRATION; INTRACAUDAL at 10:34

## 2018-11-01 RX ADMIN — METHYLPREDNISOLONE ACETATE 80 MG: 80 INJECTION, SUSPENSION INTRA-ARTICULAR; INTRALESIONAL; INTRAMUSCULAR; SOFT TISSUE at 10:34

## 2018-11-01 ASSESSMENT — PAIN SCALES - GENERAL
PAINLEVEL_OUTOF10: 0
PAINLEVEL_OUTOF10: 4
PAINLEVEL_OUTOF10: 0

## 2018-11-01 ASSESSMENT — PAIN - FUNCTIONAL ASSESSMENT: PAIN_FUNCTIONAL_ASSESSMENT: 0-10

## 2018-11-01 NOTE — PROGRESS NOTES
1102 Patient denies any complaints of pain. Band aid dry and intact. 1104 discharge instructions given to patient and wife verbalized understanding. 1118 Patient standing on side of cart gait steady denies any complaint's of numbness or tingling. Discharge to home in stable condition.     _m___ Safety:       (Environmental)   [place] to environment   Ensure ID band is correct and in place/ allergy band as needed   Assess for fall risk   Initiate fall precautions as applicable (fall band, side rails, etc.)   Call light within reach   Bed in low position/ wheels locked    __m__ Pain:        Assess pain level and characteristics   Administer analgesics as ordered   Assess effectiveness of pain management and report to MD as needed    _m___ Knowledge Deficit:   Assess baseline knowledge   Provide teaching at level of understanding   Provide teaching via preferred learning method   Evaluate teaching effectiveness    __m__ Hemodynamic/Respiratory Status:       (Pre and Post Procedure Monitoring)   Assess/Monitor vital signs and LOC   Assess Baseline SpO2 prior to any sedation   Obtain weight/height   Assess vital signs/ LOC until patient meets discharge criteria   Monitor procedure site and notify MD of any issues

## 2018-11-01 NOTE — PROGRESS NOTES
1292 pt arrives to OPN for lumbar nerve block. All questions and concerns addressed. Pt denies use of blood thinners. 3581 PATIENT RIGHTS AND RESPONSIBILITIES SHEET OFFERED TO PT TO READ.  0900 hand grasp, pedal push and pull equal and strong.  Pain 4/10 mid, lower back

## 2018-11-19 ENCOUNTER — HOSPITAL ENCOUNTER (OUTPATIENT)
Dept: AUDIOLOGY | Age: 83
Discharge: HOME | End: 2018-11-19

## 2018-11-19 PROCEDURE — 9990000010 HC NO CHARGE VISIT: Performed by: AUDIOLOGIST

## 2019-01-10 ENCOUNTER — HOSPITAL ENCOUNTER (OUTPATIENT)
Dept: INTERVENTIONAL RADIOLOGY/VASCULAR | Age: 84
Discharge: HOME | End: 2019-01-10
Payer: MEDICARE

## 2019-01-10 VITALS
TEMPERATURE: 98 F | RESPIRATION RATE: 16 BRPM | HEART RATE: 67 BPM | DIASTOLIC BLOOD PRESSURE: 74 MMHG | BODY MASS INDEX: 24.3 KG/M2 | WEIGHT: 174.2 LBS | SYSTOLIC BLOOD PRESSURE: 130 MMHG

## 2019-01-10 PROCEDURE — 2500000003 HC RX 250 WO HCPCS

## 2019-01-10 PROCEDURE — 6360000004 HC RX CONTRAST MEDICATION: Performed by: RADIOLOGY

## 2019-01-10 PROCEDURE — 62323 NJX INTERLAMINAR LMBR/SAC: CPT | Performed by: RADIOLOGY

## 2019-01-10 PROCEDURE — 6360000002 HC RX W HCPCS

## 2019-01-10 PROCEDURE — 2709999900 HC NON-CHARGEABLE SUPPLY

## 2019-01-10 RX ORDER — METHYLPREDNISOLONE ACETATE 80 MG/ML
80 INJECTION, SUSPENSION INTRA-ARTICULAR; INTRALESIONAL; INTRAMUSCULAR; SOFT TISSUE ONCE
Status: COMPLETED | OUTPATIENT
Start: 2019-01-10 | End: 2019-01-10

## 2019-01-10 RX ORDER — BUPIVACAINE HYDROCHLORIDE 2.5 MG/ML
5 INJECTION, SOLUTION EPIDURAL; INFILTRATION; INTRACAUDAL ONCE
Status: COMPLETED | OUTPATIENT
Start: 2019-01-10 | End: 2019-01-10

## 2019-01-10 RX ADMIN — BUPIVACAINE HYDROCHLORIDE 2 ML: 2.5 INJECTION, SOLUTION EPIDURAL; INFILTRATION; INTRACAUDAL at 10:09

## 2019-01-10 RX ADMIN — METHYLPREDNISOLONE ACETATE 80 MG: 80 INJECTION, SUSPENSION INTRA-ARTICULAR; INTRALESIONAL; INTRAMUSCULAR; SOFT TISSUE at 10:09

## 2019-01-10 RX ADMIN — IOHEXOL 1 ML: 180 INJECTION INTRAVENOUS at 10:09

## 2019-01-10 ASSESSMENT — PAIN SCALES - GENERAL
PAINLEVEL_OUTOF10: 0
PAINLEVEL_OUTOF10: 2

## 2019-01-10 ASSESSMENT — PAIN DESCRIPTION - DESCRIPTORS: DESCRIPTORS: ACHING

## 2019-01-10 ASSESSMENT — PAIN - FUNCTIONAL ASSESSMENT: PAIN_FUNCTIONAL_ASSESSMENT: 0-10

## 2019-04-24 ENCOUNTER — INITIAL CONSULT (OUTPATIENT)
Dept: NEUROLOGY | Age: 84
End: 2019-04-24
Payer: MEDICARE

## 2019-04-24 VITALS
HEIGHT: 71 IN | BODY MASS INDEX: 23.38 KG/M2 | WEIGHT: 167 LBS | DIASTOLIC BLOOD PRESSURE: 68 MMHG | SYSTOLIC BLOOD PRESSURE: 132 MMHG | HEART RATE: 68 BPM

## 2019-04-24 DIAGNOSIS — R41.3 MEMORY PROBLEM: Primary | ICD-10-CM

## 2019-04-24 PROCEDURE — 1036F TOBACCO NON-USER: CPT | Performed by: PSYCHIATRY & NEUROLOGY

## 2019-04-24 PROCEDURE — G8427 DOCREV CUR MEDS BY ELIG CLIN: HCPCS | Performed by: PSYCHIATRY & NEUROLOGY

## 2019-04-24 PROCEDURE — 4040F PNEUMOC VAC/ADMIN/RCVD: CPT | Performed by: PSYCHIATRY & NEUROLOGY

## 2019-04-24 PROCEDURE — 1123F ACP DISCUSS/DSCN MKR DOCD: CPT | Performed by: PSYCHIATRY & NEUROLOGY

## 2019-04-24 PROCEDURE — G8420 CALC BMI NORM PARAMETERS: HCPCS | Performed by: PSYCHIATRY & NEUROLOGY

## 2019-04-24 PROCEDURE — 99214 OFFICE O/P EST MOD 30 MIN: CPT | Performed by: PSYCHIATRY & NEUROLOGY

## 2019-04-24 NOTE — PROGRESS NOTES
Chief Complaint   Patient presents with    Consultation     Cognitive impairment      This is an 81year old male with memory problem for the past 3 years that is progressively getting worse. He was seen by neurology in the past and is seeking to establish care. He can have good days and bad days. Onset was progressive. Duration is ongoing. Frequency is daily. Symptoms are moderate. Modifiers are his symptoms are worse in the evening. He has insight to his memory problem. He forgets names of familiar people he should know. He asks repeated questions. He misplaces items. He will get lost in his own home that he has lived in for the past 25 years. He stopped driving over 1 year ago. He was getting lost driving since late 4505 and was brought home by different friends. He has not forgotten to pay his bills as his wife has always handled the finances. His wife handles the cooking or the cleaning. No visual hallucination. He has vivid dreams. His sleep is good. He wakes up feeling well rested. He does not snore. He takes daytime naps. No sleep study performed. Past medications include Aricept 10 mg nightly, but was stopped due to having night healy. History is significant for alcohol consumption. He no longer consumes alcohol. No history of stroke or head injury. Family history is significant for Alzheimer's disease in sister and brother; they did not have history of alcoholism. CT Head on 3/9/19 showed no acute ischemic infarct, hemorrhage, or mass effect. Aging changes as discussed above. History provided by patient accompanied by his wife.       Past Medical History:   Diagnosis Date    BPH (benign prostatic hypertrophy)     CKD (chronic kidney disease) stage 2, GFR 60-89 ml/min     Dementia     Erectile dysfunction     Frequent falls     Belkofski (hard of hearing)     Hyperlipidemia     Hypertension     Metabolic bone disease     Orthostatic hypotension        Patient Active Problem List   Diagnosis    Erectile dysfunction    BPH with urinary obstruction    Lower urinary tract symptoms (LUTS)    Nocturia    Urinary retention    Feeling of incomplete bladder emptying    Hyperlipemia    Frequency of urination    ETD (eustachian tube dysfunction)    Dysphagia    Dysphonia    GERD (gastroesophageal reflux disease)    Zenker diverticula    Presbyesophagus    Voice disturbance    Disease of larynx    Subjective tinnitus    Sensorineural hearing loss, bilateral    Otalgia of left ear    supine Hypertension    CKD (chronic kidney disease) stage 2, GFR 60-89 ml/min    Metabolic bone disease    Insomnia    Alcohol withdrawal (HCC)    Falls    Hist of Near syncope    Orthostatic hypotension    Low serum cortisol level ([facility])    Hypokalemia    Intractable back pain    Frequent falls    Hypokalemia    Alcohol intoxication ([address]    Alcohol intoxication ([address]    Alcohol intoxication delirium ([address]    Acute alcohol intoxication with alcoholism, with unspecified complication ([facility])    Severe malnutrition ([address]    Delirium tremens ([facility])    Dementia       No Known Allergies    Current Outpatient Medications   Medication Sig Dispense Refill    QUEtiapine (SEROQUEL) 25 MG tablet Take 1 tablet by mouth nightly 60 tablet 3    midodrine (PROAMATINE) 10 MG tablet Take 1 tablet by mouth 2 times daily as needed (SBP <110)      atorvastatin (LIPITOR) 10 MG tablet Take 1 tablet by mouth daily 30 tablet 3    oxybutynin (DITROPAN-XL) 10 MG extended release tablet Take 1 tablet by mouth nightly 30 tablet 3    Multiple Vitamin (MULTIVITAMIN) tablet Take 1 tablet by mouth daily  0    donepezil (ARICEPT) 10 MG tablet Take 1 tablet by mouth nightly 30 tablet 3    metoprolol tartrate (LOPRESSOR) 50 MG tablet Take 1 tablet by mouth daily 60 tablet 3    docusate (COLACE, DULCOLAX) 100 MG CAPS Take 100 mg by mouth daily      pantoprazole (PROTONIX) 40 MG tablet Take 1 tablet by mouth every morning (before breakfast) 30 tablet 3    NIFEdipine (ADALAT CC) 30 MG extended release tablet Take 1 tablet by mouth every morning 30 tablet 3    magnesium hydroxide (MILK OF MAGNESIA) 400 MG/5ML suspension Take 30 mLs by mouth daily as needed for Constipation      potassium chloride (KLOR-CON) 10 MEQ extended release tablet Take 1 tablet by mouth daily 60 tablet 3    dextrose 50 % solution Infuse 25 mLs intravenously as needed (Blood glucose less than 70 mg/dL and patient NOT ALERT or NPO.)      Dextrose-Sodium Chloride (DEXTROSE 5 % AND 0.9 % NACL) 5-0.9 % infusion Infuse 100 mL/hr intravenously as needed (Low blood sugar)      vitamin B-1 (THIAMINE) 100 MG tablet Take 100 mg by mouth daily      doxazosin (CARDURA) 2 MG tablet Take 2 mg by mouth daily      lansoprazole (PREVACID) 15 MG delayed release capsule Take 30 mg by mouth daily      potassium chloride (KLOR-CON M10) 10 MEQ extended release tablet Take 1 tablet by mouth daily 30 tablet 12    celecoxib (CELEBREX) 200 MG capsule Take 1 capsule by mouth daily 60 capsule 3    acetaminophen (TYLENOL) 325 MG tablet Take 2 tablets by mouth every 4 hours as needed for Pain 120 tablet 3    potassium chloride (KLOR-CON M) 20 MEQ TBCR extended release tablet Take 2 tablets by mouth once for 1 dose 60 tablet 3     No current facility-administered medications for this visit. Social History     Socioeconomic History    Marital status:      Spouse name: None    Number of children: None    Years of education: None    Highest education level: None   Occupational History    None   Social Needs    Financial resource strain: None    Food insecurity:     Worry: None     Inability: None    Transportation needs:     Medical: None     Non-medical: None   Tobacco Use    Smoking status: Former Smoker     Last attempt to quit: 1992     Years since quittin.2    Smokeless tobacco: Never Used   Substance and Sexual Activity    Alcohol use:  Yes     Alcohol/week: 0.0 oz     Comment: daily    Drug use: No    Sexual activity: None   Lifestyle    Physical activity:     Days per week: None     Minutes per session: None    Stress: None   Relationships    Social connections:     Talks on phone: None     Gets together: None     Attends Caodaism service: None     Active member of club or organization: None     Attends meetings of clubs or organizations: None     Relationship status: None    Intimate partner violence:     Fear of current or ex partner: None     Emotionally abused: None     Physically abused: None     Forced sexual activity: None   Other Topics Concern    None   Social History Narrative    None       Family History   Problem Relation Age of Onset    High Blood Pressure Father     Stroke Father     Stroke Sister     Arthritis Sister     Alzheimer's Disease Sister     Cancer Sister     Heart Disease Sister     Arthritis Sister     Other Brother         parkinsons    Dementia Brother     No Known Problems Brother     No Known Problems Brother        Review of Systems   Complete review of systems is negative other than what is mentioned in HPI      Vitals:    04/24/19 1037   BP: 132/68   Site: Left Upper Arm   Position: Sitting   Cuff Size: Medium Adult   Pulse: 68   Weight: 167 lb (75.8 kg)   Height: 5' 11\" (1.803 m)       Physical Examination:  General appearance - alert, well appearing, and in no distress, he is well groomed. He is oriented to person, place. He is not up to date on recent events. Does not know who is the president. He is of normal weight  Mental status- Level of Alertness: awake  Orientation: person, place  Memory: abnormal - . He has poor recall of 3 objects, anomia, apraxia, he has poor insight, and his abstraction is impaired. Fund of Knowledge: poor  Attention/Concentration: fair  Language: normal. Mood is normal.   Neck - supple, no significant adenopathy, carotids upstroke normal bilaterally,   There is no carotid bruit .   No neck lymphadenopathy . No thyroid enlargement   Neurological -   Cranial [id number]-XP-RSO:.   Cranial nerve II: Normal   Cranial nerve III: Pupils: equal, round, reactive to light  Cranial nerves III, IV, VI: Extraocular Movements: intact   Cranial nerve V: Facial sensation: intact   Cranial nerve VII:Facial strength: intact   Cranial nerve VIII: Hearing: intact   Cranial nerve IX: Palate Elevation intact bilaterally  Cranial nerve XI: Shoulder shrug intact bilaterally  Cranial nerve XII: Tongue midline   neck supple without rigidity, there is no limitation of range of motion of the neck. DTR's are decreased distal and symmetric  Babinski sign negative  Motor exam is 5/5 in the upper and lower extremities. Normal muscle tone . There is no muscle atrophy. Sensory is intact for light touch, cortical sensation. Coordination: finger to nose intact  Gait and station intact. Abnormal movement none, vibration decreased distal and symmetric. proprioception normal  Skin - normal coloration, no rashes, no suspicious skin lesions  Superficial temporal artery pulses are normal.   There is no limitation of range of motion of the neck. There is no resting tremor, no pin rolling, no bradykinesia, no Hypohonia, normal blink rate. Musculoskeletal: Has no hand arthritis, no limitation of ROM in any of the four extremities. There is no leg edema. The Heart was regular in rate and rhythm. No heart murmur  Chest Clear  Abdomen was soft. I reviewed the MRI brain and agree with interpretation. Results for orders placed during the hospital encounter of 04/25/17   MRI Brain WO Contrast    Narrative PROCEDURE: MRI BRAIN WO CONTRAST    CLINICAL INFORMATION: falls    COMPARISON: No prior MRI for comparison; comparison is made to the CT of the brain of 25 April 2017.     TECHNIQUE: Using a 1.5 Connie Siemens scanner, axial diffusion weighted echoplanar imaging, T2-weighted turbo spin-echo, fat-saturated T2-weighted fluid attenuated inversion recovery, and proton density weighted gradient recall images were obtained   through the entire brain. Axial, coronal, and sagittal FAST T1-weighted gradient recall images were also obtained. No IV contrast administered. BLADE software was used. FINDINGS: Motion limits the examination. Mild global atrophic changes are again demonstrated. There are no areas of restricted diffusion. There are no findings of blood products in the brain parenchyma, other than remote microhemorrhage of the right frontal lobe white matter. There are partially confluent symmetric hyperintense T2/hypointense T1 signal abnormalities in the white matter of both cerebral hemispheres, consistent with mild small vessel disease sequelae. There are mildly prominent atrophic changes of both medial temporal lobe parenchyma regions. There are slightly prominent T2 signal characteristics, hyperintense, in the FLAIR sequence in the same regions, which could represent artifact versus post ictal   features. No restricted diffusion. Limited detail of the cervical canal shows no definite acute findings. Poorly characterized degenerative changes are present. Sella contents are within acceptable limits. Vascular structures grossly show nothing concerning. Developmentally hypoplastic left vertebral artery implied. Paranasal sinuses, mastoid air cells, and middle ear cavities are grossly clear. Impression NO EVIDENCE OF ACUTE BRAIN PROCESS. MILD SMALL VESSEL DISEASE SEQUELAE. NONSPECIFIC FINDINGS OF THE BILATERAL MEDIAL TEMPORAL LOBE PARENCHYMA. **This report has been created using voice recognition software. It may contain minor errors which are inherent in voice recognition technology. **          Final report electronically signed by Dr. Falguni Diana on 4/26/2017 2:45 PM       Reviewed   Results for orders placed during the hospital encounter of 03/09/18   [address] [place] [address]    Narrative PROCEDURE: CT HEAD WO CONTRAST    CLINICAL INFORMATION: fall with head injury. COMPARISON: Noncontrast brain CT dated 2/26/2018    TECHNIQUE: Noncontrast 5 mm axial images were obtained through the brain. All CT scans at this facility use dose modulation, iterative reconstruction, and/or weight-based dosing when appropriate to reduce radiation dose to as low as reasonably achievable. FINDINGS:    Brain: There is no acute ischemic infarct, hemorrhage, midline shift, mass, or mass effect. Mild to moderate deep white matter small vessel chronic ischemic changes are noted. There is age appropriate cortical atrophy. Ventricles: The ventricles, cisterns and cortical sulci are enlarged concordant with the mild to moderate degree of age-appropriate atrophy. No obstructive hydrocephalus. Skull base/calvarium: Unremarkable  Scalp soft tissues: Unremarkable  Intraorbital contents: Unremarkable  Sinuses: Unremarkable  Mastoids: Unremarkable        Impression No acute ischemic infarct, hemorrhage, or mass effect. Aging changes as discussed above. **This report has been created using voice recognition software. It may contain minor errors which are inherent in voice recognition technology. **    Final report electronically signed by Dr. King Suh on 3/9/2018 2:24 AM     We reviewed the patient records from referring provider and available information in the EHR       ASSESSMENT:      Diagnosis Orders   1. Memory problem        This is an 81-year-old male with a history of heavy alcoholism, presents with progressive memory decline even after discontinuing the alcohol intake more than a year ago. The patient has some insight into his cognitive difficulty. I suspect that there is an element of all call related dementia, versus nutritional deficiencies that need to be screened for. The patient is pleasant has minimal insight into his cognitive difficulty.   He has no recall, he has mild anomia, he has apraxia. He has not been driving for more than one year. He was getting lost driving for one and half year prior to that. I shared with the patient and his wife that his memory decline is probably due to the long term use of ETOH, for years, despite him quitting one year ago. However we will obtain work up for his symptoms from neurology stand point. After detailed discussion with patient we agreed on the following plan. Plan    1. MRI Brain without and with contrast.   2. EEG  3. B12, Folate, Vitamin D levels. 4. Call with any new symptoms or concerns. 5. Follow up in 2 months.      Shaka Farrell MD

## 2019-04-24 NOTE — LETTER
[address] [facility]  [address] [facility] [address]  Dept: [phone]  Dept Fax: [phone]  Loc: Ivan Castillo MD        4/24/2019      Patient:  Kira Baca  MRN:  [id number]  YOB: 1935  Date of Visit:  4/24/2019    Dear Dr. Aldair Mcdaniel,    Thank you for referring Warren Landau to me for consultation. Please see attached visit summary with my findings. If you have any questions, please do not hesitate to call me.       Sincerely,         Rossana Andrade MD

## 2019-05-02 ENCOUNTER — HOSPITAL ENCOUNTER (OUTPATIENT)
Age: 84
Discharge: HOME | End: 2019-05-02
Payer: MEDICARE

## 2019-05-02 ENCOUNTER — HOSPITAL ENCOUNTER (OUTPATIENT)
Dept: MRI IMAGING | Age: 84
Discharge: HOME | End: 2019-05-02
Payer: MEDICARE

## 2019-05-02 DIAGNOSIS — R41.3 MEMORY PROBLEM: ICD-10-CM

## 2019-05-02 LAB
FOLATE: 13.2 NG/ML (ref 4.8–24.2)
POC CREATININE WHOLE BLOOD: 1.4 MG/DL (ref 0.5–1.2)
VITAMIN B-12: 397 PG/ML (ref 211–911)
VITAMIN D 25-HYDROXY: 28 NG/ML (ref 30–100)

## 2019-05-02 PROCEDURE — 82607 VITAMIN B-12: CPT

## 2019-05-02 PROCEDURE — 82746 ASSAY OF FOLIC ACID SERUM: CPT

## 2019-05-02 PROCEDURE — A9579 GAD-BASE MR CONTRAST NOS,1ML: HCPCS | Performed by: PSYCHIATRY & NEUROLOGY

## 2019-05-02 PROCEDURE — 70553 MRI BRAIN STEM W/O & W/DYE: CPT

## 2019-05-02 PROCEDURE — 36415 COLL VENOUS BLD VENIPUNCTURE: CPT

## 2019-05-02 PROCEDURE — 82565 ASSAY OF CREATININE: CPT

## 2019-05-02 PROCEDURE — 82306 VITAMIN D 25 HYDROXY: CPT

## 2019-05-02 PROCEDURE — 6360000004 HC RX CONTRAST MEDICATION: Performed by: PSYCHIATRY & NEUROLOGY

## 2019-05-02 RX ADMIN — GADOTERIDOL 15 ML: 279.3 INJECTION, SOLUTION INTRAVENOUS at 13:06

## 2019-05-03 ENCOUNTER — TELEPHONE (OUTPATIENT)
Dept: NEUROLOGY | Age: 84
End: 2019-05-03

## 2019-06-26 ENCOUNTER — HOSPITAL ENCOUNTER (OUTPATIENT)
Dept: NEUROLOGY | Age: 84
Discharge: HOME | End: 2019-06-26
Payer: MEDICARE

## 2019-06-26 PROCEDURE — 95819 EEG AWAKE AND ASLEEP: CPT

## 2019-06-26 NOTE — PROGRESS NOTES
Chris Loyola MD   NIFEdipine (ADALAT CC) 30 MG extended release tablet Take 1 tablet by mouth every morning 3/3/18   Naila Ponce MD   magnesium hydroxide (MILK OF MAGNESIA) 400 MG/5ML suspension Take 30 mLs by mouth daily as needed for Constipation 3/2/18   Naila Ponce MD   potassium chloride (KLOR-CON) 10 MEQ extended release tablet Take 1 tablet by mouth daily 3/3/18   Naila Ponce MD   dextrose 50 % solution Infuse 25 mLs intravenously as needed (Blood glucose less than 70 mg/dL and patient NOT ALERT or NPO.) 3/2/18   Naila Ponce MD   Dextrose-Sodium Chloride (DEXTROSE 5 % AND 0.9 % NACL) 5-0.9 % infusion Infuse 100 mL/hr intravenously as needed (Low blood sugar) 3/2/18   Naila Ponce MD   potassium chloride (KLOR-CON M) 20 MEQ TBCR extended release tablet Take 2 tablets by mouth once for 1 dose 3/2/18 3/2/18  Naila Ponce MD   vitamin B-1 (THIAMINE) 100 MG tablet Take 100 mg by mouth daily    Historical Provider, MD   doxazosin (CARDURA) 2 MG tablet Take 2 mg by mouth daily    Historical Provider, MD   lansoprazole (PREVACID) 15 MG delayed release capsule Take 30 mg by mouth daily    Historical Provider, MD   potassium chloride (KLOR-CON M10) 10 MEQ extended release tablet Take 1 tablet by mouth daily 6/21/17   ZORAN Washington - CNP   celecoxib (CELEBREX) 200 MG capsule Take 1 capsule by mouth daily 4/28/17   Naila Ponce MD   acetaminophen (TYLENOL) 325 MG tablet Take 2 tablets by mouth every 4 hours as needed for Pain 4/28/17   Naila Ponce MD       Technician: Vinicius Smith 6/26/2019

## 2019-06-27 NOTE — PROCEDURES
[address] [place] [address]                          ELECTROENCEPHALOGRAM REPORT    PATIENT NAME: Carolina Marsh                    :        1935  MED REC NO:   [id number]                           ROOM:  ACCOUNT NO:   [de-identified]                           ADMIT DATE: 2019  PROVIDER:     Irene Kimble. Manolo Jacob MD    DATE OF EE2019    REFERRING Provider:  Irene Kimble. MD Maryjo    CLINICAL HISTORY:  An 81-year-old male with confusion. CLINICAL INTERPRETATION:  This is a 17-channel EEG performed without  sleep deprivation. Hyperventilation and photic stimulation were not  performed. The patient is described as alert, confused. Background rhythm activity noted to be 9 Hz in the posterior parietal  area, symmetric, attenuates with eye opening. Hyperventilation was not  performed, photic stimulation was not performed. Lead and muscle  artifacts were noted, limiting quality of data obtained. There was no  evidence of epileptiform activity appreciated. IMPRESSION:  This is a normal EEG. There was no evidence of  epileptiform activity appreciated.         Reynaldo Caraballo MD    D: 2019 7:40:35       T: 2019 7:49:01     [email or web address]  Job#: [id number]     Doc#: [id number]    CC:

## 2019-07-01 ENCOUNTER — OFFICE VISIT (OUTPATIENT)
Dept: NEUROLOGY | Age: 84
End: 2019-07-01
Payer: MEDICARE

## 2019-07-01 ENCOUNTER — NURSE ONLY (OUTPATIENT)
Dept: LAB | Age: 84
End: 2019-07-01

## 2019-07-01 VITALS
SYSTOLIC BLOOD PRESSURE: 122 MMHG | DIASTOLIC BLOOD PRESSURE: 66 MMHG | HEART RATE: 68 BPM | HEIGHT: 71 IN | WEIGHT: 165 LBS | BODY MASS INDEX: 23.1 KG/M2

## 2019-07-01 DIAGNOSIS — R41.3 MEMORY PROBLEM: ICD-10-CM

## 2019-07-01 DIAGNOSIS — I70.90 ATHEROSCLEROSIS: ICD-10-CM

## 2019-07-01 DIAGNOSIS — R41.3 MEMORY PROBLEM: Primary | ICD-10-CM

## 2019-07-01 PROCEDURE — 1123F ACP DISCUSS/DSCN MKR DOCD: CPT | Performed by: PSYCHIATRY & NEUROLOGY

## 2019-07-01 PROCEDURE — 99213 OFFICE O/P EST LOW 20 MIN: CPT | Performed by: PSYCHIATRY & NEUROLOGY

## 2019-07-01 PROCEDURE — G8420 CALC BMI NORM PARAMETERS: HCPCS | Performed by: PSYCHIATRY & NEUROLOGY

## 2019-07-01 PROCEDURE — G8427 DOCREV CUR MEDS BY ELIG CLIN: HCPCS | Performed by: PSYCHIATRY & NEUROLOGY

## 2019-07-01 PROCEDURE — G8599 NO ASA/ANTIPLAT THER USE RNG: HCPCS | Performed by: PSYCHIATRY & NEUROLOGY

## 2019-07-01 PROCEDURE — 1036F TOBACCO NON-USER: CPT | Performed by: PSYCHIATRY & NEUROLOGY

## 2019-07-01 PROCEDURE — 4040F PNEUMOC VAC/ADMIN/RCVD: CPT | Performed by: PSYCHIATRY & NEUROLOGY

## 2019-07-01 RX ORDER — GLUCOSAMINE HCL 500 MG
3000 TABLET ORAL NIGHTLY
COMMUNITY

## 2019-07-02 LAB — HOMOCYSTEINE, TOTAL: 17 UMOL/L

## 2019-07-02 RX ORDER — FOLIC ACID 1 MG/1
1 TABLET ORAL DAILY
Qty: 90 TABLET | Refills: 3 | Status: SHIPPED | OUTPATIENT
Start: 2019-07-02 | End: 2020-07-22 | Stop reason: SDUPTHER

## 2019-07-03 ENCOUNTER — TELEPHONE (OUTPATIENT)
Dept: NEUROLOGY | Age: 84
End: 2019-07-03

## 2019-07-13 NOTE — PROGRESS NOTES
implied. ? Paranasal sinuses, mastoid air cells, and middle ear cavities are grossly clear. ? Impression NO EVIDENCE OF ACUTE BRAIN PROCESS. ? MILD SMALL VESSEL DISEASE SEQUELAE.  ? NONSPECIFIC FINDINGS OF THE BILATERAL MEDIAL TEMPORAL LOBE PARENCHYMA. ?  ?  ?  ?  ?  **This report has been created using voice recognition software. It may contain minor errors which are inherent in voice recognition technology. **  ?  ?  ?  ? Final report electronically signed by Dr. Giselle Walker on 4/26/2017 2:45 PM   ?  Results for orders placed during the hospital encounter of 05/02/19   MRI BRAIN W WO CONTRAST   ? Narrative PROCEDURE: MRI BRAIN W WO CONTRAST  ? CLINICAL INFORMATION: Memory problem. Confusion  ? COMPARISON: MRI of the brain dated April 26, 2017.  ?  TECHNIQUE: Multiplanar and multiple spin echo T1 and T2-weighted images were obtained through the brain before and after the administration of 15 mL ProHance intravenous contrast.  ?  FINDINGS:  ?  There is prominence of the sulcal spaces and ventricular system secondary to generalized, age-related parenchymal volume loss. Moderate patchy foci of hyperintense T2 signal are again noted throughout the periventricular and deep cerebral white matter   which likely correspond to sequela of chronic microvascular ischemic change. There is a stable appearance of subtle hyperintense T2 signal along the medial temporal lobes bilaterally. This is nonspecific and may be artifactual. No restricted diffusion is  identified. Punctate foci of susceptibility are again noted within the posterior right frontal lobe and right parietal lobe which may correspond to remote microhemorrhages versus small cavernous malformations. ?  The ventricles are midline and stable in size and morphology without evidence of hydrocephalus. No mass, mass effect or extra-axial fluid collection is identified. The basal cisterns and visualized vascular flow voids are patent.  The pituitary, brain

## 2019-07-30 ENCOUNTER — HOSPITAL ENCOUNTER (EMERGENCY)
Age: 84
Discharge: HOME | End: 2019-07-30
Attending: EMERGENCY MEDICINE
Payer: MEDICARE

## 2019-07-30 VITALS
HEIGHT: 71 IN | TEMPERATURE: 97.4 F | RESPIRATION RATE: 18 BRPM | WEIGHT: 170 LBS | BODY MASS INDEX: 23.8 KG/M2 | SYSTOLIC BLOOD PRESSURE: 142 MMHG | DIASTOLIC BLOOD PRESSURE: 63 MMHG | HEART RATE: 70 BPM | OXYGEN SATURATION: 96 %

## 2019-07-30 DIAGNOSIS — H92.02 LEFT EAR PAIN: Primary | ICD-10-CM

## 2019-07-30 DIAGNOSIS — H61.21 HEARING LOSS SECONDARY TO CERUMEN IMPACTION, RIGHT: ICD-10-CM

## 2019-07-30 PROCEDURE — 99212 OFFICE O/P EST SF 10 MIN: CPT

## 2019-07-30 PROCEDURE — 2709999900 HC NON-CHARGEABLE SUPPLY

## 2019-07-30 PROCEDURE — 99213 OFFICE O/P EST LOW 20 MIN: CPT | Performed by: EMERGENCY MEDICINE

## 2019-07-30 PROCEDURE — 69209 REMOVE IMPACTED EAR WAX UNI: CPT

## 2019-07-30 ASSESSMENT — ENCOUNTER SYMPTOMS
SHORTNESS OF BREATH: 0
SORE THROAT: 0
SINUS PRESSURE: 0
TROUBLE SWALLOWING: 0
DIARRHEA: 0
BACK PAIN: 0
NAUSEA: 0
WHEEZING: 0
COUGH: 0
ABDOMINAL PAIN: 0
STRIDOR: 0
EYE REDNESS: 0
VOICE CHANGE: 0
EYE PAIN: 0
VOMITING: 0
EYE DISCHARGE: 0

## 2019-07-30 ASSESSMENT — PAIN DESCRIPTION - PROGRESSION: CLINICAL_PROGRESSION: GRADUALLY WORSENING

## 2019-07-30 ASSESSMENT — PAIN DESCRIPTION - DESCRIPTORS: DESCRIPTORS: ACHING;DULL

## 2019-07-30 ASSESSMENT — PAIN DESCRIPTION - ORIENTATION: ORIENTATION: LEFT

## 2019-07-30 ASSESSMENT — PAIN DESCRIPTION - LOCATION: LOCATION: EAR

## 2019-07-30 ASSESSMENT — PAIN SCALES - GENERAL: PAINLEVEL_OUTOF10: 2

## 2019-07-30 ASSESSMENT — PAIN DESCRIPTION - PAIN TYPE: TYPE: ACUTE PAIN

## 2019-07-30 NOTE — ED PROVIDER NOTES
[address] Encounter      CHIEFCOMPLAINT       Chief Complaint   Patient presents with   Zoraidajanaefroylan rodriguez       Nurses Notes reviewed and I agree except as noted in the HPI. HISTORY OF PRESENT ILLNESS   Guerrero Tavarez is a 81 y.o. male who presents with 2 day history of bilateral ear pain left more than right, decreased hearing. Patient refusing to wear hearing aids. History of dementia so history is difficult to obtain. History of cerumen impactions. He denies ear pain at this time. No headache, neck pain, chest pain, shortness of breath, dizziness, vomiting. History of renal insufficiency, no diabetes or perforated eardrum. Non-smoker  REVIEW OF SYSTEMS     Review of Systems   Constitutional: Negative for appetite change, chills, fatigue, fever and unexpected weight change. HENT: Positive for ear pain and hearing loss. Negative for congestion, ear discharge, sinus pressure, sneezing, sore throat, trouble swallowing and voice change. Eyes: Negative for pain, discharge and redness. Respiratory: Negative for cough, shortness of breath, wheezing and stridor. Cardiovascular: Negative for chest pain and leg swelling. Gastrointestinal: Negative for abdominal pain, diarrhea, nausea and vomiting. Genitourinary: Negative for dysuria, frequency, hematuria and urgency. Musculoskeletal: Negative for arthralgias, back pain, myalgias and neck pain. Skin: Negative for rash. Neurological: Negative for dizziness, syncope, weakness and headaches. Hematological: Negative for adenopathy. Psychiatric/Behavioral: Negative for behavioral problems, confusion, sleep disturbance and suicidal ideas. The patient is not nervous/anxious. All other systems reviewed and are negative.       PAST MEDICAL HISTORY         Diagnosis Date    BPH (benign prostatic hypertrophy)     CKD (chronic kidney disease) stage 2, GFR 60-89 ml/min     Dementia     Erectile dysfunction     external ear normal.   Nose: Nose normal. Right sinus exhibits no maxillary sinus tenderness and no frontal sinus tenderness. Left sinus exhibits no maxillary sinus tenderness and no frontal sinus tenderness. Mouth/Throat: Oropharynx is clear and moist. No trismus in the jaw. No uvula swelling. No oropharyngeal exudate, posterior oropharyngeal edema, posterior oropharyngeal erythema or tonsillar abscesses. Right cerumen impaction obscures his right TM   Eyes: Pupils are equal, round, and reactive to light. Conjunctivae and EOM are normal. Right eye exhibits no discharge. Left eye exhibits no discharge. No scleral icterus. Conjunctiva clear   Neck: Normal range of motion. No JVD present. Carotid bruit is not present. No thyromegaly present. No menigismus   Cardiovascular: Normal rate, regular rhythm, S1 normal, S2 normal, normal heart sounds, intact distal pulses and normal pulses. Exam reveals no gallop and no friction rub. No murmur heard. Pulmonary/Chest: Effort normal and breath sounds normal. No stridor. No respiratory distress. He has no wheezes. He has no rales. He exhibits no tenderness. No cough lungs clear   Abdominal: Soft. Bowel sounds are normal. He exhibits no distension and no mass. There is no tenderness. There is no rebound and no guarding. Soft non tender   Musculoskeletal: Normal range of motion. He exhibits no edema or tenderness. Right lower leg: Normal.        Left lower leg: Normal.   Joints nl   Lymphadenopathy:     He has no cervical adenopathy. Right cervical: No superficial cervical and no deep cervical adenopathy present. Left cervical: No superficial cervical and no deep cervical adenopathy present. Neurological: He is alert and oriented to person, place, and time. He has normal reflexes. He displays normal reflexes. No cranial nerve deficit. He exhibits normal muscle tone.  Coordination normal.   Appropriate, no focal, nl gait   Skin: Skin is warm and dry. No rash noted. He is not diaphoretic. No erythema. No rash on examined areas    Psychiatric: He has a normal mood and affect. His behavior is normal. Judgment and thought content normal.   Nursing note and vitals reviewed. DIAGNOSTIC RESULTS   Labs: No results found for this visit on 07/30/19. IMAGING:  No orders to display     URGENT CARE COURSE:     Vitals:    07/30/19 1452   BP: (!) 142/63   Pulse: 70   Resp: 18   Temp: 97.4 °F (36.3 °C)   TempSrc: Tympanic   SpO2: 96%   Weight: 170 lb (77.1 kg)   Height: 5' 11\" (1.803 m)       Medications - No data to display  PROCEDURES:\  Earwax removal by nursing staff- patient received copious amounts of warm tap water irrigation of the right external ear canal producing moderate amounts of wax. Patient's hearing improved. Tolerated well without complications. Repeat examination reveals both tympanic membranes normal no evidence of trauma or infection. FINALIMPRESSION      1. Left ear pain    2. Hearing loss secondary to cerumen impaction, right        DISPOSITION/PLAN   DISPOSITION Decision To Discharge 07/30/2019 03:40:37 PM  Nontoxic, well-hydrated, normal airway. No airway abscess or epiglottitis, sepsis, CNS infection, pneumonia, hypoxia, bronchospasm. Patient cerumen impaction of the right ear that is been cleared completely and patient improved. No complications. Patient return home and encouraged to use hearing aids. Patient use Tylenol for pain.   He is to recheck with PCP in 7 days if problems persist.  Spouse understands to have him evaluated in ED if worse  PATIENT REFERRED TO:  Davian Olivera MD  [address] [place] [address] Drive    Schedule an appointment as soon as possible for a visit in 7 days  Recheck in office if problems persist, go to emergency if worse    DISCHARGE MEDICATIONS:  Discharge Medication List as of 7/30/2019  3:41 PM        Discharge Medication List as of 7/30/2019  3:41 PM          Zak OVALLE

## 2019-10-25 ENCOUNTER — HOSPITAL ENCOUNTER (OUTPATIENT)
Dept: AUDIOLOGY | Age: 84
Discharge: HOME | End: 2019-10-25

## 2019-10-25 PROCEDURE — 9990000010 HC NO CHARGE VISIT: Performed by: AUDIOLOGIST

## 2019-11-12 ENCOUNTER — OFFICE VISIT (OUTPATIENT)
Dept: ENT CLINIC | Age: 84
End: 2019-11-12
Payer: MEDICARE

## 2019-11-12 VITALS
RESPIRATION RATE: 16 BRPM | WEIGHT: 168.2 LBS | HEART RATE: 68 BPM | SYSTOLIC BLOOD PRESSURE: 124 MMHG | BODY MASS INDEX: 23.46 KG/M2 | DIASTOLIC BLOOD PRESSURE: 82 MMHG

## 2019-11-12 DIAGNOSIS — H61.23 BILATERAL IMPACTED CERUMEN: Primary | ICD-10-CM

## 2019-11-12 DIAGNOSIS — H90.3 SENSORINEURAL HEARING LOSS (SNHL), BILATERAL: ICD-10-CM

## 2019-11-12 DIAGNOSIS — Z97.4 WEARS HEARING AID IN BOTH EARS: ICD-10-CM

## 2019-11-12 PROCEDURE — G8599 NO ASA/ANTIPLAT THER USE RNG: HCPCS | Performed by: PHYSICIAN ASSISTANT

## 2019-11-12 PROCEDURE — 1123F ACP DISCUSS/DSCN MKR DOCD: CPT | Performed by: PHYSICIAN ASSISTANT

## 2019-11-12 PROCEDURE — 69210 REMOVE IMPACTED EAR WAX UNI: CPT | Performed by: PHYSICIAN ASSISTANT

## 2019-11-12 PROCEDURE — 1036F TOBACCO NON-USER: CPT | Performed by: PHYSICIAN ASSISTANT

## 2019-11-12 PROCEDURE — G8484 FLU IMMUNIZE NO ADMIN: HCPCS | Performed by: PHYSICIAN ASSISTANT

## 2019-11-12 PROCEDURE — G8420 CALC BMI NORM PARAMETERS: HCPCS | Performed by: PHYSICIAN ASSISTANT

## 2019-11-12 PROCEDURE — 4040F PNEUMOC VAC/ADMIN/RCVD: CPT | Performed by: PHYSICIAN ASSISTANT

## 2019-11-12 PROCEDURE — 99202 OFFICE O/P NEW SF 15 MIN: CPT | Performed by: PHYSICIAN ASSISTANT

## 2019-11-12 PROCEDURE — G8427 DOCREV CUR MEDS BY ELIG CLIN: HCPCS | Performed by: PHYSICIAN ASSISTANT

## 2019-11-12 ASSESSMENT — ENCOUNTER SYMPTOMS
VOMITING: 0
WHEEZING: 0
ABDOMINAL PAIN: 0
SINUS PRESSURE: 0
NAUSEA: 0
CHEST TIGHTNESS: 0
DIARRHEA: 0
RHINORRHEA: 0
VOICE CHANGE: 0
STRIDOR: 0
SORE THROAT: 0
CHOKING: 0
TROUBLE SWALLOWING: 0
APNEA: 0
COUGH: 0
SHORTNESS OF BREATH: 0
FACIAL SWELLING: 0
COLOR CHANGE: 0

## 2019-11-19 ENCOUNTER — HOSPITAL ENCOUNTER (OUTPATIENT)
Dept: AUDIOLOGY | Age: 84
Discharge: HOME | End: 2019-11-19
Payer: COMMERCIAL

## 2019-11-19 PROCEDURE — S0618 AUDIOMETRY FOR HEARING AID: HCPCS | Performed by: AUDIOLOGIST

## 2019-11-19 PROCEDURE — 9990000010 HC NO CHARGE VISIT: Performed by: AUDIOLOGIST

## 2019-12-03 ENCOUNTER — TELEPHONE (OUTPATIENT)
Dept: AUDIOLOGY | Age: 84
End: 2019-12-03

## 2019-12-30 ENCOUNTER — HOSPITAL ENCOUNTER (OUTPATIENT)
Dept: AUDIOLOGY | Age: 84
Discharge: HOME | End: 2019-12-30
Payer: COMMERCIAL

## 2019-12-30 PROCEDURE — V5261 HEARING AID, DIGIT, BIN, BTE: HCPCS | Performed by: AUDIOLOGIST

## 2019-12-30 PROCEDURE — V5160 DISPENSING FEE BINAURAL: HCPCS | Performed by: AUDIOLOGIST

## 2020-01-07 ENCOUNTER — HOSPITAL ENCOUNTER (OUTPATIENT)
Dept: AUDIOLOGY | Age: 85
Discharge: HOME | End: 2020-01-07
Payer: COMMERCIAL

## 2020-01-07 PROCEDURE — 9990000010 HC NO CHARGE VISIT: Performed by: AUDIOLOGIST

## 2020-01-20 ENCOUNTER — OFFICE VISIT (OUTPATIENT)
Dept: NEUROLOGY | Age: 85
End: 2020-01-20
Payer: MEDICARE

## 2020-01-20 VITALS
SYSTOLIC BLOOD PRESSURE: 122 MMHG | BODY MASS INDEX: 24.02 KG/M2 | DIASTOLIC BLOOD PRESSURE: 68 MMHG | HEIGHT: 71 IN | WEIGHT: 171.6 LBS | HEART RATE: 62 BPM

## 2020-01-20 PROCEDURE — 99213 OFFICE O/P EST LOW 20 MIN: CPT | Performed by: PSYCHIATRY & NEUROLOGY

## 2020-01-20 PROCEDURE — 1036F TOBACCO NON-USER: CPT | Performed by: PSYCHIATRY & NEUROLOGY

## 2020-01-20 PROCEDURE — G8427 DOCREV CUR MEDS BY ELIG CLIN: HCPCS | Performed by: PSYCHIATRY & NEUROLOGY

## 2020-01-20 PROCEDURE — 1123F ACP DISCUSS/DSCN MKR DOCD: CPT | Performed by: PSYCHIATRY & NEUROLOGY

## 2020-01-20 PROCEDURE — G8484 FLU IMMUNIZE NO ADMIN: HCPCS | Performed by: PSYCHIATRY & NEUROLOGY

## 2020-01-20 PROCEDURE — 4040F PNEUMOC VAC/ADMIN/RCVD: CPT | Performed by: PSYCHIATRY & NEUROLOGY

## 2020-01-20 PROCEDURE — G8420 CALC BMI NORM PARAMETERS: HCPCS | Performed by: PSYCHIATRY & NEUROLOGY

## 2020-01-20 RX ORDER — HYDROCHLOROTHIAZIDE 25 MG/1
25 TABLET ORAL DAILY
COMMUNITY
Start: 2019-12-27

## 2020-01-20 RX ORDER — MIRTAZAPINE 15 MG/1
15 TABLET, FILM COATED ORAL NIGHTLY
COMMUNITY
Start: 2019-12-27

## 2020-01-20 RX ORDER — ASPIRIN 81 MG/1
81 TABLET ORAL DAILY
COMMUNITY

## 2020-01-20 NOTE — PATIENT INSTRUCTIONS
1. Continue with Aspirin 81 mg daily  2. Continue with folic acid 1 mg daily   3. Continue with Aricept 10 mg nightly. Refills given  4. Continue taking Vitamin D   5. Follow up in 8 months or sooner if needed.    6. Call if any questions or concerns

## 2020-01-23 ENCOUNTER — HOSPITAL ENCOUNTER (OUTPATIENT)
Dept: AUDIOLOGY | Age: 85
Discharge: HOME | End: 2020-01-23

## 2020-01-23 PROCEDURE — 9990000010 HC NO CHARGE VISIT: Performed by: AUDIOLOGIST

## 2020-02-03 NOTE — PROGRESS NOTES
NEUROLOGY OUT PATIENT FOLLOW UP NOTE:   ?  Tova Rivera is here for follow up for memory trouble. He is doing the same. Wife reports that he is asking more repeated questions. No behavioral disturbance. He is on aspirin and vitamin D. He is here with his wife to go over plan of care going forward. ?  ROS:  Respiratory : no cough, no shortness of breath  Cardiac: no chest pain. No palpitations. Renal : no flank pain, no hematuria   Skin: no rash  ?   No Known Allergies  Current Outpatient Medications:    Cholecalciferol (VITAMIN D3) 3000 units TABS, Take by mouth, Disp: , Rfl:    QUEtiapine (SEROQUEL) 25 MG tablet, Take 1 tablet by mouth nightly, Disp: 60 tablet, Rfl: 3   midodrine (PROAMATINE) 10 MG tablet, Take 1 tablet by mouth 2 times daily as needed (SBP <110), Disp: , Rfl:    atorvastatin (LIPITOR) 10 MG tablet, Take 1 tablet by mouth daily, Disp: 30 tablet, Rfl: 3   oxybutynin (DITROPAN-XL) 10 MG extended release tablet, Take 1 tablet by mouth nightly, Disp: 30 tablet, Rfl: 3   Multiple Vitamin (MULTIVITAMIN) tablet, Take 1 tablet by mouth daily, Disp: , Rfl: 0   donepezil (ARICEPT) 10 MG tablet, Take 1 tablet by mouth nightly, Disp: 30 tablet, Rfl: 3   metoprolol tartrate (LOPRESSOR) 50 MG tablet, Take 1 tablet by mouth daily, Disp: 60 tablet, Rfl: 3   docusate (COLACE, DULCOLAX) 100 MG CAPS, Take 100 mg by mouth daily, Disp: , Rfl:    pantoprazole (PROTONIX) 40 MG tablet, Take 1 tablet by mouth every morning (before breakfast), Disp: 30 tablet, Rfl: 3   NIFEdipine (ADALAT CC) 30 MG extended release tablet, Take 1 tablet by mouth every morning, Disp: 30 tablet, Rfl: 3   magnesium hydroxide (MILK OF MAGNESIA) 400 MG/5ML suspension, Take 30 mLs by mouth daily as needed for Constipation, Disp: , Rfl:    potassium chloride (KLOR-CON) 10 MEQ extended release tablet, Take 1 tablet by mouth daily, Disp: 60 tablet, Rfl: 3   dextrose 50 % solution, Infuse 25 mLs intravenously as needed (Blood glucose less than 70 mg/dL and patient NOT ALERT or NPO.), Disp: , Rfl:    Dextrose-Sodium Chloride (DEXTROSE 5 % AND 0.9 % NACL) 5-0.9 % infusion, Infuse 100 mL/hr intravenously as needed (Low blood sugar), Disp: , Rfl:    vitamin B-1 (THIAMINE) 100 MG tablet, Take 100 mg by mouth daily, Disp: , Rfl:    doxazosin (CARDURA) 2 MG tablet, Take 2 mg by mouth daily, Disp: , Rfl:    lansoprazole (PREVACID) 15 MG delayed release capsule, Take 30 mg by mouth daily, Disp: , Rfl:    potassium chloride (KLOR-CON M10) 10 MEQ extended release tablet, Take 1 tablet by mouth daily, Disp: 30 tablet, Rfl: 12   celecoxib (CELEBREX) 200 MG capsule, Take 1 capsule by mouth daily, Disp: 60 capsule, Rfl: 3   acetaminophen (TYLENOL) 325 MG tablet, Take 2 tablets by mouth every 4 hours as needed for Pain, Disp: 120 tablet, Rfl: 3   potassium chloride (KLOR-CON M) 20 MEQ TBCR extended release tablet, Take 2 tablets by mouth once for 1 dose, Disp: 60 tablet, Rfl: 3  ? PE:   Vitals:    01/20/20 1117   BP: 122/68   Site: Left Upper Arm   Position: Sitting   Cuff Size: Medium Adult   Pulse: 62   Weight: 171 lb 9.6 oz (77.8 kg)   Height: 5' 11\" (1.803 m)    General Appearance: alert and cooperative  Gen: NAD, Language is Intact. There is no anomia. He follows 2 step commands. He has fair concentration  Head: no rash, no icterus, he is hard of hearing  Neck: There is no carotid bruits. The Neck is supple. Neuro: CN 2-12 grossly intact with no focal deficits. Power 5/5 Throughout symmetric, Reflexes are decreased and symmetric. Long tracts are decreased. Cerebellar exam is Intact. Sensory exam is intact to light touch. Gait is intact. Musculoskeletal: Has no hand arthritis, no limitation of ROM in any of the four extremities. Lower extremities no edema  ? ? DATA:  Results for orders placed or performed during the hospital encounter of 05/02/19   Vitamin D 25 Hydroxy   Result Value Ref Range   ?  Vit D, 25-Hydroxy 28 (L) 30 - 100 sinuses, mastoid air cells, and middle ear cavities are grossly clear. ? Impression NO EVIDENCE OF ACUTE BRAIN PROCESS. ? MILD SMALL VESSEL DISEASE SEQUELAE.  ? NONSPECIFIC FINDINGS OF THE BILATERAL MEDIAL TEMPORAL LOBE PARENCHYMA. ?  ?  ?  ?  ?  **This report has been created using voice recognition software. It may contain minor errors which are inherent in voice recognition technology. **  ?  ?  ?  ? Final report electronically signed by Dr. Morenita Simon on 4/26/2017 2:45 PM   ?  Results for orders placed during the hospital encounter of 05/02/19   MRI BRAIN W WO CONTRAST   ? Narrative PROCEDURE: MRI BRAIN W WO CONTRAST  ? CLINICAL INFORMATION: Memory problem. Confusion  ? COMPARISON: MRI of the brain dated April 26, 2017.  ?  TECHNIQUE: Multiplanar and multiple spin echo T1 and T2-weighted images were obtained through the brain before and after the administration of 15 mL ProHance intravenous contrast.  ?  FINDINGS:  ?  There is prominence of the sulcal spaces and ventricular system secondary to generalized, age-related parenchymal volume loss. Moderate patchy foci of hyperintense T2 signal are again noted throughout the periventricular and deep cerebral white matter   which likely correspond to sequela of chronic microvascular ischemic change. There is a stable appearance of subtle hyperintense T2 signal along the medial temporal lobes bilaterally. This is nonspecific and may be artifactual. No restricted diffusion is  identified. Punctate foci of susceptibility are again noted within the posterior right frontal lobe and right parietal lobe which may correspond to remote microhemorrhages versus small cavernous malformations. ?  The ventricles are midline and stable in size and morphology without evidence of hydrocephalus. No mass, mass effect or extra-axial fluid collection is identified. The basal cisterns and visualized vascular flow voids are patent.  The pituitary, brain   stem and cervical It may contain minor errors which are inherent in voice recognition technology. **  ? Final report electronically signed by Dr. Tenisha Oliveros on 3/9/2018 2:24 AM   EEG done 6/26/19: IMPRESSION: ?This is a normal EEG. ?There was no evidence of  epileptiform activity appreciated. ?  ?  Assessment:  ? Diagnosis Orders   1. Memory problem  ? ? He is doing the same. Wife reports that he is asking more repeated questions. He is on vitamin D supplements. He is now on antiplatelets. After detailed discussion with patient we agreed on the following plan. ?  ?  Plan:  1. Continue with Aspirin 81 mg daily  2. Continue with folic acid 1 mg daily   3. Continue with Aricept 10 mg nightly. Refills given  4. Continue taking Vitamin D   5. Follow up in 8 months or sooner if needed. 6. Call if any questions or concerns  ? Please call if any questions.      Anisha Hernandez MD

## 2020-02-11 ENCOUNTER — HOSPITAL ENCOUNTER (OUTPATIENT)
Dept: AUDIOLOGY | Age: 85
Discharge: HOME | End: 2020-02-11

## 2020-02-11 PROCEDURE — 9990000010 HC NO CHARGE VISIT: Performed by: AUDIOLOGIST

## 2020-02-11 NOTE — PROGRESS NOTES
Hearing Aid Check:  Mrs Marleen Gupta expressed frustration regarding [place]'s resistance/non-compliance to wearing the hearing aids. She also stated that he hides them or puts them in random places. She questioned whether these behaviors could be related to his dementia. I explained that these behaviors could be related to his dementia but that we should continue to encourage compliance with wearing the hearing aids and proper storage. I explained to Cristo John the importance of using his hearing aids and that he should wear them everyday and during his waking hours. I encouraged him to wear his hearing aids as a way of helping out his wife who wants to be able to communicate with him. He seemed agreeable to wearing the hearing aids to help his wife. Follow up will be made as needed.

## 2020-03-25 PROBLEM — E87.6 HYPOKALEMIA: Status: RESOLVED | Noted: 2017-06-19 | Resolved: 2020-03-24

## 2020-03-25 PROBLEM — F10.929 ALCOHOL INTOXICATION (HCC): Status: RESOLVED | Noted: 2017-09-30 | Resolved: 2020-03-24

## 2020-06-09 ENCOUNTER — OFFICE VISIT (OUTPATIENT)
Dept: ENT CLINIC | Age: 85
End: 2020-06-09
Payer: MEDICARE

## 2020-06-09 VITALS
TEMPERATURE: 97.3 F | BODY MASS INDEX: 23.85 KG/M2 | RESPIRATION RATE: 14 BRPM | DIASTOLIC BLOOD PRESSURE: 68 MMHG | WEIGHT: 171 LBS | SYSTOLIC BLOOD PRESSURE: 124 MMHG | HEART RATE: 66 BPM

## 2020-06-09 PROCEDURE — 69210 REMOVE IMPACTED EAR WAX UNI: CPT | Performed by: PHYSICIAN ASSISTANT

## 2020-06-09 ASSESSMENT — ENCOUNTER SYMPTOMS
SHORTNESS OF BREATH: 0
STRIDOR: 0
WHEEZING: 0
NAUSEA: 0
RHINORRHEA: 0
SORE THROAT: 0
COLOR CHANGE: 0
CHEST TIGHTNESS: 0
CHOKING: 0
TROUBLE SWALLOWING: 0
ABDOMINAL PAIN: 0
VOICE CHANGE: 0
APNEA: 0
COUGH: 0
DIARRHEA: 0
FACIAL SWELLING: 0
SINUS PRESSURE: 0
VOMITING: 0

## 2020-06-09 NOTE — PROGRESS NOTES
noted.    Ears:  External ears: Normal: no scars, lesions or masses. Mastoid process: No erythema noted. No tenderness to palpation. R External auditory canal: Cerumen impaction, otherwise normal  L External auditory canal: Cerumen impaction, otherwise normal   Tympanic membranes:  R intact, translucent                                                  L intact, translucent    Procedure: Cerumen removed with suction and alligator forceps. The patient tolerated the procedure without complication. Assessment/Plan:     Diagnosis Orders   1. Bilateral impacted cerumen     2. Wears hearing aid in both ears         The patient is a 81 y.o. male that presents for routine ear cleaning. The patient tolerated the procedure without complication. No other concerns at this time reported by the patient and his wife. Discussed using noise cancellation and hearing aids to minimize tinnitus. The patient will return in about 6 months for ear cleaning and will contact the office sooner with new or worsening symptoms.        Electronically signed by JUAN Schneider on 6/9/2020 at 12:54 PM

## 2020-07-08 ENCOUNTER — HOSPITAL ENCOUNTER (OUTPATIENT)
Age: 85
Discharge: HOME | End: 2020-07-08
Payer: MEDICARE

## 2020-07-08 LAB
T4 FREE: 0.95 NG/DL (ref 0.93–1.76)
TSH SERPL DL<=0.05 MIU/L-ACNC: 1.67 UIU/ML (ref 0.4–4.2)

## 2020-07-08 PROCEDURE — 84443 ASSAY THYROID STIM HORMONE: CPT

## 2020-07-08 PROCEDURE — 84439 ASSAY OF FREE THYROXINE: CPT

## 2020-07-08 PROCEDURE — 36415 COLL VENOUS BLD VENIPUNCTURE: CPT

## 2020-07-22 RX ORDER — FOLIC ACID 1 MG/1
1 TABLET ORAL DAILY
Qty: 90 TABLET | Refills: 3 | Status: SHIPPED | OUTPATIENT
Start: 2020-07-22 | Stop reason: SDUPTHER

## 2020-09-21 ENCOUNTER — VIRTUAL VISIT (OUTPATIENT)
Dept: NEUROLOGY | Age: 85
End: 2020-09-21
Payer: MEDICARE

## 2020-09-21 PROCEDURE — G8428 CUR MEDS NOT DOCUMENT: HCPCS | Performed by: PSYCHIATRY & NEUROLOGY

## 2020-09-21 PROCEDURE — 99213 OFFICE O/P EST LOW 20 MIN: CPT | Performed by: PSYCHIATRY & NEUROLOGY

## 2020-09-21 PROCEDURE — 4040F PNEUMOC VAC/ADMIN/RCVD: CPT | Performed by: PSYCHIATRY & NEUROLOGY

## 2020-09-21 PROCEDURE — 1123F ACP DISCUSS/DSCN MKR DOCD: CPT | Performed by: PSYCHIATRY & NEUROLOGY

## 2020-09-21 RX ORDER — QUETIAPINE FUMARATE 25 MG/1
12.5 TABLET, FILM COATED ORAL NIGHTLY
Qty: 15 TABLET | Refills: 3 | Status: SHIPPED | OUTPATIENT
Start: 2020-09-21 | End: 2020-09-21

## 2020-09-21 NOTE — PROGRESS NOTES
2020    TELEHEALTH EVALUATION -- Audio/Visual (During OONYG-04 public health emergency)    HPI:    Nikolay Correia (:  1935) has requested an audio/video evaluation for the following concern(s):  Follow up for dementia without behavioral disturbance, he is having vivid dreams. Started one month ago. No falls. His behavior is good during the day. Wife reports that he is still asking more repeated questions and can get redirected. He is on vitamin D supplements. He is now on antiplatelets. No behavioral disturbance. He is on aspirin and vitamin D. He is evaluated via video link with his wife present to discuss plan of care going forward. Review of Systems   Constitutional: Negative. Musculoskeletal: Negative. Skin: Negative. Prior to Visit Medications    Medication Sig Taking?  Authorizing Provider   folic acid (FOLVITE) 1 MG tablet Take 1 tablet by mouth daily  Jimmie Ramirez APRN - CNP   hydrochlorothiazide (HYDRODIURIL) 25 MG tablet   Historical Provider, MD   mirtazapine (REMERON) 15 MG tablet   Historical Provider, MD   aspirin 81 MG tablet Take 81 mg by mouth daily  Historical Provider, MD   Cholecalciferol (VITAMIN D3) 3000 units TABS Take by mouth  Historical Provider, MD   QUEtiapine (SEROQUEL) 25 MG tablet Take 1 tablet by mouth nightly  Breonna Caro MD   midodrine (PROAMATINE) 10 MG tablet Take 1 tablet by mouth 2 times daily as needed (SBP <110)  Breonna Caro MD   atorvastatin (LIPITOR) 10 MG tablet Take 1 tablet by mouth daily  Taj Aguilera MD   oxybutynin (DITROPAN-XL) 10 MG extended release tablet Take 1 tablet by mouth nightly  Taj Aguilera MD   Multiple Vitamin (MULTIVITAMIN) tablet Take 1 tablet by mouth daily  Taj Aguilera MD   donepezil (ARICEPT) 10 MG tablet Take 1 tablet by mouth nightly  Taj Aguilera MD   metoprolol tartrate (LOPRESSOR) 50 MG tablet Take 1 tablet by mouth daily  Taj Aguilera MD   docusate (COLACE, DULCOLAX) 100 MG CAPS Take 100 mg by mouth daily  Tana Reynolds MD   pantoprazole (PROTONIX) 40 MG tablet Take 1 tablet by mouth every morning (before breakfast)  Tana Reynolds MD   NIFEdipine (ADALAT CC) 30 MG extended release tablet Take 1 tablet by mouth every morning  Tana Reynolds MD   magnesium hydroxide (MILK OF MAGNESIA) 400 MG/5ML suspension Take 30 mLs by mouth daily as needed for Constipation  Tana Reynolds MD   potassium chloride (KLOR-CON) 10 MEQ extended release tablet Take 1 tablet by mouth daily  Tana Reynolds MD   dextrose 50 % solution Infuse 25 mLs intravenously as needed (Blood glucose less than 70 mg/dL and patient NOT ALERT or NPO.)  Tana Reynolds MD   Dextrose-Sodium Chloride (DEXTROSE 5 % AND 0.9 % NACL) 5-0.9 % infusion Infuse 100 mL/hr intravenously as needed (Low blood sugar)  Tana Reynolds MD   potassium chloride (KLOR-CON M) 20 MEQ TBCR extended release tablet Take 2 tablets by mouth once for 1 dose  Tana Reynolds MD   vitamin B-1 (THIAMINE) 100 MG tablet Take 100 mg by mouth daily  Historical Provider, MD   doxazosin (CARDURA) 2 MG tablet Take 2 mg by mouth daily  Historical Provider, MD   lansoprazole (PREVACID) 15 MG delayed release capsule Take 30 mg by mouth daily  Historical Provider, MD   potassium chloride (KLOR-CON M10) 10 MEQ extended release tablet Take 1 tablet by mouth daily  ZORAN Nicole CNP   celecoxib (CELEBREX) 200 MG capsule Take 1 capsule by mouth daily  Tana Reynolds MD   acetaminophen (TYLENOL) 325 MG tablet Take 2 tablets by mouth every 4 hours as needed for Pain  Tana Reynolds MD       Social History     Tobacco Use    Smoking status: Former Smoker     Last attempt to quit: 1992     Years since quittin.6    Smokeless tobacco: Never Used   Substance Use Topics    Alcohol use:  Yes     Alcohol/week: 0.0 standard drinks     Comment: daily    Drug use: No        Past Surgical History:   Procedure Laterality Date    COLONOSCOPY      ENDOSCOPY, COLON, DIAGNOSTIC      ESOPHAGUS SURGERY      NECK SURGERY      PROSTATE SURGERY      SHOULDER SURGERY      THYROID SURGERY      removal of a nodule    TONSILLECTOMY      TURP  12    Dr Brea Epstein   ,   Social History     Tobacco Use    Smoking status: Former Smoker     Last attempt to quit: 1992     Years since quittin.7    Smokeless tobacco: Never Used   Substance Use Topics    Alcohol use: Yes     Alcohol/week: 0.0 standard drinks     Comment: daily    Drug use: No   ,   Family History   Problem Relation Age of Onset    High Blood Pressure Father     Stroke Father     Stroke Sister     Arthritis Sister     Alzheimer's Disease Sister     Cancer Sister     Heart Disease Sister     Arthritis Sister     Other Brother         parkinsons    Dementia Brother     No Known Problems Brother     No Known Problems Brother        PHYSICAL EXAMINATION:  [ INSTRUCTIONS:  \"[x]\" Indicates a positive item  \"[]\" Indicates a negative item  -- DELETE ALL ITEMS NOT EXAMINED]  Vital Signs: (As obtained by patient/caregiver or practitioner observation)    Blood pressure-  Heart rate-    Respiratory rate-    Temperature-  Pulse oximetry-     Constitutional: [x] Appears well-developed and well-nourished [x] No apparent distress      [] Abnormal-   Mental status  [x] Alert and awake  [x] Oriented to person only [x]Able to follow simple commands      Eyes:  EOM    [x]  Normal  [] Abnormal-  Sclera  [x]  Normal  [] Abnormal -         Discharge [x]  None visible  [] Abnormal -    HENT:   [x] Normocephalic, atraumatic.   [] Abnormal   [x] Mouth/Throat: Mucous membranes are moist.     External Ears [x] Normal  [] Abnormal-     Neck: [x] No visualized mass     Pulmonary/Chest: [x] Respiratory effort normal.  [x] No visualized signs of difficulty breathing or respiratory distress        [] Abnormal-      Musculoskeletal:   [] Normal gait with no signs of ataxia         [] Normal range of motion of neck        [] Abnormal- Neurological:        [x] No Facial Asymmetry (Cranial nerve 7 motor function) (limited exam to video visit)          [x] No gaze palsy        [] Abnormal-         Skin:        [x] No significant exanthematous lesions or discoloration noted on facial skin         [] Abnormal-            Psychiatric:       [x] Normal Affect [] No Hallucinations        [x] Abnormal- slow in his responses at times and circumstantial.   Other pertinent observable physical exam findings-     ASSESSMENT/PLAN:  1. Memory problem  2. Vascular dementia without behavioral disturbance [facility])  He is doing the same. He having vivid dreams. Started one month ago. No falls. His behavior is good during the day. Wife reports that he is still asking more repeated questions and can get redirected. He is on vitamin D supplements. He is now on antiplatelets. He is on Seroquel already via his primary care provider. After detailed discussion with wife and patient we agreed on the following plan. ?  ?  Plan:  1. Continue with Seroquel at night time, need to clarify dosage. 2. Continue with Aspirin 81 mg daily  3. Continue with folic acid 1 mg daily   4. Continue with Aricept 10 mg nightly. Refills given  5. Continue taking Vitamin D   6. Follow up in 4-5 months or sooner if needed. 7. Call if any questions or concerns    Return in about 4 months (around 1/21/2021). Judy Sheets is a 81 y.o. male being evaluated by a Virtual Visit (video visit) encounter to address concerns as mentioned above. A caregiver was present when appropriate. Due to this being a TeleHealth encounter (During UOVYE-43 public health emergency), evaluation of the following organ systems was limited: Vitals/Constitutional/EENT/Resp/CV/GI//MS/Neuro/Skin/Heme-Lymph-Imm.   Pursuant to the emergency declaration under the [facility], [address] [place] [address] authority and the [employer] Act, this Virtual Visit was conducted with patient's (and/or legal guardian's) consent, to reduce the patient's risk of exposure to COVID-19 and provide necessary medical care. The patient (and/or legal guardian) has also been advised to contact this office for worsening conditions or problems, and seek emergency medical treatment and/or call 911 if deemed necessary. Patient identification was verified at the start of the visit: Yes    Total time spent on this encounter: 15 min    Services were provided through a video synchronous discussion virtually to substitute for in-person clinic visit. Patient and provider were located at their individual homes. --Hien Loya MD on 9/21/2020 at 11:46 AM    An electronic signature was used to authenticate this note.

## 2020-09-22 NOTE — TELEPHONE ENCOUNTER
We can consider Depakote  mg daily.   Check with patient's wife if he has been on it in the past, if not I would start him on 1 tablet daily dispense #30, 1 refill, update this in 2 weeks thank you  Tonja Gaucher MD

## 2020-09-23 RX ORDER — DIVALPROEX SODIUM 250 MG/1
250 TABLET, EXTENDED RELEASE ORAL DAILY
Qty: 30 TABLET | Refills: 1 | Status: SHIPPED | OUTPATIENT
Start: 2020-09-23 | End: 2021-01-05

## 2020-09-25 ENCOUNTER — TELEPHONE (OUTPATIENT)
Dept: NEUROLOGY | Age: 85
End: 2020-09-25

## 2020-09-25 NOTE — TELEPHONE ENCOUNTER
I was able to speak with both patient's daughters. Provided them with suggestions on how to access their dad's records and his medications, as they are on the HIPAA form. All other questions were answered. Mohit Caal MD

## 2020-12-11 ENCOUNTER — OFFICE VISIT (OUTPATIENT)
Dept: ENT CLINIC | Age: 85
End: 2020-12-11
Payer: MEDICARE

## 2020-12-11 VITALS
HEIGHT: 71 IN | RESPIRATION RATE: 14 BRPM | SYSTOLIC BLOOD PRESSURE: 136 MMHG | DIASTOLIC BLOOD PRESSURE: 70 MMHG | BODY MASS INDEX: 24.54 KG/M2 | TEMPERATURE: 97.8 F | HEART RATE: 60 BPM | WEIGHT: 175.3 LBS

## 2020-12-11 PROCEDURE — 4040F PNEUMOC VAC/ADMIN/RCVD: CPT | Performed by: PHYSICIAN ASSISTANT

## 2020-12-11 PROCEDURE — G8427 DOCREV CUR MEDS BY ELIG CLIN: HCPCS | Performed by: PHYSICIAN ASSISTANT

## 2020-12-11 PROCEDURE — G8484 FLU IMMUNIZE NO ADMIN: HCPCS | Performed by: PHYSICIAN ASSISTANT

## 2020-12-11 PROCEDURE — G8420 CALC BMI NORM PARAMETERS: HCPCS | Performed by: PHYSICIAN ASSISTANT

## 2020-12-11 PROCEDURE — 99212 OFFICE O/P EST SF 10 MIN: CPT | Performed by: PHYSICIAN ASSISTANT

## 2020-12-11 PROCEDURE — 1123F ACP DISCUSS/DSCN MKR DOCD: CPT | Performed by: PHYSICIAN ASSISTANT

## 2020-12-11 PROCEDURE — 1036F TOBACCO NON-USER: CPT | Performed by: PHYSICIAN ASSISTANT

## 2020-12-11 ASSESSMENT — ENCOUNTER SYMPTOMS
APNEA: 0
NAUSEA: 0
SORE THROAT: 0
WHEEZING: 0
FACIAL SWELLING: 0
STRIDOR: 0
TROUBLE SWALLOWING: 0
CHEST TIGHTNESS: 0
CHOKING: 0
COLOR CHANGE: 0
DIARRHEA: 0
COUGH: 0
VOMITING: 0
SINUS PRESSURE: 0
SHORTNESS OF BREATH: 0
RHINORRHEA: 0
VOICE CHANGE: 0
ABDOMINAL PAIN: 0

## 2020-12-11 NOTE — PATIENT INSTRUCTIONS
Mr. Vanessa Avendano,    I think it is very important for you to wear your hearing aids. This helps you be able to hear what is going on around you better which is important for your safety in case of emergencies. It is also very helpful for your family and wife if you can hear better so they do not have to repeat themselves. If you hear better, they can make sure you are involved with the family and conversation. See you in about 6 months!     Robin Arreaga PA-C

## 2020-12-11 NOTE — PROGRESS NOTES
[address]AR, NOSE AND THROAT  [facility] [address] [address]  Dept: [phone]  Dept Fax: [phone]  Loc: [phone]    Manish Feng is a 81 y.o. male who was referred by No ref. provider found for:  Chief Complaint   Patient presents with    6 Month Follow-Up     Patient is here for 6 month follow up ear cleaning    . HPI:     Patient presents for 6-month ear cleaning. Patient is accompanied by his son who reports that the patient has not been wearing his hearing aids very much recently. He still has them at home, but refuses to wear them typically. No reported fevers, chills, changes in hearing, otorrhea. The patient denies any tinnitus at this time. I asked the patient why he does not wear his hearing aids and his only response is that he feels like he does not need them. He denies any associated pain. The patient and his son deny any other symptoms or concerns at this time. Subjective:        Review of Systems   Constitutional: Negative for activity change, appetite change, chills, diaphoresis, fatigue, fever and unexpected weight change. HENT: Positive for hearing loss. Negative for congestion, dental problem, ear discharge, ear pain, facial swelling, mouth sores, nosebleeds, postnasal drip, rhinorrhea, sinus pressure, sneezing, sore throat, tinnitus, trouble swallowing and voice change. Eyes: Negative for visual disturbance. Respiratory: Negative for apnea, cough, choking, chest tightness, shortness of breath, wheezing and stridor. Cardiovascular: Negative for chest pain, palpitations and leg swelling. Gastrointestinal: Negative for abdominal pain, diarrhea, nausea and vomiting. Endocrine: Negative for cold intolerance, heat intolerance, polydipsia and polyuria.    Genitourinary: Negative for difficulty urinating, discharge, dysuria, enuresis, hematuria, penile pain, penile swelling, scrotal swelling, testicular pain and urgency. Musculoskeletal: Negative for arthralgias, gait problem, neck pain and neck stiffness. Skin: Negative for color change, rash and wound. Allergic/Immunologic: Negative for environmental allergies, food allergies and immunocompromised state. Neurological: Negative for dizziness, seizures, syncope, facial asymmetry, speech difficulty, light-headedness and headaches. Hematological: Negative for adenopathy. Does not bruise/bleed easily. Psychiatric/Behavioral: Negative for confusion, sleep disturbance and suicidal ideas. The patient is not nervous/anxious. Past Medical History:  Past Medical History:   Diagnosis Date    BPH (benign prostatic hypertrophy)     CKD (chronic kidney disease) stage 2, GFR 60-89 ml/min     Dementia (HCC)     Erectile dysfunction     Frequent falls     [place] (hard of hearing)     Hyperlipidemia     Hypertension     Metabolic bone disease     Orthostatic hypotension        Social History:    TOBACCO:   reports that he quit smoking about 28 years ago. He has never used smokeless tobacco.  ETOH:   reports current alcohol use. DRUGS:   reports no history of drug use. Family History:       Problem Relation Age of Onset    High Blood Pressure Father     Stroke Father     Stroke Sister     Arthritis Sister     Alzheimer's Disease Sister     Cancer Sister     Heart Disease Sister     Arthritis Sister     Other Brother         parkinsons    Dementia Brother     No Known Problems Brother     No Known Problems Brother        Surgical History:  Past Surgical History:   Procedure Laterality Date    COLONOSCOPY      ENDOSCOPY, COLON, DIAGNOSTIC      ESOPHAGUS SURGERY      NECK SURGERY      PROSTATE SURGERY      SHOULDER SURGERY      THYROID SURGERY      removal of a nodule    TONSILLECTOMY      TURP  5-31-12    Dr Ashley Carmona        Objective: This is a 81 y.o. male. Patient is alert and oriented to person, place and time.  Patient appears well developed, well nourished. Mood is happy with normal affect. Not obviously hearing impaired. No abnormality in speech noted. /70 (Site: Right Upper Arm, Position: Sitting)   Pulse 60   Temp 97.8 °F (36.6 °C) (Infrared)   Resp 14   Ht 5' 10.5\" (1.791 m)   Wt 175 lb 4.8 oz (79.5 kg)   BMI 24.80 kg/m²     Head:   Normocephalic, atraumatic. No obvious masses or lesions noted. Ears:  External ears: Normal: no scars, lesions or masses. Mastoid process: No erythema noted. No tenderness to palpation. R External auditory canal: Excessive cerumen, otherwise normal-appearing canal  L External auditory canal: Cerumen impaction, otherwise normal-appearing canal  Tympanic membranes:  R intact, translucent                                                  L intact, translucent    Procedure: Otoscopy revealed a cerumen impaction on the left and excessive cerumen on the right. Cerumen was removed bilaterally using suction and alligator forceps under handheld magnification. The patient tolerated the procedure well without any evidence of immediate complication. Assessment/Plan:     Diagnosis Orders   1. Impacted cerumen of left ear     2. Excessive cerumen in ear canal, right     3. Sensorineural hearing loss (SNHL), bilateral     4. Wears hearing aid in both ears         The patient is a 81 y.o. male that presents for routine ear cleaning. Cerumen was removed from both ears as described above. I discussed use of the hearing aid with the patient. I recommended he use them since he does have some issues with hearing. The patient will return in about 6 months for repeat ear cleaning. Family will contact the office sooner with new/worsening symptoms or other concerns.     Electronically signed by JUAN Pulido on 12/11/2020 at 1:19 PM

## 2021-01-05 DIAGNOSIS — R44.3 HALLUCINATIONS: Primary | ICD-10-CM

## 2021-01-05 DIAGNOSIS — R41.3 MEMORY PROBLEM: ICD-10-CM

## 2021-01-05 RX ORDER — DIVALPROEX SODIUM 250 MG/1
TABLET, EXTENDED RELEASE ORAL
Qty: 30 TABLET | Refills: 1 | Status: SHIPPED | OUTPATIENT
Start: 2021-01-05 | End: 2021-01-25 | Stop reason: SDUPTHER

## 2021-01-25 ENCOUNTER — VIRTUAL VISIT (OUTPATIENT)
Dept: NEUROLOGY | Age: 86
End: 2021-01-25
Payer: MEDICARE

## 2021-01-25 DIAGNOSIS — R41.3 MEMORY PROBLEM: Primary | ICD-10-CM

## 2021-01-25 DIAGNOSIS — F01.50 VASCULAR DEMENTIA WITHOUT BEHAVIORAL DISTURBANCE (HCC): ICD-10-CM

## 2021-01-25 DIAGNOSIS — R44.3 HALLUCINATIONS: ICD-10-CM

## 2021-01-25 PROCEDURE — 99213 OFFICE O/P EST LOW 20 MIN: CPT | Performed by: PSYCHIATRY & NEUROLOGY

## 2021-01-25 PROCEDURE — 1123F ACP DISCUSS/DSCN MKR DOCD: CPT | Performed by: PSYCHIATRY & NEUROLOGY

## 2021-01-25 PROCEDURE — G8428 CUR MEDS NOT DOCUMENT: HCPCS | Performed by: PSYCHIATRY & NEUROLOGY

## 2021-01-25 PROCEDURE — 4040F PNEUMOC VAC/ADMIN/RCVD: CPT | Performed by: PSYCHIATRY & NEUROLOGY

## 2021-01-25 RX ORDER — DIVALPROEX SODIUM 250 MG/1
TABLET, EXTENDED RELEASE ORAL
Qty: 30 TABLET | Refills: 5 | Status: SHIPPED | OUTPATIENT
Start: 2021-01-25 | End: 2021-08-11 | Stop reason: SDUPTHER

## 2021-01-25 NOTE — PROGRESS NOTES
2021    TELEHEALTH EVALUATION -- Audio/Visual (During SEIXS-60 public health emergency)    HPI:    Shelby Tovar (:  1935) has requested an audio/video evaluation for the following concern(s):  Follow up for dementia without behavioral disturbance, he is having vivid dreams. Started one month ago. No falls. His behavior is good during the day. Wife reports that he is still asking more repeated questions and can get redirected. He is on vitamin D supplements. He is now on antiplatelets. No behavioral disturbance. He is on aspirin and vitamin D. He is evaluated via video link with his wife present to discuss plan of care going forward. Review of Systems   Constitutional: Negative. Musculoskeletal: Negative. Skin: Negative. Prior to Visit Medications    Medication Sig Taking?  Authorizing Provider   divalproex (DEPAKOTE ER) 250 MG extended release tablet take 1 tablet by mouth once daily  ZORAN White CNP   folic acid (FOLVITE) 1 MG tablet Take 1 tablet by mouth daily  ZORAN White CNP   hydrochlorothiazide (HYDRODIURIL) 25 MG tablet   Historical Provider, MD   mirtazapine (REMERON) 15 MG tablet   Historical Provider, MD   aspirin 81 MG tablet Take 81 mg by mouth daily  Historical Provider, MD   Cholecalciferol (VITAMIN D3) 3000 units TABS Take by mouth  Historical Provider, MD   midodrine (PROAMATINE) 10 MG tablet Take 1 tablet by mouth 2 times daily as needed (SBP <110)  Luis E Swanson MD   atorvastatin (LIPITOR) 10 MG tablet Take 1 tablet by mouth daily  Otoniel Turner MD   oxybutynin (DITROPAN-XL) 10 MG extended release tablet Take 1 tablet by mouth nightly  Otoniel Turner MD   Multiple Vitamin (MULTIVITAMIN) tablet Take 1 tablet by mouth daily  Otoniel Turner MD   donepezil (ARICEPT) 10 MG tablet Take 1 tablet by mouth nightly  Otoniel Turner MD   metoprolol tartrate (LOPRESSOR) 50 MG tablet Take 1 tablet by mouth daily  Otoniel Turner MD   docusate (COLACE, DULCOLAX) 100 MG CAPS Take 100 mg by mouth daily  Huma Polanco MD   pantoprazole (PROTONIX) 40 MG tablet Take 1 tablet by mouth every morning (before breakfast)  Huma Polanco MD   NIFEdipine (ADALAT CC) 30 MG extended release tablet Take 1 tablet by mouth every morning  Huma Polanco MD   magnesium hydroxide (MILK OF MAGNESIA) 400 MG/5ML suspension Take 30 mLs by mouth daily as needed for Constipation  Huma Polanco MD   potassium chloride (KLOR-CON) 10 MEQ extended release tablet Take 1 tablet by mouth daily  Huma Polanco MD   dextrose 50 % solution Infuse 25 mLs intravenously as needed (Blood glucose less than 70 mg/dL and patient NOT ALERT or NPO.)  Huma Polanco MD   Dextrose-Sodium Chloride (DEXTROSE 5 % AND 0.9 % NACL) 5-0.9 % infusion Infuse 100 mL/hr intravenously as needed (Low blood sugar)  Huma Polanco MD   potassium chloride (KLOR-CON M) 20 MEQ TBCR extended release tablet Take 2 tablets by mouth once for 1 dose  Huma Polanco MD   vitamin B-1 (THIAMINE) 100 MG tablet Take 100 mg by mouth daily  Historical Provider, MD   doxazosin (CARDURA) 2 MG tablet Take 2 mg by mouth daily  Historical Provider, MD   lansoprazole (PREVACID) 15 MG delayed release capsule Take 30 mg by mouth daily  Historical Provider, MD   potassium chloride (KLOR-CON M10) 10 MEQ extended release tablet Take 1 tablet by mouth daily  ZORAN Hughes CNP   celecoxib (CELEBREX) 200 MG capsule Take 1 capsule by mouth daily  Huma Polanco MD   acetaminophen (TYLENOL) 325 MG tablet Take 2 tablets by mouth every 4 hours as needed for Pain  Huma Polanco MD       Social History     Tobacco Use    Smoking status: Former Smoker     Quit date: 1992     Years since quittin.0    Smokeless tobacco: Never Used   Substance Use Topics    Alcohol use:  Yes     Alcohol/week: 0.0 standard drinks     Comment: daily    Drug use: No        Past Surgical History:   Procedure Laterality Date    COLONOSCOPY      ENDOSCOPY, COLON, DIAGNOSTIC      ESOPHAGUS SURGERY      NECK SURGERY      PROSTATE SURGERY      SHOULDER SURGERY      THYROID SURGERY      removal of a nodule    TONSILLECTOMY      TURP  12    Dr Davis Brine   ,   Social History     Tobacco Use    Smoking status: Former Smoker     Quit date: 1992     Years since quittin.0    Smokeless tobacco: Never Used   Substance Use Topics    Alcohol use: Yes     Alcohol/week: 0.0 standard drinks     Comment: daily    Drug use: No   ,   Family History   Problem Relation Age of Onset    High Blood Pressure Father     Stroke Father     Stroke Sister     Arthritis Sister     Alzheimer's Disease Sister     Cancer Sister     Heart Disease Sister     Arthritis Sister     Other Brother         parkinsons    Dementia Brother     No Known Problems Brother     No Known Problems Brother        PHYSICAL EXAMINATION:  [ INSTRUCTIONS:  \"[x]\" Indicates a positive item  \"[]\" Indicates a negative item  -- DELETE ALL ITEMS NOT EXAMINED]  Vital Signs: (As obtained by patient/caregiver or practitioner observation)    Blood pressure-  Heart rate-    Respiratory rate-    Temperature-  Pulse oximetry-     Constitutional: [x] Appears well-developed and well-nourished [x] No apparent distress      [] Abnormal-   Mental status  [x] Alert and awake  [x] Oriented to person only [x]Able to follow simple commands      Eyes:  EOM    [x]  Normal  [] Abnormal-  Sclera  [x]  Normal  [] Abnormal -         Discharge [x]  None visible  [] Abnormal -    HENT:   [x] Normocephalic, atraumatic.   [] Abnormal   [x] Mouth/Throat: Mucous membranes are moist.     External Ears [x] Normal  [] Abnormal-     Neck: [x] No visualized mass     Pulmonary/Chest: [x] Respiratory effort normal.  [x] No visualized signs of difficulty breathing or respiratory distress        [] Abnormal-      Musculoskeletal:   [] Normal gait with no signs of ataxia         [] Normal range of motion of neck        [] Abnormal- Neurological:        [x] No Facial Asymmetry (Cranial nerve 7 motor function) (limited exam to video visit)          [x] No gaze palsy        [] Abnormal-         Skin:        [x] No significant exanthematous lesions or discoloration noted on facial skin         [] Abnormal-            Psychiatric:       [x] Normal Affect [] No Hallucinations        [x] Abnormal- slow in his responses at times and circumstantial.   Other pertinent observable physical exam findings-     ASSESSMENT/PLAN:  1. Memory problem  2. Vascular dementia without behavioral disturbance ([facility] [address]  He is worse with poor memory and asks the same questions over and over. He wishes to drive. He is not hallucinating as much. He not having vivid dreams. He is felt to be doing better on the Depakote  mg daily. No falls. His behavior is good during the day. He needs to be redirected. He is on vitamin D supplements. He is now on antiplatelets. He is off the seroquel by his primary care provider. The patient and his wife were counseled again about the importance of him not driving. His wife states that he has been giving him a hard time. I reemphasized to him the importance of no driving due to his dementia and him being a risk for himself and others. He acknowledged, however his wife was also educated about the need for redirection. after detailed discussion with the patient and his wife we agreed on the following plan. ?  ?  Plan:  1. Continue with Depakote  mg daily. Refills given. 2. Continue with Aspirin 81 mg daily  3. Continue with folic acid 1 mg daily   4. Continue with Aricept 10 mg nightly. Refills given  5. No driving   6. Continue taking Vitamin D   7. Follow up in 4-5 months or sooner if needed. 8. Call if any questions or concerns    Return in about 5 months (around 6/25/2021). Jeff Zendejas is a 81 y.o. male being evaluated by a Virtual Visit (video visit) encounter to address concerns as mentioned above.   RENNY caregiver was present when appropriate. Due to this being a TeleHealth encounter (During GPNGB-79 public health emergency), evaluation of the following organ systems was limited: Vitals/Constitutional/EENT/Resp/CV/GI//MS/Neuro/Skin/Heme-Lymph-Imm. Pursuant to the emergency declaration under the [address] [place] [address], [address] [place] [address] and the [employer] Act, this Virtual Visit was conducted with patient's (and/or legal guardian's) consent, to reduce the patient's risk of exposure to COVID-19 and provide necessary medical care. The patient (and/or legal guardian) has also been advised to contact this office for worsening conditions or problems, and seek emergency medical treatment and/or call 911 if deemed necessary. Patient identification was verified at the start of the visit: Yes  Total time spent on this encounter: 24 min  Services were provided through a video synchronous discussion virtually to substitute for in-person clinic visit. Patient and provider were located at their individual homes. --Meredith Baum MD on 1/25/2021 at 1:23 PM    An electronic signature was used to authenticate this note.

## 2021-04-02 LAB
ABSOLUTE BASO #: 0 X10E9/L (ref 0–0.2)
ABSOLUTE EOS #: 0.3 X10E9/L (ref 0–0.4)
ABSOLUTE LYMPH #: 1.3 X10E9/L (ref 1–3.5)
ABSOLUTE MONO #: 0.5 X10E9/L (ref 0–0.9)
ABSOLUTE NEUT #: 2.6 X10E9/L (ref 1.5–6.6)
ALBUMIN SERPL-MCNC: 3.8 G/DL (ref 3.2–5.3)
ALK PHOSPHATASE: 51 U/L (ref 39–130)
ALT SERPL-CCNC: 11 U/L (ref 0–40)
ANION GAP SERPL CALCULATED.3IONS-SCNC: 9 MMOL/L (ref 5–15)
AST SERPL-CCNC: 19 U/L (ref 0–41)
BASOPHILS RELATIVE PERCENT: 0.8 %
BILIRUB SERPL-MCNC: 0.4 MG/DL (ref 0.3–1.2)
BUN BLDV-MCNC: 29 MG/DL (ref 5–27)
CALCIUM SERPL-MCNC: 8.8 MG/DL (ref 8.5–10.5)
CHLORIDE BLD-SCNC: 106 MMOL/L (ref 98–109)
CHOLESTEROL/HDL RATIO: 4 (ref 1–5)
CHOLESTEROL: 137 MG/DL (ref 150–200)
CO2: 29 MMOL/L (ref 22–32)
CREAT SERPL-MCNC: 1.5 MG/DL (ref 0.6–1.3)
EGFR AFRICAN AMERICAN: 54 ML/MIN/1.73SQ.M
EGFR IF NONAFRICAN AMERICAN: 45 ML/MIN/1.73SQ.M
EOSINOPHILS RELATIVE PERCENT: 5.6 %
GLUCOSE: 93 MG/DL (ref 65–99)
HCT VFR BLD CALC: 36 % (ref 39–49)
HDLC SERPL-MCNC: 34 MG/DL
HEMOGLOBIN: 12.4 G/DL (ref 13–17)
LDL CHOLESTEROL CALCULATED: 77 MG/DL
LDL/HDL RATIO: 2.3
LYMPHOCYTE %: 27.1 %
MCH RBC QN AUTO: 32.4 PG (ref 27–34)
MCHC RBC AUTO-ENTMCNC: 34.4 G/DL (ref 32–36)
MCV RBC AUTO: 94 FL (ref 80–100)
MONOCYTES # BLD: 10.5 %
NEUTROPHILS RELATIVE PERCENT: 56 %
PDW BLD-RTO: 12.2 % (ref 11.5–15)
PLATELETS: 151 X10E9/L (ref 150–450)
PMV BLD AUTO: 8.6 FL (ref 7–12)
POTASSIUM SERPL-SCNC: 4 MMOL/L (ref 3.5–5)
PSA, ULTRASENSITIVE: 1.12 NG/ML (ref 0–4)
RBC: 3.83 X10E12/L (ref 4.1–5.7)
SODIUM BLD-SCNC: 144 MMOL/L (ref 134–146)
T4 FREE: 0.77 NG/DL (ref 0.61–1.6)
TOTAL PROTEIN: 7.1 G/DL (ref 6–8)
TRIGL SERPL-MCNC: 128 MG/DL (ref 27–150)
TSH SERPL DL<=0.05 MIU/L-ACNC: 2.87 UIU/ML (ref 0.49–4.67)
VITAMIN D 1,25-DIHYDROXY: 39.2 PG/ML (ref 20–82)
VLDLC SERPL CALC-MCNC: 26 MG/DL (ref 0–30)
WBC: 4.7 X10E9/L (ref 4–11)

## 2021-06-04 ENCOUNTER — OFFICE VISIT (OUTPATIENT)
Dept: NEUROLOGY | Age: 86
End: 2021-06-04
Payer: MEDICARE

## 2021-06-04 VITALS
WEIGHT: 176 LBS | SYSTOLIC BLOOD PRESSURE: 120 MMHG | HEIGHT: 70 IN | DIASTOLIC BLOOD PRESSURE: 62 MMHG | HEART RATE: 63 BPM | OXYGEN SATURATION: 97 % | BODY MASS INDEX: 25.2 KG/M2

## 2021-06-04 DIAGNOSIS — R44.3 HALLUCINATIONS: ICD-10-CM

## 2021-06-04 DIAGNOSIS — R41.3 MEMORY PROBLEM: Primary | ICD-10-CM

## 2021-06-04 DIAGNOSIS — F01.50 VASCULAR DEMENTIA WITHOUT BEHAVIORAL DISTURBANCE (HCC): ICD-10-CM

## 2021-06-04 PROCEDURE — 99213 OFFICE O/P EST LOW 20 MIN: CPT | Performed by: PSYCHIATRY & NEUROLOGY

## 2021-06-04 PROCEDURE — 1123F ACP DISCUSS/DSCN MKR DOCD: CPT | Performed by: PSYCHIATRY & NEUROLOGY

## 2021-06-04 PROCEDURE — G8417 CALC BMI ABV UP PARAM F/U: HCPCS | Performed by: PSYCHIATRY & NEUROLOGY

## 2021-06-04 PROCEDURE — G8427 DOCREV CUR MEDS BY ELIG CLIN: HCPCS | Performed by: PSYCHIATRY & NEUROLOGY

## 2021-06-04 PROCEDURE — 4040F PNEUMOC VAC/ADMIN/RCVD: CPT | Performed by: PSYCHIATRY & NEUROLOGY

## 2021-06-04 PROCEDURE — 1036F TOBACCO NON-USER: CPT | Performed by: PSYCHIATRY & NEUROLOGY

## 2021-06-04 NOTE — PROGRESS NOTES
NEUROLOGY OUT PATIENT FOLLOW UP NOTE:  6/4/20211:16 PM    Salma Vásquez is here for follow up for   Patient Active Problem List   Diagnosis    Erectile dysfunction    BPH with urinary obstruction    Lower urinary tract symptoms (LUTS)    Nocturia    Urinary retention    Feeling of incomplete bladder emptying    Hyperlipemia    Frequency of urination    ETD (eustachian tube dysfunction)    Dysphagia    Dysphonia    GERD (gastroesophageal reflux disease)    Zenker diverticula    Presbyesophagus    Voice disturbance    Disease of larynx    Subjective tinnitus    Sensorineural hearing loss, bilateral    Otalgia of left ear    supine Hypertension    CKD (chronic kidney disease) stage 2, GFR 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 ml/min    Metabolic bone disease    Insomnia    Alcohol withdrawal (HCC)    Falls    Hist of Near syncope    Orthostatic hypotension    Low serum cortisol level (HCC)    Intractable back pain    Frequent falls    Hypokalemia    Alcohol intoxication ([address]    Alcohol intoxication delirium ([address]    Acute alcohol intoxication with alcoholism, with unspecified complication ([address]    Severe malnutrition ([address]    Delirium tremens ([address]    Dementia ([address]          Follow up for dementia without behavioral disturbance, he is having vivid dreams. He can stagger with his ambulation in am, is confused in am. He denies hallucination, but wife reports he talks during sleep. He is asking more repeated questions and can get redirected. He is on vitamin D supplements. He is now on antiplatelets. No behavioral disturbance. He is on aspirin and vitamin D. He comes in with his wife. ROS:  Respiratory : no cough, no shortness of breath  Cardiac: no chest pain. No palpitations.   Renal : no flank pain, no hematuria    Skin: no rash      No Known Allergies    Current Outpatient Medications:     divalproex (DEPAKOTE ER) 250 MG extended release tablet, take 1 tablet by mouth once daily, Disp: 30 tablet, Rfl: 5    folic acid (FOLVITE) 1 MG tablet, Take 1 tablet by mouth daily, Disp: 90 tablet, Rfl: 3    hydrochlorothiazide (HYDRODIURIL) 25 MG tablet, , Disp: , Rfl:     mirtazapine (REMERON) 15 MG tablet, , Disp: , Rfl:     aspirin 81 MG tablet, Take 81 mg by mouth daily, Disp: , Rfl:     Cholecalciferol (VITAMIN D3) 3000 units TABS, Take by mouth, Disp: , Rfl:     atorvastatin (LIPITOR) 10 MG tablet, Take 1 tablet by mouth daily, Disp: 30 tablet, Rfl: 3    oxybutynin (DITROPAN-XL) 10 MG extended release tablet, Take 1 tablet by mouth nightly, Disp: 30 tablet, Rfl: 3    donepezil (ARICEPT) 10 MG tablet, Take 1 tablet by mouth nightly, Disp: 30 tablet, Rfl: 3    metoprolol tartrate (LOPRESSOR) 50 MG tablet, Take 1 tablet by mouth daily, Disp: 60 tablet, Rfl: 3    docusate (COLACE, DULCOLAX) 100 MG CAPS, Take 100 mg by mouth daily, Disp: , Rfl:     NIFEdipine (ADALAT CC) 30 MG extended release tablet, Take 1 tablet by mouth every morning, Disp: 30 tablet, Rfl: 3    potassium chloride (KLOR-CON) 10 MEQ extended release tablet, Take 1 tablet by mouth daily, Disp: 60 tablet, Rfl: 3    doxazosin (CARDURA) 2 MG tablet, Take 2 mg by mouth daily, Disp: , Rfl:     potassium chloride (KLOR-CON M10) 10 MEQ extended release tablet, Take 1 tablet by mouth daily, Disp: 30 tablet, Rfl: 12    celecoxib (CELEBREX) 200 MG capsule, Take 1 capsule by mouth daily, Disp: 60 capsule, Rfl: 3    midodrine (PROAMATINE) 10 MG tablet, Take 1 tablet by mouth 2 times daily as needed (SBP <110) (Patient not taking: Reported on 6/4/2021), Disp: , Rfl:     Multiple Vitamin (MULTIVITAMIN) tablet, Take 1 tablet by mouth daily (Patient not taking: Reported on 6/4/2021), Disp: , Rfl: 0    pantoprazole (PROTONIX) 40 MG tablet, Take 1 tablet by mouth every morning (before breakfast) (Patient not taking: Reported on 6/4/2021), Disp: 30 tablet, Rfl: 3    magnesium hydroxide (MILK OF MAGNESIA) 400 MG/5ML suspension, Take 30 mLs by mouth daily as deficits. Power 5/5 Throughout symmetric, Reflexes are decreased symmetric. Long tracts are decreased. Cerebellar exam is Intact. Sensory exam is intact to light touch. Gait is guarded intact. Musculoskeletal:  Has no hand arthritis, no limitation of ROM in any of the four extremities. Lower extremities no edema  The abdomen is soft,  intact bowel sounds. DATA:      Results for orders placed or performed in visit on 04/01/21   CBC   Result Value Ref Range    WBC 4.7 4.0 - 11.0 X10E9/L    RBC 3.83 (L) 4.10 - 5.70 X10E12/L    Hemoglobin 12.4 (L) 13.0 - 17.0 g/dL    Hematocrit 36.0 (L) 39 - 49 %    MCV 94 80 - 100 fL    MCH 32.4 27 - 34 pg    MCHC 34.4 32 - 36 g/dL    RDW 12.2 11.5 - 15.0 %    Platelets 451 533 - 990 X10E9/L    MPV 8.6 7 - 12 fL    Neutrophils % 56.0 %    Absolute Neut # 2.6 1.5 - 6.6 X10E9/L    Lymphocyte % 27.1 %    Absolute Lymph # 1.3 1.0 - 3.5 X10E9/L    Monocytes 10.5 %    Absolute Mono # 0.5 0 - 0.9 X10E9/L    Eosinophils % 5.6 %    Absolute Eos # 0.3 0.0 - 0.4 X10E9/L    Basophils % 0.8 %    Absolute Baso # 0.0 0.0 - 0.2 X10E9/L   Comprehensive Metabolic Panel   Result Value Ref Range    Glucose 93 65 - 99 mg/dL    BUN 29 (H) 5 - 27 mg/dL    CREATININE 1.50 (H) 0.60 - 1.30 mg/dL    eGFR African American 54 (L) >59 ml/min/1.73sq.m    EGFR IF NonAfrican American 45 (L) >59 ml/min/1.73sq. m    Calcium 8.8 8.5 - 10.5 mg/dL    Sodium 144 134 - 146 mmol/L    Potassium 4.0 3.5 - 5.0 mmol/L    Chloride 106 98 - 109 mmol/L    CO2 29 22 - 32 mmol/L    Anion Gap 9 5 - 15 mmol/L    Total Bilirubin 0.4 0.3 - 1.2 mg/dL    Alk Phosphatase 51 39 - 130 U/L    AST 19 0 - 41 U/L    ALT 11 0 - 40 U/L    Total Protein 7.1 6.0 - 8.0 g/dL    Albumin 3.8 3.2 - 5.3 g/dL   PSA 3rd Generation   Result Value Ref Range    PSA, Ultrasensitive 1.12 0.00 - 4.00 ng/mL   TSH without Reflex   Result Value Ref Range    TSH 2.87 0.49 - 4.67 uIU/mL   FREE T4   Result Value Ref Range    T4 Free 0.77 0.61 - 1.60 ng/dL Lipid Panel w/ Reflex Direct LDL   Result Value Ref Range    Cholesterol 137 (L) 150 - 200 mg/dL    Triglycerides 128 27 - 150 mg/dL    HDL 34 (L) >39 mg/dL    LDL Calculated 77 <130 mg/dL    VLDL Cholesterol Calculated 26 0 - 30 mg/dL    LDl/HDL Ratio 2.3 <3.5    Chol/HDL Ratio 4.0 1.0 - 5.0   Vitamin D 1,25 Dihydroxy   Result Value Ref Range    Vit D, 1,25-Dihydroxy 39.2 20.0 - 82.0 PG/ML            MRI Brain WO Contrast  Narrative  PROCEDURE: MRI BRAIN WO CONTRAST  CLINICAL INFORMATION: falls  COMPARISON: No prior MRI for comparison; comparison is made to the CT of the brain of 25 April 2017. TECHNIQUE: Using a 1.5 Connie Siemens scanner, axial diffusion weighted echoplanar imaging, T2-weighted turbo spin-echo, fat-saturated T2-weighted fluid attenuated inversion recovery, and proton density weighted gradient recall images were obtained  through the entire brain. Axial, coronal, and sagittal FAST T1-weighted gradient recall images were also obtained. No IV contrast administered. BLADE software was used. FINDINGS: Motion limits the examination. Mild global atrophic changes are again demonstrated. There are no areas of restricted diffusion. There are no findings of blood products in the brain parenchyma, other than remote microhemorrhage of the right frontal lobe white matter. There are partially confluent symmetric hyperintense T2/hypointense T1 signal abnormalities in the white matter of both cerebral hemispheres, consistent with mild small vessel disease sequelae. There are mildly prominent atrophic changes of both medial temporal lobe parenchyma regions. There are slightly prominent T2 signal characteristics, hyperintense, in the FLAIR sequence in the same regions, which could represent artifact versus post ictal  features. No restricted diffusion. Limited detail of the cervical canal shows no definite acute findings. Poorly characterized degenerative changes are present.  Sella contents are within acceptable limits. Vascular structures grossly show nothing concerning. Developmentally hypoplastic left vertebral artery implied. Paranasal sinuses, mastoid air cells, and middle ear cavities are grossly clear. Impression  NO EVIDENCE OF ACUTE BRAIN PROCESS. MILD SMALL VESSEL DISEASE SEQUELAE. NONSPECIFIC FINDINGS OF THE BILATERAL MEDIAL TEMPORAL LOBE PARENCHYMA. **This report has been created using voice recognition software. It may contain minor errors which are inherent in voice recognition technology. **  Final report electronically signed by Dr. Krysta Izquierdo on 4/26/2017 2:45 PM    Results for orders placed during the hospital encounter of 05/02/19    MRI BRAIN W WO CONTRAST  Narrative  PROCEDURE: MRI BRAIN W WO CONTRAST  CLINICAL INFORMATION: Memory problem. Confusion  COMPARISON: MRI of the brain dated April 26, 2017. TECHNIQUE: Multiplanar and multiple spin echo T1 and T2-weighted images were obtained through the brain before and after the administration of 15 mL ProHance intravenous contrast.  FINDINGS:  There is prominence of the sulcal spaces and ventricular system secondary to generalized, age-related parenchymal volume loss. Moderate patchy foci of hyperintense T2 signal are again noted throughout the periventricular and deep cerebral white matter  which likely correspond to sequela of chronic microvascular ischemic change. There is a stable appearance of subtle hyperintense T2 signal along the medial temporal lobes bilaterally. This is nonspecific and may be artifactual. No restricted diffusion is  identified. Punctate foci of susceptibility are again noted within the posterior right frontal lobe and right parietal lobe which may correspond to remote microhemorrhages versus small cavernous malformations. The ventricles are midline and stable in size and morphology without evidence of hydrocephalus. No mass, mass effect or extra-axial fluid collection is identified.  The basal cisterns and visualized vascular flow voids are patent. The pituitary, brain  stem and cervical medullary junction are within normal limits. No abnormal intracranial enhancement is identified. The visualized orbits and temporal bone structures are unremarkable. The paranasal sinuses are within normal limits. Impression  Stable senescent changes are present including moderate sequela of chronic microvascular ischemic angiopathy within the periventricular and deep cerebral white matter. No acute intracranial abnormality is identified. **This report has been created using voice recognition software. It may contain minor errors which are inherent in voice recognition technology. **      Final report electronically signed by Dr. Rashida Herbert on 5/2/2019 1:38 PM      CT HEAD WO CONTRAST  Narrative  PROCEDURE: CT HEAD WO CONTRAST  CLINICAL INFORMATION: fall with head injury. COMPARISON: Noncontrast brain CT dated 2/26/2018  TECHNIQUE: Noncontrast 5 mm axial images were obtained through the brain. All CT scans at this facility use dose modulation, iterative reconstruction, and/or weight-based dosing when appropriate to reduce radiation dose to as low as reasonably achievable. FINDINGS:  Brain: There is no acute ischemic infarct, hemorrhage, midline shift, mass, or mass effect. Mild to moderate deep white matter small vessel chronic ischemic changes are noted. There is age appropriate cortical atrophy. Ventricles: The ventricles, cisterns and cortical sulci are enlarged concordant with the mild to moderate degree of age-appropriate atrophy. No obstructive hydrocephalus. Skull base/calvarium: Unremarkable  Scalp soft tissues: Unremarkable  Intraorbital contents: Unremarkable  Sinuses: Unremarkable  Mastoids: Unremarkable  Impression  No acute ischemic infarct, hemorrhage, or mass effect. Aging changes as discussed above. **This report has been created using voice recognition software.  It may contain minor errors which are inherent in voice recognition technology. **  Final report electronically signed by Dr. Mo Whitmore on 3/9/2018 2:24 AM         Assessment:     Diagnosis Orders   1. Memory problem     2. 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 Hallucinations        He is worse with poor memory. He can stagger with his ambulation in am, is confused in am. He denies hallucination, his wife reports he talks during sleep. He not having vivid dreams. He is felt to be doing better on the Depakote  mg daily. No falls. His behavior is good during the day. He needs to be redirected. He is on vitamin D supplements. He is now on antiplatelets. He is off the seroquel by his primary care provider. The patient and his wife were counseled again about the importance of him not driving, the patient was not agreeable. After detailed discussion with the patient and his wife we agreed on the following plan. Plan:  1. EEG  2. B12, B6, Folate. 3. Continue with Depakote  mg daily. Refills given. 4. Continue with Aspirin 81 mg daily  5. Continue with folic acid 1 mg daily   6. Continue with Aricept 10 mg nightly. Refills given  7. Consider sleep evaluation. 8. No driving   9. Continue taking Vitamin D   10. Follow up in 4-5 months or sooner if needed. 11. Call if any questions or concerns      Total time 22 min.      Jossue Kee MD

## 2021-06-11 ENCOUNTER — TELEPHONE (OUTPATIENT)
Dept: ENT CLINIC | Age: 86
End: 2021-06-11

## 2021-06-11 NOTE — TELEPHONE ENCOUNTER
Anali Rodriguez no showed his appointment [email or web address] A message was left for him to call [email or web address] to reschedule the appointment. A letter was mailed to Anali Rodriguez in regards to rescheduling his appointment.

## 2021-06-17 ENCOUNTER — HOSPITAL ENCOUNTER (OUTPATIENT)
Dept: NEUROLOGY | Age: 86
Discharge: HOME | End: 2021-06-17
Payer: MEDICARE

## 2021-06-17 DIAGNOSIS — F01.50 VASCULAR DEMENTIA WITHOUT BEHAVIORAL DISTURBANCE (HCC): ICD-10-CM

## 2021-06-17 DIAGNOSIS — R41.3 MEMORY PROBLEM: ICD-10-CM

## 2021-06-17 DIAGNOSIS — R44.3 HALLUCINATIONS: ICD-10-CM

## 2021-06-17 PROCEDURE — 95816 EEG AWAKE AND DROWSY: CPT

## 2021-06-17 NOTE — PROCEDURES
[facility] Laboratory Technician worksheet       EEG Date: 2021    Name: Geno An   : 1935   Age: 81 y.o. SEX: male    Room: OP    MRN: [id number]     CSN: [id number]    Ordering Provider: Kalpana Javier  EEG Number: [id number] Time of Test:      Hand: Right   Sedation: No    H.V. Done: No AGE PROTOCOL Photic: No    Sleep: No   Drowsy: Yes   Sleep Deprived: No    Seizures observed: no    Mentality: alert, forgetful       Clinical History: MEMORY PROBLEM, VASCULAR DEMENTIA WITHOUT BEHAVIORAL DISTURBANCE, HALLUCINATIONS. MRI OF BRAIN 2019  Impression        Stable senescent changes are present including moderate sequela of chronic microvascular ischemic angiopathy within the periventricular and deep cerebral white matter. No acute intracranial abnormality is identified. Past Medical History:       Diagnosis Date    BPH (benign prostatic hypertrophy)     CKD (chronic kidney disease) stage 2, GFR 60-89 ml/min     Dementia (HCC)     Erectile dysfunction     Frequent falls     [place]     Hyperlipidemia     Hypertension     Metabolic bone disease     Orthostatic hypotension          Prior to Admission medications    Medication Sig Start Date End Date Taking?  Authorizing Provider   divalproex (DEPAKOTE ER) 250 MG extended release tablet take 1 tablet by mouth once daily 21   Shruti Anaya MD   folic acid (FOLVITE) 1 MG tablet Take 1 tablet by mouth daily 20   ZORAN Mabry CNP   hydrochlorothiazide (HYDRODIURIL) 25 MG tablet  19   Historical Provider, MD   mirtazapine (REMERON) 15 MG tablet  19   Historical Provider, MD   aspirin 81 MG tablet Take 81 mg by mouth daily    Historical Provider, MD   Cholecalciferol (VITAMIN D3) 3000 units TABS Take by mouth    Historical Provider, MD   midodrine (PROAMATINE) 10 MG tablet Take 1 tablet by mouth 2 times daily as needed (SBP <110)  Patient not taking: Reported on 6/4/2021 3/6/18   Gerhard Garcia MD   atorvastatin (LIPITOR) 10 MG tablet Take 1 tablet by mouth daily 3/2/18   Davionbedkaia Heaton, MD   oxybutynin (DITROPAN-XL) 10 MG extended release tablet Take 1 tablet by mouth nightly 3/2/18   [facility] Sud[facility], MD   Multiple Vitamin (MULTIVITAMIN) tablet Take 1 tablet by mouth daily  Patient not taking: Reported on 6/4/2021 3/3/18   Davionbedkaia Heaton, MD   donepezil (ARICEPT) 10 MG tablet Take 1 tablet by mouth nightly 3/2/18   bedee Balloon, MD   metoprolol tartrate (LOPRESSOR) 50 MG tablet Take 1 tablet by mouth daily 3/3/18   [facility] Balloon, MD   docusate (COLACE, DULCOLAX) 100 MG CAPS Take 100 mg by mouth daily 3/3/18   [facility] Balloon, MD   pantoprazole (PROTONIX) 40 MG tablet Take 1 tablet by mouth every morning (before breakfast)  Patient not taking: Reported on 6/4/2021 3/3/18   [facility] He[facility] MD   NIFEdipine (ADALAT CC) 30 MG extended release tablet Take 1 tablet by mouth every morning 3/3/18   [facility] Balloon, MD   magnesium hydroxide (MILK OF MAGNESIA) 400 MG/5ML suspension Take 30 mLs by mouth daily as needed for Constipation  Patient not taking: Reported on 6/4/2021 3/2/18   Karol Heaton MD   potassium chloride (KLOR-CON) 10 MEQ extended release tablet Take 1 tablet by mouth daily 3/3/18   Karol Heaton, MD   dextrose 50 % solution Infuse 25 mLs intravenously as needed (Blood glucose less than 70 mg/dL and patient NOT ALERT or NPO.)  Patient not taking: Reported on 6/4/2021 3/2/18   Karol Heaton MD   Dextrose-Sodium Chloride (DEXTROSE 5 % AND 0.9 % NACL) 5-0.9 % infusion Infuse 100 mL/hr intravenously as needed (Low blood sugar)  Patient not taking: Reported on 6/4/2021 3/2/18   Karol Heaton MD   potassium chloride (KLOR-CON M) 20 MEQ TBCR extended release tablet Take 2 tablets by mouth once for 1 dose 3/2/18 3/2/18  Karol Heaton MD   vitamin B-1 (THIAMINE) 100 MG tablet Take 100 mg by mouth daily  Patient not taking: Reported on 6/4/2021    Historical Provider, MD doxazosin (CARDURA) 2 MG tablet Take 2 mg by mouth daily    Historical Provider, MD   lansoprazole (PREVACID) 15 MG delayed release capsule Take 30 mg by mouth daily  Patient not taking: Reported on 6/4/2021    Historical Provider, MD   potassium chloride (KLOR-CON M10) 10 MEQ extended release tablet Take 1 tablet by mouth daily 6/21/17   Cassie Schumacher APRN - CNP   celecoxib (CELEBREX) 200 MG capsule Take 1 capsule by mouth daily 4/28/17   Emili Lopez MD   acetaminophen (TYLENOL) 325 MG tablet Take 2 tablets by mouth every 4 hours as needed for Pain  Patient not taking: Reported on 6/4/2021 4/28/17   Emili Lopez MD       Technician: Gifty Santana RCP 6/17/2021

## 2021-06-18 PROCEDURE — 95819 EEG AWAKE AND ASLEEP: CPT | Performed by: PSYCHIATRY & NEUROLOGY

## 2021-06-18 NOTE — PROCEDURES
[address] [id number]                          ELECTROENCEPHALOGRAM REPORT    PATIENT NAME: Gomez To                    :        1935  MED REC NO:   [id number]                           ROOM:  ACCOUNT NO:   [de-identified]                           ADMIT DATE: 2021  PROVIDER:     Maribell Harley. Sunitha Franco MD    DATE OF EE2021    REFERRING PROVIDER:  Maribell Harley. Sunitha Franco MD    CLINICAL HISTORY:  An 69-year-old male presenting with memory problem,  visual disturbance, hallucination, evaluate for seizure. CLINICAL INTERPRETATION:  This is a 17-channel EEG performed without  sleep deprivation. Hyperventilation was not performed. Photic  stimulation was not performed. The patient is described as alert. The background rhythm activity is noted to be 8 Hz in the posterior  parietal area, symmetric, attenuates with eye opening. Hyperventilation  was not performed. Lead and muscle artifacts were noted limiting  quality of data obtained. Photic stimulation was not performed due to  lack of the patient's cooperation. Excessive slowing was seen in theta  range suggestive of mild cortical dysfunction. Light stages of sleep  are seen during the recording. IMPRESSION:  This is a mildly abnormal EEG due to the excessive slowing  seen in theta range suggestive of cortical dysfunction such as seen with  encephalopathy. However, there was no evidence of epileptiform activity  appreciated.         Zoraida Aparicio MD    D: 2021 12:42:01       T: 2021 12:54:42     [email or web address]  Job#: [id number]     Doc#: [id number]    CC:

## 2021-06-19 LAB
FOLATE: >25 NG/ML
PSA, ULTRASENSITIVE: 1.25 NG/ML (ref 0–4)
VITAMIN B-12: 290 PG/ML (ref 180–914)
VITAMIN B6: 95 NMOL/L (ref 20–125)

## 2021-06-25 ENCOUNTER — TELEPHONE (OUTPATIENT)
Dept: NEUROLOGY | Age: 86
End: 2021-06-25

## 2021-06-25 NOTE — RESULT ENCOUNTER NOTE
Please ask patient to start B12 sublingual supplements over the counter daily not multivitamins (preferably more than 3000mcg/day). Level is low 290. Repeat in two to three months.

## 2021-07-14 ENCOUNTER — HOSPITAL ENCOUNTER (OUTPATIENT)
Age: 86
Setting detail: OBSERVATION
Discharge: HOME | End: 2021-07-15
Attending: EMERGENCY MEDICINE | Admitting: INTERNAL MEDICINE
Payer: MEDICARE

## 2021-07-14 ENCOUNTER — APPOINTMENT (OUTPATIENT)
Dept: GENERAL RADIOLOGY | Age: 86
End: 2021-07-14
Payer: MEDICARE

## 2021-07-14 ENCOUNTER — APPOINTMENT (OUTPATIENT)
Dept: MRI IMAGING | Age: 86
End: 2021-07-14
Payer: MEDICARE

## 2021-07-14 ENCOUNTER — APPOINTMENT (OUTPATIENT)
Dept: CT IMAGING | Age: 86
End: 2021-07-14
Payer: MEDICARE

## 2021-07-14 DIAGNOSIS — G45.9 TIA (TRANSIENT ISCHEMIC ATTACK): Primary | ICD-10-CM

## 2021-07-14 LAB
ALBUMIN SERPL-MCNC: 4 G/DL (ref 3.5–5.1)
ALP BLD-CCNC: 48 U/L (ref 38–126)
ALT SERPL-CCNC: 9 U/L (ref 11–66)
ANION GAP SERPL CALCULATED.3IONS-SCNC: 7 MEQ/L (ref 8–16)
AST SERPL-CCNC: 18 U/L (ref 5–40)
BASOPHILS # BLD: 0.8 %
BASOPHILS ABSOLUTE: 0 THOU/MM3 (ref 0–0.1)
BILIRUB SERPL-MCNC: 0.5 MG/DL (ref 0.3–1.2)
BILIRUBIN DIRECT: < 0.2 MG/DL (ref 0–0.3)
BILIRUBIN URINE: NEGATIVE
BLOOD, URINE: NEGATIVE
BUN BLDV-MCNC: 23 MG/DL (ref 7–22)
CALCIUM SERPL-MCNC: 8.9 MG/DL (ref 8.5–10.5)
CHARACTER, URINE: CLEAR
CHLORIDE BLD-SCNC: 109 MEQ/L (ref 98–111)
CO2: 27 MEQ/L (ref 23–33)
COLOR: YELLOW
CREAT SERPL-MCNC: 1.3 MG/DL (ref 0.4–1.2)
EKG ATRIAL RATE: 61 BPM
EKG P AXIS: 59 DEGREES
EKG P-R INTERVAL: 190 MS
EKG Q-T INTERVAL: 420 MS
EKG QRS DURATION: 84 MS
EKG QTC CALCULATION (BAZETT): 422 MS
EKG R AXIS: -28 DEGREES
EKG T AXIS: 59 DEGREES
EKG VENTRICULAR RATE: 61 BPM
EOSINOPHIL # BLD: 3.8 %
EOSINOPHILS ABSOLUTE: 0.2 THOU/MM3 (ref 0–0.4)
ERYTHROCYTE [DISTWIDTH] IN BLOOD BY AUTOMATED COUNT: 11.5 % (ref 11.5–14.5)
ERYTHROCYTE [DISTWIDTH] IN BLOOD BY AUTOMATED COUNT: 39.7 FL (ref 35–45)
GFR SERPL CREATININE-BSD FRML MDRD: 63 ML/MIN/1.73M2
GLUCOSE BLD-MCNC: 103 MG/DL (ref 70–108)
GLUCOSE BLD-MCNC: 95 MG/DL (ref 70–108)
GLUCOSE, URINE: NEGATIVE MG/DL
HCT VFR BLD CALC: 36.6 % (ref 42–52)
HEMOGLOBIN: 11.7 GM/DL (ref 14–18)
IMMATURE GRANS (ABS): 0.01 THOU/MM3 (ref 0–0.07)
IMMATURE GRANULOCYTES: 0.3 %
KETONES, URINE: NEGATIVE
LEUKOCYTE EST, POC: NEGATIVE
LYMPHOCYTES # BLD: 25.4 %
LYMPHOCYTES ABSOLUTE: 1 THOU/MM3 (ref 1–4.8)
MCH RBC QN AUTO: 30.5 PG (ref 26–33)
MCHC RBC AUTO-ENTMCNC: 32 GM/DL (ref 32.2–35.5)
MCV RBC AUTO: 95.6 FL (ref 80–94)
MONOCYTES # BLD: 9.8 %
MONOCYTES ABSOLUTE: 0.4 THOU/MM3 (ref 0.4–1.3)
NITRITE, URINE: NEGATIVE
NUCLEATED RED BLOOD CELLS: 0 /100 WBC
OSMOLALITY CALCULATION: 288.5 MOSMOL/KG (ref 275–300)
PH UA: 7.5 (ref 5–9)
PLATELET # BLD: 145 THOU/MM3 (ref 130–400)
PMV BLD AUTO: 10.3 FL (ref 9.4–12.4)
POTASSIUM REFLEX MAGNESIUM: 4.2 MEQ/L (ref 3.5–5.2)
PRO-BNP: 130.7 PG/ML (ref 0–1800)
PROTEIN UA: NEGATIVE MG/DL
RBC # BLD: 3.83 MILL/MM3 (ref 4.7–6.1)
SEG NEUTROPHILS: 59.9 %
SEGMENTED NEUTROPHILS ABSOLUTE COUNT: 2.4 THOU/MM3 (ref 1.8–7.7)
SODIUM BLD-SCNC: 143 MEQ/L (ref 135–145)
SPECIFIC GRAVITY UA: > 1.03 (ref 1–1.03)
TOTAL PROTEIN: 6.9 G/DL (ref 6.1–8)
TROPONIN T: < 0.01 NG/ML
UROBILINOGEN, URINE: 1 EU/DL (ref 0–1)
WBC # BLD: 4 THOU/MM3 (ref 4.8–10.8)

## 2021-07-14 PROCEDURE — 80076 HEPATIC FUNCTION PANEL: CPT

## 2021-07-14 PROCEDURE — 93010 ELECTROCARDIOGRAM REPORT: CPT | Performed by: NUCLEAR MEDICINE

## 2021-07-14 PROCEDURE — 6360000004 HC RX CONTRAST MEDICATION: Performed by: EMERGENCY MEDICINE

## 2021-07-14 PROCEDURE — 6360000002 HC RX W HCPCS: Performed by: REGISTERED NURSE

## 2021-07-14 PROCEDURE — 80048 BASIC METABOLIC PNL TOTAL CA: CPT

## 2021-07-14 PROCEDURE — 6370000000 HC RX 637 (ALT 250 FOR IP): Performed by: STUDENT IN AN ORGANIZED HEALTH CARE EDUCATION/TRAINING PROGRAM

## 2021-07-14 PROCEDURE — 70496 CT ANGIOGRAPHY HEAD: CPT

## 2021-07-14 PROCEDURE — 85025 COMPLETE CBC W/AUTO DIFF WBC: CPT

## 2021-07-14 PROCEDURE — G0378 HOSPITAL OBSERVATION PER HR: HCPCS

## 2021-07-14 PROCEDURE — 83880 ASSAY OF NATRIURETIC PEPTIDE: CPT

## 2021-07-14 PROCEDURE — 96372 THER/PROPH/DIAG INJ SC/IM: CPT

## 2021-07-14 PROCEDURE — 70551 MRI BRAIN STEM W/O DYE: CPT

## 2021-07-14 PROCEDURE — 71045 X-RAY EXAM CHEST 1 VIEW: CPT

## 2021-07-14 PROCEDURE — 2580000003 HC RX 258: Performed by: STUDENT IN AN ORGANIZED HEALTH CARE EDUCATION/TRAINING PROGRAM

## 2021-07-14 PROCEDURE — 84484 ASSAY OF TROPONIN QUANT: CPT

## 2021-07-14 PROCEDURE — 70498 CT ANGIOGRAPHY NECK: CPT

## 2021-07-14 PROCEDURE — 81003 URINALYSIS AUTO W/O SCOPE: CPT

## 2021-07-14 PROCEDURE — 36415 COLL VENOUS BLD VENIPUNCTURE: CPT

## 2021-07-14 PROCEDURE — 70450 CT HEAD/BRAIN W/O DYE: CPT

## 2021-07-14 PROCEDURE — 93005 ELECTROCARDIOGRAM TRACING: CPT | Performed by: STUDENT IN AN ORGANIZED HEALTH CARE EDUCATION/TRAINING PROGRAM

## 2021-07-14 PROCEDURE — 82948 REAGENT STRIP/BLOOD GLUCOSE: CPT

## 2021-07-14 PROCEDURE — 2580000003 HC RX 258: Performed by: REGISTERED NURSE

## 2021-07-14 PROCEDURE — 99285 EMERGENCY DEPT VISIT HI MDM: CPT

## 2021-07-14 PROCEDURE — 6370000000 HC RX 637 (ALT 250 FOR IP): Performed by: INTERNAL MEDICINE

## 2021-07-14 RX ORDER — OXYBUTYNIN CHLORIDE 10 MG/1
10 TABLET, EXTENDED RELEASE ORAL NIGHTLY
Status: CANCELLED | OUTPATIENT
Start: 2021-07-14

## 2021-07-14 RX ORDER — SODIUM CHLORIDE 0.9 % (FLUSH) 0.9 %
5-40 SYRINGE (ML) INJECTION EVERY 12 HOURS SCHEDULED
Status: DISCONTINUED | OUTPATIENT
Start: 2021-07-14 | End: 2021-07-15 | Stop reason: HOSPADM

## 2021-07-14 RX ORDER — ASPIRIN 81 MG/1
324 TABLET, CHEWABLE ORAL ONCE
Status: COMPLETED | OUTPATIENT
Start: 2021-07-14 | End: 2021-07-14

## 2021-07-14 RX ORDER — DIVALPROEX SODIUM 250 MG/1
250 TABLET, EXTENDED RELEASE ORAL DAILY
Status: DISCONTINUED | OUTPATIENT
Start: 2021-07-15 | End: 2021-07-15 | Stop reason: HOSPADM

## 2021-07-14 RX ORDER — SODIUM CHLORIDE 9 MG/ML
25 INJECTION, SOLUTION INTRAVENOUS PRN
Status: DISCONTINUED | OUTPATIENT
Start: 2021-07-14 | End: 2021-07-15 | Stop reason: HOSPADM

## 2021-07-14 RX ORDER — FOLIC ACID 1 MG/1
1 TABLET ORAL DAILY
Status: DISCONTINUED | OUTPATIENT
Start: 2021-07-15 | End: 2021-07-15 | Stop reason: HOSPADM

## 2021-07-14 RX ORDER — DONEPEZIL HYDROCHLORIDE 10 MG/1
10 TABLET, FILM COATED ORAL NIGHTLY
Status: DISCONTINUED | OUTPATIENT
Start: 2021-07-14 | End: 2021-07-15 | Stop reason: HOSPADM

## 2021-07-14 RX ORDER — NIFEDIPINE 30 MG/1
30 TABLET, EXTENDED RELEASE ORAL EVERY MORNING
Status: DISCONTINUED | OUTPATIENT
Start: 2021-07-15 | End: 2021-07-15 | Stop reason: HOSPADM

## 2021-07-14 RX ORDER — DOXAZOSIN MESYLATE 4 MG/1
2 TABLET ORAL DAILY
Status: DISCONTINUED | OUTPATIENT
Start: 2021-07-15 | End: 2021-07-15 | Stop reason: HOSPADM

## 2021-07-14 RX ORDER — SODIUM CHLORIDE 0.9 % (FLUSH) 0.9 %
5-40 SYRINGE (ML) INJECTION PRN
Status: DISCONTINUED | OUTPATIENT
Start: 2021-07-14 | End: 2021-07-15 | Stop reason: HOSPADM

## 2021-07-14 RX ORDER — PANTOPRAZOLE SODIUM 40 MG/1
40 TABLET, DELAYED RELEASE ORAL
Status: DISCONTINUED | OUTPATIENT
Start: 2021-07-15 | End: 2021-07-15 | Stop reason: HOSPADM

## 2021-07-14 RX ORDER — ASPIRIN 81 MG/1
81 TABLET, CHEWABLE ORAL DAILY
Status: DISCONTINUED | OUTPATIENT
Start: 2021-07-15 | End: 2021-07-15 | Stop reason: HOSPADM

## 2021-07-14 RX ORDER — HEPARIN SODIUM 5000 [USP'U]/ML
5000 INJECTION, SOLUTION INTRAVENOUS; SUBCUTANEOUS EVERY 8 HOURS SCHEDULED
Status: DISCONTINUED | OUTPATIENT
Start: 2021-07-14 | End: 2021-07-15 | Stop reason: HOSPADM

## 2021-07-14 RX ORDER — SODIUM CHLORIDE 9 MG/ML
INJECTION, SOLUTION INTRAVENOUS CONTINUOUS
Status: DISCONTINUED | OUTPATIENT
Start: 2021-07-14 | End: 2021-07-15 | Stop reason: HOSPADM

## 2021-07-14 RX ORDER — HYDROCHLOROTHIAZIDE 25 MG/1
25 TABLET ORAL DAILY
Status: DISCONTINUED | OUTPATIENT
Start: 2021-07-15 | End: 2021-07-15 | Stop reason: HOSPADM

## 2021-07-14 RX ORDER — ACETAMINOPHEN 325 MG/1
650 TABLET ORAL EVERY 6 HOURS PRN
Status: DISCONTINUED | OUTPATIENT
Start: 2021-07-14 | End: 2021-07-15 | Stop reason: HOSPADM

## 2021-07-14 RX ORDER — DOCUSATE SODIUM 100 MG/1
100 CAPSULE, LIQUID FILLED ORAL DAILY
Status: DISCONTINUED | OUTPATIENT
Start: 2021-07-15 | End: 2021-07-15 | Stop reason: HOSPADM

## 2021-07-14 RX ORDER — ATORVASTATIN CALCIUM 10 MG/1
10 TABLET, FILM COATED ORAL NIGHTLY
Status: DISCONTINUED | OUTPATIENT
Start: 2021-07-14 | End: 2021-07-15 | Stop reason: HOSPADM

## 2021-07-14 RX ORDER — CLOPIDOGREL BISULFATE 75 MG/1
75 TABLET ORAL DAILY
Status: DISCONTINUED | OUTPATIENT
Start: 2021-07-15 | End: 2021-07-15 | Stop reason: HOSPADM

## 2021-07-14 RX ORDER — ACETAMINOPHEN 650 MG/1
650 SUPPOSITORY RECTAL EVERY 6 HOURS PRN
Status: DISCONTINUED | OUTPATIENT
Start: 2021-07-14 | End: 2021-07-15 | Stop reason: HOSPADM

## 2021-07-14 RX ORDER — METOPROLOL TARTRATE 50 MG/1
50 TABLET, FILM COATED ORAL DAILY
Status: DISCONTINUED | OUTPATIENT
Start: 2021-07-15 | End: 2021-07-15 | Stop reason: HOSPADM

## 2021-07-14 RX ORDER — ONDANSETRON 4 MG/1
4 TABLET, ORALLY DISINTEGRATING ORAL EVERY 8 HOURS PRN
Status: DISCONTINUED | OUTPATIENT
Start: 2021-07-14 | End: 2021-07-15 | Stop reason: HOSPADM

## 2021-07-14 RX ORDER — 0.9 % SODIUM CHLORIDE 0.9 %
1000 INTRAVENOUS SOLUTION INTRAVENOUS ONCE
Status: COMPLETED | OUTPATIENT
Start: 2021-07-14 | End: 2021-07-14

## 2021-07-14 RX ORDER — CLOPIDOGREL 300 MG/1
300 TABLET, FILM COATED ORAL ONCE
Status: COMPLETED | OUTPATIENT
Start: 2021-07-14 | End: 2021-07-14

## 2021-07-14 RX ORDER — ONDANSETRON 2 MG/ML
4 INJECTION INTRAMUSCULAR; INTRAVENOUS EVERY 6 HOURS PRN
Status: DISCONTINUED | OUTPATIENT
Start: 2021-07-14 | End: 2021-07-15 | Stop reason: HOSPADM

## 2021-07-14 RX ADMIN — HEPARIN SODIUM 5000 UNITS: 5000 INJECTION INTRAVENOUS; SUBCUTANEOUS at 22:32

## 2021-07-14 RX ADMIN — SODIUM CHLORIDE: 9 INJECTION, SOLUTION INTRAVENOUS at 21:25

## 2021-07-14 RX ADMIN — IOPAMIDOL 80 ML: 755 INJECTION, SOLUTION INTRAVENOUS at 15:38

## 2021-07-14 RX ADMIN — DONEPEZIL HYDROCHLORIDE 10 MG: 10 TABLET, FILM COATED ORAL at 23:21

## 2021-07-14 RX ADMIN — ATORVASTATIN CALCIUM 10 MG: 10 TABLET, FILM COATED ORAL at 23:21

## 2021-07-14 RX ADMIN — ASPIRIN 324 MG: 81 TABLET, CHEWABLE ORAL at 16:00

## 2021-07-14 RX ADMIN — CLOPIDOGREL BISULFATE 300 MG: 300 TABLET, FILM COATED ORAL at 16:01

## 2021-07-14 RX ADMIN — SODIUM CHLORIDE 1000 ML: 9 INJECTION, SOLUTION INTRAVENOUS at 15:51

## 2021-07-14 ASSESSMENT — ENCOUNTER SYMPTOMS
CONSTIPATION: 0
SINUS PAIN: 0
EYE PAIN: 0
SHORTNESS OF BREATH: 0
NAUSEA: 0
DIARRHEA: 0
ABDOMINAL PAIN: 0
BACK PAIN: 0
VOMITING: 0
COUGH: 0

## 2021-07-14 ASSESSMENT — PAIN SCALES - GENERAL
PAINLEVEL_OUTOF10: 0
PAINLEVEL_OUTOF10: 0

## 2021-07-14 NOTE — PROGRESS NOTES
Responded to Code stroke in CT Scan. Dr Bora Forte ordered Creatinine at point of care. Dr. Paulino Forte ordered not to do Creatinine and to continue with CTA. Rashaun TIM, [employer], Aware.

## 2021-07-14 NOTE — ED NOTES
Patient given oral hydration and nutrition. Denies other needs. Call light within reach.         Gin Ovalle RN  07/14/21 [id number]

## 2021-07-14 NOTE — H&P
Internal Medicine Specialties  H&P  7/14/2021  5:43 PM    Patient:  Andres Argueta  YOB: 1935    MRN: [id number]   Acct:  [de-identified]   21/021A  Primary Care Physician: Tunde Hurt MD    Chief Complaint:  Chief Complaint   Patient presents with    Fatigue     unsteady gait       History of Present Illness: The patient is a 81 y.o. male with pmhx of dementia, hypertension, BPH, alcoholic encephalopathy, hyperlipidemia, CKD stage II who presents with acute onset generalized weakness and unsteady gait when he was out with his wife today. Wife reports that patient just could not get out of the car and kept saying that he felt funny and was not able to get up. . In the emergency department code stroke was called as patient had a slight left pronator drift which has since resolved. Patient was taken directly to CT. CT head without contrast was negative, CTA head and neck showed minimal stenosis. Tele stroke was consulted and they recommended inpatient admission with neurology consult, aspirin, Plavix, MRI. Patient denies chest pain, shortness of breath, abdominal pain, nausea, vomiting. Patient is alert to name only which wife states that this is probably his baseline. Patient admitted for TIA. Code status discussed with pt and family at bedside; pt remains a full code.        Past Medical History:        Diagnosis Date    BPH (benign prostatic hypertrophy)     CKD (chronic kidney disease) stage 2, GFR 60-89 ml/min     Dementia (HCC)     Erectile dysfunction     Frequent falls     [place] (hard of hearing)     Hyperlipidemia     Hypertension     Metabolic bone disease     Orthostatic hypotension        Past Surgical History:        Procedure Laterality Date    COLONOSCOPY      ENDOSCOPY, COLON, DIAGNOSTIC      ESOPHAGUS SURGERY      NECK SURGERY      PROSTATE SURGERY      SHOULDER SURGERY      THYROID SURGERY      removal of a nodule    TONSILLECTOMY      TURP  5-31-12     [address] Medications: Not in a hospital admission. Allergies:  Patient has no known allergies. Social History:    reports that he quit smoking about 29 years ago. He has never used smokeless tobacco. He reports previous alcohol use. He reports that he does not use drugs.       Family History:       Problem Relation Age of Onset    High Blood Pressure Father     Stroke Father     Stroke Sister     Arthritis Sister     Alzheimer's Disease Sister     Cancer Sister     Heart Disease Sister     Arthritis Sister     Other Brother         parkinsons    Dementia Brother     No Known Problems Brother     No Known Problems Brother        Review of Systems:    General ROS: negative  Psychological ROS: negative  Hematological and Lymphatic ROS: negative  Endocrine ROS: negative  Vision:negative  ENT ROS: Denies  Respiratory ROS: no cough, shortness of breath, or wheezing  Cardiovascular ROS: no chest pain or dyspnea on exertion  Gastrointestinal ROS: no abdominal pain, change in bowel habits, or black or bloody stools  Genito-Urinary ROS: no dysuria, trouble voiding, or hematuria  Musculoskeletal ROS: negative  Neurological ROS: weakness, confusion  Dermatological ROS: negative  Weight:no change in weight  Appetite:ok      Physical Exam:    Vitals:  Patient Vitals for the past 24 hrs:   BP Temp Temp src Pulse Resp SpO2 Height Weight   07/14/21 1642 131/67   54 18 97 %     07/14/21 1543 112/65   59 18 95 %     07/14/21 1448 (!) 103/56 98.7 °F (37.1 °C) Oral 62 18 100 % 5' 10\" (1.778 m) 162 lb (73.5 kg)     Weight: Weight: 162 lb (73.5 kg)     24 hour intake/output:No intake or output data in the 24 hours ending 07/14/21 1743    General appearance - alert, well appearing, and in no distress  Eyes - pupils equal and reactive, extraocular eye movements intact  Ears - bilateral TM's and external ear canals normal  Nose - normal and patent, no erythema, discharge or polyps  Mouth - mucous membranes moist, pharynx normal without lesions  Neck - supple, no significant adenopathy  Lymphatics - no palpable lymphadenopathy, no hepatosplenomegaly  Chest - clear to auscultation, no wheezes, rales or rhonchi, symmetric air entry  Distant Breath Sounds: No  Heart - normal rate, regular rhythm, normal S1, S2, no murmurs, rubs, clicks or gallops  Abdomen - soft, nontender, nondistended, no masses or organomegaly  Obese: No; Protuberant:   Neurological - alert, oriented, normal speech, no focal findings or movement disorder noted  Musculoskeletal - no joint tenderness, deformity or swelling  Extremities - peripheral pulses normal, no pedal edema, no clubbing or cyanosis  Skin - normal coloration and turgor, no rashes, no suspicious skin lesions noted    Review of Labs and Diagnostic Testing:    CBC:   Recent Labs     07/14/21  1500   WBC 4.0*   HGB 11.7*   HCT 36.6*   MCV 95.6*        BMP:   Recent Labs     07/14/21  1500      K 4.2      CO2 27   BUN 23*   CREATININE 1.3*   CALCIUM 8.9   GLUCOSE 95     PT/INR: No results for input(s): PROTIME, INR in the last 72 hours. APTT: No results for input(s): APTT in the last 72 hours. Lipids: No results for input(s): ALKPHOS, ALT, AST, BILITOT, BILIDIR, LABALBU, AMYLASE, LIPASE in the last 72 hours. Troponin: No results for input(s): TROPONINT in the last 72 hours. Imaging:  CTA HEAD W WO CONTRAST (CODE STROKE NIHSS 4 or Above)    Result Date: 7/14/2021  PROCEDURE: CTA HEAD W WO CONTRAST, CTA NECK W WO CONTRAST CLINICAL INFORMATION: code stroke . COMPARISON: CT head from the same date and MR brain dated 5/2/2019. TECHNIQUE: Helical CT from the aortic arch through the head in arterial phase during fast bolus administration of 80 mL Isovue-370 injected in the right [facility] with multiplanar reconstructions to include volumetric maximum intensity projection sequences.   All CT scans at this facility use dose modulation, iterative reconstruction, and/or weight-based dosing when appropriate to reduce radiation dose to as low as reasonably achievable. FINDINGS: CTA HEAD: There are mural calcifications in the cavernous segments of internal carotid arteries without flow-limiting stenosis. Intracranial segments of internal carotid arteries are patent throughout without focal stenosis or visualized aneurysm. The bilateral middle cerebral and anterior cerebral arteries are patent without focal abnormality identified. The basilar artery is patent without focal stenosis or visualized aneurysm. The bilateral posterior cerebral and superior cerebellar arteries are patent without focal abnormality identified. Dural venous sinuses appear patent without focal filling defect. No focal areas of abnormal parenchymal enhancement are identified. CTA NECK: There is a bovine type arch. The brachiocephalic and left subclavian arteries are patent without focal stenosis. There is mild to moderate stenosis in the distal most segment of the right common carotid artery. There is mural calcification at the distal most segment of left common carotid artery without flow-limiting stenosis. There is calcified and noncalcified mural plaque in the proximal right internal carotid artery with narrowest luminal diameter of 3.9 mm with a point more distal, where the walls are parallel, measuring 3.9 mm. The left carotid bifurcation is widely patent without hemodynamically significant stenosis. Cervical segments of internal carotid arteries are patent without focal stenosis. The right vertebral artery is dominant. The vertebral arteries are patent throughout their course without focal stenosis. Mucosal surfaces of the aerodigestive tract are symmetric without focal nodular thickening or visualized mass. No cervical lymphadenopathy is identified. The parotid, submandibular and thyroid glands are unremarkable. There are mild posterior dependent changes in the visualized portions of the lungs.  The heart is enlarged. There are moderate degenerative changes of the cervical spine. No suspicious osseous lesion is identified. 1. Minimal mural calcification in the clinoid segments of the internal carotid arteries without flow-limiting stenosis or aneurysm in the intracranial circulation. 2. Calcified mural plaque in the proximal right internal carotid artery without hemodynamically significant stenosis by NASCET criteria. 3. Mild to moderate stenosis in the distal most segment of the right common carotid artery. **This report has been created using voice recognition software. It may contain minor errors which are inherent in voice recognition technology. ** Final report electronically signed by Dr. Penny Becker MD on 7/14/2021 4:06 PM    CT HEAD WO CONTRAST    Result Date: 7/14/2021  PROCEDURE: CT HEAD WO CONTRAST CLINICAL INFORMATION: Code stroke. COMPARISON: MR brain dated 5/2/2019 and CT head dated 3/9/2018. TECHNIQUE: Noncontrast 5 mm axial images were obtained through the brain. Sagittal and coronal reconstructions were created. All CT scans at this facility use dose modulation, iterative reconstruction, and/or weight-based dosing when appropriate to reduce radiation dose to as low as reasonably achievable. FINDINGS: There is stable moderate temporal and central predominant volume loss. There are moderate confluent and patchy areas of hypodensity in the periventricular, subcortical deep white matter as evidence for chronic microvascular angiopathy, similar to prior exam. Gray-white matter projection otherwise appears grossly preserved. No intracranial hemorrhage, mass effect or midline shift is identified. The calvarium appears intact. Orbits are unremarkable. Visualized paranasal sinuses are clear. Mastoid air cells are clear. No evidence of acute intracranial abnormality. Findings were discussed with Matthew Renae RN via telephone at the time of interpretation.  **This report has been created using voice recognition software. It may contain minor errors which are inherent in voice recognition technology. ** Final report electronically signed by Dr. Fernando Altamirano MD on 7/14/2021 3:42 PM    CTA NECK W WO CONTRAST    Result Date: 7/14/2021  PROCEDURE: CTA HEAD W WO CONTRAST, CTA NECK W WO CONTRAST CLINICAL INFORMATION: code stroke . COMPARISON: CT head from the same date and MR brain dated 5/2/2019. TECHNIQUE: Helical CT from the aortic arch through the head in arterial phase during fast bolus administration of 80 mL Isovue-370 injected in the right [facility] with multiplanar reconstructions to include volumetric maximum intensity projection sequences. All CT scans at this facility use dose modulation, iterative reconstruction, and/or weight-based dosing when appropriate to reduce radiation dose to as low as reasonably achievable. FINDINGS: CTA HEAD: There are mural calcifications in the cavernous segments of internal carotid arteries without flow-limiting stenosis. Intracranial segments of internal carotid arteries are patent throughout without focal stenosis or visualized aneurysm. The bilateral middle cerebral and anterior cerebral arteries are patent without focal abnormality identified. The basilar artery is patent without focal stenosis or visualized aneurysm. The bilateral posterior cerebral and superior cerebellar arteries are patent without focal abnormality identified. Dural venous sinuses appear patent without focal filling defect. No focal areas of abnormal parenchymal enhancement are identified. CTA NECK: There is a bovine type arch. The brachiocephalic and left subclavian arteries are patent without focal stenosis. There is mild to moderate stenosis in the distal most segment of the right common carotid artery. There is mural calcification at the distal most segment of left common carotid artery without flow-limiting stenosis.  There is calcified and noncalcified mural plaque in the proximal right internal carotid artery with narrowest luminal diameter of 3.9 mm with a point more distal, where the walls are parallel, measuring 3.9 mm. The left carotid bifurcation is widely patent without hemodynamically significant stenosis. Cervical segments of internal carotid arteries are patent without focal stenosis. The right vertebral artery is dominant. The vertebral arteries are patent throughout their course without focal stenosis. Mucosal surfaces of the aerodigestive tract are symmetric without focal nodular thickening or visualized mass. No cervical lymphadenopathy is identified. The parotid, submandibular and thyroid glands are unremarkable. There are mild posterior dependent changes in the visualized portions of the lungs. The heart is enlarged. There are moderate degenerative changes of the cervical spine. No suspicious osseous lesion is identified. 1. Minimal mural calcification in the clinoid segments of the internal carotid arteries without flow-limiting stenosis or aneurysm in the intracranial circulation. 2. Calcified mural plaque in the proximal right internal carotid artery without hemodynamically significant stenosis by NASCET criteria. 3. Mild to moderate stenosis in the distal most segment of the right common carotid artery. **This report has been created using voice recognition software. It may contain minor errors which are inherent in voice recognition technology. ** Final report electronically signed by Dr. Ramez Tinsley MD on 7/14/2021 4:06 PM    XR CHEST PORTABLE    Result Date: 7/14/2021  PROCEDURE: XR CHEST PORTABLE CLINICAL INFORMATION: 52-year-old male with generalized weakness and unsteady gait. Code stroke. COMPARISON: Chest x-ray 9/10/2017. TECHNIQUE: AP upright view of the chest was obtained. FINDINGS: The lungs are clear. There is cardiomegaly. The pulmonary vasculature is within normal limits. There is no significant pleural effusion or pneumothorax.  Visualized portions of the upper abdomen are within normal limits. The osseous structures are intact. No acute fractures or suspicious osseous lesions. Cardiomegaly with no acute intrathoracic process. **This report has been created using voice recognition software. It may contain minor errors which are inherent in voice recognition technology. ** Final report electronically signed by Dr Hipolito Ibarra on 7/14/2021 4:02 PM        Assessment/Plan:    1. TIA  a. Consult neurology  b. Daily aspirin and Plavix and statin  c. N.p.o. until bedside swallow evaluation completed  d. Obtain MRI without contrast  e. IV fluids  f. PT OT  2. Chronic kidney disease stage II  a. Monitor renal function S/P contrast  3. History of dementia  a. Continue Aricept  b. Per wife his mental status is his baseline; he has intermittent alertness versus confusion  4. Hypertension  a. Continue home medications with parameters  5. BPH  a. Continue Cardura  6. Hyperlipidemia  a. Continue statin  7. PT OT  8. DVT prophylaxis  a.  Heparin subcu    Assessment and plan of care discussed with supervising physician, Dr Pili Cameron.      Electronically signed by ZORAN Monterroso CNP on 7/14/2021 at 5:43 PM         Copy: Primary Care Physician: Noe Bellamy MD    Pt seen and examined by me  Chart reviewed and d/w Chaitanya Chua  D/w wife at bedside  Pt was unable to walk prior to coming to hospital  Now moves all extremities and attempting to get out of bed to go to bathroom and refusing to use the urinal  Pt awake and oriented to person but could not tell why he is here  H/o Dementia  Await MRI results   PT OT  Cont antiplatelets    Electronically signed by Tyler Rojas MD on 7/14/2021 at 7:29 PM

## 2021-07-14 NOTE — ED NOTES
Dr. House Course at bedside informing patient and family on plan of care.       Sg Jett RN  07/14/21 [id number]

## 2021-07-14 NOTE — ED TRIAGE NOTES
Patient presents to the ED with complaints of generalized weakness and an unsteady gait. Patient's wife at bedside reports patient having a history of dementia. She explains that they were walking at the store when his gait became unsteady which is not usual for him. He does not normally use a cane or walker. He denies any pain or other symptoms. EKG completed at bedside. VSS. No other complaints at this time.

## 2021-07-14 NOTE — ED NOTES
Bedside report received from [facility].  Pt in [address], RN  07/14/21 [id number]

## 2021-07-14 NOTE — ED NOTES
Patient resting in semi bertrand's position, breathing easy and unlabored. Patient watching television, family at bedside. VSS. Patient updated on further plan of care. No other complaints at this time.       Troy Arnett RN  07/14/21 [id number]

## 2021-07-14 NOTE — ED PROVIDER NOTES
constipation, diarrhea, nausea and vomiting. Genitourinary: Negative for dysuria and flank pain. Musculoskeletal: Negative for back pain and neck pain. Skin: Negative for wound. Neurological: Positive for weakness. Negative for headaches. Psychiatric/Behavioral: Negative for confusion.          PAST MEDICAL AND SURGICAL HISTORY     Past Medical History:   Diagnosis Date    BPH (benign prostatic hypertrophy)     CKD (chronic kidney disease) stage 2, GFR 60-89 ml/min     Dementia (HCC)     Erectile dysfunction     Frequent falls     [place] (hard of hearing)     Hyperlipidemia     Hypertension     Metabolic bone disease     Orthostatic hypotension      Past Surgical History:   Procedure Laterality Date    COLONOSCOPY      ENDOSCOPY, COLON, DIAGNOSTIC      ESOPHAGUS SURGERY      NECK SURGERY      PROSTATE SURGERY      SHOULDER SURGERY      THYROID SURGERY      removal of a nodule    TONSILLECTOMY      TURP  5-31-12    Dr Ute Franco         MEDICATIONS     Current Facility-Administered Medications:     sodium chloride flush 0.9 % injection 5-40 mL, 5-40 mL, Intravenous, 2 times per day, Bronwyn Millet, APRN - CNP    sodium chloride flush 0.9 % injection 5-40 mL, 5-40 mL, Intravenous, PRN, Bronwyn Rowleyet, APRN - CNP    0.9 % sodium chloride infusion, 25 mL, Intravenous, PRN, Bronwyn Rowleyet, APRN - CNP    ondansetron (ZOFRAN-ODT) disintegrating tablet 4 mg, 4 mg, Oral, Q8H PRN **OR** ondansetron (ZOFRAN) injection 4 mg, 4 mg, Intravenous, Q6H PRN, Bronwyn Millet, APRN - CNP    acetaminophen (TYLENOL) tablet 650 mg, 650 mg, Oral, Q6H PRN **OR** acetaminophen (TYLENOL) suppository 650 mg, 650 mg, Rectal, Q6H PRN, Bronwyn Boggs, APRN - CNP    heparin (porcine) injection 5,000 Units, 5,000 Units, Subcutaneous, 3 times per day, Bronwyn Boggs, APRN - CNP    0.9 % sodium chloride infusion, , Intravenous, Continuous, Bronwyn Boggs APRN - CNP    [START ON 7/15/2021] clopidogrel (PLAVIX) tablet 75 mg, 75 mg, Oral, Daily, Jos Thomas, APRN - CNP    Current Outpatient Medications:     divalproex (DEPAKOTE ER) 250 MG extended release tablet, take 1 tablet by mouth once daily, Disp: 30 tablet, Rfl: 5    folic acid (FOLVITE) 1 MG tablet, Take 1 tablet by mouth daily, Disp: 90 tablet, Rfl: 3    hydrochlorothiazide (HYDRODIURIL) 25 MG tablet, , Disp: , Rfl:     mirtazapine (REMERON) 15 MG tablet, , Disp: , Rfl:     aspirin 81 MG tablet, Take 81 mg by mouth daily, Disp: , Rfl:     Cholecalciferol (VITAMIN D3) 3000 units TABS, Take by mouth, Disp: , Rfl:     midodrine (PROAMATINE) 10 MG tablet, Take 1 tablet by mouth 2 times daily as needed (SBP <110) (Patient not taking: Reported on 6/4/2021), Disp: , Rfl:     atorvastatin (LIPITOR) 10 MG tablet, Take 1 tablet by mouth daily, Disp: 30 tablet, Rfl: 3    oxybutynin (DITROPAN-XL) 10 MG extended release tablet, Take 1 tablet by mouth nightly, Disp: 30 tablet, Rfl: 3    Multiple Vitamin (MULTIVITAMIN) tablet, Take 1 tablet by mouth daily (Patient not taking: Reported on 6/4/2021), Disp: , Rfl: 0    donepezil (ARICEPT) 10 MG tablet, Take 1 tablet by mouth nightly, Disp: 30 tablet, Rfl: 3    metoprolol tartrate (LOPRESSOR) 50 MG tablet, Take 1 tablet by mouth daily, Disp: 60 tablet, Rfl: 3    docusate (COLACE, DULCOLAX) 100 MG CAPS, Take 100 mg by mouth daily, Disp: , Rfl:     pantoprazole (PROTONIX) 40 MG tablet, Take 1 tablet by mouth every morning (before breakfast) (Patient not taking: Reported on 6/4/2021), Disp: 30 tablet, Rfl: 3    NIFEdipine (ADALAT CC) 30 MG extended release tablet, Take 1 tablet by mouth every morning, Disp: 30 tablet, Rfl: 3    magnesium hydroxide (MILK OF MAGNESIA) 400 MG/5ML suspension, Take 30 mLs by mouth daily as needed for Constipation (Patient not taking: Reported on 6/4/2021), Disp: , Rfl:     potassium chloride (KLOR-CON) 10 MEQ extended release tablet, Take 1 tablet by mouth daily, Disp: 60 tablet, Rfl: 3   dextrose 50 % solution, Infuse 25 mLs intravenously as needed (Blood glucose less than 70 mg/dL and patient NOT ALERT or NPO.) (Patient not taking: Reported on 2021), Disp: , Rfl:     Dextrose-Sodium Chloride (DEXTROSE 5 % AND 0.9 % NACL) 5-0.9 % infusion, Infuse 100 mL/hr intravenously as needed (Low blood sugar) (Patient not taking: Reported on 2021), Disp: , Rfl:     potassium chloride (KLOR-CON M) 20 MEQ TBCR extended release tablet, Take 2 tablets by mouth once for 1 dose, Disp: 60 tablet, Rfl: 3    vitamin B-1 (THIAMINE) 100 MG tablet, Take 100 mg by mouth daily (Patient not taking: Reported on 2021), Disp: , Rfl:     doxazosin (CARDURA) 2 MG tablet, Take 2 mg by mouth daily, Disp: , Rfl:     lansoprazole (PREVACID) 15 MG delayed release capsule, Take 30 mg by mouth daily (Patient not taking: Reported on 2021), Disp: , Rfl:     potassium chloride (KLOR-CON M10) 10 MEQ extended release tablet, Take 1 tablet by mouth daily, Disp: 30 tablet, Rfl: 12    celecoxib (CELEBREX) 200 MG capsule, Take 1 capsule by mouth daily, Disp: 60 capsule, Rfl: 3    acetaminophen (TYLENOL) 325 MG tablet, Take 2 tablets by mouth every 4 hours as needed for Pain (Patient not taking: Reported on 2021), Disp: 120 tablet, Rfl: 3      SOCIAL HISTORY     Social History     Social History Narrative    Not on file     Social History     Tobacco Use    Smoking status: Former Smoker     Quit date: 1992     Years since quittin.4    Smokeless tobacco: Never Used   Vaping Use    Vaping Use: Never used   Substance Use Topics    Alcohol use: Not Currently     Alcohol/week: 0.0 standard drinks     Comment: daily    Drug use: No         ALLERGIES   No Known Allergies      FAMILY HISTORY     Family History   Problem Relation Age of Onset    High Blood Pressure Father     Stroke Father     Stroke Sister     Arthritis Sister     Alzheimer's Disease Sister     Cancer Sister     Heart Disease Sister    Blase Liming Arthritis Sister     Other Brother         parkinsons    Dementia Brother     No Known Problems Brother     No Known Problems Brother          PREVIOUS RECORDS   Previous records reviewed: Patient was seen last on June 4, 2021 for neurology office visit regarding memory problems. PHYSICAL EXAM     ED Triage Vitals [07/14/21 1448]   BP Temp Temp Source Pulse Resp SpO2 Height Weight   (!) 103/56 98.7 °F (37.1 °C) Oral 62 18 100 % 5' 10\" (1.778 m) 162 lb (73.5 kg)     Initial vital signs and nursing assessment reviewed and vitals are/show: abnormal from Bradycardia. Pulsoximetry is normal per my interpretation. Additional Vital Signs:  Vitals:    07/14/21 1835   BP: 136/69   Pulse: 56   Resp: 19   Temp:    SpO2: 99%       Physical Exam  Constitutional:       General: He is not in acute distress. Appearance: Normal appearance. He is normal weight. He is not ill-appearing, toxic-appearing or diaphoretic. HENT:      Head: Normocephalic and atraumatic. Right Ear: External ear normal.      Left Ear: External ear normal.   Eyes:      General: No scleral icterus. Right eye: No discharge. Left eye: No discharge. Extraocular Movements: Extraocular movements intact. Pupils: Pupils are equal, round, and reactive to light. Cardiovascular:      Rate and Rhythm: Regular rhythm. Bradycardia present. Pulmonary:      Effort: Pulmonary effort is normal. No respiratory distress. Breath sounds: Normal breath sounds. No stridor. No wheezing, rhonchi or rales. Chest:      Chest wall: No tenderness. Abdominal:      General: Abdomen is flat. There is no distension. Palpations: Abdomen is soft. Tenderness: There is no abdominal tenderness. There is no guarding or rebound. Musculoskeletal:         General: Normal range of motion. Cervical back: Neck supple. Right lower leg: No edema. Left lower leg: No edema. Skin:     General: Skin is warm and dry. Neurological:      Mental Status: He is alert. Comments: GCS 14  Alert and oriented to person and place but not time  PERRLA  EOM intact  Bilateral facial motor and sensation intact in distribution of CN V1 V2 V3  No slurred speech  No aphasia  Bilateral upper extremity sensation intact  Left upper extremity pronator drift  Bilateral lower extremity motor and sensation intact  No gait ataxia  No weakness   Psychiatric:         Mood and Affect: Mood normal.         Behavior: Behavior normal.         Thought Content: Thought content normal.         Judgment: Judgment normal.             MEDICAL DECISION MAKING   Initial Assessment:     TIA    Differential diagnoses include but not limited to: CVA, TIA, dementia, infection, arrhythmia, electrolyte abnormality, trauma    Plan:       Code stroke  Neuro-Interventionalist consult  EKG  Labs. Imaging: CT Head, CTA head & Neck, CXR  IV Fluids  Plavix   Aspirin  Admission          Patient was seen and evaluated in the emergency department for weakness. Upon my initial examination, patient exhibited left upper extremity pronator drift. Given patient's physical exam findings and symptoms I was concerned for possible CVA. I paged this as a code stroke and patient was taken over to radiology for stat imaging. By the time patient was received at radiology, his symptoms had resolved, suspicious for TIA. I discussed the case with our neuro interventional team who recommended continuing the stroke protocol without administering any TPA but did recommend aspirin and Plavix and admission for MRI. I did administer aspirin Plavix and IV fluids. Patient's vitals were stable during his ED stay. Patient's lab work was otherwise unremarkable at time of admission. I discussed patient's case with the admitting team even though not all lab work was back but they graciously acceptted patient. Patient admitted.         CRITICAL CARE: There was a high probability of clinically significant/life threatening deterioration in this patient's condition which required my urgent intervention. Total critical care time was 30 minutes. This excludes any time for separately reportable procedures. ED RESULTS   Laboratory results:  Labs Reviewed   BASIC METABOLIC PANEL W/ REFLEX TO MG FOR LOW K - Abnormal; Notable for the following components:       Result Value    BUN 23 (*)     CREATININE 1.3 (*)     All other components within normal limits   CBC WITH AUTO DIFFERENTIAL - Abnormal; Notable for the following components:    WBC 4.0 (*)     RBC 3.83 (*)     Hemoglobin 11.7 (*)     Hematocrit 36.6 (*)     MCV 95.6 (*)     MCHC 32.0 (*)     All other components within normal limits   HEPATIC FUNCTION PANEL - Abnormal; Notable for the following components:    ALT 9 (*)     All other components within normal limits   URINALYSIS - Abnormal; Notable for the following components:    Specific Gravity, UA >1.030 (*)     All other components within normal limits   ANION GAP - Abnormal; Notable for the following components:    Anion Gap 7.0 (*)     All other components within normal limits   GLOMERULAR FILTRATION RATE, ESTIMATED - Abnormal; Notable for the following components:    Est, Glom Filt Rate 63 (*)     All other components within normal limits   TROPONIN   BRAIN NATRIURETIC PEPTIDE   OSMOLALITY   BASIC METABOLIC PANEL W/ REFLEX TO MG FOR LOW K   CBC WITH AUTO DIFFERENTIAL   POCT GLUCOSE       Radiologic studies results:  XR CHEST PORTABLE   Final Result   Cardiomegaly with no acute intrathoracic process. **This report has been created using voice recognition software. It may contain minor errors which are inherent in voice recognition technology. **      Final report electronically signed by Dr Joselito Javier on 7/14/2021 4:02 PM      CTA NECK W WO CONTRAST   Final Result       1.  Minimal mural calcification in the clinoid segments of the internal carotid arteries without flow-limiting stenosis or aneurysm in the intracranial circulation. 2. Calcified mural plaque in the proximal right internal carotid artery without hemodynamically significant stenosis by NASCET criteria. 3. Mild to moderate stenosis in the distal most segment of the right common carotid artery. **This report has been created using voice recognition software. It may contain minor errors which are inherent in voice recognition technology. **      Final report electronically signed by Dr. Vero Roper MD on 7/14/2021 4:06 PM      CTA HEAD W WO CONTRAST (CODE STROKE NIHSS 4 or Above)   Final Result       1. Minimal mural calcification in the clinoid segments of the internal carotid arteries without flow-limiting stenosis or aneurysm in the intracranial circulation. 2. Calcified mural plaque in the proximal right internal carotid artery without hemodynamically significant stenosis by NASCET criteria. 3. Mild to moderate stenosis in the distal most segment of the right common carotid artery. **This report has been created using voice recognition software. It may contain minor errors which are inherent in voice recognition technology. **      Final report electronically signed by Dr. Vero oRper MD on 7/14/2021 4:06 PM      CT HEAD WO CONTRAST   Final Result    No evidence of acute intracranial abnormality. Findings were discussed with Deshawn Rowley RN via telephone at the time of interpretation. **This report has been created using voice recognition software. It may contain minor errors which are inherent in voice recognition technology. **      Final report electronically signed by Dr. Vero Roper MD on 7/14/2021 3:42 PM       [facility].    (Results Pending)       ED Medications administered this visit:   Medications   sodium chloride flush 0.9 % injection 5-40 mL (has no administration in time range)   sodium chloride flush 0.9 % injection 5-40 mL (has no administration in time range)   0.9 % sodium chloride infusion (has no administration in time range)   ondansetron (ZOFRAN-ODT) disintegrating tablet 4 mg (has no administration in time range)     Or   ondansetron (ZOFRAN) injection 4 mg (has no administration in time range)   acetaminophen (TYLENOL) tablet 650 mg (has no administration in time range)     Or   acetaminophen (TYLENOL) suppository 650 mg (has no administration in time range)   heparin (porcine) injection 5,000 Units (has no administration in time range)   0.9 % sodium chloride infusion (has no administration in time range)   clopidogrel (PLAVIX) tablet 75 mg (has no administration in time range)   iopamidol (ISOVUE-370) 76 % injection 80 mL (80 mLs Intravenous Given 7/14/21 1538)   aspirin chewable tablet 324 mg (324 mg Oral Given 7/14/21 1600)   clopidogrel (PLAVIX) tablet 300 mg (300 mg Oral Given 7/14/21 1601)   0.9 % sodium chloride bolus (0 mLs Intravenous Stopped 7/14/21 1835)         ED COURSE     ED Course as of Jul 14 1920   Wed Jul 14, 2021   1525 Code stroke paged for ANA pearsonator drift; Alert and oriented to place and self, but not time; wife at bedside is unsure if this is new or notLast known well 1pm    [CR]   1 Spoke with neurointerventialist; agreed with stroke workout; treat as TIA; aspirin + plavix; admit for further workup    [CR]   1555 Cr 1.3    [CR]   1608 CT Head:  IMPRESSION:   No evidence of acute intracranial abnormality. [CR]   4047 CXR:  IMPRESSION:  Cardiomegaly with no acute intrathoracic process. [CR]   0506 CTA:IMPRESSION:     1. Minimal mural calcification in the clinoid segments of the internal carotid arteries without flow-limiting stenosis or aneurysm in the intracranial circulation. 2. Calcified mural plaque in the proximal right internal carotid artery without hemodynamically significant stenosis by NASCET criteria.   3. Mild to moderate stenosis in the distal most segment of the right common carotid artery. [CR]   1733 UA negative for infection    [CR]      ED Course User Index  [CR] Chelsey Thompson MD            MEDICATION CHANGES     New Prescriptions    No medications on file         FINAL DISPOSITION     Final diagnoses:   TIA (transient ischemic attack)     Condition: condition: stable  Dispo: Admit to med/surg floor      This transcription was electronically signed. Parts of this transcriptions may have been dictated by use of voice recognition software and electronically transcribed, and parts may have been transcribed with the assistance of an ED scribe. The transcription may contain errors not detected in proofreading. Please refer to my supervising physician's documentation if my documentation differs.     Electronically Signed: Chelsey Thompson MD, 07/14/21, 7:20 PM         Chelsey Thompson MD  Resident  07/14/21 5953

## 2021-07-14 NOTE — ED NOTES
MRI questionaire and colvin catheter placed at this time. Patient tolerated well. VSS. No other complaints at this time.       Gian Contreras RN  07/14/21 4773

## 2021-07-15 VITALS
SYSTOLIC BLOOD PRESSURE: 150 MMHG | OXYGEN SATURATION: 98 % | DIASTOLIC BLOOD PRESSURE: 69 MMHG | WEIGHT: 174.1 LBS | HEIGHT: 70 IN | RESPIRATION RATE: 18 BRPM | BODY MASS INDEX: 24.92 KG/M2 | TEMPERATURE: 98.3 F | HEART RATE: 50 BPM

## 2021-07-15 LAB
ANION GAP SERPL CALCULATED.3IONS-SCNC: 8 MEQ/L (ref 8–16)
BASOPHILS # BLD: 0.4 %
BASOPHILS ABSOLUTE: 0 THOU/MM3 (ref 0–0.1)
BUN BLDV-MCNC: 19 MG/DL (ref 7–22)
CALCIUM SERPL-MCNC: 8.6 MG/DL (ref 8.5–10.5)
CHLORIDE BLD-SCNC: 108 MEQ/L (ref 98–111)
CO2: 26 MEQ/L (ref 23–33)
CREAT SERPL-MCNC: 1.1 MG/DL (ref 0.4–1.2)
EOSINOPHIL # BLD: 2.8 %
EOSINOPHILS ABSOLUTE: 0.2 THOU/MM3 (ref 0–0.4)
ERYTHROCYTE [DISTWIDTH] IN BLOOD BY AUTOMATED COUNT: 11.5 % (ref 11.5–14.5)
ERYTHROCYTE [DISTWIDTH] IN BLOOD BY AUTOMATED COUNT: 40.9 FL (ref 35–45)
GFR SERPL CREATININE-BSD FRML MDRD: 77 ML/MIN/1.73M2
GLUCOSE BLD-MCNC: 74 MG/DL (ref 70–108)
HCT VFR BLD CALC: 33.5 % (ref 42–52)
HEMOGLOBIN: 11 GM/DL (ref 14–18)
IMMATURE GRANS (ABS): 0.03 THOU/MM3 (ref 0–0.07)
IMMATURE GRANULOCYTES: 0.5 %
LYMPHOCYTES # BLD: 21.8 %
LYMPHOCYTES ABSOLUTE: 1.2 THOU/MM3 (ref 1–4.8)
MCH RBC QN AUTO: 31.8 PG (ref 26–33)
MCHC RBC AUTO-ENTMCNC: 32.8 GM/DL (ref 32.2–35.5)
MCV RBC AUTO: 96.8 FL (ref 80–94)
MONOCYTES # BLD: 8.1 %
MONOCYTES ABSOLUTE: 0.5 THOU/MM3 (ref 0.4–1.3)
NUCLEATED RED BLOOD CELLS: 0 /100 WBC
OSMOLALITY CALCULATION: 284 MOSMOL/KG (ref 275–300)
PLATELET # BLD: 147 THOU/MM3 (ref 130–400)
PMV BLD AUTO: 10.6 FL (ref 9.4–12.4)
POTASSIUM REFLEX MAGNESIUM: 3.8 MEQ/L (ref 3.5–5.2)
RBC # BLD: 3.46 MILL/MM3 (ref 4.7–6.1)
SEG NEUTROPHILS: 66.4 %
SEGMENTED NEUTROPHILS ABSOLUTE COUNT: 3.8 THOU/MM3 (ref 1.8–7.7)
SODIUM BLD-SCNC: 142 MEQ/L (ref 135–145)
WBC # BLD: 5.7 THOU/MM3 (ref 4.8–10.8)

## 2021-07-15 PROCEDURE — 97116 GAIT TRAINING THERAPY: CPT

## 2021-07-15 PROCEDURE — 96372 THER/PROPH/DIAG INJ SC/IM: CPT

## 2021-07-15 PROCEDURE — 95819 EEG AWAKE AND ASLEEP: CPT

## 2021-07-15 PROCEDURE — 36415 COLL VENOUS BLD VENIPUNCTURE: CPT

## 2021-07-15 PROCEDURE — 2580000003 HC RX 258: Performed by: INTERNAL MEDICINE

## 2021-07-15 PROCEDURE — 97530 THERAPEUTIC ACTIVITIES: CPT

## 2021-07-15 PROCEDURE — 2580000003 HC RX 258: Performed by: REGISTERED NURSE

## 2021-07-15 PROCEDURE — 6370000000 HC RX 637 (ALT 250 FOR IP): Performed by: INTERNAL MEDICINE

## 2021-07-15 PROCEDURE — 93270 REMOTE 30 DAY ECG REV/REPORT: CPT

## 2021-07-15 PROCEDURE — 97162 PT EVAL MOD COMPLEX 30 MIN: CPT

## 2021-07-15 PROCEDURE — 85025 COMPLETE CBC W/AUTO DIFF WBC: CPT

## 2021-07-15 PROCEDURE — 80048 BASIC METABOLIC PNL TOTAL CA: CPT

## 2021-07-15 PROCEDURE — G0378 HOSPITAL OBSERVATION PER HR: HCPCS

## 2021-07-15 PROCEDURE — 6370000000 HC RX 637 (ALT 250 FOR IP): Performed by: REGISTERED NURSE

## 2021-07-15 PROCEDURE — 6360000002 HC RX W HCPCS: Performed by: INTERNAL MEDICINE

## 2021-07-15 PROCEDURE — 99222 1ST HOSP IP/OBS MODERATE 55: CPT | Performed by: PSYCHIATRY & NEUROLOGY

## 2021-07-15 PROCEDURE — 97166 OT EVAL MOD COMPLEX 45 MIN: CPT

## 2021-07-15 PROCEDURE — 90792 PSYCH DIAG EVAL W/MED SRVCS: CPT | Performed by: PSYCHIATRY & NEUROLOGY

## 2021-07-15 PROCEDURE — 6360000002 HC RX W HCPCS: Performed by: REGISTERED NURSE

## 2021-07-15 RX ORDER — PANTOPRAZOLE SODIUM 40 MG/1
40 TABLET, DELAYED RELEASE ORAL
Qty: 30 TABLET | Refills: 3 | Status: SHIPPED | OUTPATIENT
Start: 2021-07-16

## 2021-07-15 RX ORDER — METOPROLOL TARTRATE 50 MG/1
50 TABLET, FILM COATED ORAL DAILY
Status: ON HOLD | COMMUNITY
End: 2021-07-15 | Stop reason: SDUPTHER

## 2021-07-15 RX ORDER — METOPROLOL TARTRATE 50 MG/1
25 TABLET, FILM COATED ORAL DAILY
Qty: 60 TABLET | Refills: 3 | Status: SHIPPED | OUTPATIENT
Start: 2021-07-15

## 2021-07-15 RX ORDER — CLOPIDOGREL BISULFATE 75 MG/1
75 TABLET ORAL DAILY
Qty: 30 TABLET | Refills: 3 | Status: SHIPPED | OUTPATIENT
Start: 2021-07-16 | End: 2021-08-30

## 2021-07-15 RX ORDER — POTASSIUM CHLORIDE 750 MG/1
20 TABLET, EXTENDED RELEASE ORAL DAILY
COMMUNITY

## 2021-07-15 RX ORDER — ONDANSETRON 4 MG/1
4 TABLET, ORALLY DISINTEGRATING ORAL EVERY 8 HOURS PRN
Qty: 10 TABLET | Refills: 0 | Status: SHIPPED | OUTPATIENT
Start: 2021-07-15

## 2021-07-15 RX ORDER — ZIPRASIDONE MESYLATE 20 MG/ML
10 INJECTION, POWDER, LYOPHILIZED, FOR SOLUTION INTRAMUSCULAR ONCE
Status: COMPLETED | OUTPATIENT
Start: 2021-07-15 | End: 2021-07-15

## 2021-07-15 RX ORDER — CYANOCOBALAMIN 1000 UG/ML
1000 INJECTION INTRAMUSCULAR; SUBCUTANEOUS ONCE
Status: DISCONTINUED | OUTPATIENT
Start: 2021-07-15 | End: 2021-07-15 | Stop reason: HOSPADM

## 2021-07-15 RX ORDER — ACETAMINOPHEN 500 MG
500 TABLET ORAL EVERY 6 HOURS PRN
COMMUNITY

## 2021-07-15 RX ORDER — LANOLIN ALCOHOL/MO/W.PET/CERES
1000 CREAM (GRAM) TOPICAL DAILY
Status: DISCONTINUED | OUTPATIENT
Start: 2021-07-16 | End: 2021-07-15 | Stop reason: HOSPADM

## 2021-07-15 RX ADMIN — DOXAZOSIN 2 MG: 4 TABLET ORAL at 11:00

## 2021-07-15 RX ADMIN — DOCUSATE SODIUM 100 MG: 100 CAPSULE, LIQUID FILLED ORAL at 10:59

## 2021-07-15 RX ADMIN — DIVALPROEX SODIUM 250 MG: 250 TABLET, EXTENDED RELEASE ORAL at 10:59

## 2021-07-15 RX ADMIN — FOLIC ACID 1 MG: 1 TABLET ORAL at 10:59

## 2021-07-15 RX ADMIN — NIFEDIPINE 30 MG: 30 TABLET, FILM COATED, EXTENDED RELEASE ORAL at 11:00

## 2021-07-15 RX ADMIN — SODIUM CHLORIDE: 9 INJECTION, SOLUTION INTRAVENOUS at 04:13

## 2021-07-15 RX ADMIN — CLOPIDOGREL BISULFATE 75 MG: 75 TABLET ORAL at 10:59

## 2021-07-15 RX ADMIN — PANTOPRAZOLE SODIUM 40 MG: 40 TABLET, DELAYED RELEASE ORAL at 06:21

## 2021-07-15 RX ADMIN — ASPIRIN 81 MG: 81 TABLET, CHEWABLE ORAL at 09:00

## 2021-07-15 RX ADMIN — ZIPRASIDONE MESYLATE 10 MG: 20 INJECTION, POWDER, LYOPHILIZED, FOR SOLUTION INTRAMUSCULAR at 00:16

## 2021-07-15 RX ADMIN — HEPARIN SODIUM 5000 UNITS: 5000 INJECTION INTRAVENOUS; SUBCUTANEOUS at 06:21

## 2021-07-15 RX ADMIN — WATER 1.2 ML: 1 INJECTION INTRAMUSCULAR; INTRAVENOUS; SUBCUTANEOUS at 00:16

## 2021-07-15 RX ADMIN — HYDROCHLOROTHIAZIDE 25 MG: 25 TABLET ORAL at 11:00

## 2021-07-15 ASSESSMENT — PAIN SCALES - GENERAL: PAINLEVEL_OUTOF10: 0

## 2021-07-15 NOTE — ED NOTES
Pt awake, lying on cot. RR even and unlabored. Family at bedside. Tech at bedside to take pt up to room via stretcher.       Prince Hood RN  07/14/21 7395

## 2021-07-15 NOTE — CARE COORDINATION
7/15/21, 7:38 AM EDT  DISCHARGE PLANNING EVALUATION:    Cecelia Beasley       Admitted: 7/14/2021/ [facility] day: 0   Location: -/[facility] Reason for admit: TIA (transient ischemic attack) [G45.9]   PMH:  has a past medical history of BPH (benign prostatic hypertrophy), CKD (chronic kidney disease) stage 2, GFR 60-89 ml/min, Dementia (HCC), Erectile dysfunction, Frequent falls, [place] (hard of hearing), Hyperlipidemia, Hypertension, Metabolic bone disease, and Orthostatic hypotension. Procedure:   CT Head WO Contrast   Impression:         No evidence of acute intracranial abnormality. CXR   Impression:        Cardiomegaly with no acute intrathoracic process. CTA Head Neck W WO Contrast   Impression:            1. Minimal mural calcification in the clinoid segments of the internal carotid arteries without flow-limiting stenosis or aneurysm in the intracranial circulation. 2. Calcified mural plaque in the proximal right internal carotid artery without hemodynamically significant stenosis by NASCET criteria. 3. Mild to moderate stenosis in the distal most segment of the right common carotid artery. Barriers to Discharge:  PT/OT, Neurology consult, telemetry, Psychiatry consult, Asa, Lipitor, Plavix, Depakote Aricept, Heparin SQ, Protonix. PCP: Tali Tenorio MD   %    Patient Goals/Plan/Treatment Preferences: Met with Yulia Blandon. He currently lives at home with his wife. Plan is to return home at discharge. He denies need for DME and declines HH. Will follow clinical course, PT/OT recommendations for potential needs. Transportation/Food Security/Housekeeping Addressed:  No issues identified.

## 2021-07-15 NOTE — PROGRESS NOTES
[address] [place] [address]  INPATIENT PHYSICAL THERAPY  EVALUATION  [facility] 4A - [id number]    Time In: 3911  Time Out: 8414  Timed Code Treatment Minutes: 11 Minutes  Minutes: 18          Date: 7/15/2021  Patient Name: Grace Farr,  Gender:  male        MRN: [id number]  : 1935  (81 y.o.)      Referring Practitioner: Sarah Sarmiento CNP  Diagnosis: TIA  Additional Pertinent Hx: The patient is a 81 y.o. male with pmhx of dementia, hypertension, BPH, alcoholic encephalopathy, hyperlipidemia, CKD stage II who presents with acute onset generalized weakness and unsteady gait when he was out with his wife today. Wife reports that patient just could not get out of the car and kept saying that he felt funny and was not able to get up. . In the emergency department code stroke was called as patient had a slight left pronator drift which has since resolved. Patient was taken directly to CT. CT head without contrast was negative, CTA head and neck showed minimal stenosis. Tele stroke was consulted and they recommended inpatient admission with neurology consult, aspirin, Plavix, MRI. Patient denies chest pain, shortness of breath, abdominal pain, nausea, vomiting. Patient is alert to name only which wife states that this is probably his baseline. Patient admitted for TIA. Code status discussed with pt and family at bedside; pt remains a full code.  hx dementia     Restrictions/Precautions:  Restrictions/Precautions: General Precautions, Fall Risk    Subjective:  Chart Reviewed: Yes  Patient assessed for rehabilitation services?: Yes  Subjective: pleasant and cooperative, confusion noted with dec STM-pt repeating same questions multiple times    General:            Hearing: Exceptions to [facility]  Hearing Exceptions: Hard of hearing/hearing concerns         Pain: no c/o pain per pt    Vitals: Vitals not assessed per clinical judgement, see nursing flowsheet    Social/Functional History:    Lives With: formal and informal observation of tasks, assessment of data and development of plan of care and goals. Treatment time included skilled education and facilitation of tasks to increase safety and independence with functional mobility for improved independence and quality of life. Assessment: Body structures, Functions, Activity limitations: Decreased safe awareness, Decreased cognition, Decreased balance, Decreased functional mobility   Assessment: pt with dec cognition and STM with repeating safety questions, min dec balance and strength with inc assist for safe mobility, recommend cont PT to inc pt I with functional mobility  Prognosis: Good    REQUIRES PT FOLLOW UP: Yes    Discharge Recommendations:  Discharge Recommendations: Continue to assess pending progress, 24 hour supervision or assist    Patient Education:  PT Education: Goals, PT Role, Plan of Care, Functional Mobility Training    Equipment Recommendations:  Equipment Needed: No    Plan:  Times per week: 3-5X N  Times per day: Daily  Specific instructions for Next Treatment: therex, mobility, balance activities    Goals:  Patient goals : return home with wife  Short term goals  Time Frame for Short term goals: by discharge  Short term goal 1: bed mobility with MOD I to get in/out of bed  Short term goal 2: transfer with S to get in/out of chairs  Short term goal 3: amb >150'x1 without AD and S to walk safely in home  Short term goal 4: negotiate 4 steps with HR and S to enter home safely  Long term goals  Time Frame for Long term goals : no LTGs set secondary to short ELOS    Following session, patient left in safe position with all fall risk precautions in place.

## 2021-07-15 NOTE — PROGRESS NOTES
CLINICAL PHARMACY: DISCHARGE MED RECONCILIATION/REVIEW    [facility]) Select Patient?: Yes  Total # of Interventions Recommended: 0   -   Total # Interventions Accepted: 0  Intervention Severity:   - Level 1 Intervention Present?: No   - Level 2 #: 0   - Level 3 #: 0   Time Spent (min): 15    Additional Documentation:

## 2021-07-15 NOTE — CONSULTS
NEUROLOGY CONSULT NOTE      Requesting Physician: Guanako Donohue MD    Reason for Consult:  Evaluate for generalized tiredness    History of Present Illness:  Dionicio Bowen is a 81 y.o. male admitted to [address] [place] [address] on 7/14/2021. Patient with moderate to severe dementia, frequent falls, hypertension now admitted with 1 episode of transient generalized weakness. He actually did not fall. But he has previous fall history. Patient also had transient confusion. There was some left sided weakness which resolved by the time patient came to the emergency room. Patient was brought in here for evaluation. Reports no chest pain. No shortness of breath with exertion. Reports no neck pain. No vision changes. No dysphagia. No fever. No rash. No weight loss.     Past Medical History:        Diagnosis Date    BPH (benign prostatic hypertrophy)     CKD (chronic kidney disease) stage 2, GFR 60-89 ml/min     Dementia (HCC)     Erectile dysfunction     Frequent falls     [place] (hard of hearing)     Hyperlipidemia     Hypertension     Metabolic bone disease     Orthostatic hypotension            Procedure Laterality Date    COLONOSCOPY      ENDOSCOPY, COLON, DIAGNOSTIC      ESOPHAGUS SURGERY      NECK SURGERY      PROSTATE SURGERY      SHOULDER SURGERY      THYROID SURGERY      removal of a nodule    TONSILLECTOMY      TURP  5-31-12    Dr Shane Torrez       Allergies:    No Known Allergies     Current Medications:   aspirin chewable tablet 81 mg, Daily  atorvastatin (LIPITOR) tablet 10 mg, Nightly  divalproex (DEPAKOTE ER) extended release tablet 250 mg, Daily  docusate sodium (COLACE) capsule 100 mg, Daily  donepezil (ARICEPT) tablet 10 mg, Nightly  doxazosin (CARDURA) tablet 2 mg, Daily  folic acid (FOLVITE) tablet 1 mg, Daily  hydroCHLOROthiazide (HYDRODIURIL) tablet 25 mg, Daily  metoprolol tartrate (LOPRESSOR) tablet 50 mg, Daily  NIFEdipine (PROCARDIA XL) extended release tablet 30 mg, QAM  pantoprazole (PROTONIX) tablet 40 mg, QAM AC  sodium chloride flush 0.9 % injection 5-40 mL, 2 times per day  sodium chloride flush 0.9 % injection 5-40 mL, PRN  0.9 % sodium chloride infusion, PRN  ondansetron (ZOFRAN-ODT) disintegrating tablet 4 mg, Q8H PRN   Or  ondansetron (ZOFRAN) injection 4 mg, Q6H PRN  acetaminophen (TYLENOL) tablet 650 mg, Q6H PRN   Or  acetaminophen (TYLENOL) suppository 650 mg, Q6H PRN  heparin (porcine) injection 5,000 Units, 3 times per day  0.9 % sodium chloride infusion, Continuous  clopidogrel (PLAVIX) tablet 75 mg, Daily         Social History:  Social History     Tobacco Use   Smoking Status Former Smoker    Quit date: 1992    Years since quittin.4   Smokeless Tobacco Never Used     Social History     Substance and Sexual Activity   Alcohol Use Not Currently    Alcohol/week: 0.0 standard drinks    Comment: daily     Social History     Substance and Sexual Activity   Drug Use No         Family History:       Problem Relation Age of Onset    High Blood Pressure Father     Stroke Father     Stroke Sister     Arthritis Sister     Alzheimer's Disease Sister     Cancer Sister     Heart Disease Sister     Arthritis Sister     Other Brother         parkinsons    Dementia Brother     No Known Problems Brother     No Known Problems Brother        Review of Systems:  All systems reviewed and are all negative except what is mentioned in history of present illness. Physical Exam:  BP (!) 174/88   Pulse 65   Temp 97.9 °F (36.6 °C) (Axillary)   Resp 18   Ht 5' 10\" (1.778 m)   Wt 174 lb 1.6 oz (79 kg)   SpO2 98%   BMI 24.98 kg/m²  I Body mass index is 24.98 kg/m². I   Wt Readings from Last 1 Encounters:   21 174 lb 1.6 oz (79 kg)           General:  pleasant in no acute distress. HEENT: No pallor or icterus. Neck supple. No Carotid bruits. Heart: S1 and S2 normal with regular rhythm. No murmur.        GENERAL NEUROLOGIC EXAM     Mental Status: Awake alert and oriented to person place and time. Language is normal for comprehension, repetition, naming and fluency. Recent and remote memory were abnormal.  Attention span and concentration were abnormal . Fund of knowledge was abnormal.      Cranial nerves:  Fundi were normal bilaterally. Visual fields were full to confrontation. Pupils are equal regular and reactive to light. Extraocular movements are intact. Facial sensation was normal and symmetrical bilaterally. Muscles of facial expression were strong and symmetric. Hearing to finger rub normal bilaterally. Palate elevates symmetrically. Sternocleidomastoid and trapezius normal. The tongue protrudes midline without atrophy or fasciculations. Motor: Normal tone and bulk with no involuntary movements or pronator drift. Strength normal 5/5 in bilateral upper and lower extremities. Sensation: Light touch, pin prick, vibration and joint position sense were normal in the upper and lower extremities. Reflexes: 2+ bilaterally symmetrical with down going toes. Coordination: finger nose and heel shin test normal bilaterally. Gait: Normal station and gait. Heel, toe and tandem walk normal.  Romberg negative. Labs:    CBC:   Recent Labs     07/14/21  1500 07/15/21  0437   WBC 4.0* 5.7   HGB 11.7* 11.0*    147   MCV 95.6* 96.8*   MCH 30.5 31.8   MCHC 32.0* 32.8     CMP:  Recent Labs     07/14/21  1500 07/15/21  0437    142   K 4.2 3.8    108   CO2 27 26   BUN 23* 19   CREATININE 1.3* 1.1   LABGLOM 63* 77*   GLUCOSE 95 74   CALCIUM 8.9 8.6     Liver:   Recent Labs     07/14/21  1500   AST 18   ALT 9*   ALKPHOS 48   PROT 6.9   LABALBU 4.0   BILITOT 0.5     Stroke Labs: No results for input(s): [id number], TSH, LDLCALC, LABA1C in the last 72 hours. Imaging:  No results found for this or any previous visit. No results found for this or any previous visit.     Results for orders placed during the hospital 3. 9 mm. The left carotid bifurcation is widely patent without hemodynamically significant stenosis. Cervical segments of internal carotid arteries are patent without focal stenosis. The right vertebral artery is dominant. The  vertebral arteries are patent throughout their course without focal stenosis. Mucosal surfaces of the aerodigestive tract are symmetric without focal nodular thickening or visualized mass. No cervical lymphadenopathy is identified. The parotid, submandibular and thyroid glands are unremarkable. There are mild posterior dependent  changes in the visualized portions of the lungs. The heart is enlarged. There are moderate degenerative changes of the cervical spine. No suspicious osseous lesion is identified. Impression  1. Minimal mural calcification in the clinoid segments of the internal carotid arteries without flow-limiting stenosis or aneurysm in the intracranial circulation. 2. Calcified mural plaque in the proximal right internal carotid artery without hemodynamically significant stenosis by NASCET criteria. 3. Mild to moderate stenosis in the distal most segment of the right common carotid artery. **This report has been created using voice recognition software. It may contain minor errors which are inherent in voice recognition technology. **    Final report electronically signed by Dr. Raul Larry MD on 7/14/2021 4:06 PM    Results for orders placed during the hospital encounter of 07/14/21    CTA NECK W WO CONTRAST    Narrative  PROCEDURE: CTA HEAD W WO CONTRAST, CTA [address] INFORMATION: code stroke . COMPARISON: CT head from the same date and MR brain dated 5/2/2019. TECHNIQUE: Helical CT from the aortic arch through the head in arterial phase during fast bolus administration of 80 mL Isovue-370 injected in the right [facility] with multiplanar reconstructions to include volumetric maximum intensity projection sequences.     All CT scans at this facility use dose modulation, iterative reconstruction, and/or weight-based dosing when appropriate to reduce radiation dose to as low as reasonably achievable. FINDINGS:    CTA HEAD:  There are mural calcifications in the cavernous segments of internal carotid arteries without flow-limiting stenosis. Intracranial segments of internal carotid arteries are patent throughout without focal stenosis or visualized aneurysm. The bilateral  middle cerebral and anterior cerebral arteries are patent without focal abnormality identified. The basilar artery is patent without focal stenosis or visualized aneurysm. The bilateral posterior cerebral and superior cerebellar arteries are patent  without focal abnormality identified. Dural venous sinuses appear patent without focal filling defect. No focal areas of abnormal parenchymal enhancement are identified. CTA NECK:  There is a bovine type arch. The brachiocephalic and left subclavian arteries are patent without focal stenosis. There is mild to moderate stenosis in the distal most segment of the right common carotid artery. There is mural calcification at the distal  most segment of left common carotid artery without flow-limiting stenosis. There is calcified and noncalcified mural plaque in the proximal right internal carotid artery with narrowest luminal diameter of 3.9 mm with a point more distal, where the walls  are parallel, measuring 3.9 mm. The left carotid bifurcation is widely patent without hemodynamically significant stenosis. Cervical segments of internal carotid arteries are patent without focal stenosis. The right vertebral artery is dominant. The  vertebral arteries are patent throughout their course without focal stenosis. Mucosal surfaces of the aerodigestive tract are symmetric without focal nodular thickening or visualized mass. No cervical lymphadenopathy is identified. The parotid, submandibular and thyroid glands are unremarkable.  There are mild posterior dependent  changes in the visualized portions of the lungs. The heart is enlarged. There are moderate degenerative changes of the cervical spine. No suspicious osseous lesion is identified. Impression  1. Minimal mural calcification in the clinoid segments of the internal carotid arteries without flow-limiting stenosis or aneurysm in the intracranial circulation. 2. Calcified mural plaque in the proximal right internal carotid artery without hemodynamically significant stenosis by NASCET criteria. 3. Mild to moderate stenosis in the distal most segment of the right common carotid artery. **This report has been created using voice recognition software. It may contain minor errors which are inherent in voice recognition technology. **    Final report electronically signed by Dr. Emmy Shah MD on 7/14/2021 4:06 PM    Results for orders placed during the hospital encounter of 07/14/21    MRI BRAIN WO CONTRAST    Narrative  WET READ:    No definite areas of resection diffusion are demonstrated to suggest acute ischemia. However, there are chronic appearing patchy foci of periventricular and subcortical white matter hyperintense signals changes likely representing senescent changes which  appear similar when compared to prior examination dated 5/20/2019. This examination will be formally read by one of the neuro radiologists tomorrow. Please refer to that report. Chaitanya Rousseau [place] ON 7/14/2021 7:38 PM.    **This report has been created using voice recognition software. It may contain minor errors which are inherent in voice recognition technology. **      FINAL REPORT:    PROCEDURE: MRI BRAIN WO CONTRAST    INDICATION:  TIA symptoms. COMPARISON: CT head from the same date and MR brain dated 5/2/2019.     TECHNIQUE: Multiplanar and multiple spin echo MRI images were obtained of the brain without contrast.    FINDINGS:  There is prominent temporal volume loss with mild volume loss elsewhere, stable compared to prior exam. There are mild to moderate confluent and patchy areas of T2/FLAIR prolongation in the periventricular, subcortical deep white matter, also stable  compared to prior MRI. No intra or extra-axial mass is identified. No focal areas of restricted diffusion are present. The major vascular flow voids appear patent. There is bilateral proptosis, stable compared to prior exam. Orbits are otherwise unremarkable. Paranasal sinuses and mastoid air cells are clear. Impression  1. No evidence of acute intracranial abnormality. 2. Prominent temporal predominant volume loss. 3. 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 **This report has been created using voice recognition software. It may contain minor errors which are inherent in voice recognition technology. **    Final report electronically signed by Dr. Zac Pinto MD on 7/15/2021 9:25 AM    Results for orders placed during the hospital encounter of 05/02/19    MRI BRAIN W WO CONTRAST    Narrative  PROCEDURE: MRI BRAIN W WO CONTRAST    CLINICAL INFORMATION: Memory problem. Confusion    COMPARISON: MRI of the brain dated April 26, 2017. TECHNIQUE: Multiplanar and multiple spin echo T1 and T2-weighted images were obtained through the brain before and after the administration of 15 mL ProHance intravenous contrast.    FINDINGS:    There is prominence of the sulcal spaces and ventricular system secondary to generalized, age-related parenchymal volume loss. Moderate patchy foci of hyperintense T2 signal are again noted throughout the periventricular and deep cerebral white matter  which likely correspond to sequela of chronic microvascular ischemic change. There is a stable appearance of subtle hyperintense T2 signal along the medial temporal lobes bilaterally. This is nonspecific and may be artifactual. No restricted diffusion is  identified.  Punctate foci of susceptibility are again noted within the posterior right frontal lobe and right parietal lobe which may correspond to remote microhemorrhages versus small cavernous malformations. The ventricles are midline and stable in size and morphology without evidence of hydrocephalus. No mass, mass effect or extra-axial fluid collection is identified. The basal cisterns and visualized vascular flow voids are patent. The pituitary, brain  stem and cervical medullary junction are within normal limits. No abnormal intracranial enhancement is identified. The visualized orbits and temporal bone structures are unremarkable. The paranasal sinuses are within normal limits. Impression  Stable senescent changes are present including moderate sequela of chronic microvascular ischemic angiopathy within the periventricular and deep cerebral white matter. No acute intracranial abnormality is identified. **This report has been created using voice recognition software. It may contain minor errors which are inherent in voice recognition technology. **      Final report electronically signed by Dr. Viktoria Fletcher on 5/2/2019 1:38 PM    No results found for this or any previous visit. Results for orders placed during the hospital encounter of 07/14/21    CT HEAD WO CONTRAST    Narrative  PROCEDURE: CT HEAD WO CONTRAST    CLINICAL INFORMATION: Code stroke. COMPARISON: MR brain dated 5/2/2019 and CT head dated 3/9/2018. TECHNIQUE: Noncontrast 5 mm axial images were obtained through the brain. Sagittal and coronal reconstructions were created. All CT scans at this facility use dose modulation, iterative reconstruction, and/or weight-based dosing when appropriate to reduce radiation dose to as low as reasonably achievable. FINDINGS:  There is stable moderate temporal and central predominant volume loss.  There are moderate confluent and patchy areas of hypodensity in the periventricular, subcortical deep white matter as evidence for chronic microvascular angiopathy, similar to prior  exam. Gray-white matter projection otherwise appears grossly preserved. No intracranial hemorrhage, mass effect or midline shift is identified. The calvarium appears intact. Orbits are unremarkable. Visualized paranasal sinuses are clear. Mastoid air  cells are clear. Impression  No evidence of acute intracranial abnormality. Findings were discussed with Yadiel Kruse RN via telephone at the time of interpretation. **This report has been created using voice recognition software. It may contain minor errors which are inherent in voice recognition technology. **    Final report electronically signed by Dr. Derrick Mcdonald MD on 7/14/2021 3:42 PM      We reviewed the patient records and available information in the EHR     Active Problems:    TIA (transient ischemic attack)  Resolved Problems:    * No resolved hospital problems. *      ASSESSMENT/PLAN: This is a 81 y.o. male who  has a past medical history of BPH (benign prostatic hypertrophy), CKD (chronic kidney disease) stage 2, GFR 60-89 ml/min, Dementia (HCC), Erectile dysfunction, Frequent falls, [place] (hard of hearing), Hyperlipidemia, Hypertension, Metabolic bone disease, and Orthostatic hypotension. He presents with transient left-sided weakness and confusion and generalized tiredness. 1. Possible TIA versus seizures:  -EEG as outpatient  -Patient was already on aspirin at home. Plavix 75 mg plus aspirin 81 mg x 21 days and then Plavix alone to continue. Continue high intensity statins Lipitor 40 mg daily. MRI brain, CTA head and neck did not show any abnormality.  -30-day Holter monitor as outpatient. Follow-up with neurology as outpatient in 4 to 6 weeks with Dr. Avinash Pandya. We will sign off. Patient can be discharged from neurological standpoint. Thank you for allowing us to participate in the care of this patient.       Electronically signed by Dinesh Oconnor MD on 7/15/2021 at 2:28 PM

## 2021-07-15 NOTE — DISCHARGE SUMMARY
Discharge Summary  7/15/2021  5:45 PM    Patient:  Grabiel White  YOB: 1935    MRN: [id number]   Acct: [de-identified]   [id number]   Primary Care Physician: Dariela Brown MD    Admit date:  7/14/2021    Discharge date:  7/15/2021    Discharge Diagnoses:    TIA  CKD II  Dementia  HTN  BPH  HLD      Discharge Medications:       Madeline Green   Home Medication Instructions KMS:[id number]    Printed on:07/15/21 7875   Medication Information                      acetaminophen (TYLENOL) 500 MG tablet  Take 500 mg by mouth every 6 hours as needed for Pain             aspirin 81 MG EC tablet  Take 81 mg by mouth daily              atorvastatin (LIPITOR) 10 MG tablet  Take 1 tablet by mouth daily             Cholecalciferol (VITAMIN D3) 3000 units TABS  Take 3,000 Units by mouth nightly              clopidogrel (PLAVIX) 75 MG tablet  Take 1 tablet by mouth daily             divalproex (DEPAKOTE ER) 250 MG extended release tablet  take 1 tablet by mouth once daily             docusate (COLACE, DULCOLAX) 100 MG CAPS  Take 100 mg by mouth daily             donepezil (ARICEPT) 10 MG tablet  Take 1 tablet by mouth nightly             doxazosin (CARDURA) 2 MG tablet  Take 2 mg by mouth daily             folic acid (FOLVITE) 1 MG tablet  Take 1 tablet by mouth daily             hydrochlorothiazide (HYDRODIURIL) 25 MG tablet  Take 25 mg by mouth daily              metoprolol tartrate (LOPRESSOR) 50 MG tablet  Take 0.5 tablets by mouth daily             mirtazapine (REMERON) 15 MG tablet  Take 15 mg by mouth nightly              Multiple Vitamin (MULTIVITAMIN) tablet  Take 1 tablet by mouth daily             NIFEdipine (ADALAT CC) 30 MG extended release tablet  Take 1 tablet by mouth every morning             ondansetron (ZOFRAN-ODT) 4 MG disintegrating tablet  Take 1 tablet by mouth every 8 hours as needed for Nausea or Vomiting             oxybutynin (DITROPAN-XL) 10 MG extended release tablet  Take 1 tablet by mouth nightly             pantoprazole (PROTONIX) 40 MG tablet  Take 1 tablet by mouth every morning (before breakfast)             potassium chloride (KLOR-CON M) 10 MEQ extended release tablet  Take 20 mEq by mouth daily             vitamin B-1 (THIAMINE) 100 MG tablet  Take 100 mg by mouth daily                Scheduled Meds: Scheduled Meds:   [START ON 7/16/2021] vitamin B-12  1,000 mcg Oral Daily    cyanocobalamin  1,000 mcg Intramuscular Once    aspirin  81 mg Oral Daily    atorvastatin  10 mg Oral Nightly    divalproex  250 mg Oral Daily    docusate sodium  100 mg Oral Daily    donepezil  10 mg Oral Nightly    doxazosin  2 mg Oral Daily    folic acid  1 mg Oral Daily    hydroCHLOROthiazide  25 mg Oral Daily    metoprolol tartrate  50 mg Oral Daily    NIFEdipine  30 mg Oral QAM    pantoprazole  40 mg Oral QAM AC    sodium chloride flush  5-40 mL Intravenous 2 times per day    heparin (porcine)  5,000 Units Subcutaneous 3 times per day    clopidogrel  75 mg Oral Daily     Continuous Infusions:   sodium chloride      sodium chloride Stopped (07/15/21 1743)     PRN Meds:.sodium chloride flush, sodium chloride, ondansetron **OR** ondansetron, acetaminophen **OR** acetaminophen  Continuous Infusions:   sodium chloride      sodium chloride Stopped (07/15/21 1743)       Intake/Output Summary (Last 24 hours) at 7/15/2021 1745  Last data filed at 7/15/2021 1523  Gross per 24 hour   Intake 1570.84 ml   Output 3575 ml   Net -2004.16 ml       Diet:  ADULT DIET;  Regular    Activity:  Activity as tolerated (Patient may move about with assist as indicated or with supervision.)    Follow-up:  in the next few weeks with Allen Hood MD,      Consultants:  Neurology      Objective:  Lab Results   Component Value Date    WBC 5.7 07/15/2021    RBC 3.46 07/15/2021    RBC 3.83 04/01/2021    HGB 11.0 07/15/2021    HCT 33.5 07/15/2021    MCV 96.8 07/15/2021    MCH 31.8 07/15/2021    MCHC 32.8 07/15/2021    RDW 12.2 04/01/2021     07/15/2021    MPV 10.6 07/15/2021     Lab Results   Component Value Date     07/15/2021    K 3.8 07/15/2021     07/15/2021    CO2 26 07/15/2021    BUN 19 07/15/2021    CREATININE 1.1 07/15/2021    GLUCOSE 74 07/15/2021    GLUCOSE 93 04/01/2021    CALCIUM 8.6 07/15/2021     Lab Results   Component Value Date    CALCIUM 8.6 07/15/2021     No results found for: IONCA  Lab Results   Component Value Date    MG 1.8 02/27/2018     No results found for: PHOS  No results found for: BNP  Lab Results   Component Value Date    ALKPHOS 48 07/14/2021    ALT 9 07/14/2021    AST 18 07/14/2021    PROT 6.9 07/14/2021    BILITOT 0.5 07/14/2021    BILIDIR <0.2 07/14/2021    LABALBU 4.0 07/14/2021    LABALBU 4.0 05/30/2012     Lab Results   Component Value Date    LACTA 1.6 09/10/2017     Lab Results   Component Value Date    AMYLASE 42 02/28/2018     Lab Results   Component Value Date    LIPASE 30.0 02/27/2018     Lab Results   Component Value Date    CHOL 137 04/01/2021    TRIG 128 04/01/2021    HDL 34 04/01/2021    LDLCALC 77 04/01/2021     Recent Labs     07/14/21  1525   POCGLU 103     No results for input(s): CKTOTAL, CKMB, TROPONINI in the last 72 hours. No results found for: LABA1C  Lab Results   Component Value Date    INR 1.33 08/10/2017     No results found for: Radha Gomez [facility] Course: clinical course has improved, patient was admitted to the hospital on July 14, 2021 due to TIA. Patient was at with his wife experiencing some weakness on ability to walk. CT head and CTA head and neck were negative. Inpatient admission was advised. Neurology was consulted. Patient was continued on aspirin and Plavix. MRI was negative for any acute infarct. Patient is stable for discharge. Celebrex held. Holter at d/c. Metoprolol dose decreased prior to d/c due to HR in 50s.       Vitals: BP (!) 150/69   Pulse 50   Temp 98.3 °F (36.8 °C) (Oral)   Resp 18   Ht 5' 10\" (1.778 m)   Wt 174 lb 1.6 oz (79 kg)   SpO2 98%   BMI 24.98 kg/m²   Physical Exam:  General appearance - alert, well appearing, and in no distress  Eyes - pupils equal and reactive, extraocular eye movements intact  Neck - supple, no significant adenopathy  Chest - clear to auscultation, no wheezes, rales or rhonchi, symmetric air entry  Heart - normal rate, regular rhythm, normal S1, S2, no murmurs, rubs, clicks or gallops  Abdomen - soft, nontender, nondistended, no masses or organomegaly pos bs:   Neurological - alert, oriented, normal speech, no focal findings or movement disorder noted  Extremities - peripheral pulses normal, no pedal edema,       Disposition: home    Condition: Stable    Over 35 minutes spent on encounter  Assessment and plan of care discussed with supervising physician, Dr Rich Aase.       ZORAN Madison - CNP, CNP     Copy: Primary Care Physician: Mindy Hubbard MD  Internal Medicine

## 2021-07-15 NOTE — PROGRESS NOTES
[facility] Laboratory Technician Worksheet      EEG Date: 7/15/2021    Name: Apolonia Villegas   : 1935   Age: 81 y.o. SEX: male    ROOM: [facility] MRN: [id number]           CSN: [id number]      Ordering Provider: Parul Sun  EEG Number: [id number] Time of Test:  5628    Hand: Unknown   Sedation: no    H.V. Done: No age protocol Photic: No    Sleep: Yes  Drowsy: Yes   Sleep Deprived: Yes - due to being in hospital. Wife says that he did not sleep last night. Seizures observed: no    Mentality: confused, minimally able to follow commands      Clinical History: Patient admitted 21 due to weakness. History of moderate to severe dementia and frequent falls. MRI Brain 21  Impression       1. No evidence of acute intracranial abnormality. 2. Prominent temporal predominant volume loss. 3. 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      CT Head 21  Impression    No evidence of acute intracranial abnormality.             Past Medical History:       Diagnosis Date    BPH (benign prostatic hypertrophy)     CKD (chronic kidney disease) stage 2, GFR 60-89 ml/min     Dementia (HCC)     Erectile dysfunction     Frequent falls     [place] (hard of hearing)     Hyperlipidemia     Hypertension     Metabolic bone disease     Orthostatic hypotension        Scheduled Meds:   vitamin B-12  1,000 mcg Oral Daily    cyanocobalamin  1,000 mcg Intramuscular Once    aspirin  81 mg Oral Daily    atorvastatin  10 mg Oral Nightly    divalproex  250 mg Oral Daily    docusate sodium  100 mg Oral Daily    donepezil  10 mg Oral Nightly    doxazosin  2 mg Oral Daily    folic acid  1 mg Oral Daily    hydroCHLOROthiazide  25 mg Oral Daily    metoprolol tartrate  50 mg Oral Daily    NIFEdipine  30 mg Oral QAM    pantoprazole  40 mg Oral QAM AC    sodium chloride flush  5-40 mL Intravenous 2 times per day    heparin (porcine)  5,000 Units Subcutaneous 3 times per day    clopidogrel  75 mg Oral Daily     Continuous Infusions:   sodium chloride      sodium chloride 75 mL/hr at 07/15/21 0413     PRN Meds:.sodium chloride flush, sodium chloride, ondansetron **OR** ondansetron, acetaminophen **OR** acetaminophen    Technician: Santos Cavanaugh 7/15/2021

## 2021-07-15 NOTE — PLAN OF CARE
Problem: Falls - Risk of:  Goal: Will remain free from falls  Description: Will remain free from falls  Outcome: Met This Shift  Note: Patient remains free from falls at this time. Bed is locked in lowest position with alarm on and side rails up. Call light and personal belongings are within reach. \"Call, don't fall\" policy explained. Fall wristband and nonslip socks are on patient and fall risk sign is posted. Problem: Falls - Risk of:  Goal: Absence of physical injury  Description: Absence of physical injury  Outcome: Met This Shift  Note: Patient remains free from physical injury at this time. Safety precautions explained and in place per [facility] policy. Problem: Skin Integrity:  Goal: Will show no infection signs and symptoms  Description: Will show no infection signs and symptoms  Outcome: Met This Shift  Note: Pt shows no evidence of infection at this time. Will continue to monitor. Problem: Skin Integrity:  Goal: Absence of new skin breakdown  Description: Absence of new skin breakdown  Outcome: Met This Shift  Note: Pt turns himself in bed regularly and walks occasionally. No evidence of new skin breakdown at this time. Will continue to monitor. Care plan reviewed with patient, wife, and son. Patient, wife, and son verbalize understanding of the plan of care and contribute to goal setting.

## 2021-07-15 NOTE — PROGRESS NOTES
[facility] 61  INPATIENT OCCUPATIONAL THERAPY  [facility] NEUROSCIENCES 4A  EVALUATION    Time:    Time In: 989  Time Out: 1019  Timed Code Treatment Minutes: 10 Minutes  Minutes: 20          Date: 7/15/2021  Patient Name: Lori Gonzalez,   Gender: male      MRN: [id number]  : 1935  (81 y.o.)  Referring Practitioner: ZORAN Dixon CNP  Diagnosis: TIA  Additional Pertinent Hx: The patient is a 81 y.o. male with pmhx of dementia, hypertension, BPH, alcoholic encephalopathy, hyperlipidemia, CKD stage II who presents with acute onset generalized weakness and unsteady gait when he was out with his wife today. Wife reports that patient just could not get out of the car and kept saying that he felt funny and was not able to get up. . In the emergency department code stroke was called as patient had a slight left pronator drift which has since resolved. Patient was taken directly to CT. CT head without contrast was negative, CTA head and neck showed minimal stenosis. Tele stroke was consulted and they recommended inpatient admission with neurology consult, aspirin, Plavix, MRI. Patient denies chest pain, shortness of breath, abdominal pain, nausea, vomiting. Patient is alert to name only which wife states that this is probably his baseline. Patient admitted for TIA. Code status discussed with pt and family at bedside; pt remains a full code. Restrictions/Precautions:  Restrictions/Precautions: General Precautions, Fall Risk    Subjective  Chart Reviewed: Yes, Orders, Progress Notes  Patient assessed for rehabilitation services?: Yes  Family / Caregiver Present: No    Subjective: RN okayed session. Pt was up in recliner upon arrival. Pt pleasant and cooperative. Hx of dementia, repetition noted throughout session.     Pain:  Pain Assessment  Patient Currently in Pain: Denies    Vitals: Vitals not assessed per clinical judgement, see nursing flowsheet    Social/Functional History:  Lives With: Spouse  Type of Home: House  Home Layout: Two level, Bed/Bath upstairs  Home Access: Stairs to enter with rails  Entrance Stairs - Number of Steps: 3   Bathroom Shower/Tub: Walk-in shower  Bathroom Toilet: Handicap height  Bathroom Equipment: Grab bars in shower  Bathroom Accessibility: Accessible       ADL Assistance: Independent  Homemaking Assistance: Needs assistance (pt reports he will occasionally cook)  Ambulation Assistance: Independent  Transfer Assistance: Independent               VISION:WFL    HEARING:  [place]    COGNITION: Slow Processing, Decreased Recall, Decreased Insight, Impaired Memory, Decreased Problem Solving, Decreased Safety Awareness and Impulsive    RANGE OF MOTION:  Bilateral Upper Extremity:  grossly 140 degrees shoulder flexion    STRENGTH:  Bilateral Upper Extremity:  grossly 4/5    SENSATION:   WFL    ADL:   Lower Extremity Dressing: Stand By Assistance. adjusting socks while seated in recliner. BALANCE:  Sitting Balance:  Supervision. seated on mat table  Standing Balance: Stand By Assistance. with no UE support, good balance noted    BED MOBILITY:  Not Tested    TRANSFERS:  Sit to Stand:  Stand By Assistance. from recliner, min impulsivity and decreased safety awareness noted  Stand to Sit: Stand By Assistance. FUNCTIONAL MOBILITY:  Assistive Device: None  Assist Level:  Stand By Assistance and Contact Guard Assistance. Distance: HH distances within unit and to/from therapy gym  Pt completed at fair, occasionally quick pace. Pt with unsteady gait, but no LOB, requiring min VC for path finding throughout. Activity Tolerance:  Patient tolerance of  treatment: good. Assessment:  Assessment: Pt presented with the listed deficits s/p admission with TIA. Pt with decreased strength, endurance, cognition, and balance and currently requires CGA - SBA during mobility and ADLs and IADLs.  Pt would benefit from continued OT to restore PLOF maximize pt's indep with ADLs and IADLs in prep for a safe return home at max level of indep. Performance deficits / Impairments: Decreased functional mobility , Decreased endurance, Decreased ADL status, Decreased balance, Decreased strength, Decreased high-level IADLs, Decreased safe awareness  Prognosis: Good  REQUIRES OT FOLLOW UP: Yes  Decision Making: Medium Complexity  Safety Devices in place: Yes  Type of devices: All fall risk precautions in place, Call light within reach, Chair alarm in place, Left in chair    Treatment Initiated: Treatment and education initiated within context of evaluation. Evaluation time included review of current medical information, gathering information related to past medical, social and functional history, completion of standardized testing, formal and informal observation of tasks, assessment of data and development of plan of care and goals. Treatment time included skilled education and facilitation of tasks to increase safety and independence with ADL's for improved functional independence and quality of life. Discharge Recommendations:  Continue to assess pending progress, Home with Home health OT, Home with assist PRN    Patient Education:  OT Education: OT Role, Plan of Care, ADL Adaptive Strategies, Transfer Training, Energy Conservation, Orientation    Equipment Recommendations:  Equipment Needed: No    Plan:  Times per week: 3-5x  Times per day: Daily  Current Treatment Recommendations: Strengthening, Balance Training, Functional Mobility Training, Endurance Training, Safety Education & Training, Self-Care / ADL, Patient/Caregiver Education & Training. See long-term goal time frame for expected duration of plan of care. If no long-term goals established, a short length of stay is anticipated.     Goals:  Patient goals : return home with wife  Short term goals  Time Frame for Short term goals: by discharge  Short term goal 1: Pt will complete functional mobility HH distances, including around

## 2021-07-15 NOTE — FLOWSHEET NOTE
Advance Directive Consult: Advance Directive materials were provided to patient and explained. Patient is not ready to complete at this time, gave information for Spiritual Care to be contacted if further assistance is needed. We had prayer for his healing     07/15/21 1024   Encounter Summary   Services provided to: Patient and family together   Referral/Consult From: [facility]   Continue Visiting Yes  (7/15 AD info. )   Complexity of Encounter Moderate   Length of Encounter 15 minutes   Care Plan:  Continue spiritual and emotional care for patient and family. Including prayers. He may be going home today or tomorrow. His wife came and I explained the AD to her as well. She will call our office with questions and with help in filling out the AD. Pain Medicine

## 2021-07-15 NOTE — PROGRESS NOTES
Discharge teaching and instructions completed with patient using teachback method. AVS reviewed. Prescriptions given to patient. Patient voiced understanding regarding prescriptions, follow up appointments, and care of self at home. Discharged home with all belongings.

## 2021-07-15 NOTE — ED NOTES
ED to inpatient nurses report    Chief Complaint   Patient presents with    Fatigue     unsteady gait      Present to ED from home  LOC: alert and orientated to name, place, date  Vital signs   Vitals:    07/14/21 1642 07/14/21 1748 07/14/21 1835 07/14/21 2132   BP: 131/67 136/72 136/69 (!) 149/59   Pulse: 54 55 56 (!) 49   Resp: 18 17 19 16   Temp:       TempSrc:       SpO2: 97% 97% 99% 98%   Weight:       Height:          Oxygen Baseline 99%    Current needs required room air Bipap/Cpap No  LDAs:   Peripheral IV 07/14/21 Right Forearm (Active)   Site Assessment Clean;Dry; Intact 07/14/21 2134   Line Status Infusing 07/14/21 2134   Dressing Status Clean;Dry; Intact 07/14/21 2134   Dressing Intervention New 07/14/21 1507     Mobility: Requires assistance * 1  Pending ED orders: none  Present condition: stable, calm, cooperative    Electronically signed by Calvin Eugene RN on 7/14/2021 at 9:36 PM       Calvin Eugene RN  07/14/21 2137

## 2021-07-15 NOTE — CONSULTS
15 MG tablet  12/27/19  Yes Historical Provider, MD   aspirin 81 MG tablet Take 81 mg by mouth daily   Yes Historical Provider, MD   Cholecalciferol (VITAMIN D3) 3000 units TABS Take by mouth   Yes Historical Provider, MD   atorvastatin (LIPITOR) 10 MG tablet Take 1 tablet by mouth daily 3/2/18  Yes Poli Newman MD   oxybutynin (DITROPAN-XL) 10 MG extended release tablet Take 1 tablet by mouth nightly 3/2/18  Yes Poli Newman MD   Multiple Vitamin (MULTIVITAMIN) tablet Take 1 tablet by mouth daily 3/3/18  Yes Poli Newman MD   donepezil (ARICEPT) 10 MG tablet Take 1 tablet by mouth nightly 3/2/18  Yes Poli Newman MD   metoprolol tartrate (LOPRESSOR) 50 MG tablet Take 1 tablet by mouth daily 3/3/18  Yes Poli Newman MD   NIFEdipine (ADALAT CC) 30 MG extended release tablet Take 1 tablet by mouth every morning 3/3/18  Yes Poli Newman MD   potassium chloride (KLOR-CON) 10 MEQ extended release tablet Take 1 tablet by mouth daily 3/3/18  Yes Poli Newman MD   doxazosin (CARDURA) 2 MG tablet Take 2 mg by mouth daily   Yes Historical Provider, MD   potassium chloride (KLOR-CON M10) 10 MEQ extended release tablet Take 1 tablet by mouth daily 6/21/17  Yes Loretta Atkinson, APRN - CNP   celecoxib (CELEBREX) 200 MG capsule Take 1 capsule by mouth daily 4/28/17  Yes Poli Newman MD   acetaminophen (TYLENOL) 325 MG tablet Take 2 tablets by mouth every 4 hours as needed for Pain 4/28/17  Yes Poli Newman MD   midodrine (PROAMATINE) 10 MG tablet Take 1 tablet by mouth 2 times daily as needed (SBP <110)  Patient not taking: Reported on 6/4/2021 3/6/18   Renetta Goddard MD   docusate (COLACE, DULCOLAX) 100 MG CAPS Take 100 mg by mouth daily 3/3/18   Poli Newman MD   pantoprazole (PROTONIX) 40 MG tablet Take 1 tablet by mouth every morning (before breakfast)  Patient not taking: Reported on 6/4/2021 3/3/18   Poli Newman MD   magnesium hydroxide (MILK OF MAGNESIA) 400 MG/5ML suspension Take 30 mLs by mouth daily as needed for Constipation  Patient not taking: Reported on 6/4/2021 3/2/18   Mónica Biggs MD   dextrose 50 % solution Infuse 25 mLs intravenously as needed (Blood glucose less than 70 mg/dL and patient NOT ALERT or NPO.)  Patient not taking: Reported on 6/4/2021 3/2/18   Mónica Biggs MD   Dextrose-Sodium Chloride (DEXTROSE 5 % AND 0.9 % NACL) 5-0.9 % infusion Infuse 100 mL/hr intravenously as needed (Low blood sugar)  Patient not taking: Reported on 6/4/2021 3/2/18   Mónica Biggs MD   potassium chloride (KLOR-CON M) 20 MEQ TBCR extended release tablet Take 2 tablets by mouth once for 1 dose 3/2/18 3/2/18  Mónica Biggs MD   vitamin B-1 (THIAMINE) 100 MG tablet Take 100 mg by mouth daily     Historical Provider, MD   lansoprazole (PREVACID) 15 MG delayed release capsule Take 30 mg by mouth daily  Patient not taking: Reported on 6/4/2021    Historical Provider, MD        Medications:    Current Facility-Administered Medications: aspirin chewable tablet 81 mg, 81 mg, Oral, Daily  atorvastatin (LIPITOR) tablet 10 mg, 10 mg, Oral, Nightly  divalproex (DEPAKOTE ER) extended release tablet 250 mg, 250 mg, Oral, Daily  docusate sodium (COLACE) capsule 100 mg, 100 mg, Oral, Daily  donepezil (ARICEPT) tablet 10 mg, 10 mg, Oral, Nightly  doxazosin (CARDURA) tablet 2 mg, 2 mg, Oral, Daily  folic acid (FOLVITE) tablet 1 mg, 1 mg, Oral, Daily  hydroCHLOROthiazide (HYDRODIURIL) tablet 25 mg, 25 mg, Oral, Daily  metoprolol tartrate (LOPRESSOR) tablet 50 mg, 50 mg, Oral, Daily  NIFEdipine (PROCARDIA XL) extended release tablet 30 mg, 30 mg, Oral, QAM  pantoprazole (PROTONIX) tablet 40 mg, 40 mg, Oral, QAM AC  sodium chloride flush 0.9 % injection 5-40 mL, 5-40 mL, Intravenous, 2 times per day  sodium chloride flush 0.9 % injection 5-40 mL, 5-40 mL, Intravenous, PRN  0.9 % sodium chloride infusion, 25 mL, Intravenous, PRN  ondansetron (ZOFRAN-ODT) disintegrating tablet 4 mg, 4 mg, Oral, Q8H PRN **OR** ondansetron (ZOFRAN) injection 4 mg, 4 mg, Intravenous, Q6H PRN  acetaminophen (TYLENOL) tablet 650 mg, 650 mg, Oral, Q6H PRN **OR** acetaminophen (TYLENOL) suppository 650 mg, 650 mg, Rectal, Q6H PRN  heparin (porcine) injection 5,000 Units, 5,000 Units, Subcutaneous, 3 times per day  0.9 % sodium chloride infusion, , Intravenous, Continuous  clopidogrel (PLAVIX) tablet 75 mg, 75 mg, Oral, Daily     Past Medical History:        Diagnosis Date    BPH (benign prostatic hypertrophy)     CKD (chronic kidney disease) stage 2, GFR 60-89 ml/min     Dementia (HCC)     Erectile dysfunction     Frequent falls     [place] (hard of hearing)     Hyperlipidemia     Hypertension     Metabolic bone disease     Orthostatic hypotension        Past Surgical History:        Procedure Laterality Date    COLONOSCOPY      ENDOSCOPY, COLON, DIAGNOSTIC      ESOPHAGUS SURGERY      NECK SURGERY      PROSTATE SURGERY      SHOULDER SURGERY      THYROID SURGERY      removal of a nodule    TONSILLECTOMY      TURP  5-31-12    Dr Guerrero Lung       Allergies: Patient has no known allergies. Social History:      RESIDENCE: Patient first mentioned he lived in nursing home, then stated he was living in [facility], then stated he was living in [address] [place] [address]. Originally from [facility]. : Patient is    CHILDREN: Patient has 4 children. Had to count twice in order to answer the question. OCCUPATION: Patient worked at [employer] as a skill . EDUCATION: Apprenticeship     SUBSTANCE USE HISTORY:   Patient is a former smoker. Previous alcohol use. Family Medical and Psychiatric History:     Unable to assess due to cognition.          Problem Relation Age of Onset    High Blood Pressure Father     Stroke Father     Stroke Sister     Arthritis Sister     Alzheimer's Disease Sister     Cancer Sister     Heart Disease Sister     Arthritis Sister     Other Brother         parkinsons    Dementia Brother     No Known Problems Brother     No Known Problems Brother          Physical  BP (!) 159/73   Pulse 56   Temp 97.5 °F (36.4 °C) (Oral)   Resp 16   Ht 5' 10\" (1.778 m)   Wt 174 lb 1.6 oz (79 kg)   SpO2 95%   BMI 24.98 kg/m²     General: Alert, well appearing and in no distress. CV: RRR w/o murmurs, gallops, rubs  Lungs: CTA bilaterally without wheezes, rales or rhonchi  Neurological: Alert, not oriented to place, time. No movement or focal disorders noted. Mental Status Examination:  Level of consciousness:  Within normal limits  Appearance: hospital attire, lying in bed, fair grooming  Behavior/Motor:  no abnormalities noted  Attitude toward examiner:  cooperative, attentive and good eye contact  Speech:  Spontaneous, normal rate and volume  Mood:  euthymic  Affect: mood congruent  Thought processes:  Not linear, not goal directed. Dementia  Thought content: No suicidal ideations   No homicidal ideations    No hallucinations   No delusions  Cognition:  Not oriented to time, place, situation. Oriented to person  Concentration: Impaired  Memory: Poor, dementia  Insight & Judgment:  poor    DSM-5 DIAGNOSIS:      Dementia without behavioral disturbance    Stressors     Severity of stressors is medium  Source of stressors include: Other psychosocial and environmental stressors    PLAN:      Continue current medication regiment  Recommend Seroquel 25 PRN at bedtime  Additional recommendations will follow the clinical course. Thank you very much for allowing us to participate in the care of this patient. Time spent 25 min.       Electronically signed by David Burton DO on 7/15/21 at 9:26 AM EDT

## 2021-07-15 NOTE — PROGRESS NOTES
CLINICAL PHARMACY NOTE: MEDS TO BEDS    Total # of Prescriptions Filled: 3   The following medications were delivered to the patient:  Clopidogrel 75mg  Pantoprazole 40mg  Ondansetron ODT 4mg    Additional Documentation:

## 2021-07-15 NOTE — DISCHARGE INSTR - COC
Continuity of Care Form    Patient Name: Andres Argueta   :  1935  MRN:  [id number]    Admit date:  2021  Discharge date:  ***    Code Status Order: Full Code   Advance Directives:     Admitting Physician:  Gita Davis MD  PCP: Tunde Hurt MD    Discharging Nurse: [facility] Unit/Room#: 4A-18/018-A  Discharging Unit Phone Number: ***    Emergency Contact:   Extended Emergency Contact Information  Primary Emergency Contact: Eileen Castellanos RENNY  Address: [address] [place] [address], [address] [place] [address] [address]  Phone: [phone]  Mobile Phone: [phone]  Relation: Spouse  Hearing or visual needs: None  Other needs: None  Preferred language: English   needed? No  [facility] [address]  Phone: [phone]  Mobile Phone: [phone]  Relation: Child   needed?  No    Past Surgical History:  Past Surgical History:   Procedure Laterality Date    COLONOSCOPY      ENDOSCOPY, COLON, DIAGNOSTIC      ESOPHAGUS SURGERY      NECK SURGERY      PROSTATE SURGERY      SHOULDER SURGERY      THYROID SURGERY      removal of a nodule    TONSILLECTOMY      TURP  12    Dr Tani Meeks       Immunization History:   Immunization History   Administered Date(s) Administered    Influenza Vaccine, unspecified formulation 2015    Td, unspecified formulation 10/26/2014       Active Problems:  Patient Active Problem List   Diagnosis Code    Erectile dysfunction N52.9    BPH with urinary obstruction N40.1, N13.8    Lower urinary tract symptoms (LUTS) R39.9    Nocturia R35.1    Urinary retention R33.9    Feeling of incomplete bladder emptying R39.14    Hyperlipemia E78.5    Frequency of urination R35.0    ETD (eustachian tube dysfunction) H69.80    Dysphagia R13.10    Dysphonia R49.0    GERD (gastroesophageal reflux disease) K21.9    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 Phylliss Kroner    Hist of Near syncope R55    Orthostatic hypotension I95.1    Low serum cortisol level ([facility]) E27.40    Intractable back pain M54.9    Frequent falls R29.6    Hypokalemia E87.6    Alcohol intoxication ([facility] F10.929    Alcohol intoxication delirium ([facility]) F10.121    Acute alcohol intoxication with alcoholism, with unspecified complication ([facility]) Z65.102    Severe malnutrition ([facility] [address] E43    Delirium tremens ([facility] [address] F10.231    Dementia ([facility]) F03.90    TIA (transient ischemic attack) G45.9       Isolation/Infection:   Isolation          No Isolation        Patient Infection Status     None to display          Nurse Assessment:  Last Vital Signs: BP (!) 150/69   Pulse 50   Temp 98.3 °F (36.8 °C) (Oral)   Resp 18   Ht 5' 10\" (1.778 m)   Wt 174 lb 1.6 oz (79 kg)   SpO2 98%   BMI 24.98 kg/m²     Last documented pain score (0-10 scale): Pain Level: 0  Last Weight:   Wt Readings from Last 1 Encounters:   21 174 lb 1.6 oz (79 kg)     Mental Status:  {IP PT MENTAL STATUS:}    IV Access:  { BUTCH IV ACCESS:884665919}    Nursing Mobility/ADLs:  Walking   {P DME RHBS:031345832}  Transfer  {P DME MOHO:664387737}  Bathing  {P DME QBOI:172963813}  Dressing  {P DME CIP}  Toileting  {P DME YDMV:023722825}  Feeding  {P DME IZLV:694237939}  Med Admin  {[facility] DME TNYN:320665381}  Med Delivery   { BUTCH MED Delivery:412869935}    Wound Care Documentation and Therapy:        Elimination:  Continence:   · Bowel: {YES / RY:35912}  · Bladder: {YES / UX:66787}  Urinary Catheter: {Urinary Catheter:891403825} Colostomy/Ileostomy/Ileal Conduit: {YES / HX:93977}       Date of Last BM: ***    Intake/Output Summary (Last 24 hours) at 7/15/2021 1743  Last data filed at 7/15/2021 1523  Gross per 24 hour   Intake 1570.84 ml   Output 3575 ml   Net -2004.16 ml     I/O last 3 completed shifts: In: 442.5 [P.O.:300;  I.V.:142.5]  Out: 3100 [Urine:3100]    Safety Concerns:     { BUTCH Safety Concerns:275444202}    Impairments/Disabilities:      { BUTCH Impairments/Disabilities:110980491}    Nutrition Therapy:  Current Nutrition Therapy:   { BUTCH Diet List:501306115}    Routes of Feeding: {[facility] DME Other Feedings:224649161}  Liquids: {Slp liquid thickness:04336}  Daily Fluid Restriction: {[facility] DME Yes amt example:828758298}  Last Modified Barium Swallow with Video (Video Swallowing Test): {Done Not Done ELZY:204160533}    Treatments at the Time of Hospital Discharge:   Respiratory Treatments: ***  Oxygen Therapy:  {Therapy; copd oxygen:36997}  Ventilator:    {[facility] Vent VRJW:534367173}    Rehab Therapies: {THERAPEUTIC INTERVENTION:6722688617}  Weight Bearing Status/Restrictions: [address] CC Weight Bearin}  Other Medical Equipment (for information only, NOT a DME order):  {EQUIPMENT:452999267}  Other Treatments: ***    Patient's personal belongings (please select all that are sent with patient):  {[facility] DME Belongings:042510930}    RN SIGNATURE:  {Esignature:182856882}    CASE MANAGEMENT/SOCIAL WORK SECTION    Inpatient Status Date: ***    Readmission Risk Assessment Score:  Readmission Risk              Risk of Unplanned Readmission:  0           Discharging to Facility/ Agency   · Name:   · Address:  · Phone:  · Fax:    Dialysis Facility (if applicable)   · Name:  · Address:  · Dialysis Schedule:  · Phone:  · Fax:    / signature: {Esignature:130720792}    PHYSICIAN SECTION    Prognosis: {Prognosis:8804397450}    Condition at Discharge: [address] Patient Condition:322067236}    Rehab Potential (if transferring to Rehab): {Prognosis:2022042168}    Recommended Labs or Other Treatments After Discharge: ***    Physician Certification: I certify the above information and transfer of Bibi Phillips  is necessary for the continuing treatment of the diagnosis listed and that he requires {Admit to Appropriate Level of Care:66256} for {GREATER/LESS:947754032} 30 days.      Update Admission H&P: {CHP DME Changes in DKVIN:451821305}    PHYSICIAN SIGNATURE:  {Esignature:010065709}

## 2021-07-15 NOTE — ACP (ADVANCE CARE PLANNING)
Advance Care Planning     Advance Care Planning Activator (Inpatient)  Conversation Note      Date of ACP Conversation: 7/14/2021     Conversation Conducted with: Patient with Decision Making Capacity   Healthcare Decision Maker: Named in Advance Directive or Healthcare Power of  (name) Jina Ramin    ACP Activator: Umer Goldberg RN    Health Care Decision Maker:     Current Designated Health Care Decision Maker: Kristofer Castellanos/pt has hx of dementia    Care Preferences    Ventilation: \"If you were in your present state of health and suddenly became very ill and were unable to breathe on your own, what would your preference be about the use of a ventilator (breathing machine) if it were available to you? \"      Would the patient desire the use of ventilator (breathing machine)?: yes    \"If your health worsens and it becomes clear that your chance of recovery is unlikely, what would your preference be about the use of a ventilator (breathing machine) if it were available to you? \"     Would the patient desire the use of ventilator (breathing machine)?: No      Resuscitation  \"CPR works best to restart the heart when there is a sudden event, like a heart attack, in someone who is otherwise healthy. Unfortunately, CPR does not typically restart the heart for people who have serious health conditions or who are very sick. \"    \"In the event your heart stopped as a result of an underlying serious health condition, would you want attempts to be made to restart your heart (answer \"yes\" for attempt to resuscitate) or would you prefer a natural death (answer \"no\" for do not attempt to resuscitate)? \" yes       [] Yes   [] No   Educated Patient / Ivan Public regarding differences between Advance Directives and portable DNR orders.     Length of ACP Conversation in minutes:  30  Conversation Outcomes:  [x] ACP discussion completed  [] Existing advance directive reviewed with patient; no changes to patient's previously recorded wishes  [] New Advance Directive completed  [] Portable Do Not Rescitate prepared for Provider review and signature  [] POLST/POST/MOLST/MOST prepared for Provider review and signature      Follow-up plan:    [x] Schedule follow-up conversation to continue planning  [] Referred individual to Provider for additional questions/concerns   [] Advised patient/agent/surrogate to review completed ACP document and update if needed with changes in condition, patient preferences or care setting    [x] This note routed to one or more involved healthcare providers       Pt in room with wife Trish Narvaez and son Kofi Velazco at bedside. Being admitted for TIA. MRI complete to rule out stroke, but results are not back yet. Pt does have dementia, was trying to take colvin catheter out during our conversation, and had to be redirected several times not to do so. He is pleasantly confused though. Wife states that pt has a Living Will at home, but no AD. I educated wife and son Kofi Velazco on the differences between AD and a Living Will. Kofi Velazco also had his brother Edgar Arce on speaker. Trish Narvaez states she wants to think about filling out AD, as she states she \"does not want her children to have to make decisions for them that are hard. \" When we discussed code status Eileen states Ashley Luis wants to be a Full Code. Consult to Spiritual Care made for follow up on AD.

## 2021-07-16 ENCOUNTER — CARE COORDINATION (OUTPATIENT)
Dept: CASE MANAGEMENT | Age: 86
End: 2021-07-16

## 2021-07-16 PROCEDURE — 95819 EEG AWAKE AND ASLEEP: CPT | Performed by: PSYCHIATRY & NEUROLOGY

## 2021-07-16 NOTE — PROCEDURES
[address] [id number]                          ELECTROENCEPHALOGRAM REPORT    PATIENT NAME: Olive Dubois                    :        1935  MED REC NO:   [id number]                           ROOM:       0018  ACCOUNT NO:   [de-identified]                           ADMIT DATE: 2021  PROVIDER:     Estella Cheatham MD    DATE OF EE/15/2021    REFERRING PROVIDER:  Dr. Jack Vivar:  A 72-year-old male presenting with weakness,  moderate-to-severe dementia, frequent falls, evaluate for seizure. This is a 17-channel EEG performed without sleep deprivation. Hyperventilation was not performed. Photic stimulation was not  performed. The patient is described as confused, minimally follows  commands. MEDICATIONS:  Listed vitamin B12, aspirin, atorvastatin, Depakote,  docusate, donepezil,  doxazosin, folic acid, hydrochlorothiazide,  metoprolol, nifedipine, pantoprazole, heparin, Plavix. The background rhythm activity is noted to be 9-10 Hz in the posterior  parietal area, symmetric. Hyperventilation was not performed. Lead  muscle artifacts were noted. Light stages of sleep are seen during the  recording. Photic stimulation was not performed. No clinical seizures  were observed. Fast beta activity was noted during the recording  suggestive of mild cortical dysfunction such as seen with metabolic  causes, medication effects or nonspecific encephalopathies. IMPRESSION:  This is a mildly abnormal EEG due to presence of fast beta  activity suggestive of mild cortical dysfunction such as seen with  metabolic causes, medication effects or nonspecific encephalopathies. There was no evidence of epileptiform activity appreciated.         Mine Marshall MD    D: 2021 3:14:40       T: 2021 3:24:11     [email or web address]  Job#: [id number]     Doc#: [id number]    CC:

## 2021-07-16 NOTE — CARE COORDINATION
[facility] Transitions Initial Follow Up Call    Call within 2 business days of discharge: Yes    Patient: Lawrence Aleman Patient : 1935   MRN: [id number]  Reason for Admission: TIA  Discharge Date: 7/15/21 RARS: No data recorded    Last Discharge [facility]       Complaint Diagnosis Description Type Department Provider    21 Fatigue TIA (transient ischemic attack) ED to Hosp-Admission (Discharged) (ADMITTED) Koko Tsang MD; Gerri Condon DO           Spoke with:  Bhargav Krishnamurthy states Raheel Larson is still sleeping but doing ok at home. We discussed setting up PCP appt she declines assistance from me with this at first stating she can do it. She has not picked up other medications from [address] [place] [address] yet, will today. She is asking for assistance from home care to help set up medications. I have routed this to Kimberly Snell for assistance with this and set up of PCP appt. Wife knows they have to have PCP appt for home care to come out. Transitions of Care Initial Call    Was this an external facility discharge? No Discharge Facility:  [facility]    Challenges to be reviewed by the provider   Additional needs identified to be addressed with provider: Yes  home health care-Wife Bhargav Krishnamurthy is reqeusting home care for assistance with mediciation set up. Upon reviewing notes possibly benefit from PT OT eval in home as well. Method of communication with provider : chart routing      Advance Care Planning:   Does patient have an Advance Directive: not on file; education provided and referral to internal ACP facilitator. Was this a readmission? No  Patient stated reason for admission:  TIA  Patients top risk factors for readmission: functional cognitive ability, functional physical ability, lack of knowledge about disease, medication management, PCP relationship and polypharmacy    Care Transition Nurse (CTN) contacted the family by telephone to perform post hospital discharge assessment. Verified name and  with family as identifiers. Provided introduction to self, and explanation of the CTN role. CTN reviewed discharge instructions, medical action plan and red flags with family who verbalized understanding. Family given an opportunity to ask questions and does not have any further questions or concerns at this time. Were discharge instructions available to patient? Yes. Reviewed appropriate site of care based on symptoms and resources available to patient including: PCP, Specialist and Home health. The family agrees to contact the PCP office for questions related to their healthcare. Medication reconciliation was performed with family, who verbalizes understanding of administration of home medications. Advised obtaining a 90-day supply of all daily and as-needed medications. Covid Risk Education     Educated patient about risk for severe COVID-19 due to risk factors according to CDC guidelines. CTN reviewed discharge instructions, medical action plan and red flag symptoms with the family who verbalized understanding. Discussed COVID vaccination status: No. Education provided on COVID-19 vaccination as appropriate. Discussed exposure protocols and quarantine with CDC Guidelines. Family was given an opportunity to verbalize any questions and concerns and agrees to contact CTN or health care provider for questions related to their healthcare. Reviewed and educated family on any new and changed medications related to discharge diagnosis. Was patient discharged with a pulse oximeter? Discussed and confirmed pulse oximeter discharge instructions and when to notify provider or seek emergency care. CTN provided contact information. Plan for follow-up call in 3-5 days based on severity of symptoms and risk factors.   Plan for next call: symptom management-weakness, follow up appointment-pcp, referrals-home care and referral to ambulatory care manager-after CTN episode completion          Facility: [unfilled]    Non-face-to-face services provided:  Obtained and reviewed discharge summary and/or continuity of care documents  Establishment or re-establishment of referrals-Home Care    Care Transitions 24 Hour Call    Schedule Follow Up Appointment with PCP: Matt Genera you have any ongoing symptoms?: Yes  Patient-reported symptoms: Weakness  Interventions for patient-reported symptoms: Other  Do you have a copy of your discharge instructions?: Yes  Do you have all of your prescriptions and are they filled?: Yes  Have you been contacted by a [facility] Pharmacist?: No  Have you scheduled your follow up appointment?: No (Comment: offered to assist wife states she can do it)  Were you discharged with any Home Care or Post Acute Services: No  Do you feel like you have everything you need to keep you well at home?: Yes  Care Transitions Interventions         Follow Up  Future Appointments   Date Time Provider Sudheer Salgado   7/22/2021 10:30 AM JUAN Funes ENT [facility] - [place]   11/5/2021  1:15 PM Juan Pablo Mcrae MD [facility] [id number] [place] [address], RN

## 2021-07-16 NOTE — CARE COORDINATION
Call placed to Dr. Boyle Postin spoke with Demetrius Garcia she will arrange Home care, transferred me to scheduling they are able to give me 7/28/2021 at 8:45 am.     Call back to [facility] [place] to update on plan of care. She states she is going to [facility] later and will go over medications with them there. [address] phone call.     Romina Lyons RN

## 2021-07-19 ENCOUNTER — CARE COORDINATION (OUTPATIENT)
Dept: CASE MANAGEMENT | Age: 86
End: 2021-07-19

## 2021-07-19 NOTE — CARE COORDINATION
Request from [address] [place] [address], RN.,   Please help schedule patient for hospital f/u and check on home care. Called left VM with Vamis Looney with Dr. Dorita Michael to see if a face to face needs to be done before home care orders and to set patient up for hospital f/u. Waiting for call back.       Wendy Reynoso, [address] [facility] Coordination Transition

## 2021-07-20 ENCOUNTER — CARE COORDINATION (OUTPATIENT)
Dept: CASE MANAGEMENT | Age: 86
End: 2021-07-20

## 2021-07-20 NOTE — CARE COORDINATION
Subsequent Care Transitions Outreach Attempt - Unsuccessful     Follow up Transition Call - No answer Left Voicemail message with writers callback number.     Patient: Talisha Young Patient : 1935 MRN: [id number]    Last Discharge [facility]       Complaint Diagnosis Description Type Department Provider    21 Fatigue TIA (transient ischemic attack) ED to Hosp-Admission (Discharged) (ADMITTED) Koko Barry MD; Jazmín Carver DO          Discharge Diagnosis :     Noted following upcoming appointments from discharge chart review:       [facility] follow up appointment(s):   Future Appointments   Date Time Provider Sudheer Salgado   2021 10:30 AM Manuel Mckeon P - [place]   2021  1:15 PM MD Anthony Yeung, RN  CARE TRANSITIONS NURSE

## 2021-07-26 DIAGNOSIS — R41.3 MEMORY PROBLEM: Primary | ICD-10-CM

## 2021-07-26 RX ORDER — FOLIC ACID 1 MG/1
1 TABLET ORAL DAILY
Qty: 90 TABLET | Refills: 3 | Status: SHIPPED | OUTPATIENT
Start: 2021-07-26

## 2021-07-28 ENCOUNTER — CARE COORDINATION (OUTPATIENT)
Dept: CARE COORDINATION | Age: 86
End: 2021-07-28

## 2021-07-28 NOTE — CARE COORDINATION
[facility] Transitions Follow Up Call    2021    Patient: Greta Thurman  Patient : 1935   MRN: <[id number]>  Reason for Admission: TIA  Discharge Date: 7/15/21 RARS: No data recorded       Spoke with: Patient's wife Cirilo Ott who stated that the patient is doing pretty good. Home Health started today and will be back out on Friday. Cirilo Ott has picked up the three prescriptions post discharge, Clopidogrel, ondansetron and pantroprazole. She stated that the Home Health nurse got all the medications \"situated\" for the patient. Wife also states that patient had a follow up with Dr. Cristin Lopez yesterday. Wife states no other issues or concerns at this time. Care Transitions Follow Up Call    Needs to be reviewed by the provider   Additional needs identified to be addressed with provider: No  none             Method of communication with provider : none      Care Transition Nurse (CTN) contacted the family by telephone to follow up after admission on 21. Verified name and  with family as identifiers. Addressed changes since last contact: home health care-Home care is following patient. Discussed follow-up appointments. If no appointment was previously scheduled, appointment scheduling offered: Yes. Is follow up appointment scheduled within 7 days of discharge? Yes. Advance Care Planning:   Does patient have an Advance Directive: not on file. CTN reviewed discharge instructions, medical action plan and red flags with family and discussed any barriers to care and/or understanding of plan of care after discharge. Discussed appropriate site of care based on symptoms and resources available to patient including: PCP, Specialist and Home health. The family agrees to contact the PCP office for questions related to their healthcare.      Patients top risk factors for readmission: functional cognitive ability, functional physical ability, lack of knowledge about disease, PCP relationship and polypharmacy  Interventions to address risk factors: Obtained and reviewed discharge summary and/or continuity of care documents      Non-[facility] follow up appointment(s): NA    CTN provided contact information for future needs. Plan for follow-up call in 7-10 days based on severity of symptoms and risk factors. Plan for next call: symptom management-weakness, referral to ACM after CTN episode resolution. Care Transitions Subsequent and Final Call    Subsequent and Final Calls  Care Transitions Interventions  Other Interventions:            Follow Up  Future Appointments   Date Time Provider Sudheer Salgado   8/30/2021  1:15 PM Venita Sandhu [facility]   9/30/2021  9:00 AM JUAN Knox N ENT [facility] - [place], [place]   Care Transitions  [phone]

## 2021-08-04 ENCOUNTER — CARE COORDINATION (OUTPATIENT)
Dept: CARE COORDINATION | Age: 86
End: 2021-08-04

## 2021-08-04 NOTE — CARE COORDINATION
[facility] Transitions Follow Up Call    2021    Patient: Oanh Vega  Patient : 1935   MRN: <[id number]>  Reason for Admission: TIA  Discharge Date: 7/15/21 RARS: No data recorded       Spoke with: Eileen who stated that Luis Stover had some diarrhea yesterday and is feeling fatigued today. Shruthi Santillan is having same symptoms. She said that her niece who is a nurse came to their house last night and assessed both Luis Stover and her wife and she said Shruthi Santillan had a low grade temp of 99.2. Advised hydration and tylenol for fever. Home health is coming tomorrow and will assess patient as well. Otherwise, no other concerns at this time. Care Transitions Follow Up Call    Needs to be reviewed by the provider   Additional needs identified to be addressed with provider: No  none             Method of communication with provider : none      Care Transition Nurse (CTN) contacted the patient by telephone to follow up after admission on 7/15/21. Verified name and  with family as identifiers. Addressed changes since last contact: home health care- continues    Advance Care Planning:   Does patient have an Advance Directive: not on file. CTN reviewed discharge instructions, medical action plan and red flags with family and discussed any barriers to care and/or understanding of plan of care after discharge. Discussed appropriate site of care based on symptoms and resources available to patient including: PCP, Specialist and When to call 911. The family agrees to contact the PCP office for questions related to their healthcare. Patients top risk factors for readmission: functional cognitive ability, functional physical ability, lack of knowledge about disease and polypharmacy  Interventions to address risk factors: Obtained and reviewed discharge summary and/or continuity of care documents      Non-[facility] follow up appointment(s): ANG    CTN provided contact information for future needs.  Plan for follow-up call in 5-7 days based on severity of symptoms and risk factors. Plan for next call: symptom management-weakness and referral to ambulatory care manager-after CTN episode resolution. Care Transitions Subsequent and Final Call    Subsequent and Final Calls  Care Transitions Interventions  Other Interventions:            Follow Up  Future Appointments   Date Time Provider Sudheer Saglado   8/30/2021  1:15 PM Guanako Zhao MD N [facility] - [address]   9/30/2021  9:00 AM JUAN Cobb Cha N ENT [facility] - [place], LPN

## 2021-08-07 ENCOUNTER — HOSPITAL ENCOUNTER (EMERGENCY)
Age: 86
Discharge: HOME | End: 2021-08-07
Payer: MEDICARE

## 2021-08-07 DIAGNOSIS — M10.9 GOUTY ARTHRITIS OF LEFT GREAT TOE: Primary | ICD-10-CM

## 2021-08-07 PROCEDURE — 99214 OFFICE O/P EST MOD 30 MIN: CPT | Performed by: NURSE PRACTITIONER

## 2021-08-07 PROCEDURE — 99213 OFFICE O/P EST LOW 20 MIN: CPT

## 2021-08-07 RX ORDER — PREDNISONE 10 MG/1
10 TABLET ORAL 2 TIMES DAILY
Qty: 10 TABLET | Refills: 0 | Status: SHIPPED | OUTPATIENT
Start: 2021-08-07 | End: 2021-08-12

## 2021-08-07 ASSESSMENT — PAIN DESCRIPTION - ORIENTATION: ORIENTATION: LEFT

## 2021-08-07 ASSESSMENT — PAIN SCALES - WONG BAKER: WONGBAKER_NUMERICALRESPONSE: 8

## 2021-08-07 ASSESSMENT — PAIN - FUNCTIONAL ASSESSMENT: PAIN_FUNCTIONAL_ASSESSMENT: PREVENTS OR INTERFERES WITH MANY ACTIVE NOT PASSIVE ACTIVITIES

## 2021-08-07 ASSESSMENT — ENCOUNTER SYMPTOMS
COUGH: 0
CHEST TIGHTNESS: 0
ALLERGIC/IMMUNOLOGIC NEGATIVE: 1
SHORTNESS OF BREATH: 0
EYES NEGATIVE: 1
GASTROINTESTINAL NEGATIVE: 1

## 2021-08-07 ASSESSMENT — PAIN DESCRIPTION - PAIN TYPE: TYPE: ACUTE PAIN

## 2021-08-07 ASSESSMENT — PAIN DESCRIPTION - LOCATION: LOCATION: TOE (COMMENT WHICH ONE)

## 2021-08-07 ASSESSMENT — PAIN DESCRIPTION - FREQUENCY: FREQUENCY: CONTINUOUS

## 2021-08-07 NOTE — ED PROVIDER NOTES
[id number]       Chief Complaint   Patient presents with    Toe Pain     left foot/great toe pain       Nurses Notes reviewed and I agree except as noted in the HPI. HISTORY OF PRESENT ILLNESS   Vesna Spangler is a 81 y.o. male who presents pain in left great toe x 5 days. It is aching, throbbing, painful and warm to touch. Some mild swelling. Has tried elevation and this has not helped it. No recent changes to medications or treatments,. REVIEW OF SYSTEMS     Review of Systems   Constitutional: Negative for activity change, appetite change, fatigue and fever. HENT: Negative. Eyes: Negative. Respiratory: Negative for cough, chest tightness and shortness of breath. Cardiovascular: Negative for chest pain. Gastrointestinal: Negative. Endocrine: Negative for cold intolerance. Genitourinary: Negative. Musculoskeletal: Positive for arthralgias, gait problem and joint swelling. Skin: Negative. Allergic/Immunologic: Negative. Hematological: Negative for adenopathy. Does not bruise/bleed easily. Psychiatric/Behavioral: Negative. PAST MEDICAL HISTORY         Diagnosis Date    BPH (benign prostatic hypertrophy)     CKD (chronic kidney disease) stage 2, GFR 60-89 ml/min     Dementia (HCC)     Erectile dysfunction     Frequent falls     [place] (hard of hearing)     Hyperlipidemia     Hypertension     Metabolic bone disease     Orthostatic hypotension        SURGICAL HISTORY     Patient  has a past surgical history that includes shoulder surgery; Neck surgery; Tonsillectomy; TURP (5-31-12); Colonoscopy; Thyroid surgery; Prostate surgery; Endoscopy, colon, diagnostic; and Esophagus surgery.     CURRENT MEDICATIONS       Discharge Medication List as of 8/7/2021  2:41 PM      CONTINUE these medications which have NOT CHANGED    Details   folic acid (FOLVITE) 1 MG tablet Take 1 tablet by mouth daily, Disp-90 tablet, R-3Normal      potassium chloride (KLOR-CON M) 10 MEQ extended release tablet Take 20 mEq by mouth dailyHistorical Med      pantoprazole (PROTONIX) 40 MG tablet Take 1 tablet by mouth every morning (before breakfast), Disp-30 tablet, R-3Normal      clopidogrel (PLAVIX) 75 MG tablet Take 1 tablet by mouth daily, Disp-30 tablet, R-3Normal      metoprolol tartrate (LOPRESSOR) 50 MG tablet Take 0.5 tablets by mouth daily, Disp-60 tablet, R-3Normal      divalproex (DEPAKOTE ER) 250 MG extended release tablet take 1 tablet by mouth once daily, Disp-30 tablet, R-5Normal      hydrochlorothiazide (HYDRODIURIL) 25 MG tablet Take 25 mg by mouth daily Historical Med      mirtazapine (REMERON) 15 MG tablet Take 15 mg by mouth nightly Historical Med      aspirin 81 MG EC tablet Take 81 mg by mouth daily Historical Med      Cholecalciferol (VITAMIN D3) 3000 units TABS Take 3,000 Units by mouth nightly Historical Med      atorvastatin (LIPITOR) 10 MG tablet Take 1 tablet by mouth daily, Disp-30 tablet, R-3DC to SNF      oxybutynin (DITROPAN-XL) 10 MG extended release tablet Take 1 tablet by mouth nightly, Disp-30 tablet, R-3DC to SNF      Multiple Vitamin (MULTIVITAMIN) tablet Take 1 tablet by mouth daily, R-0DC to SNF      donepezil (ARICEPT) 10 MG tablet Take 1 tablet by mouth nightly, Disp-30 tablet, R-3DC to SNF      NIFEdipine (ADALAT CC) 30 MG extended release tablet Take 1 tablet by mouth every morning, Disp-30 tablet, R-3DC to SNF      vitamin B-1 (THIAMINE) 100 MG tablet Take 100 mg by mouth daily Historical Med      doxazosin (CARDURA) 2 MG tablet Take 2 mg by mouth dailyHistorical Med      acetaminophen (TYLENOL) 500 MG tablet Take 500 mg by mouth every 6 hours as needed for PainHistorical Med      ondansetron (ZOFRAN-ODT) 4 MG disintegrating tablet Take 1 tablet by mouth every 8 hours as needed for Nausea or Vomiting, Disp-10 tablet, R-0Normal      docusate (COLACE, DULCOLAX) 100 MG CAPS Take 100 mg by mouth dailyDC to SNF             ALLERGIES     Patient is has No Known Allergies. FAMILY HISTORY     Patient'sfamily history includes Alzheimer's Disease in his sister; Arthritis in his sister and sister; Cancer in his sister; Dementia in his brother; Heart Disease in his sister; High Blood Pressure in his father; No Known Problems in his brother and brother; Other in his brother; Stroke in his father and sister. SOCIAL HISTORY     Patient  reports that he quit smoking about 29 years ago. He has never used smokeless tobacco. He reports previous alcohol use. He reports that he does not use drugs. PHYSICAL EXAM     ED Amrit Blount   ,  ,  ,  ,    Physical Exam  Vitals and nursing note reviewed. Constitutional:       Appearance: He is normal weight. He is not ill-appearing. HENT:      Head: Normocephalic. Right Ear: External ear normal.      Left Ear: External ear normal.      Nose: Nose normal.      Mouth/Throat:      Pharynx: Oropharynx is clear. Eyes:      Conjunctiva/sclera: Conjunctivae normal.   Cardiovascular:      Rate and Rhythm: Normal rate and regular rhythm. Pulses: Normal pulses. Pulmonary:      Effort: Pulmonary effort is normal.   Abdominal:      General: Abdomen is flat. Bowel sounds are normal.   Musculoskeletal:         General: Swelling and tenderness present. Cervical back: Normal range of motion and neck supple. Left foot: Normal range of motion and normal capillary refill. Swelling, tenderness and bony tenderness present. No foot drop, prominent metatarsal heads or laceration. Normal pulse. Legs:    Skin:     General: Skin is warm and dry. Capillary Refill: Capillary refill takes less than 2 seconds. Coloration: Skin is not pale. Findings: No erythema. Neurological:      General: No focal deficit present. Mental Status: He is alert and oriented to person, place, and time. Mental status is at baseline.    Psychiatric:         Mood and Affect: Mood normal.         Behavior: Behavior normal.         Thought Content: Thought content normal.         Judgment: Judgment normal.         DIAGNOSTIC RESULTS   Labs: No results found for this visit on 08/07/21. IMAGING:  No orders to display     URGENT CARE COURSE:   There were no vitals filed for this visit. Medications - No data to display  PROCEDURES:  None  FINALIMPRESSION    I have reviewed the patient's medical history in detail and updated the computerized patient record. HPI/ROS per the patient and caregiver. Overall non toxic in appearance. Answers questions appropriately. Conditions discussed and addressed this visit include:     Acute gout of the left great toe. First episode. Mild in nature. No recent med changes. Will trial prednisone 10 mg bid and follow up with pcp in 5 days if no improvement. Pt and spouse agreeable to plan of care.    1. Gouty arthritis of left great toe        DISPOSITION/PLAN   DISPOSITION      PATIENT REFERRED TO:  Yvette Awan MD  [facility], [address] [place] [address]  [phone]    In 3 days  As needed, If symptoms worsen    DISCHARGE MEDICATIONS:  Discharge Medication List as of 8/7/2021  2:41 PM      START taking these medications    Details   predniSONE (DELTASONE) 10 MG tablet Take 1 tablet by mouth 2 times daily for 5 days, Disp-10 tablet, R-0Normal           Discharge Medication List as of 8/7/2021  2:41 PM          ZORAN Weaver CNP, APRN - CNP  08/07/21 8524

## 2021-08-11 DIAGNOSIS — R41.3 MEMORY PROBLEM: ICD-10-CM

## 2021-08-11 DIAGNOSIS — R44.3 HALLUCINATIONS: Primary | ICD-10-CM

## 2021-08-11 RX ORDER — DIVALPROEX SODIUM 250 MG/1
TABLET, EXTENDED RELEASE ORAL
Qty: 90 TABLET | Refills: 1 | Status: SHIPPED | OUTPATIENT
Start: 2021-08-11 | End: 2021-08-13 | Stop reason: SDUPTHER

## 2021-08-12 ENCOUNTER — CARE COORDINATION (OUTPATIENT)
Dept: CASE MANAGEMENT | Age: 86
End: 2021-08-12

## 2021-08-12 NOTE — CARE COORDINATION
[facility] Transitions Follow Up Call    2021    Patient: Tori Bowen  Patient : 1935   MRN: [id number]  Reason for Admission:  TIA  Discharge Date: 21 RARS: No data recorded     Spoke with wife Kristofer states Mukesh Sutton is doing well, we discussed his visit to Urgent care for Gout she states that is all better no questions or concerns there. Understands this is final call reviewed upcoming appt with Neurology confirmed she knew of it. No further questions or concerns from Kristofer. Care Transitions Subsequent and Final Call    Subsequent and Final Calls  Do you have any ongoing symptoms?: No  Have your medications changed?: No  Do you have any questions related to your medications?: No  Do you currently have any active services?: No  Do you have any needs or concerns that I can assist you with?: No  Identified Barriers: None  Care Transitions Interventions  Other Interventions:            Follow Up  Future Appointments   Date Time Provider Sudheer Salgado   2021  1:15 PM [facility]   2021  9:00 AM JUAN Marcus RN        Last Discharge [facility]       Complaint Diagnosis Description Type Department Provider    21 Fatigue TIA (transient ischemic attack) ED to Hosp-Admission (Discharged) (ADMITTED) Koko Hauser MD; Joselito Haley DO          Noted following upcoming appointments from discharge chart review:       [facility] follow up appointment(s):   Future Appointments   Date Time Provider Sudheer Salgado   2021  1:15 PM MD [facility] [facility]   2021  9:00 AM JUAN Marcus RN  CARE TRANSITIONS NURSE          Care Transitions Subsequent and Final Call    Subsequent and Final Calls  Do you have any ongoing symptoms?: No  Have your medications changed?: No  Do you have any questions related to your medications?: No  Do

## 2021-08-13 DIAGNOSIS — R41.3 MEMORY PROBLEM: ICD-10-CM

## 2021-08-13 DIAGNOSIS — R44.3 HALLUCINATIONS: ICD-10-CM

## 2021-08-13 RX ORDER — DIVALPROEX SODIUM 250 MG/1
TABLET, EXTENDED RELEASE ORAL
Qty: 30 TABLET | Refills: 5 | Status: SHIPPED | OUTPATIENT
Start: 2021-08-13 | End: 2022-04-19

## 2021-08-30 ENCOUNTER — OFFICE VISIT (OUTPATIENT)
Dept: NEUROLOGY | Age: 86
End: 2021-08-30
Payer: MEDICARE

## 2021-08-30 VITALS
WEIGHT: 173 LBS | HEART RATE: 67 BPM | HEIGHT: 70 IN | OXYGEN SATURATION: 95 % | SYSTOLIC BLOOD PRESSURE: 124 MMHG | BODY MASS INDEX: 24.77 KG/M2 | DIASTOLIC BLOOD PRESSURE: 68 MMHG

## 2021-08-30 DIAGNOSIS — R41.3 MEMORY PROBLEM: ICD-10-CM

## 2021-08-30 DIAGNOSIS — F01.50 VASCULAR DEMENTIA WITHOUT BEHAVIORAL DISTURBANCE (HCC): ICD-10-CM

## 2021-08-30 DIAGNOSIS — R44.3 HALLUCINATIONS: Primary | ICD-10-CM

## 2021-08-30 PROCEDURE — G8427 DOCREV CUR MEDS BY ELIG CLIN: HCPCS | Performed by: PSYCHIATRY & NEUROLOGY

## 2021-08-30 PROCEDURE — 1123F ACP DISCUSS/DSCN MKR DOCD: CPT | Performed by: PSYCHIATRY & NEUROLOGY

## 2021-08-30 PROCEDURE — 99213 OFFICE O/P EST LOW 20 MIN: CPT | Performed by: PSYCHIATRY & NEUROLOGY

## 2021-08-30 PROCEDURE — 1036F TOBACCO NON-USER: CPT | Performed by: PSYCHIATRY & NEUROLOGY

## 2021-08-30 PROCEDURE — 4040F PNEUMOC VAC/ADMIN/RCVD: CPT | Performed by: PSYCHIATRY & NEUROLOGY

## 2021-08-30 PROCEDURE — G8420 CALC BMI NORM PARAMETERS: HCPCS | Performed by: PSYCHIATRY & NEUROLOGY

## 2021-08-30 RX ORDER — MEMANTINE HYDROCHLORIDE 5 MG/1
5 TABLET ORAL 3 TIMES DAILY
COMMUNITY
Start: 2021-08-25

## 2021-08-30 RX ORDER — CLOPIDOGREL BISULFATE 75 MG/1
75 TABLET ORAL DAILY
Qty: 30 TABLET | Refills: 11 | Status: SHIPPED | OUTPATIENT
Start: 2021-08-30

## 2021-08-30 NOTE — PROGRESS NOTES
NEUROLOGY OUT PATIENT FOLLOW UP NOTE:  8/30/20211:49 PM    Apolonia Villegas is here for follow up for   Patient Active Problem List   Diagnosis    Erectile dysfunction    BPH with urinary obstruction    Lower urinary tract symptoms (LUTS)    Nocturia    Urinary retention    Feeling of incomplete bladder emptying    Hyperlipemia    Frequency of urination    ETD (eustachian tube dysfunction)    Dysphagia    Dysphonia    GERD (gastroesophageal reflux disease)    Zenker diverticula    Presbyesophagus    Voice disturbance    Disease of larynx    Subjective tinnitus    Sensorineural hearing loss, bilateral    Otalgia of left ear    supine Hypertension    CKD (chronic kidney disease) stage 2, GFR 60-89 ml/min    Metabolic bone disease    Insomnia    Alcohol withdrawal (HCC)    Falls    Hist of Near syncope    Orthostatic hypotension    Low serum cortisol level (HCC)    Intractable back pain    Frequent falls    Hypokalemia    Alcohol intoxication ([address]    Alcohol intoxication delirium ([address]    Acute alcohol intoxication with alcoholism, with unspecified complication ([address]    Severe malnutrition ([address]    Delirium tremens ([address]    Dementia ([address]    TIA (transient ischemic attack)          Follow up for dementia without behavioral disturbance, he is having vivid dreams. He can stagger with his ambulation in am, is confused in am. He denies hallucination, but wife reports he talks during sleep. He is asking more repeated questions and can get redirected. He is on vitamin D supplements. He is now on antiplatelets. No behavioral disturbance. He is on aspirin and vitamin D. He comes in with his wife to go over plan.          No Known Allergies    Current Outpatient Medications:     memantine (NAMENDA) 5 MG tablet, Take 5 mg by mouth 3 times daily, Disp: , Rfl:     divalproex (DEPAKOTE ER) 250 MG extended release tablet, take 1 tablet by mouth once daily, Disp: 30 tablet, Rfl: 5    folic acid (FOLVITE) 1 MG tablet, Take 1 tablet by mouth daily, Disp: 90 tablet, Rfl: 3    pantoprazole (PROTONIX) 40 MG tablet, Take 1 tablet by mouth every morning (before breakfast), Disp: 30 tablet, Rfl: 3    metoprolol tartrate (LOPRESSOR) 50 MG tablet, Take 0.5 tablets by mouth daily, Disp: 60 tablet, Rfl: 3    mirtazapine (REMERON) 15 MG tablet, Take 15 mg by mouth nightly , Disp: , Rfl:     aspirin 81 MG EC tablet, Take 81 mg by mouth daily , Disp: , Rfl:     Cholecalciferol (VITAMIN D3) 3000 units TABS, Take 3,000 Units by mouth nightly , Disp: , Rfl:     atorvastatin (LIPITOR) 10 MG tablet, Take 1 tablet by mouth daily, Disp: 30 tablet, Rfl: 3    oxybutynin (DITROPAN-XL) 10 MG extended release tablet, Take 1 tablet by mouth nightly, Disp: 30 tablet, Rfl: 3    Multiple Vitamin (MULTIVITAMIN) tablet, Take 1 tablet by mouth daily, Disp: , Rfl: 0    donepezil (ARICEPT) 10 MG tablet, Take 1 tablet by mouth nightly, Disp: 30 tablet, Rfl: 3    NIFEdipine (ADALAT CC) 30 MG extended release tablet, Take 1 tablet by mouth every morning, Disp: 30 tablet, Rfl: 3    vitamin B-1 (THIAMINE) 100 MG tablet, Take 100 mg by mouth daily , Disp: , Rfl:     doxazosin (CARDURA) 2 MG tablet, Take 2 mg by mouth daily, Disp: , Rfl:     potassium chloride (KLOR-CON M) 10 MEQ extended release tablet, Take 20 mEq by mouth daily (Patient not taking: Reported on 8/30/2021), Disp: , Rfl:     acetaminophen (TYLENOL) 500 MG tablet, Take 500 mg by mouth every 6 hours as needed for Pain (Patient not taking: Reported on 8/30/2021), Disp: , Rfl:     clopidogrel (PLAVIX) 75 MG tablet, Take 1 tablet by mouth daily (Patient not taking: Reported on 8/30/2021), Disp: 30 tablet, Rfl: 3    ondansetron (ZOFRAN-ODT) 4 MG disintegrating tablet, Take 1 tablet by mouth every 8 hours as needed for Nausea or Vomiting (Patient not taking: Reported on 8/30/2021), Disp: 10 tablet, Rfl: 0    hydrochlorothiazide (HYDRODIURIL) 25 MG tablet, Take 25 mg by mouth daily  (Patient not taking: Reported on 8/30/2021), Disp: , Rfl:     docusate (COLACE, DULCOLAX) 100 MG CAPS, Take 100 mg by mouth daily (Patient not taking: Reported on 8/30/2021), Disp: , Rfl:     I reviewed the past medical history, allergies, medications, social history and family history. PE:   Vitals:    08/30/21 1335   BP: 124/68   Site: Left Upper Arm   Position: Sitting   Cuff Size: Medium Adult   Pulse: 67   SpO2: 95%   Weight: 173 lb (78.5 kg)   Height: 5' 10\" (1.778 m)     General Appearance:  alert and cooperative, he is wearing a mask. He is oriented to place and person, he has some insight, he is cooperative and follows two step commands. His abstraction and calculation are impaired, there is no apraxia. Recall is impaired. He is circumstantial with his answers. Skin:  Skin color, texture, turgor normal. No rashes or lesions. Gen: NAD, Language is Intact. Skin: no rash, lesion,  moist to touch. warm  Head: no rash, no icterus  Neck: There is no carotid bruits. The Neck is supple. There is no neck lymphadenopathy. Neuro: CN 2-12 grossly intact with no focal deficits. Power 5/5 Throughout symmetric, Reflexes are decreased symmetric. Long tracts are decreased. Cerebellar exam is Intact. Sensory exam is intact to light touch. Gait is guarded intact. Musculoskeletal:  Has no hand arthritis, no limitation of ROM in any of the four extremities.   Lower extremities no edema          DATA:      Results for orders placed or performed during the hospital encounter of 28/68/85   Basic Metabolic Panel w/ Reflex to MG   Result Value Ref Range    Sodium 143 135 - 145 meq/L    Potassium reflex Magnesium 4.2 3.5 - 5.2 meq/L    Chloride 109 98 - 111 meq/L    CO2 27 23 - 33 meq/L    Glucose 95 70 - 108 mg/dL    BUN 23 (H) 7 - 22 mg/dL    CREATININE 1.3 (H) 0.4 - 1.2 mg/dL    Calcium 8.9 8.5 - 10.5 mg/dL   CBC Auto Differential   Result Value Ref Range    WBC 4.0 (L) 4.8 - 10.8 thou/mm3    RBC 3.83 (L) 4.70 - 6.10 mill/mm3    Hemoglobin 11.7 (L) 14.0 - 18.0 gm/dl    Hematocrit 36.6 (L) 42.0 - 52.0 %    MCV 95.6 (H) 80.0 - 94.0 fL    MCH 30.5 26.0 - 33.0 pg    MCHC 32.0 (L) 32.2 - 35.5 gm/dl    RDW-CV 11.5 11.5 - 14.5 %    RDW-SD 39.7 35.0 - 45.0 fL    Platelets 028 165 - 196 thou/mm3    MPV 10.3 9.4 - 12.4 fL    Seg Neutrophils 59.9 %    Lymphocytes 25.4 %    Monocytes 9.8 %    Eosinophils 3.8 %    Basophils 0.8 %    Immature Granulocytes 0.3 %    Segs Absolute 2.4 1 - 7 thou/mm3    Lymphocytes Absolute 1.0 1.0 - 4.8 thou/mm3    Monocytes Absolute 0.4 0.4 - 1.3 thou/mm3    Eosinophils Absolute 0.2 0.0 - 0.4 thou/mm3    Basophils Absolute 0.0 0.0 - 0.1 thou/mm3    Immature Grans (Abs) 0.01 0.00 - 0.07 thou/mm3    nRBC 0 /100 wbc   Hepatic Function Panel   Result Value Ref Range    Albumin 4.0 3.5 - 5.1 g/dL    Total Bilirubin 0.5 0.3 - 1.2 mg/dL    Bilirubin, Direct <0.2 0.0 - 0.3 mg/dL    Alkaline Phosphatase 48 38 - 126 U/L    AST 18 5 - 40 U/L    ALT 9 (L) 11 - 66 U/L    Total Protein 6.9 6.1 - 8.0 g/dL   Troponin   Result Value Ref Range    Troponin T < 0.010 ng/ml   Brain Natriuretic Peptide   Result Value Ref Range    Pro-.7 0.0 - 1800.0 pg/mL   Urinalysis, reflex to microscopic   Result Value Ref Range    Glucose, Urine NEGATIVE NEGATIVE mg/dl    Bilirubin Urine NEGATIVE NEGATIVE    Ketones, Urine NEGATIVE NEGATIVE    Specific Gravity, UA >1.030 (A) 1.002 - 1.030    Blood, Urine NEGATIVE NEGATIVE    pH, UA 7.5 5.0 - 9.0    Protein, UA NEGATIVE NEGATIVE mg/dl    Urobilinogen, Urine 1.0 0.0 - 1.0 eu/dl    Nitrite, Urine NEGATIVE NEGATIVE    Leukocytes, UA NEGATIVE NEGATIVE    Color, UA YELLOW YELLOW-STRAW    Character, Urine CLEAR [id number]   Anion Gap   Result Value Ref Range    Anion Gap 7.0 (L) 8.0 - 16.0 meq/L   Glomerular Filtration Rate, Estimated   Result Value Ref Range    Est, Glom Filt Rate 63 (A) ml/min/1.73m2   Osmolality   Result Value Ref Range    Osmolality Calc 288.5 275.0 - 300.0 mOsmol/kg   Basic Metabolic Panel w/ Reflex to MG   Result Value Ref Range    Sodium 142 135 - 145 meq/L    Potassium reflex Magnesium 3.8 3.5 - 5.2 meq/L    Chloride 108 98 - 111 meq/L    CO2 26 23 - 33 meq/L    Glucose 74 70 - 108 mg/dL    BUN 19 7 - 22 mg/dL    CREATININE 1.1 0.4 - 1.2 mg/dL    Calcium 8.6 8.5 - 10.5 mg/dL   CBC auto differential   Result Value Ref Range    WBC 5.7 4.8 - 10.8 thou/mm3    RBC 3.46 (L) 4.70 - 6.10 mill/mm3    Hemoglobin 11.0 (L) 14.0 - 18.0 gm/dl    Hematocrit 33.5 (L) 42.0 - 52.0 %    MCV 96.8 (H) 80.0 - 94.0 fL    MCH 31.8 26.0 - 33.0 pg    MCHC 32.8 32.2 - 35.5 gm/dl    RDW-CV 11.5 11.5 - 14.5 %    RDW-SD 40.9 35.0 - 45.0 fL    Platelets 060 193 - 201 thou/mm3    MPV 10.6 9.4 - 12.4 fL    Seg Neutrophils 66.4 %    Lymphocytes 21.8 %    Monocytes 8.1 %    Eosinophils 2.8 %    Basophils 0.4 %    Immature Granulocytes 0.5 %    Segs Absolute 3.8 1 - 7 thou/mm3    Lymphocytes Absolute 1.2 1.0 - 4.8 thou/mm3    Monocytes Absolute 0.5 0.4 - 1.3 thou/mm3    Eosinophils Absolute 0.2 0.0 - 0.4 thou/mm3    Basophils Absolute 0.0 0.0 - 0.1 thou/mm3    Immature Grans (Abs) 0.03 0.00 - 0.07 thou/mm3    nRBC 0 /100 wbc   Anion Gap   Result Value Ref Range    Anion Gap 8.0 8.0 - 16.0 meq/L   Glomerular Filtration Rate, Estimated   Result Value Ref Range    Est, Glom Filt Rate 77 (A) ml/min/1.73m2   Osmolality   Result Value Ref Range    Osmolality Calc 284.0 275.0 - 300.0 mOsmol/kg   POCT Glucose   Result Value Ref Range    POC Glucose 103 70 - 108 mg/dl   EKG 12 Lead   Result Value Ref Range    Ventricular Rate 61 BPM    Atrial Rate 61 BPM    P-R Interval 190 ms    QRS Duration 84 ms    Q-T Interval 420 ms    QTc Calculation (Bazett) 422 ms    P Axis 59 degrees    R Axis -28 degrees    T Axis 59 degrees            MRI Brain WO Contrast  Narrative  PROCEDURE: MRI BRAIN WO CONTRAST  CLINICAL INFORMATION: falls  COMPARISON: No prior MRI for comparison; comparison is made to the CT of the brain of 25 April 2017. TECHNIQUE: Using a 1.5 Connie Siemens scanner, axial diffusion weighted echoplanar imaging, T2-weighted turbo spin-echo, fat-saturated T2-weighted fluid attenuated inversion recovery, and proton density weighted gradient recall images were obtained  through the entire brain. Axial, coronal, and sagittal FAST T1-weighted gradient recall images were also obtained. No IV contrast administered. BLADE software was used. FINDINGS: Motion limits the examination. Mild global atrophic changes are again demonstrated. There are no areas of restricted diffusion. There are no findings of blood products in the brain parenchyma, other than remote microhemorrhage of the right frontal lobe white matter. There are partially confluent symmetric hyperintense T2/hypointense T1 signal abnormalities in the white matter of both cerebral hemispheres, consistent with mild small vessel disease sequelae. There are mildly prominent atrophic changes of both medial temporal lobe parenchyma regions. There are slightly prominent T2 signal characteristics, hyperintense, in the FLAIR sequence in the same regions, which could represent artifact versus post ictal  features. No restricted diffusion. Limited detail of the cervical canal shows no definite acute findings. Poorly characterized degenerative changes are present. Sella contents are within acceptable limits. Vascular structures grossly show nothing concerning. Developmentally hypoplastic left vertebral artery implied. Paranasal sinuses, mastoid air cells, and middle ear cavities are grossly clear. Impression  NO EVIDENCE OF ACUTE BRAIN PROCESS. MILD SMALL VESSEL DISEASE SEQUELAE. NONSPECIFIC FINDINGS OF THE BILATERAL MEDIAL TEMPORAL LOBE PARENCHYMA. **This report has been created using voice recognition software. It may contain minor errors which are inherent in voice recognition technology. **  Final report electronically signed by Dr. Hayley Lema on 4/26/2017 2:45 PM    Results for orders placed during the hospital encounter of 05/02/19    MRI BRAIN W WO CONTRAST  Narrative  PROCEDURE: MRI BRAIN W WO CONTRAST  CLINICAL INFORMATION: Memory problem. Confusion  COMPARISON: MRI of the brain dated April 26, 2017. TECHNIQUE: Multiplanar and multiple spin echo T1 and T2-weighted images were obtained through the brain before and after the administration of 15 mL ProHance intravenous contrast.  FINDINGS:  There is prominence of the sulcal spaces and ventricular system secondary to generalized, age-related parenchymal volume loss. Moderate patchy foci of hyperintense T2 signal are again noted throughout the periventricular and deep cerebral white matter  which likely correspond to sequela of chronic microvascular ischemic change. There is a stable appearance of subtle hyperintense T2 signal along the medial temporal lobes bilaterally. This is nonspecific and may be artifactual. No restricted diffusion is  identified. Punctate foci of susceptibility are again noted within the posterior right frontal lobe and right parietal lobe which may correspond to remote microhemorrhages versus small cavernous malformations. The ventricles are midline and stable in size and morphology without evidence of hydrocephalus. No mass, mass effect or extra-axial fluid collection is identified. The basal cisterns and visualized vascular flow voids are patent. The pituitary, brain  stem and cervical medullary junction are within normal limits. No abnormal intracranial enhancement is identified. The visualized orbits and temporal bone structures are unremarkable. The paranasal sinuses are within normal limits. Impression  Stable senescent changes are present including moderate sequela of chronic microvascular ischemic angiopathy within the periventricular and deep cerebral white matter. No acute intracranial abnormality is identified. **This report has been created using voice recognition software.  It may contain minor errors which are inherent in voice recognition technology. **      Final report electronically signed by Dr. Negrita Partida on 5/2/2019 1:38 PM      CT HEAD WO CONTRAST  Narrative  PROCEDURE: CT HEAD WO CONTRAST  CLINICAL INFORMATION: fall with head injury. COMPARISON: Noncontrast brain CT dated 2/26/2018  TECHNIQUE: Noncontrast 5 mm axial images were obtained through the brain. All CT scans at this facility use dose modulation, iterative reconstruction, and/or weight-based dosing when appropriate to reduce radiation dose to as low as reasonably achievable. FINDINGS:  Brain: There is no acute ischemic infarct, hemorrhage, midline shift, mass, or mass effect. Mild to moderate deep white matter small vessel chronic ischemic changes are noted. There is age appropriate cortical atrophy. Ventricles: The ventricles, cisterns and cortical sulci are enlarged concordant with the mild to moderate degree of age-appropriate atrophy. No obstructive hydrocephalus. Skull base/calvarium: Unremarkable  Scalp soft tissues: Unremarkable  Intraorbital contents: Unremarkable  Sinuses: Unremarkable  Mastoids: Unremarkable  Impression  No acute ischemic infarct, hemorrhage, or mass effect. Aging changes as discussed above. **This report has been created using voice recognition software. It may contain minor errors which are inherent in voice recognition technology. **  Final report electronically signed by Dr. Liz Bray on 3/9/2018 2:24 AM     EEG 7/2021:  IMPRESSION:  This is a mildly abnormal EEG due to presence of fast beta  activity suggestive of mild cortical dysfunction such as seen with  metabolic causes, medication effects or nonspecific encephalopathies. There was no evidence of epileptiform activity appreciated.       Assessment:     Diagnosis Orders   1. Hallucinations     2. Memory problem     3.  Vascular dementia without behavioral disturbance ([facility]        Follow up for vascular dementia, hallucination and memory problem. He is found to have low B12 and is placed on replacement. He is tolerating the Depakote and B12, aricept, plavix. He was started on Memantine by psychiatry. He is worse with poor memory. He can stagger with his ambulation in am, is confused in am. He denies hallucination, his wife reports he talks during sleep. He not having vivid dreams. He is felt to be doing better on the Depakote  mg daily. No falls. His behavior is good during the day. He needs to be redirected. I reviewed the patient pertinent labs and records in the EHR and from other providers. He is on vitamin D supplements. He is off the seroquel by his primary care provider. The patient and his wife were counseled again about the importance of him not driving, the patient was not agreeable. After detailed discussion with the patient and his wife we agreed on the following plan. Plan:  1. Plavix 75 mg daily. 2. Take B12 supplements   3. Continue with Depakote  mg daily. Refills given. 4. Continue with Aspirin 81 mg daily  5. Continue with folic acid 1 mg daily   6. Continue with Aricept 10 mg nightly. Refills given  7. No driving   8. Continue taking Vitamin D   9. Follow up in 8 months or sooner if needed. 10. Call if any questions or concerns      Total time 22 min.      Veleta Dance, MD

## 2021-09-24 NOTE — ED NOTES
Wife states pt only takes a few bites of food before stopping. Pt has been telling wife \"I can't swallow my food\" when he attempt to eat. Pt denies any pain with eating. Pt denies any SOB/Chest pain. Wife at bedside states this has been going on for a few days.       Francine Henry RN  12/16/17 1262 Home

## 2021-09-30 ENCOUNTER — OFFICE VISIT (OUTPATIENT)
Dept: ENT CLINIC | Age: 86
End: 2021-09-30
Payer: MEDICARE

## 2021-09-30 VITALS
OXYGEN SATURATION: 99 % | BODY MASS INDEX: 25.54 KG/M2 | SYSTOLIC BLOOD PRESSURE: 138 MMHG | RESPIRATION RATE: 14 BRPM | HEART RATE: 63 BPM | WEIGHT: 178 LBS | TEMPERATURE: 97.1 F | DIASTOLIC BLOOD PRESSURE: 80 MMHG

## 2021-09-30 DIAGNOSIS — H61.23 BILATERAL IMPACTED CERUMEN: Primary | ICD-10-CM

## 2021-09-30 PROCEDURE — 69210 REMOVE IMPACTED EAR WAX UNI: CPT | Performed by: NURSE PRACTITIONER

## 2021-09-30 NOTE — PROGRESS NOTES
Procedure note:  DATE AND TIME OF PROCEDURE: 9/30/2021 9:10 AM  PATIENT NAME: Rita Dietrich  MRN [id number]  PROVIDER: ZORAN Welch CNP   PRE-PROCEDURE DIAGNOSIS:  Bilateral cerumen impaction  POST-PROCEDURE DIAGNOSIS:   Same     INDICATION: Cerumenosis causing an inability to visualize the tympanic membrane, middle ear space and/or external auditory canal.     TEACHING: Procedure, benefits, and risks were explained to patient/family. Verbal informed consent was obtained. TIME OUT: A time out was conducted immediately before starting the procedure that confirmed a final verification of the correct patient, correct procedure, correct patient position, correct site and availability of special equipment. DESCRIPTION OF PROCEDURE:  PROCEDURE: Cerumenectomy  SITE: Bilateral ear(s)    ANESTHESIA:  NONE  COMPLICATIONS: NONE  EBL: NONE    PROCEDURE: Handheld otoscope used to visualize EAC. Bilateral cerumen in place, obstructing visualization of tympanic membranes. Cerumen removed with wire loop until tympanic membranes could be visualized as documented above. Normal exam, aside from stenotic lateral canals. The patient tolerated the procedure well, with no complications.

## 2021-12-24 LAB
ABSOLUTE BASO #: 0 X10E9/L (ref 0–0.2)
ABSOLUTE EOS #: 0.1 X10E9/L (ref 0–0.4)
ABSOLUTE LYMPH #: 0.9 X10E9/L (ref 1–3.5)
ABSOLUTE MONO #: 0.5 X10E9/L (ref 0–0.9)
ABSOLUTE NEUT #: 2.8 X10E9/L (ref 1.5–6.6)
ALBUMIN SERPL-MCNC: 3.9 G/DL (ref 3.2–5.3)
ALK PHOSPHATASE: 55 U/L (ref 39–130)
ALT SERPL-CCNC: 11 U/L (ref 0–40)
ANION GAP SERPL CALCULATED.3IONS-SCNC: 8 MMOL/L (ref 5–15)
AST SERPL-CCNC: 18 U/L (ref 0–41)
BASOPHILS RELATIVE PERCENT: 0.7 %
BILIRUB SERPL-MCNC: 0.6 MG/DL (ref 0.3–1.2)
BILIRUBIN DIRECT: 0.2 MG/DL (ref 0–0.4)
BUN BLDV-MCNC: 20 MG/DL (ref 5–27)
CALCIUM SERPL-MCNC: 9.2 MG/DL (ref 8.5–10.5)
CHLORIDE BLD-SCNC: 109 MMOL/L (ref 98–109)
CHOLESTEROL/HDL RATIO: 3.9 (ref 1–5)
CHOLESTEROL: 139 MG/DL (ref 150–200)
CO2: 28 MMOL/L (ref 22–32)
CREAT SERPL-MCNC: 1.42 MG/DL (ref 0.6–1.3)
EGFR AFRICAN AMERICAN: 57 ML/MIN/1.73SQ.M
EGFR IF NONAFRICAN AMERICAN: 47 ML/MIN/1.73SQ.M
EOSINOPHILS RELATIVE PERCENT: 3.4 %
GLUCOSE: 85 MG/DL (ref 65–99)
HCT VFR BLD CALC: 36.1 % (ref 39–49)
HDLC SERPL-MCNC: 36 MG/DL
HEMOGLOBIN: 12 G/DL (ref 13–17)
LDL CHOLESTEROL CALCULATED: 82 MG/DL
LDL/HDL RATIO: 2.3
LYMPHOCYTE %: 20.1 %
MCH RBC QN AUTO: 31.3 PG (ref 27–34)
MCHC RBC AUTO-ENTMCNC: 33.3 G/DL (ref 32–36)
MCV RBC AUTO: 94 FL (ref 80–100)
MONOCYTES # BLD: 10.5 %
NEUTROPHILS RELATIVE PERCENT: 65.3 %
PDW BLD-RTO: 12.9 % (ref 11.5–15)
PLATELETS: 150 X10E9/L (ref 150–450)
PMV BLD AUTO: 9.1 FL (ref 7–12)
POTASSIUM SERPL-SCNC: 4.1 MMOL/L (ref 3.5–5)
RBC: 3.85 X10E12/L (ref 4.1–5.7)
SODIUM BLD-SCNC: 145 MMOL/L (ref 134–146)
T4 FREE: 0.72 NG/DL (ref 0.61–1.6)
TOTAL PROTEIN: 7.2 G/DL (ref 6–8)
TRIGL SERPL-MCNC: 107 MG/DL (ref 27–150)
TSH SERPL DL<=0.05 MIU/L-ACNC: 2.99 UIU/ML (ref 0.49–4.67)
URIC ACID: 6.2 MG/DL (ref 2.6–7.2)
VLDLC SERPL CALC-MCNC: 21 MG/DL (ref 0–30)
WBC: 4.3 X10E9/L (ref 4–11)

## 2022-04-19 DIAGNOSIS — R44.3 HALLUCINATIONS: ICD-10-CM

## 2022-04-19 DIAGNOSIS — R41.3 MEMORY PROBLEM: ICD-10-CM

## 2022-04-19 RX ORDER — DIVALPROEX SODIUM 250 MG/1
TABLET, EXTENDED RELEASE ORAL
Qty: 30 TABLET | Refills: 5 | Status: SHIPPED | OUTPATIENT
Start: 2022-04-19 | End: 2022-10-17

## 2022-05-02 ENCOUNTER — OFFICE VISIT (OUTPATIENT)
Dept: NEUROLOGY | Age: 87
End: 2022-05-02
Payer: MEDICARE

## 2022-05-02 VITALS
BODY MASS INDEX: 24.64 KG/M2 | HEIGHT: 71 IN | WEIGHT: 176 LBS | HEART RATE: 65 BPM | DIASTOLIC BLOOD PRESSURE: 80 MMHG | SYSTOLIC BLOOD PRESSURE: 140 MMHG | OXYGEN SATURATION: 98 %

## 2022-05-02 DIAGNOSIS — F01.50 VASCULAR DEMENTIA WITHOUT BEHAVIORAL DISTURBANCE (HCC): Primary | ICD-10-CM

## 2022-05-02 DIAGNOSIS — F02.81 DEMENTIA IN OTHER DISEASES CLASSIFIED ELSEWHERE WITH BEHAVIORAL DISTURBANCE: ICD-10-CM

## 2022-05-02 PROCEDURE — 1036F TOBACCO NON-USER: CPT | Performed by: PSYCHIATRY & NEUROLOGY

## 2022-05-02 PROCEDURE — 4040F PNEUMOC VAC/ADMIN/RCVD: CPT | Performed by: PSYCHIATRY & NEUROLOGY

## 2022-05-02 PROCEDURE — 1123F ACP DISCUSS/DSCN MKR DOCD: CPT | Performed by: PSYCHIATRY & NEUROLOGY

## 2022-05-02 PROCEDURE — G8428 CUR MEDS NOT DOCUMENT: HCPCS | Performed by: PSYCHIATRY & NEUROLOGY

## 2022-05-02 PROCEDURE — G8420 CALC BMI NORM PARAMETERS: HCPCS | Performed by: PSYCHIATRY & NEUROLOGY

## 2022-05-02 PROCEDURE — 99214 OFFICE O/P EST MOD 30 MIN: CPT | Performed by: PSYCHIATRY & NEUROLOGY

## 2022-05-02 NOTE — PATIENT INSTRUCTIONS
1. Plavix 75 mg daily. 2. Continue with B12 supplements   3. Change Depakote ER to 500 mg daily. Refills given. 4. Continue with Aspirin 81 mg daily  5. Continue with folic acid 1 mg daily   6. Continue with Aricept 10 mg nightly. Refills given  7. No driving   8. Continue taking Vitamin D   9. Follow up in 8 months or sooner if needed.    10. Call if any questions or concerns

## 2022-05-02 NOTE — PROGRESS NOTES
NEUROLOGY OUT PATIENT FOLLOW UP NOTE:  5/2/20221:20 PM    Alessandra Ramirez is here for follow up for   Patient Active Problem List   Diagnosis    Erectile dysfunction    BPH with urinary obstruction    Lower urinary tract symptoms (LUTS)    Nocturia    Urinary retention    Feeling of incomplete bladder emptying    Hyperlipemia    Frequency of urination    ETD (eustachian tube dysfunction)    Dysphagia    Dysphonia    GERD (gastroesophageal reflux disease)    Zenker diverticula    Presbyesophagus    Voice disturbance    Disease of larynx    Subjective tinnitus    Sensorineural hearing loss, bilateral    Otalgia of left ear    supine Hypertension    CKD (chronic kidney disease) stage 2, GFR 60-89 ml/min    Metabolic bone disease    Insomnia    Alcohol withdrawal (HCC)    Falls    Hist of Near syncope    Orthostatic hypotension    Low serum cortisol level (HCC)    Intractable back pain    Frequent falls    Hypokalemia    Alcohol intoxication ([address]    Alcohol intoxication delirium ([address]    Acute alcohol intoxication with alcoholism, with unspecified complication ([address]    Severe malnutrition ([address]    Delirium tremens ([address]    Dementia ([address]    TIA (transient ischemic attack)          Follow up for dementia without behavioral disturbance, he is having vivid dreams. He is on aspirin and vitamin D. He comes in with his wife to go over plan. No Known Allergies    Current Outpatient Medications:     divalproex (DEPAKOTE ER) 250 MG extended release tablet, Take 1 tablet by mouth daily. , Disp: 30 tablet, Rfl: 5    memantine (NAMENDA) 5 MG tablet, Take 5 mg by mouth 3 times daily, Disp: , Rfl:     clopidogrel (PLAVIX) 75 MG tablet, Take 1 tablet by mouth daily, Disp: 30 tablet, Rfl: 11    pantoprazole (PROTONIX) 40 MG tablet, Take 1 tablet by mouth every morning (before breakfast), Disp: 30 tablet, Rfl: 3    metoprolol tartrate (LOPRESSOR) 50 MG tablet, Take 0.5 tablets by mouth daily, Disp: 60 tablet, Rfl: 3    mirtazapine (REMERON) 15 MG tablet, Take 15 mg by mouth nightly , Disp: , Rfl:     aspirin 81 MG EC tablet, Take 81 mg by mouth daily , Disp: , Rfl:     Cholecalciferol (VITAMIN D3) 3000 units TABS, Take 3,000 Units by mouth nightly , Disp: , Rfl:     atorvastatin (LIPITOR) 10 MG tablet, Take 1 tablet by mouth daily, Disp: 30 tablet, Rfl: 3    oxybutynin (DITROPAN-XL) 10 MG extended release tablet, Take 1 tablet by mouth nightly, Disp: 30 tablet, Rfl: 3    donepezil (ARICEPT) 10 MG tablet, Take 1 tablet by mouth nightly, Disp: 30 tablet, Rfl: 3    NIFEdipine (ADALAT CC) 30 MG extended release tablet, Take 1 tablet by mouth every morning, Disp: 30 tablet, Rfl: 3    doxazosin (CARDURA) 2 MG tablet, Take 2 mg by mouth daily, Disp: , Rfl:     folic acid (FOLVITE) 1 MG tablet, Take 1 tablet by mouth daily, Disp: 90 tablet, Rfl: 3    potassium chloride (KLOR-CON M) 10 MEQ extended release tablet, Take 20 mEq by mouth daily  (Patient not taking: Reported on 11/8/2021), Disp: , Rfl:     acetaminophen (TYLENOL) 500 MG tablet, Take 500 mg by mouth every 6 hours as needed for Pain prn (Patient not taking: Reported on 11/8/2021), Disp: , Rfl:     ondansetron (ZOFRAN-ODT) 4 MG disintegrating tablet, Take 1 tablet by mouth every 8 hours as needed for Nausea or Vomiting (Patient not taking: Reported on 11/8/2021), Disp: 10 tablet, Rfl: 0    hydrochlorothiazide (HYDRODIURIL) 25 MG tablet, Take 25 mg by mouth daily  (Patient not taking: Reported on 11/8/2021), Disp: , Rfl:     Multiple Vitamin (MULTIVITAMIN) tablet, Take 1 tablet by mouth daily, Disp: , Rfl: 0    docusate (COLACE, DULCOLAX) 100 MG CAPS, Take 100 mg by mouth daily, Disp: , Rfl:     vitamin B-1 (THIAMINE) 100 MG tablet, Take 100 mg by mouth daily , Disp: , Rfl:     I reviewed the past medical history, allergies, medications, social history and family history.        PE:   Vitals:    05/02/22 1303 05/02/22 1311   BP: (!) 160/74 (!) 140/80   Site: Left Upper Arm Right Upper Arm   Position: Sitting Sitting   Cuff Size: Medium Adult Medium Adult   Pulse: 65    SpO2: 98%    Weight: 176 lb (79.8 kg)    Height: 5' 10.5\" (1.791 m)      General Appearance:  alert and cooperative, he is wearing a mask. He is oriented to place and person, he does not know why he is here, he is circumstantial with his answers. He cannot follow more than 1 step command, he has poor abstraction, his concentration is fair. His recall is impaired, he has anomia. Skin:  Skin color, texture, turgor normal. No rashes or lesions. Gen: NAD, Language is Intact. Skin: no rash, lesion,  moist to touch. warm  Head: no rash, no icterus  Neck: There is no carotid bruits. The Neck is supple. There is no neck lymphadenopathy. Neuro: CN 2-12 grossly intact with no focal deficits. Power 5/5 Throughout symmetric, Reflexes are decreased symmetric. Long tracts are decreased. Cerebellar exam is Intact. Sensory exam is intact to light touch. Gait is guarded intact. Musculoskeletal:  Has no hand arthritis, no limitation of ROM in any of the four extremities.   Lower extremities no edema          DATA:      Results for orders placed or performed in visit on 12/23/21   Hepatic Function Panel   Result Value Ref Range    Total Bilirubin 0.6 0.3 - 1.2 mg/dL    Bilirubin, Direct 0.2 0.0 - 0.4 mg/dL    Alk Phosphatase 55 39 - 130 U/L    AST 18 0 - 41 U/L    ALT 11 0 - 40 U/L    Total Protein 7.2 6.0 - 8.0 g/dL    Albumin 3.9 3.2 - 5.3 g/dL   CBC   Result Value Ref Range    WBC 4.3 4.0 - 11.0 X10E9/L    RBC 3.85 (L) 4.10 - 5.70 X10E12/L    Hemoglobin 12.0 (L) 13.0 - 17.0 g/dL    Hematocrit 36.1 (L) 39 - 49 %    MCV 94 80 - 100 fL    MCH 31.3 27 - 34 pg    MCHC 33.3 32 - 36 g/dL    RDW 12.9 11.5 - 15.0 %    Platelets 924 100 - 230 X10E9/L    MPV 9.1 7 - 12 fL    Neutrophils % 65.3 %    Absolute Neut # 2.8 1.5 - 6.6 X10E9/L    Lymphocyte % 20.1 %    Absolute Lymph # 0.9 (L) 1.0 - 3.5 X10E9/L    Monocytes 10.5 %    Absolute Mono # 0.5 0 - 0.9 X10E9/L    Eosinophils % 3.4 %    Absolute Eos # 0.1 0.0 - 0.4 X10E9/L    Basophils % 0.7 %    Absolute Baso # 0.0 0.0 - 0.2 P82C7/A   Basic Metabolic Panel   Result Value Ref Range    Glucose 85 65 - 99 mg/dL    BUN 20 5 - 27 mg/dL    CREATININE 1.42 (H) 0.60 - 1.30 mg/dL    eGFR African American 57 (L) >59 ml/min/1.73sq.m    EGFR IF NonAfrican American 47 (L) >59 ml/min/1.73sq. m    Calcium 9.2 8.5 - 10.5 mg/dL    Sodium 145 134 - 146 mmol/L    Potassium 4.1 3.5 - 5.0 mmol/L    Chloride 109 98 - 109 mmol/L    CO2 28 22 - 32 mmol/L    Anion Gap 8 5 - 15 mmol/L   TSH without Reflex   Result Value Ref Range    TSH 2.99 0.49 - 4.67 uIU/mL   FREE T4   Result Value Ref Range    T4 Free 0.72 0.61 - 1.60 ng/dL   Lipid Panel w/ Reflex Direct LDL   Result Value Ref Range    Cholesterol 139 (L) 150 - 200 mg/dL    Triglycerides 107 27 - 150 mg/dL    HDL 36 (L) >39 mg/dL    LDL Calculated 82 <130 mg/dL    VLDL Cholesterol Calculated 21 0 - 30 mg/dL    LDl/HDL Ratio 2.3 <3.5    Chol/HDL Ratio 3.9 1.0 - 5.0   Uric Acid   Result Value Ref Range    Uric Acid 6.2 2.6 - 7.2 mg/dL            MRI Brain WO Contrast  Narrative  PROCEDURE: MRI BRAIN WO CONTRAST  CLINICAL INFORMATION: falls  COMPARISON: No prior MRI for comparison; comparison is made to the CT of the brain of 25 April 2017. TECHNIQUE: Using a 1.5 Connie Siemens scanner, axial diffusion weighted echoplanar imaging, T2-weighted turbo spin-echo, fat-saturated T2-weighted fluid attenuated inversion recovery, and proton density weighted gradient recall images were obtained  through the entire brain. Axial, coronal, and sagittal FAST T1-weighted gradient recall images were also obtained. No IV contrast administered. [employer] software was used. FINDINGS: Motion limits the examination. Mild global atrophic changes are again demonstrated. There are no areas of restricted diffusion.  There are no findings of blood products in the brain parenchyma, other than remote microhemorrhage of the right frontal lobe white matter. There are partially confluent symmetric hyperintense T2/hypointense T1 signal abnormalities in the white matter of both cerebral hemispheres, consistent with mild small vessel disease sequelae. There are mildly prominent atrophic changes of both medial temporal lobe parenchyma regions. There are slightly prominent T2 signal characteristics, hyperintense, in the FLAIR sequence in the same regions, which could represent artifact versus post ictal  features. No restricted diffusion. Limited detail of the cervical canal shows no definite acute findings. Poorly characterized degenerative changes are present. Sella contents are within acceptable limits. Vascular structures grossly show nothing concerning. Developmentally hypoplastic left vertebral artery implied. Paranasal sinuses, mastoid air cells, and middle ear cavities are grossly clear. Impression  NO EVIDENCE OF ACUTE BRAIN PROCESS. MILD SMALL VESSEL DISEASE SEQUELAE. NONSPECIFIC FINDINGS OF THE BILATERAL MEDIAL TEMPORAL LOBE PARENCHYMA. **This report has been created using voice recognition software. It may contain minor errors which are inherent in voice recognition technology. **  Final report electronically signed by Dr. Harinder Yao on 4/26/2017 2:45 PM    Results for orders placed during the hospital encounter of 05/02/19    MRI BRAIN W WO CONTRAST  Narrative  PROCEDURE: MRI BRAIN W WO CONTRAST  CLINICAL INFORMATION: Memory problem. Confusion  COMPARISON: MRI of the brain dated April 26, 2017. TECHNIQUE: Multiplanar and multiple spin echo T1 and T2-weighted images were obtained through the brain before and after the administration of 15 mL ProHance intravenous contrast.  FINDINGS:  There is prominence of the sulcal spaces and ventricular system secondary to generalized, age-related parenchymal volume loss.  Moderate patchy foci of hyperintense T2 signal are again noted throughout the periventricular and deep cerebral white matter  which likely correspond to sequela of chronic microvascular ischemic change. There is a stable appearance of subtle hyperintense T2 signal along the medial temporal lobes bilaterally. This is nonspecific and may be artifactual. No restricted diffusion is  identified. Punctate foci of susceptibility are again noted within the posterior right frontal lobe and right parietal lobe which may correspond to remote microhemorrhages versus small cavernous malformations. The ventricles are midline and stable in size and morphology without evidence of hydrocephalus. No mass, mass effect or extra-axial fluid collection is identified. The basal cisterns and visualized vascular flow voids are patent. The pituitary, brain  stem and cervical medullary junction are within normal limits. No abnormal intracranial enhancement is identified. The visualized orbits and temporal bone structures are unremarkable. The paranasal sinuses are within normal limits. Impression  Stable senescent changes are present including moderate sequela of chronic microvascular ischemic angiopathy within the periventricular and deep cerebral white matter. No acute intracranial abnormality is identified. **This report has been created using voice recognition software. It may contain minor errors which are inherent in voice recognition technology. **      Final report electronically signed by Dr. Sharon Brady on 5/2/2019 1:38 PM      CT HEAD WO CONTRAST  Narrative  PROCEDURE: CT HEAD WO CONTRAST  CLINICAL INFORMATION: fall with head injury. COMPARISON: Noncontrast brain CT dated 2/26/2018  TECHNIQUE: Noncontrast 5 mm axial images were obtained through the brain. All CT scans at this facility use dose modulation, iterative reconstruction, and/or weight-based dosing when appropriate to reduce radiation dose to as low as reasonably achievable.   FINDINGS:  Brain: There is no acute ischemic infarct, hemorrhage, midline shift, mass, or mass effect. Mild to moderate deep white matter small vessel chronic ischemic changes are noted. There is age appropriate cortical atrophy. Ventricles: The ventricles, cisterns and cortical sulci are enlarged concordant with the mild to moderate degree of age-appropriate atrophy. No obstructive hydrocephalus. Skull base/calvarium: Unremarkable  Scalp soft tissues: Unremarkable  Intraorbital contents: Unremarkable  Sinuses: Unremarkable  Mastoids: Unremarkable  Impression  No acute ischemic infarct, hemorrhage, or mass effect. Aging changes as discussed above. **This report has been created using voice recognition software. It may contain minor errors which are inherent in voice recognition technology. **  Final report electronically signed by Dr. Liz Bray on 3/9/2018 2:24 AM     EEG 7/2021:  IMPRESSION:  This is a mildly abnormal EEG due to presence of fast beta  activity suggestive of mild cortical dysfunction such as seen with  metabolic causes, medication effects or nonspecific encephalopathies. There was no evidence of epileptiform activity appreciated.       Assessment:     Diagnosis Orders   1. Vascular dementia without behavioral disturbance ([facility]     2. Dementia in other diseases classified elsewhere with behavioral disturbance ([address]        Follow up for vascular dementia, hallucination and memory problem. He is having increased behavioral difficulty, forget and requires redirection. He is not physical. He asks repeated questions. He thinks he is still driving. He has hallucination. He was taken off Seroquel by his primary care provider. He can have frequent irritability with his mood. Wife is asking for more medications to help. The patient has no insight, he is circumstantial with his answers, he is pleasantly confused. He is on vitamin D supplements. He is off the seroquel by his primary care provider. The patient has not been driving by family effort, even though he still believes he is driving. After detailed discussion with the patient and his wife we agreed on the following plan. Plan:  1. Plavix 75 mg daily. 2. Continue with B12 supplements   3. Change Depakote ER to 500 mg daily. Refills given. 4. Continue with Aspirin 81 mg daily  5. Continue with folic acid 1 mg daily   6. Continue with Aricept 10 mg nightly. Refills given  7. No driving   8. Continue taking Vitamin D   9. Follow up in 8 months or sooner if needed. 10. Call if any questions or concerns      Total time 32 min.      Venita Sandhu MD

## 2022-09-12 NOTE — ED NOTES
Patient to MRI in stable condition      Watson Ceballos RN  07/14/21 7974 no difficulty urinating/no hematuria/no dysmenorrhea/no pelvic pain/no STD exposure

## 2022-10-15 DIAGNOSIS — R41.3 MEMORY PROBLEM: ICD-10-CM

## 2022-10-15 DIAGNOSIS — R44.3 HALLUCINATIONS: ICD-10-CM

## 2022-10-17 ENCOUNTER — OFFICE VISIT (OUTPATIENT)
Dept: ENT CLINIC | Age: 87
End: 2022-10-17
Payer: MEDICARE

## 2022-10-17 VITALS
HEIGHT: 71 IN | DIASTOLIC BLOOD PRESSURE: 60 MMHG | BODY MASS INDEX: 24.64 KG/M2 | WEIGHT: 176 LBS | RESPIRATION RATE: 14 BRPM | HEART RATE: 64 BPM | TEMPERATURE: 97.1 F | SYSTOLIC BLOOD PRESSURE: 128 MMHG

## 2022-10-17 DIAGNOSIS — H61.23 BILATERAL IMPACTED CERUMEN: Primary | ICD-10-CM

## 2022-10-17 PROCEDURE — 69210 REMOVE IMPACTED EAR WAX UNI: CPT | Performed by: NURSE PRACTITIONER

## 2022-10-17 RX ORDER — DIVALPROEX SODIUM 250 MG/1
TABLET, EXTENDED RELEASE ORAL
Qty: 30 TABLET | Refills: 5 | Status: SHIPPED | OUTPATIENT
Start: 2022-10-17

## 2022-10-17 NOTE — PROGRESS NOTES
Procedure note:  DATE AND TIME OF PROCEDURE: 10/17/2022 4:17 PM  PATIENT NAME: Hetal Real  MRN [id number]  PROVIDER: ZORAN Wilson CNP   PRE-PROCEDURE DIAGNOSIS:  Bilateral cerumen impaction  POST-PROCEDURE DIAGNOSIS:   Same     INDICATION: Cerumenosis causing an inability to visualize the tympanic membrane, middle ear space and/or external auditory canal.     TEACHING: Procedure, benefits, and risks were explained to patient/family. Verbal informed consent was obtained. TIME OUT: A time out was conducted immediately before starting the procedure that confirmed a final verification of the correct patient, correct procedure, correct patient position, correct site and availability of special equipment. DESCRIPTION OF PROCEDURE:  PROCEDURE: Cerumenectomy  SITE: Bilateral ear(s)    ANESTHESIA:  NONE  COMPLICATIONS: NONE  EBL: NONE    PROCEDURE: Handheld otoscope used to visualize EAC. Bilateral cerumen in place, obstructing visualization of tympanic membranes. Cerumen removed with wire loop and alligator forceps until tympanic membranes could be visualized as documented above. Normal exam. The patient tolerated the procedure well, with no complications.

## 2023-01-06 ENCOUNTER — OFFICE VISIT (OUTPATIENT)
Dept: NEUROLOGY | Age: 88
End: 2023-01-06
Payer: MEDICARE

## 2023-01-06 VITALS
HEIGHT: 71 IN | BODY MASS INDEX: 22.54 KG/M2 | OXYGEN SATURATION: 99 % | WEIGHT: 161 LBS | SYSTOLIC BLOOD PRESSURE: 130 MMHG | HEART RATE: 50 BPM | DIASTOLIC BLOOD PRESSURE: 70 MMHG

## 2023-01-06 DIAGNOSIS — F01.50 VASCULAR DEMENTIA WITHOUT BEHAVIORAL DISTURBANCE (HCC): Primary | ICD-10-CM

## 2023-01-06 DIAGNOSIS — R41.3 MEMORY PROBLEM: ICD-10-CM

## 2023-01-06 DIAGNOSIS — R44.3 HALLUCINATIONS: ICD-10-CM

## 2023-01-06 PROCEDURE — 1036F TOBACCO NON-USER: CPT | Performed by: NURSE PRACTITIONER

## 2023-01-06 PROCEDURE — 99213 OFFICE O/P EST LOW 20 MIN: CPT | Performed by: NURSE PRACTITIONER

## 2023-01-06 PROCEDURE — G8427 DOCREV CUR MEDS BY ELIG CLIN: HCPCS | Performed by: NURSE PRACTITIONER

## 2023-01-06 PROCEDURE — G8484 FLU IMMUNIZE NO ADMIN: HCPCS | Performed by: NURSE PRACTITIONER

## 2023-01-06 PROCEDURE — 1123F ACP DISCUSS/DSCN MKR DOCD: CPT | Performed by: NURSE PRACTITIONER

## 2023-01-06 PROCEDURE — G8420 CALC BMI NORM PARAMETERS: HCPCS | Performed by: NURSE PRACTITIONER

## 2023-01-06 NOTE — PATIENT INSTRUCTIONS
Continue with B12 supplements   Continue with  Depakote ER to 500 mg daily. Refills given. Continue with Aspirin 81 mg daily  Continue with folic acid 1 mg daily   Continue with Aricept 10 mg nightly. Refills given  No driving   Continue taking Vitamin D   Follow up in 8 months or sooner if needed.    Call if any questions or concerns

## 2023-01-06 NOTE — PROGRESS NOTES
NEUROLOGY OUT PATIENT FOLLOW UP NOTE:  1/6/20231:29 PM    Fany Castro is here for follow up for dementia without behavioral disturbance  Patient Active Problem List   Diagnosis    Erectile dysfunction    BPH with urinary obstruction    Lower urinary tract symptoms (LUTS)    Nocturia    Urinary retention    Feeling of incomplete bladder emptying    Hyperlipemia    Frequency of urination    ETD (eustachian tube dysfunction)    Dysphagia    Dysphonia    GERD (gastroesophageal reflux disease)    Zenker diverticula    Presbyesophagus    Voice disturbance    Disease of larynx    Subjective tinnitus    Sensorineural hearing loss, bilateral    Otalgia of left ear    supine Hypertension    CKD (chronic kidney disease) stage 2, GFR 60-89 ml/min    Metabolic bone disease    Insomnia    Alcohol withdrawal (HCC)    Falls    Hist of Near syncope    Orthostatic hypotension    Low serum cortisol level    Intractable back pain    Frequent falls    Hypokalemia    Alcohol intoxication ([address]    Alcohol intoxication delirium ([address]    Acute alcohol intoxication with alcoholism, with unspecified complication (HCC)    Severe malnutrition (HCC)    Delirium tremens ([address]    Dementia ([address]    TIA (transient ischemic attack)           ROS:  Respiratory : no cough, no shortness of breath  Cardiac: no chest pain. No palpitations. Renal : no flank pain, no hematuria    Skin: no rash      No Known Allergies    Current Outpatient Medications:     divalproex (DEPAKOTE ER) 250 MG extended release tablet, Take 1 tablet by mouth daily. , Disp: 30 tablet, Rfl: 5    memantine (NAMENDA) 5 MG tablet, Take 5 mg by mouth 3 times daily, Disp: , Rfl:     clopidogrel (PLAVIX) 75 MG tablet, Take 1 tablet by mouth daily, Disp: 30 tablet, Rfl: 11    folic acid (FOLVITE) 1 MG tablet, Take 1 tablet by mouth daily, Disp: 90 tablet, Rfl: 3    acetaminophen (TYLENOL) 500 MG tablet, Take 500 mg by mouth every 6 hours as needed for Pain prn, Disp: , Rfl: pantoprazole (PROTONIX) 40 MG tablet, Take 1 tablet by mouth every morning (before breakfast), Disp: 30 tablet, Rfl: 3    metoprolol tartrate (LOPRESSOR) 50 MG tablet, Take 0.5 tablets by mouth daily, Disp: 60 tablet, Rfl: 3    mirtazapine (REMERON) 15 MG tablet, Take 15 mg by mouth nightly , Disp: , Rfl:     aspirin 81 MG EC tablet, Take 81 mg by mouth daily , Disp: , Rfl:     Cholecalciferol (VITAMIN D3) 3000 units TABS, Take 3,000 Units by mouth nightly , Disp: , Rfl:     atorvastatin (LIPITOR) 10 MG tablet, Take 1 tablet by mouth daily, Disp: 30 tablet, Rfl: 3    oxybutynin (DITROPAN-XL) 10 MG extended release tablet, Take 1 tablet by mouth nightly, Disp: 30 tablet, Rfl: 3    Multiple Vitamin (MULTIVITAMIN) tablet, Take 1 tablet by mouth daily, Disp: , Rfl: 0    donepezil (ARICEPT) 10 MG tablet, Take 1 tablet by mouth nightly, Disp: 30 tablet, Rfl: 3    docusate (COLACE, DULCOLAX) 100 MG CAPS, Take 100 mg by mouth daily, Disp: , Rfl:     NIFEdipine (ADALAT CC) 30 MG extended release tablet, Take 1 tablet by mouth every morning, Disp: 30 tablet, Rfl: 3    doxazosin (CARDURA) 2 MG tablet, Take 2 mg by mouth daily, Disp: , Rfl:     potassium chloride (KLOR-CON M) 10 MEQ extended release tablet, Take 20 mEq by mouth daily  (Patient not taking: No sig reported), Disp: , Rfl:     ondansetron (ZOFRAN-ODT) 4 MG disintegrating tablet, Take 1 tablet by mouth every 8 hours as needed for Nausea or Vomiting (Patient not taking: No sig reported), Disp: 10 tablet, Rfl: 0    hydrochlorothiazide (HYDRODIURIL) 25 MG tablet, Take 25 mg by mouth daily  (Patient not taking: No sig reported), Disp: , Rfl:     vitamin B-1 (THIAMINE) 100 MG tablet, Take 100 mg by mouth daily  (Patient not taking: Reported on 1/6/2023), Disp: , Rfl:     I reviewed the past medical history, allergies, medications, social history and family history.        PE:   Vitals:    01/06/23 1315   BP: 130/70   Site: Left Upper Arm   Position: Sitting   Cuff Size: Large Adult   Pulse: 50   SpO2: 99%   Weight: 161 lb (73 kg)   Height: 5' 11\" (1.803 m)     General Appearance:  alert and cooperative,  He is oriented to place and person, he does not know why he is here, he is circumstantial with his answers. He cannot follow more than 1 step command, he has poor abstraction, his concentration is fair. His recall is impaired, he has anomia. Skin:  Skin color, texture, turgor normal. No rashes or lesions. Gen: NAD, Language is Intact. Skin: no rash, lesion,  moist to touch. warm  Head: no rash, no icterus  Neck: There is no carotid bruits. The Neck is supple. There is no neck lymphadenopathy. Neuro: CN 2-12 grossly intact with no focal deficits. Power 5/5 Throughout symmetric, Reflexes are decreased symmetric. Long tracts are decreased. Cerebellar exam is Intact. Sensory exam is intact to light touch. Gait is guarded intact. Musculoskeletal:  Has no hand arthritis, no limitation of ROM in any of the four extremities.   Lower extremities no edema          DATA:      Results for orders placed or performed in visit on 12/23/21   Hepatic Function Panel   Result Value Ref Range    Total Bilirubin 0.6 0.3 - 1.2 mg/dL    Bilirubin, Direct 0.2 0.0 - 0.4 mg/dL    Alk Phosphatase 55 39 - 130 U/L    AST 18 0 - 41 U/L    ALT 11 0 - 40 U/L    Total Protein 7.2 6.0 - 8.0 g/dL    Albumin 3.9 3.2 - 5.3 g/dL   CBC   Result Value Ref Range    WBC 4.3 4.0 - 11.0 X10E9/L    RBC 3.85 (L) 4.10 - 5.70 X10E12/L    Hemoglobin 12.0 (L) 13.0 - 17.0 g/dL    Hematocrit 36.1 (L) 39 - 49 %    MCV 94 80 - 100 fL    MCH 31.3 27 - 34 pg    MCHC 33.3 32 - 36 g/dL    RDW 12.9 11.5 - 15.0 %    Platelets 332 967 - 758 X10E9/L    MPV 9.1 7 - 12 fL    Neutrophils % 65.3 %    Absolute Neut # 2.8 1.5 - 6.6 X10E9/L    Lymphocyte % 20.1 %    Absolute Lymph # 0.9 (L) 1.0 - 3.5 X10E9/L    Monocytes 10.5 %    Absolute Mono # 0.5 0 - 0.9 X10E9/L    Eosinophils % 3.4 %    Absolute Eos # 0.1 0.0 - 0.4 X10E9/L    Basophils % 0.7 %    Absolute Baso # 0.0 0.0 - 0.2 B17L9/B   Basic Metabolic Panel   Result Value Ref Range    Glucose 85 65 - 99 mg/dL    BUN 20 5 - 27 mg/dL    Creatinine 1.42 (H) 0.60 - 1.30 mg/dL    eGFR African American 57 (L) >59 ml/min/1.73sq.m    EGFR IF NonAfrican American 47 (L) >59 ml/min/1.73sq. m    Calcium 9.2 8.5 - 10.5 mg/dL    Sodium 145 134 - 146 mmol/L    Potassium 4.1 3.5 - 5.0 mmol/L    Chloride 109 98 - 109 mmol/L    CO2 28 22 - 32 mmol/L    Anion Gap 8 5 - 15 mmol/L   TSH without Reflex   Result Value Ref Range    TSH 2.99 0.49 - 4.67 uIU/mL   FREE T4   Result Value Ref Range    T4 Free 0.72 0.61 - 1.60 ng/dL   Lipid Panel w/ Reflex Direct LDL   Result Value Ref Range    Cholesterol 139 (L) 150 - 200 mg/dL    Triglycerides 107 27 - 150 mg/dL    HDL 36 (L) >39 mg/dL    LDL Calculated 82 <130 mg/dL    VLDL Cholesterol Calculated 21 0 - 30 mg/dL    LDL/HDL Ratio 2.3 <3.5    Chol/HDL Ratio 3.9 1.0 - 5.0   Uric Acid   Result Value Ref Range    Uric Acid 6.2 2.6 - 7.2 mg/dL          No results found for this or any previous visit. No results found for this or any previous visit. Results for orders placed during the hospital encounter of 07/14/21    CTA HEAD W WO CONTRAST (CODE STROKE NIHSS 4 or Above)    Narrative  PROCEDURE: CTA HEAD W WO CONTRAST, CTA NECK W WO CONTRAST    CLINICAL INFORMATION: code stroke . COMPARISON: CT head from the same date and MR brain dated 5/2/2019. TECHNIQUE: Helical CT from the aortic arch through the head in arterial phase during fast bolus administration of 80 mL Isovue-370 injected in the right [facility], [place] with multiplanar reconstructions to include volumetric maximum intensity projection sequences. All CT scans at this facility use dose modulation, iterative reconstruction, and/or weight-based dosing when appropriate to reduce radiation dose to as low as reasonably achievable.     FINDINGS:    CTA HEAD:  There are mural calcifications in the cavernous segments of internal carotid arteries without flow-limiting stenosis. Intracranial segments of internal carotid arteries are patent throughout without focal stenosis or visualized aneurysm. The bilateral  middle cerebral and anterior cerebral arteries are patent without focal abnormality identified. The basilar artery is patent without focal stenosis or visualized aneurysm. The bilateral posterior cerebral and superior cerebellar arteries are patent  without focal abnormality identified. Dural venous sinuses appear patent without focal filling defect. No focal areas of abnormal parenchymal enhancement are identified. CTA NECK:  There is a bovine type arch. The brachiocephalic and left subclavian arteries are patent without focal stenosis. There is mild to moderate stenosis in the distal most segment of the right common carotid artery. There is mural calcification at the distal  most segment of left common carotid artery without flow-limiting stenosis. There is calcified and noncalcified mural plaque in the proximal right internal carotid artery with narrowest luminal diameter of 3.9 mm with a point more distal, where the walls  are parallel, measuring 3.9 mm. The left carotid bifurcation is widely patent without hemodynamically significant stenosis. Cervical segments of internal carotid arteries are patent without focal stenosis. The right vertebral artery is dominant. The  vertebral arteries are patent throughout their course without focal stenosis. Mucosal surfaces of the aerodigestive tract are symmetric without focal nodular thickening or visualized mass. No cervical lymphadenopathy is identified. The parotid, submandibular and thyroid glands are unremarkable. There are mild posterior dependent  changes in the visualized portions of the lungs. The heart is enlarged. There are moderate degenerative changes of the cervical spine. No suspicious osseous lesion is identified. Impression  1.  Minimal mural calcification in the clinoid segments of the internal carotid arteries without flow-limiting stenosis or aneurysm in the intracranial circulation. 2. Calcified mural plaque in the proximal right internal carotid artery without hemodynamically significant stenosis by NASCET criteria. 3. Mild to moderate stenosis in the distal most segment of the right common carotid artery. **This report has been created using voice recognition software. It may contain minor errors which are inherent in voice recognition technology. **    Final report electronically signed by Dr. Yin Steward MD on 7/14/2021 4:06 PM    Results for orders placed during the hospital encounter of 07/14/21    CTA NECK W WO CONTRAST    Narrative  PROCEDURE: CTA HEAD W WO CONTRAST, CTA [address] INFORMATION: code stroke . COMPARISON: CT head from the same date and MR brain dated 5/2/2019. TECHNIQUE: Helical CT from the aortic arch through the head in arterial phase during fast bolus administration of 80 mL Isovue-370 injected in the right [facility] with multiplanar reconstructions to include volumetric maximum intensity projection sequences. All CT scans at this facility use dose modulation, iterative reconstruction, and/or weight-based dosing when appropriate to reduce radiation dose to as low as reasonably achievable. FINDINGS:    CTA HEAD:  There are mural calcifications in the cavernous segments of internal carotid arteries without flow-limiting stenosis. Intracranial segments of internal carotid arteries are patent throughout without focal stenosis or visualized aneurysm. The bilateral  middle cerebral and anterior cerebral arteries are patent without focal abnormality identified. The basilar artery is patent without focal stenosis or visualized aneurysm. The bilateral posterior cerebral and superior cerebellar arteries are patent  without focal abnormality identified.  Dural venous sinuses appear patent without focal filling defect. No focal areas of abnormal parenchymal enhancement are identified.    CTA NECK:  There is a bovine type arch. The brachiocephalic and left subclavian arteries are patent without focal stenosis. There is mild to moderate stenosis in the distal most segment of the right common carotid artery. There is mural calcification at the distal  most segment of left common carotid artery without flow-limiting stenosis. There is calcified and noncalcified mural plaque in the proximal right internal carotid artery with narrowest luminal diameter of 3.9 mm with a point more distal, where the walls  are parallel, measuring 3.9 mm. The left carotid bifurcation is widely patent without hemodynamically significant stenosis. Cervical segments of internal carotid arteries are patent without focal stenosis. The right vertebral artery is dominant. The  vertebral arteries are patent throughout their course without focal stenosis.    Mucosal surfaces of the aerodigestive tract are symmetric without focal nodular thickening or visualized mass. No cervical lymphadenopathy is identified. The parotid, submandibular and thyroid glands are unremarkable. There are mild posterior dependent  changes in the visualized portions of the lungs. The heart is enlarged. There are moderate degenerative changes of the cervical spine. No suspicious osseous lesion is identified.    Impression  1. Minimal mural calcification in the clinoid segments of the internal carotid arteries without flow-limiting stenosis or aneurysm in the intracranial circulation.  2. Calcified mural plaque in the proximal right internal carotid artery without hemodynamically significant stenosis by NASCET criteria.  3. Mild to moderate stenosis in the distal most segment of the right common carotid artery.          **This report has been created using voice recognition software. It may contain minor errors which are inherent in voice recognition technology.**    Final report  electronically signed by Dr. Rose Valverde MD on 7/14/2021 4:06 PM    Results for orders placed during the hospital encounter of 07/14/21    MRI BRAIN WO CONTRAST    Narrative  WET READ:    No definite areas of resection diffusion are demonstrated to suggest acute ischemia. However, there are chronic appearing patchy foci of periventricular and subcortical white matter hyperintense signals changes likely representing senescent changes which  appear similar when compared to prior examination dated 5/20/2019. This examination will be formally read by one of the neuro radiologists tomorrow. Please refer to that report. Gagan Arias ON 7/14/2021 7:38 PM.    **This report has been created using voice recognition software. It may contain minor errors which are inherent in voice recognition technology. **      FINAL REPORT:    PROCEDURE: MRI BRAIN WO CONTRAST    INDICATION:  TIA symptoms. COMPARISON: CT head from the same date and MR brain dated 5/2/2019. TECHNIQUE: Multiplanar and multiple spin echo MRI images were obtained of the brain without contrast.    FINDINGS:  There is prominent temporal volume loss with mild volume loss elsewhere, stable compared to prior exam. There are mild to moderate confluent and patchy areas of T2/FLAIR prolongation in the periventricular, subcortical deep white matter, also stable  compared to prior MRI. No intra or extra-axial mass is identified. No focal areas of restricted diffusion are present. The major vascular flow voids appear patent. There is bilateral proptosis, stable compared to prior exam. Orbits are otherwise unremarkable. Paranasal sinuses and mastoid air cells are clear. Impression  1. No evidence of acute intracranial abnormality. 2. Prominent temporal predominant volume loss. 3. 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           **This report has been created using voice recognition software. It may contain minor errors which are inherent in voice recognition technology. **    Final report electronically signed by Dr. Franck Manning MD on 7/15/2021 9:25 AM    Results for orders placed during the hospital encounter of 05/02/19    MRI BRAIN W WO CONTRAST    Narrative  PROCEDURE: MRI BRAIN W WO CONTRAST    CLINICAL INFORMATION: Memory problem. Confusion    COMPARISON: MRI of the brain dated April 26, 2017. TECHNIQUE: Multiplanar and multiple spin echo T1 and T2-weighted images were obtained through the brain before and after the administration of 15 mL ProHance intravenous contrast.    FINDINGS:    There is prominence of the sulcal spaces and ventricular system secondary to generalized, age-related parenchymal volume loss. Moderate patchy foci of hyperintense T2 signal are again noted throughout the periventricular and deep cerebral white matter  which likely correspond to sequela of chronic microvascular ischemic change. There is a stable appearance of subtle hyperintense T2 signal along the medial temporal lobes bilaterally. This is nonspecific and may be artifactual. No restricted diffusion is  identified. Punctate foci of susceptibility are again noted within the posterior right frontal lobe and right parietal lobe which may correspond to remote microhemorrhages versus small cavernous malformations. The ventricles are midline and stable in size and morphology without evidence of hydrocephalus. No mass, mass effect or extra-axial fluid collection is identified. The basal cisterns and visualized vascular flow voids are patent. The pituitary, brain  stem and cervical medullary junction are within normal limits. No abnormal intracranial enhancement is identified. The visualized orbits and temporal bone structures are unremarkable. The paranasal sinuses are within normal limits.     Impression  Stable senescent changes are present including moderate sequela of chronic microvascular ischemic angiopathy within the periventricular and deep cerebral white matter. No acute intracranial abnormality is identified.          **This report has been created using voice recognition software. It may contain minor errors which are inherent in voice recognition technology.**      Final report electronically signed by Dr. Fabien Barahona on 5/2/2019 1:38 PM    No results found for this or any previous visit.    Results for orders placed during the hospital encounter of 07/14/21    CT HEAD WO CONTRAST    Narrative  PROCEDURE: CT HEAD WO CONTRAST    CLINICAL INFORMATION: Code stroke.    COMPARISON: MR brain dated 5/2/2019 and CT head dated 3/9/2018.    TECHNIQUE: Noncontrast 5 mm axial images were obtained through the brain. Sagittal and coronal reconstructions were created.    All CT scans at this facility use dose modulation, iterative reconstruction, and/or weight-based dosing when appropriate to reduce radiation dose to as low as reasonably achievable.    FINDINGS:  There is stable moderate temporal and central predominant volume loss. There are moderate confluent and patchy areas of hypodensity in the periventricular, subcortical deep white matter as evidence for chronic microvascular angiopathy, similar to prior  exam. Gray-white matter projection otherwise appears grossly preserved. No intracranial hemorrhage, mass effect or midline shift is identified. The calvarium appears intact. Orbits are unremarkable. Visualized paranasal sinuses are clear. Mastoid air  cells are clear.    Impression  No evidence of acute intracranial abnormality.    Findings were discussed with Carmen Poe RN via telephone at the time of interpretation.          **This report has been created using voice recognition software. It may contain minor errors which are inherent in voice recognition technology.**    Final report electronically signed by Dr. Jamal Shankar DO, MD on 7/14/2021 3:42 PM         Assessment:      Diagnosis Orders   1. Memory problem        2. Hallucinations        3. Vascular dementia without behavioral disturbance ([address]             Follow up for vascular dementia, memory trouble. He is requiring more redirection per his wife. He is on Depakote for behavior issues, his wife reports this seems to help. He can have vivid dreams. The patient has no insight, he is circumstantial with his answers, he is pleasantly confused. He is off the seroquel by his primary care provider. After detailed discussion with patient and his wife we agreed on the following plan. Plan:  Continue with B12 supplements   Continue with  Depakote ER to 500 mg daily. Refills given. Continue with Aspirin 81 mg daily  Continue with folic acid 1 mg daily   Continue with Aricept 10 mg nightly. Refills given  No driving   Continue taking Vitamin D   Follow up in 8 months or sooner if needed.    Call if any questions or concerns       Total time 25 min    ZORAN Mcdaniel - CNP

## 2023-02-07 ENCOUNTER — OFFICE VISIT (OUTPATIENT)
Dept: UROLOGY | Age: 88
End: 2023-02-07
Payer: MEDICARE

## 2023-02-07 DIAGNOSIS — R32 URINARY INCONTINENCE, UNSPECIFIED TYPE: ICD-10-CM

## 2023-02-07 DIAGNOSIS — N40.1 BENIGN LOCALIZED PROSTATIC HYPERPLASIA WITH LOWER URINARY TRACT SYMPTOMS (LUTS): Primary | ICD-10-CM

## 2023-02-07 LAB
BILIRUBIN URINE: NEGATIVE
BLOOD URINE, POC: NEGATIVE
CHARACTER, URINE: CLEAR
COLOR, URINE: YELLOW
GLUCOSE URINE: NEGATIVE MG/DL
KETONES, URINE: NEGATIVE
LEUKOCYTE CLUMPS, URINE: NEGATIVE
NITRITE, URINE: NEGATIVE
PH, URINE: 6 (ref 5–9)
PROTEIN, URINE: NEGATIVE MG/DL
SPECIFIC GRAVITY, URINE: 1.01 (ref 1–1.03)
UROBILINOGEN, URINE: 0.2 EU/DL (ref 0–1)

## 2023-02-07 PROCEDURE — 99204 OFFICE O/P NEW MOD 45 MIN: CPT | Performed by: UROLOGY

## 2023-02-07 PROCEDURE — G8420 CALC BMI NORM PARAMETERS: HCPCS | Performed by: UROLOGY

## 2023-02-07 PROCEDURE — G8484 FLU IMMUNIZE NO ADMIN: HCPCS | Performed by: UROLOGY

## 2023-02-07 PROCEDURE — 1123F ACP DISCUSS/DSCN MKR DOCD: CPT | Performed by: UROLOGY

## 2023-02-07 PROCEDURE — G8427 DOCREV CUR MEDS BY ELIG CLIN: HCPCS | Performed by: UROLOGY

## 2023-02-07 PROCEDURE — 1036F TOBACCO NON-USER: CPT | Performed by: UROLOGY

## 2023-02-07 NOTE — PROGRESS NOTES
Jon Dick MD.    [address] [facility].  [address] [place] [id number]  Dept: [phone]  Dept Fax: [phone]  Loc: [address] [facility] Urology Office Note -     Patient:  All Snyder  YOB: 1935    The patient is a 81 y.o. male who presents today for evaluation of the following problems:   Chief Complaint   Patient presents with    Incontinence     Has gotten worse over the last couple of months      referred/consultation requested by Philip Gupta MD.    History of Present Illness:    Incontinence  Hx of turp in 2012  Worsening urinary symptoms  Wife is present today  Pt with dementia  Daughter present over phone with us today          Requested/reviewed records from Philip Gupta MD office and/or outside [de-identified]    (Patient's old records have been requested, reviewed and pertinent findings summarized in today's note.)    Procedures Today: N/A      Last several PSA's:  Lab Results   Component Value Date    PSA 0.68 09/14/2018       Last total testosterone:  No results found for: TESTOSTERONE    Urinalysis today:  Results for POC orders placed in visit on 02/07/23   POCT Urinalysis No Micro (Auto)   Result Value Ref Range    Glucose, Ur Negative NEGATIVE mg/dl    Bilirubin Urine Negative     Ketones, Urine Negative NEGATIVE    Specific Gravity, Urine 1.015 1.002 - 1.030    Blood, UA POC Negative NEGATIVE    pH, Urine 6.00 5.0 - 9.0    Protein, Urine Negative NEGATIVE mg/dl    Urobilinogen, Urine 0.20 0.0 - 1.0 eu/dl    Nitrite, Urine Negative NEGATIVE    Leukocyte Clumps, Urine Negative NEGATIVE    Color, Urine Yellow YELLOW-STRAW    Character, Urine Clear [id number].CLOUD       Last BUN and creatinine:  Lab Results   Component Value Date    BUN 19 02/18/2023     Lab Results   Component Value Date    CREATININE 0.9 02/18/2023         Imaging Reviewed during this Office Visit:   Landy Mantilla MD independently reviewed the images and verified the radiology reports from:    No results found. PAST MEDICAL, FAMILY AND SOCIAL HISTORY:  Past Medical History:   Diagnosis Date    BPH (benign prostatic hypertrophy)     CKD (chronic kidney disease) stage 2, GFR 60-89 ml/min     Dementia (HCC)     Erectile dysfunction     Frequent falls     [place] (hard of hearing)     Hyperlipidemia     Hypertension     Memory disorder     Metabolic bone disease     Orthostatic hypotension      Past Surgical History:   Procedure Laterality Date    COLONOSCOPY      ENDOSCOPY, COLON, DIAGNOSTIC      ESOPHAGUS SURGERY      NECK SURGERY      PROSTATE SURGERY      SHOULDER SURGERY      THYROID SURGERY      removal of a nodule    TONSILLECTOMY      TURP  5-31-12    Dr Chaz Villeda     Family History   Problem Relation Age of Onset    High Blood Pressure Father     Stroke Father     Stroke Sister     Arthritis Sister     Alzheimer's Disease Sister     Cancer Sister     Heart Disease Sister     Arthritis Sister     Other Brother         parkinsons    Dementia Brother     No Known Problems Brother     No Known Problems Brother      Outpatient Medications Marked as Taking for the 2/7/23 encounter (Office Visit) with Virginia Frederick MD   Medication Sig Dispense Refill    divalproex (DEPAKOTE ER) 250 MG extended release tablet Take 1 tablet by mouth daily.  30 tablet 5    memantine (NAMENDA) 10 MG tablet Take 10 mg by mouth daily      [DISCONTINUED] clopidogrel (PLAVIX) 75 MG tablet Take 1 tablet by mouth daily (Patient not taking: Reported on 2/16/2023) 30 tablet 11    [DISCONTINUED] folic acid (FOLVITE) 1 MG tablet Take 1 tablet by mouth daily (Patient not taking: Reported on 2/16/2023) 90 tablet 3    acetaminophen (TYLENOL) 500 MG tablet Take 500 mg by mouth every 6 hours as needed for Pain prn      pantoprazole (PROTONIX) 40 MG tablet Take 1 tablet by mouth every morning (before breakfast) 30 tablet 3    ondansetron (ZOFRAN-ODT) 4 MG disintegrating tablet Take 1 tablet by mouth every 8 hours as needed for Nausea or Vomiting 10 tablet 0    [DISCONTINUED] potassium chloride (KLOR-CON M) 10 MEQ extended release tablet Take 20 mEq by mouth daily (Patient not taking: Reported on 2023)      [DISCONTINUED] metoprolol tartrate (LOPRESSOR) 50 MG tablet Take 0.5 tablets by mouth daily 60 tablet 3    aspirin 81 MG EC tablet Take 81 mg by mouth daily       [DISCONTINUED] hydrochlorothiazide (HYDRODIURIL) 25 MG tablet Take 25 mg by mouth daily (Patient not taking: Reported on 2023)      [DISCONTINUED] mirtazapine (REMERON) 15 MG tablet Take 15 mg by mouth nightly  (Patient not taking: Reported on 2023)      Cholecalciferol (VITAMIN D3) 3000 units TABS Take 3,000 Units by mouth nightly       atorvastatin (LIPITOR) 10 MG tablet Take 1 tablet by mouth daily 30 tablet 3    oxybutynin (DITROPAN-XL) 10 MG extended release tablet Take 1 tablet by mouth nightly 30 tablet 3    Multiple Vitamin (MULTIVITAMIN) tablet Take 1 tablet by mouth daily  0    donepezil (ARICEPT) 10 MG tablet Take 1 tablet by mouth nightly 30 tablet 3    docusate (COLACE, DULCOLAX) 100 MG CAPS Take 100 mg by mouth daily      [DISCONTINUED] NIFEdipine (ADALAT CC) 30 MG extended release tablet Take 1 tablet by mouth every morning (Patient not taking: Reported on 2023) 30 tablet 3    vitamin B-1 (THIAMINE) 100 MG tablet Take 100 mg by mouth daily      doxazosin (CARDURA) 2 MG tablet Take 2 mg by mouth daily         Patient has no known allergies.   Social History     Tobacco Use   Smoking Status Former    Types: Cigarettes    Quit date: 1992    Years since quittin.0   Smokeless Tobacco Never      (If patient a smoker, smoking cessation counseling offered)   Social History     Substance and Sexual Activity   Alcohol Use Not Currently    Alcohol/week: 0.0 standard drinks    Comment: daily       REVIEW OF SYSTEMS:  Constitutional: negative  Eyes: negative  Respiratory: negative  Cardiovascular: negative  Gastrointestinal: negative  Genitourinary: see HPI  Musculoskeletal: negative  Skin: negative   Neurological: negative  Hematological/Lymphatic: negative  Psychological: negative        Physical Exam:    This a 81 y.o. male  There were no vitals filed for this visit. There is no height or weight on file to calculate BMI. Constitutional: Patient in no acute distress;       Assessment and Plan        1. Benign localized prostatic hyperplasia with lower urinary tract symptoms (LUTS)    2. Urinary incontinence, unspecified type               Plan:      Discussed at length w family (daughter on speakerphone, wife who is present in office today)  Very bothered by incontinence  Cont ditropan and cardura for now  Cystoscopy for bravo eval        Prescriptions Ordered:  No orders of the defined types were placed in this encounter.      Orders Placed:  Orders Placed This Encounter   Procedures    POCT Urinalysis No Micro (Auto)     Ordered by an unspecified provider              Christy Moncada MD

## 2023-02-14 ENCOUNTER — HOSPITAL ENCOUNTER (OUTPATIENT)
Dept: UROLOGY | Age: 88
Discharge: HOME | End: 2023-02-14
Payer: MEDICARE

## 2023-02-14 VITALS
DIASTOLIC BLOOD PRESSURE: 69 MMHG | RESPIRATION RATE: 16 BRPM | TEMPERATURE: 97.8 F | HEART RATE: 58 BPM | SYSTOLIC BLOOD PRESSURE: 162 MMHG

## 2023-02-14 PROCEDURE — 0TJB8ZZ INSPECTION OF BLADDER, VIA NATURAL OR ARTIFICIAL OPENING ENDOSCOPIC: ICD-10-PCS | Performed by: UROLOGY

## 2023-02-14 PROCEDURE — 52000 CYSTOURETHROSCOPY: CPT

## 2023-02-14 NOTE — OP NOTE
{Blank single:19197::\"Cystoscopy\",\"Cystoscopy with left stent removal\",\"Cystoscopy with right stent removal\",\"Cystoscopy with bilateral stent removal\",\"Cystoscopy with Urethral Dilation\",\"Cystoscopy with fulguration of tumors\",\"Cystoscopy with botox\",\" \"}    Operative Note    Patient:  Isabella Dupree  MRN: [id number]  YOB: 1935    Date: 02/14/23  Surgeon: Josephine Butler MD  Anesthesia: Urojet Local  Indications: ***  Position: Supine  EBL: 0 ml    Findings:   The patient was prepped and draped in the usual sterile fashion. The flexible cystoscope was advanced through the urethra and into the bladder. The bladder was thoroughly inspected and the following was noted:    Residual Urine: {Blank single:19197::\"minimal\",\"moderate\",\"significant\" \" \"}. {Blank single:19197::\"urine cloudy and concerning for infection\",\" Urine clear, with no obvious infection\"}  Urethra: {Blank single:19197::\"No abnormalities of the urethra are noted\",\"meatal stricture noted\",\"fossa navicularis stricture noted\",\"bulbar urethral stricture noted\", bladder neck contracture noted\",\"pan-urethral stricture noted\",\" \"}. Urethral dilation was *** performed. {Blank single:19197::\"serial dilation was performed with S cook dilators over a guidewire\",\"serial dilation with John Lever sounds was performed\",\" \"}  Prostate: {Blank single:19197::\"open prostatic urethra with no obvious obstruction\",\"lateral lobe hypertrophy + present, prostate moderately obstructing\",\"lateral lobe hypertrophy ++ present, prostate obstructing\",\"trilobar hyperplasia+, obstruction noted\",\"trilobar hyperplasia++, obstructing \",\"trilobar hyperplasia +++, markedly obstructing\",\" \"}, intravesical extension of prostate *** present. There was *** previous TURP defect. Bladder: {Blank single:19197::\"bladder tumor noted. \",\" CIS/patchy erythema noted\",\"no tumor noted\"} ***.   {Blank single:19197::\"Mild\",\"Moderate\",\"Severe\",\" \"} trabeculation noted.   *** bladder diverticulum. Ureters: Orifices with normal configuration and location. {Blank single:08542::\"left stent was removed with graspers in it's entirety\",\"right stent was removed with graspers in it's entirety\",\" both stents were removed with graspers in their entirety\",\" \"}    The cystoscope was removed. The patient tolerated the procedure well.   Bulbar urethral stricture passed  Some prostate regrowth mod  Patchy erythema right lateral wall      Cysto, urethral dilation, bladder biopsy, pvp (35)

## 2023-02-14 NOTE — PROGRESS NOTES
[facility]  This procedure has been fully reviewed with the patient and written informed consent has been obtained. 1421 Procedure started with   1423 Procedure completed; patient tolerated well. Dr. Celestine Christian talked to patient in length about procedure findings. Patient discharged.     PLAN     Schedule cysto, urethral dilation, bladder biopsy, pvp (35)

## 2023-02-16 ENCOUNTER — HOSPITAL ENCOUNTER (INPATIENT)
Age: 88
LOS: 2 days | Discharge: HOME HEALTH | DRG: 690 | End: 2023-02-18
Attending: EMERGENCY MEDICINE | Admitting: INTERNAL MEDICINE
Payer: MEDICARE

## 2023-02-16 ENCOUNTER — APPOINTMENT (OUTPATIENT)
Dept: GENERAL RADIOLOGY | Age: 88
DRG: 690 | End: 2023-02-16
Payer: MEDICARE

## 2023-02-16 DIAGNOSIS — R53.1 GENERALIZED WEAKNESS: ICD-10-CM

## 2023-02-16 DIAGNOSIS — R41.3 MEMORY PROBLEM: ICD-10-CM

## 2023-02-16 DIAGNOSIS — N30.01 ACUTE CYSTITIS WITH HEMATURIA: Primary | ICD-10-CM

## 2023-02-16 PROBLEM — N39.0 URINARY TRACT INFECTION: Status: ACTIVE | Noted: 2023-02-16

## 2023-02-16 PROBLEM — R65.10 SIRS (SYSTEMIC INFLAMMATORY RESPONSE SYNDROME) (HCC): Status: ACTIVE | Noted: 2023-02-16

## 2023-02-16 LAB
ANION GAP SERPL CALC-SCNC: 13 MEQ/L (ref 8–16)
BACTERIA URNS QL MICRO: ABNORMAL /HPF
BASOPHILS ABSOLUTE: 0 THOU/MM3 (ref 0–0.1)
BASOPHILS NFR BLD AUTO: 0.2 %
BILIRUB UR QL STRIP.AUTO: NEGATIVE
BUN SERPL-MCNC: 20 MG/DL (ref 7–22)
CALCIUM SERPL-MCNC: 9.1 MG/DL (ref 8.5–10.5)
CASTS #/AREA URNS LPF: ABNORMAL /LPF
CASTS 2: ABNORMAL /LPF
CHARACTER UR: ABNORMAL
CHLORIDE SERPL-SCNC: 106 MEQ/L (ref 98–111)
CO2 SERPL-SCNC: 26 MEQ/L (ref 23–33)
COLOR: YELLOW
CREAT SERPL-MCNC: 1.3 MG/DL (ref 0.4–1.2)
CRYSTALS URNS MICRO: ABNORMAL
DEPRECATED MEAN GLUCOSE BLD GHB EST-ACNC: 78 MG/DL (ref 70–126)
DEPRECATED RDW RBC AUTO: 41.6 FL (ref 35–45)
EOSINOPHIL NFR BLD AUTO: 0.2 %
EOSINOPHILS ABSOLUTE: 0 THOU/MM3 (ref 0–0.4)
EPITHELIAL CELLS, UA: ABNORMAL /HPF
ERYTHROCYTE [DISTWIDTH] IN BLOOD BY AUTOMATED COUNT: 11.8 % (ref 11.5–14.5)
FLUAV RNA RESP QL NAA+PROBE: NOT DETECTED
FLUBV RNA RESP QL NAA+PROBE: NOT DETECTED
GFR SERPL CREATININE-BSD FRML MDRD: 53 ML/MIN/1.73M2
GLUCOSE SERPL-MCNC: 100 MG/DL (ref 70–108)
GLUCOSE UR QL STRIP.AUTO: NEGATIVE MG/DL
HBA1C MFR BLD HPLC: 4.6 % (ref 4.4–6.4)
HCT VFR BLD AUTO: 35.1 % (ref 42–52)
HGB BLD-MCNC: 11.5 GM/DL (ref 14–18)
HGB UR QL STRIP.AUTO: ABNORMAL
IMM GRANULOCYTES # BLD AUTO: 0.04 THOU/MM3 (ref 0–0.07)
IMM GRANULOCYTES NFR BLD AUTO: 0.3 %
KETONES UR QL STRIP.AUTO: ABNORMAL
LACTIC ACID, SEPSIS: 1.4 MMOL/L (ref 0.5–1.9)
LACTIC ACID, SEPSIS: 1.6 MMOL/L (ref 0.5–1.9)
LYMPHOCYTES ABSOLUTE: 0.4 THOU/MM3 (ref 1–4.8)
LYMPHOCYTES NFR BLD AUTO: 3.8 %
MCH RBC QN AUTO: 31.9 PG (ref 26–33)
MCHC RBC AUTO-ENTMCNC: 32.8 GM/DL (ref 32.2–35.5)
MCV RBC AUTO: 97.2 FL (ref 80–94)
MISCELLANEOUS 2: ABNORMAL
MONOCYTES ABSOLUTE: 0.5 THOU/MM3 (ref 0.4–1.3)
MONOCYTES NFR BLD AUTO: 4.7 %
NEUTROPHILS NFR BLD AUTO: 90.8 %
NITRITE UR QL STRIP: POSITIVE
NRBC BLD AUTO-RTO: 0 /100 WBC
OSMOLALITY SERPL CALC.SUM OF ELEC: 291.4 MOSMOL/KG (ref 275–300)
PH UR STRIP.AUTO: 7 [PH] (ref 5–9)
PLATELET # BLD AUTO: 144 THOU/MM3 (ref 130–400)
PMV BLD AUTO: 10.5 FL (ref 9.4–12.4)
POTASSIUM SERPL-SCNC: 3.8 MEQ/L (ref 3.5–5.2)
PROCALCITONIN SERPL IA-MCNC: 4.15 NG/ML (ref 0.01–0.09)
PROT UR STRIP.AUTO-MCNC: 100 MG/DL
RBC # BLD AUTO: 3.61 MILL/MM3 (ref 4.7–6.1)
RBC URINE: > 200 /HPF
RENAL EPI CELLS #/AREA URNS HPF: ABNORMAL /[HPF]
SARS-COV-2 RNA RESP QL NAA+PROBE: NOT DETECTED
SEGMENTED NEUTROPHILS ABSOLUTE COUNT: 10.4 THOU/MM3 (ref 1.8–7.7)
SODIUM SERPL-SCNC: 145 MEQ/L (ref 135–145)
SP GR UR REFRACT.AUTO: 1.02 (ref 1–1.03)
TROPONIN T: < 0.01 NG/ML
TSH SERPL DL<=0.005 MIU/L-ACNC: 1.13 UIU/ML (ref 0.4–4.2)
UROBILINOGEN, URINE: 0.2 EU/DL (ref 0–1)
WBC # BLD AUTO: 11.5 THOU/MM3 (ref 4.8–10.8)
WBC #/AREA URNS HPF: ABNORMAL /HPF
WBC #/AREA URNS HPF: ABNORMAL /[HPF]
YEAST LIKE FUNGI URNS QL MICRO: ABNORMAL

## 2023-02-16 PROCEDURE — 1200000000 HC SEMI PRIVATE

## 2023-02-16 PROCEDURE — 87086 URINE CULTURE/COLONY COUNT: CPT

## 2023-02-16 PROCEDURE — 2580000003 HC RX 258: Performed by: EMERGENCY MEDICINE

## 2023-02-16 PROCEDURE — 84145 PROCALCITONIN (PCT): CPT

## 2023-02-16 PROCEDURE — 2580000003 HC RX 258: Performed by: INTERNAL MEDICINE

## 2023-02-16 PROCEDURE — 2580000003 HC RX 258

## 2023-02-16 PROCEDURE — 84484 ASSAY OF TROPONIN QUANT: CPT

## 2023-02-16 PROCEDURE — 84443 ASSAY THYROID STIM HORMONE: CPT

## 2023-02-16 PROCEDURE — 96365 THER/PROPH/DIAG IV INF INIT: CPT

## 2023-02-16 PROCEDURE — 83036 HEMOGLOBIN GLYCOSYLATED A1C: CPT

## 2023-02-16 PROCEDURE — 80048 BASIC METABOLIC PNL TOTAL CA: CPT

## 2023-02-16 PROCEDURE — 87040 BLOOD CULTURE FOR BACTERIA: CPT

## 2023-02-16 PROCEDURE — 6370000000 HC RX 637 (ALT 250 FOR IP): Performed by: INTERNAL MEDICINE

## 2023-02-16 PROCEDURE — 36415 COLL VENOUS BLD VENIPUNCTURE: CPT

## 2023-02-16 PROCEDURE — 93010 ELECTROCARDIOGRAM REPORT: CPT | Performed by: INTERNAL MEDICINE

## 2023-02-16 PROCEDURE — 6370000000 HC RX 637 (ALT 250 FOR IP)

## 2023-02-16 PROCEDURE — 85025 COMPLETE CBC W/AUTO DIFF WBC: CPT

## 2023-02-16 PROCEDURE — 83605 ASSAY OF LACTIC ACID: CPT

## 2023-02-16 PROCEDURE — 81001 URINALYSIS AUTO W/SCOPE: CPT

## 2023-02-16 PROCEDURE — 87636 SARSCOV2 & INF A&B AMP PRB: CPT

## 2023-02-16 PROCEDURE — 71045 X-RAY EXAM CHEST 1 VIEW: CPT

## 2023-02-16 PROCEDURE — 6360000002 HC RX W HCPCS: Performed by: INTERNAL MEDICINE

## 2023-02-16 PROCEDURE — 99285 EMERGENCY DEPT VISIT HI MDM: CPT

## 2023-02-16 PROCEDURE — 87186 SC STD MICRODIL/AGAR DIL: CPT

## 2023-02-16 PROCEDURE — 93005 ELECTROCARDIOGRAM TRACING: CPT | Performed by: EMERGENCY MEDICINE

## 2023-02-16 PROCEDURE — 6360000002 HC RX W HCPCS: Performed by: EMERGENCY MEDICINE

## 2023-02-16 RX ORDER — PANTOPRAZOLE SODIUM 40 MG/1
40 TABLET, DELAYED RELEASE ORAL
Status: DISCONTINUED | OUTPATIENT
Start: 2023-02-16 | End: 2023-02-18 | Stop reason: HOSPADM

## 2023-02-16 RX ORDER — SODIUM CHLORIDE 0.9 % (FLUSH) 0.9 %
5-40 SYRINGE (ML) INJECTION EVERY 12 HOURS SCHEDULED
Status: DISCONTINUED | OUTPATIENT
Start: 2023-02-16 | End: 2023-02-18 | Stop reason: HOSPADM

## 2023-02-16 RX ORDER — ENOXAPARIN SODIUM 100 MG/ML
40 INJECTION SUBCUTANEOUS DAILY
Status: DISCONTINUED | OUTPATIENT
Start: 2023-02-16 | End: 2023-02-18 | Stop reason: HOSPADM

## 2023-02-16 RX ORDER — ATORVASTATIN CALCIUM 10 MG/1
10 TABLET, FILM COATED ORAL NIGHTLY
Status: DISCONTINUED | OUTPATIENT
Start: 2023-02-16 | End: 2023-02-18 | Stop reason: HOSPADM

## 2023-02-16 RX ORDER — SODIUM CHLORIDE 0.9 % (FLUSH) 0.9 %
5-40 SYRINGE (ML) INJECTION PRN
Status: DISCONTINUED | OUTPATIENT
Start: 2023-02-16 | End: 2023-02-18 | Stop reason: HOSPADM

## 2023-02-16 RX ORDER — DOCUSATE SODIUM 100 MG/1
100 CAPSULE, LIQUID FILLED ORAL DAILY
Status: DISCONTINUED | OUTPATIENT
Start: 2023-02-16 | End: 2023-02-18 | Stop reason: HOSPADM

## 2023-02-16 RX ORDER — MIRTAZAPINE 15 MG/1
15 TABLET, FILM COATED ORAL NIGHTLY
Status: DISCONTINUED | OUTPATIENT
Start: 2023-02-16 | End: 2023-02-16 | Stop reason: ALTCHOICE

## 2023-02-16 RX ORDER — DIVALPROEX SODIUM 250 MG/1
250 TABLET, EXTENDED RELEASE ORAL DAILY
Status: DISCONTINUED | OUTPATIENT
Start: 2023-02-16 | End: 2023-02-18 | Stop reason: HOSPADM

## 2023-02-16 RX ORDER — HYDROCHLOROTHIAZIDE 25 MG/1
25 TABLET ORAL DAILY
Status: DISCONTINUED | OUTPATIENT
Start: 2023-02-16 | End: 2023-02-16 | Stop reason: ALTCHOICE

## 2023-02-16 RX ORDER — SODIUM CHLORIDE 9 MG/ML
INJECTION, SOLUTION INTRAVENOUS PRN
Status: DISCONTINUED | OUTPATIENT
Start: 2023-02-16 | End: 2023-02-18 | Stop reason: HOSPADM

## 2023-02-16 RX ORDER — POLYETHYLENE GLYCOL 3350 17 G/17G
17 POWDER, FOR SOLUTION ORAL DAILY PRN
Status: DISCONTINUED | OUTPATIENT
Start: 2023-02-16 | End: 2023-02-18 | Stop reason: HOSPADM

## 2023-02-16 RX ORDER — MULTIVITAMIN WITH IRON
1 TABLET ORAL DAILY
Status: DISCONTINUED | OUTPATIENT
Start: 2023-02-16 | End: 2023-02-18 | Stop reason: HOSPADM

## 2023-02-16 RX ORDER — DOXAZOSIN 2 MG/1
2 TABLET ORAL DAILY
Status: DISCONTINUED | OUTPATIENT
Start: 2023-02-16 | End: 2023-02-18 | Stop reason: HOSPADM

## 2023-02-16 RX ORDER — POTASSIUM CHLORIDE 20 MEQ/1
20 TABLET, EXTENDED RELEASE ORAL DAILY
Status: DISCONTINUED | OUTPATIENT
Start: 2023-02-16 | End: 2023-02-16 | Stop reason: ALTCHOICE

## 2023-02-16 RX ORDER — ASPIRIN 81 MG/1
81 TABLET ORAL DAILY
Status: DISCONTINUED | OUTPATIENT
Start: 2023-02-16 | End: 2023-02-18 | Stop reason: HOSPADM

## 2023-02-16 RX ORDER — DONEPEZIL HYDROCHLORIDE 10 MG/1
10 TABLET, FILM COATED ORAL NIGHTLY
Status: DISCONTINUED | OUTPATIENT
Start: 2023-02-16 | End: 2023-02-18 | Stop reason: HOSPADM

## 2023-02-16 RX ORDER — ONDANSETRON 4 MG/1
4 TABLET, ORALLY DISINTEGRATING ORAL EVERY 8 HOURS PRN
Status: DISCONTINUED | OUTPATIENT
Start: 2023-02-16 | End: 2023-02-18 | Stop reason: HOSPADM

## 2023-02-16 RX ORDER — SODIUM CHLORIDE 9 MG/ML
INJECTION, SOLUTION INTRAVENOUS CONTINUOUS
Status: DISCONTINUED | OUTPATIENT
Start: 2023-02-16 | End: 2023-02-18 | Stop reason: HOSPADM

## 2023-02-16 RX ORDER — ACETAMINOPHEN 325 MG/1
650 TABLET ORAL EVERY 6 HOURS PRN
Status: DISCONTINUED | OUTPATIENT
Start: 2023-02-16 | End: 2023-02-18 | Stop reason: HOSPADM

## 2023-02-16 RX ORDER — ONDANSETRON 2 MG/ML
4 INJECTION INTRAMUSCULAR; INTRAVENOUS EVERY 6 HOURS PRN
Status: DISCONTINUED | OUTPATIENT
Start: 2023-02-16 | End: 2023-02-18 | Stop reason: HOSPADM

## 2023-02-16 RX ORDER — CLOPIDOGREL BISULFATE 75 MG/1
75 TABLET ORAL DAILY
Status: DISCONTINUED | OUTPATIENT
Start: 2023-02-16 | End: 2023-02-16 | Stop reason: ALTCHOICE

## 2023-02-16 RX ORDER — OXYBUTYNIN CHLORIDE 10 MG/1
10 TABLET, EXTENDED RELEASE ORAL NIGHTLY
Status: DISCONTINUED | OUTPATIENT
Start: 2023-02-16 | End: 2023-02-18 | Stop reason: HOSPADM

## 2023-02-16 RX ORDER — GAUZE BANDAGE 2" X 2"
100 BANDAGE TOPICAL DAILY
Status: DISCONTINUED | OUTPATIENT
Start: 2023-02-16 | End: 2023-02-16 | Stop reason: ALTCHOICE

## 2023-02-16 RX ORDER — MEMANTINE HYDROCHLORIDE 10 MG/1
10 TABLET ORAL DAILY
Status: DISCONTINUED | OUTPATIENT
Start: 2023-02-16 | End: 2023-02-18 | Stop reason: HOSPADM

## 2023-02-16 RX ORDER — ACETAMINOPHEN 325 MG/1
650 TABLET ORAL EVERY 6 HOURS PRN
Status: DISCONTINUED | OUTPATIENT
Start: 2023-02-16 | End: 2023-02-16

## 2023-02-16 RX ORDER — VITAMIN B COMPLEX
3000 TABLET ORAL NIGHTLY
Status: DISCONTINUED | OUTPATIENT
Start: 2023-02-16 | End: 2023-02-18 | Stop reason: HOSPADM

## 2023-02-16 RX ORDER — MEMANTINE HYDROCHLORIDE 5 MG/1
5 TABLET ORAL 3 TIMES DAILY
Status: DISCONTINUED | OUTPATIENT
Start: 2023-02-16 | End: 2023-02-16

## 2023-02-16 RX ORDER — FOLIC ACID 1 MG/1
1 TABLET ORAL DAILY
Status: DISCONTINUED | OUTPATIENT
Start: 2023-02-16 | End: 2023-02-16 | Stop reason: ALTCHOICE

## 2023-02-16 RX ORDER — NIFEDIPINE 30 MG/1
30 TABLET, FILM COATED, EXTENDED RELEASE ORAL EVERY MORNING
Status: DISCONTINUED | OUTPATIENT
Start: 2023-02-16 | End: 2023-02-16 | Stop reason: ALTCHOICE

## 2023-02-16 RX ORDER — ACETAMINOPHEN 650 MG/1
650 SUPPOSITORY RECTAL EVERY 6 HOURS PRN
Status: DISCONTINUED | OUTPATIENT
Start: 2023-02-16 | End: 2023-02-18 | Stop reason: HOSPADM

## 2023-02-16 RX ADMIN — PANTOPRAZOLE SODIUM 40 MG: 40 TABLET, DELAYED RELEASE ORAL at 14:02

## 2023-02-16 RX ADMIN — CEFTRIAXONE SODIUM 1000 MG: 1 INJECTION, POWDER, FOR SOLUTION INTRAMUSCULAR; INTRAVENOUS at 05:32

## 2023-02-16 RX ADMIN — METOPROLOL TARTRATE 25 MG: 25 TABLET, FILM COATED ORAL at 14:02

## 2023-02-16 RX ADMIN — SODIUM CHLORIDE, PRESERVATIVE FREE 10 ML: 5 INJECTION INTRAVENOUS at 14:02

## 2023-02-16 RX ADMIN — ENOXAPARIN SODIUM 40 MG: 100 INJECTION SUBCUTANEOUS at 14:02

## 2023-02-16 RX ADMIN — DONEPEZIL HYDROCHLORIDE 10 MG: 10 TABLET, FILM COATED ORAL at 21:21

## 2023-02-16 RX ADMIN — Medication 1 TABLET: at 14:02

## 2023-02-16 RX ADMIN — ASPIRIN 81 MG: 81 TABLET, COATED ORAL at 14:02

## 2023-02-16 RX ADMIN — DOCUSATE SODIUM 100 MG: 100 CAPSULE, LIQUID FILLED ORAL at 14:02

## 2023-02-16 RX ADMIN — DOXAZOSIN MESYLATE 2 MG: 2 TABLET ORAL at 14:02

## 2023-02-16 RX ADMIN — MEMANTINE 10 MG: 10 TABLET ORAL at 18:46

## 2023-02-16 RX ADMIN — Medication 3000 UNITS: at 21:21

## 2023-02-16 RX ADMIN — OXYBUTYNIN CHLORIDE 10 MG: 10 TABLET, EXTENDED RELEASE ORAL at 21:21

## 2023-02-16 RX ADMIN — ATORVASTATIN CALCIUM 10 MG: 10 TABLET, FILM COATED ORAL at 21:20

## 2023-02-16 RX ADMIN — SODIUM CHLORIDE: 9 INJECTION, SOLUTION INTRAVENOUS at 13:36

## 2023-02-16 NOTE — ED NOTES
Pt resting on cot with eyes closed at this time. Respirations even and unlabored. VSS. No objective signs of distress noted.      Jahaira Yee RN  02/16/23 4018

## 2023-02-16 NOTE — ED NOTES
Pt resting on cot with eyes closed at this time. Respirations even and unlabored.  [address] [facility], RN  02/16/23 9209

## 2023-02-16 NOTE — PLAN OF CARE
Problem: Discharge Planning  Goal: Discharge to home or other facility with appropriate resources  Outcome: Progressing  Flowsheets (Taken 2/16/2023 5426)  Discharge to home or other facility with appropriate resources: Identify barriers to discharge with patient and caregiver  Note: Home with wife

## 2023-02-16 NOTE — ED NOTES
Pt presents to the ED by EMS due to pt wife stating that they have both been feeling slightly ill. Per EMS pt wife states that she is concerned because he has a new tremor that he has never had before. Upon arrival pt is alert but only oriented to self. This is baseline for pt.      Luis Tirado RN  02/16/23 2026

## 2023-02-16 NOTE — ED NOTES
ED to inpatient nurses report    Chief Complaint   Patient presents with    Tremors      Present to ED from home  LOC: alert to only name  Vital signs   Vitals:    02/16/23 0447 02/16/23 0455 02/16/23 0550 02/16/23 0646   BP:  (!) 145/73 133/81 (!) 150/67   Pulse: 97  85 83   Resp: 19 20 18 16   Temp:       TempSrc:       SpO2:   95% 94%   Weight:       Height:          Oxygen Baseline RA    Current needs required RA   LDAs:   Peripheral IV 02/16/23 Left Antecubital (Active)   Site Assessment Clean, dry & intact 02/16/23 0121   Line Status Blood return noted;Normal saline locked;Specimen collected; Flushed 02/16/23 0121   Phlebitis Assessment No symptoms 02/16/23 0121   Infiltration Assessment 0 02/16/23 0121   Dressing Status Clean, dry & intact 02/16/23 0121     Mobility: Requires assistance * 2  Pending ED orders: NA  Present condition: Resting on cot comfortably. No needs expressed.     C-SSRS    Swallow Screening    Preferred Language: English     Electronically signed by Reddy Nichols RN on 2/16/2023 at 7:03 AM       Reddy Nichols RN  02/16/23 1781

## 2023-02-16 NOTE — H&P
Internal Medicine Specialties  H&P  2/16/2023  10:57 AM    Patient:  Naila Rhodes  YOB: 1935    MRN: [id number]   Acct:  [de-identified]   [id number]  Primary Care Physician: Ada Foster MD    Chief Complaint:  Chief Complaint   Patient presents with    Tremors       History of Present Illness: The patient is a 81 y.o. male with pmhx of CKD, dementia, BPH who presents with per ED note tremor and feeling ill. Pt poor historian and not cooperative with assessment. Per ED notes  complaint of generalized fatigue, poor oral intake, history of dementia. Patient had a cystoscopy done yesterday by Dr. Saranya Turcios. In the emergency department Creat 1.3, Trop neg, WBC 11.5, Covid/Flu negative, UA trace ketones, positive nitrites, small leukocyte, large blood. CXR- 1. No acute cardiopulmonary disease. EKG- Normal sinus rhythm  Left bundle branch block  Abnormal ECG  When compared with ECG of 14-JUL-2021 14:45,  Ventricular rate has increased BY  38 BPM  Left bundle branch block is now Present  Criteria for Septal infarct are no longer Present. Patient admitted for UTI. Past Medical History:        Diagnosis Date    BPH (benign prostatic hypertrophy)     CKD (chronic kidney disease) stage 2, GFR 60-89 ml/min     Dementia (HCC)     Erectile dysfunction     Frequent falls     [place] (hard of hearing)     Hyperlipidemia     Hypertension     Memory disorder     Metabolic bone disease     Orthostatic hypotension        Past Surgical History:        Procedure Laterality Date    COLONOSCOPY      ENDOSCOPY, COLON, DIAGNOSTIC      ESOPHAGUS SURGERY      NECK SURGERY      PROSTATE SURGERY      SHOULDER SURGERY      THYROID SURGERY      removal of a nodule    TONSILLECTOMY      TURP  5-31-12    Dr Tarsha Garcia Medications: Medications Prior to Admission: divalproex (DEPAKOTE ER) 250 MG extended release tablet, Take 1 tablet by mouth daily.   memantine (NAMENDA) 5 MG tablet, Take 5 mg by mouth 3 times daily  clopidogrel (PLAVIX) 75 MG tablet, Take 1 tablet by mouth daily  folic acid (FOLVITE) 1 MG tablet, Take 1 tablet by mouth daily  potassium chloride (KLOR-CON M) 10 MEQ extended release tablet, Take 20 mEq by mouth daily  acetaminophen (TYLENOL) 500 MG tablet, Take 500 mg by mouth every 6 hours as needed for Pain prn  pantoprazole (PROTONIX) 40 MG tablet, Take 1 tablet by mouth every morning (before breakfast)  ondansetron (ZOFRAN-ODT) 4 MG disintegrating tablet, Take 1 tablet by mouth every 8 hours as needed for Nausea or Vomiting  metoprolol tartrate (LOPRESSOR) 50 MG tablet, Take 0.5 tablets by mouth daily  hydrochlorothiazide (HYDRODIURIL) 25 MG tablet, Take 25 mg by mouth daily  mirtazapine (REMERON) 15 MG tablet, Take 15 mg by mouth nightly   aspirin 81 MG EC tablet, Take 81 mg by mouth daily   Cholecalciferol (VITAMIN D3) 3000 units TABS, Take 3,000 Units by mouth nightly   atorvastatin (LIPITOR) 10 MG tablet, Take 1 tablet by mouth daily  oxybutynin (DITROPAN-XL) 10 MG extended release tablet, Take 1 tablet by mouth nightly  Multiple Vitamin (MULTIVITAMIN) tablet, Take 1 tablet by mouth daily  donepezil (ARICEPT) 10 MG tablet, Take 1 tablet by mouth nightly  docusate (COLACE, DULCOLAX) 100 MG CAPS, Take 100 mg by mouth daily  NIFEdipine (ADALAT CC) 30 MG extended release tablet, Take 1 tablet by mouth every morning  vitamin B-1 (THIAMINE) 100 MG tablet, Take 100 mg by mouth daily  doxazosin (CARDURA) 2 MG tablet, Take 2 mg by mouth daily      Allergies:  Patient has no known allergies. Social History:    reports that he quit smoking about 31 years ago. His smoking use included cigarettes. He has never used smokeless tobacco. He reports that he does not currently use alcohol. He reports that he does not use drugs.     Occupation: Lives at home with wife    Family History:       Problem Relation Age of Onset    High Blood Pressure Father     Stroke Father     Stroke Sister     Arthritis Sister Alzheimer's Disease Sister     Cancer Sister     Heart Disease Sister     Arthritis Sister     Other Brother         parkinsons    Dementia Brother     No Known Problems Brother     No Known Problems Brother        Review of Systems: Pt not cooperative with exam, states no to all answers     General ROS: pos for dementia  Psychological ROS: negative  Hematological and Lymphatic ROS: negative  Endocrine ROS: negative  Vision:negative  ENT ROS: Denies  Respiratory ROS: no cough, shortness of breath, or wheezing  Cardiovascular ROS: no chest pain or dyspnea on exertion  Gastrointestinal ROS: no abdominal pain, change in bowel habits, or black or bloody stools  Genito-Urinary ROS: no dysuria, trouble voiding, or hematuria  Musculoskeletal ROS: negative  Neurological ROS: no TIA or stroke symptoms  Dermatological ROS: negative  Weight:no change in weight  Appetite:ok      Physical Exam:    Vitals:Patient Vitals for the past 24 hrs:   BP Temp Temp src Pulse Resp SpO2 Height Weight   02/16/23 0830 -- -- -- -- -- -- -- 149 lb 12.8 oz (67.9 kg)   02/16/23 0646 (!) 150/67 -- -- 83 16 94 % -- --   02/16/23 0550 133/81 -- -- 85 18 95 % -- --   02/16/23 0455 (!) 145/73 -- -- -- 20 -- -- --   02/16/23 0447 -- -- -- 97 19 -- -- --   02/16/23 0436 (!) 157/67 -- -- 83 19 94 % -- --   02/16/23 0321 (!) 156/67 -- -- 93 22 93 % -- --   02/16/23 0221 (!) 170/71 -- -- 96 22 95 % -- --   02/16/23 0106 (!) 191/98 98.9 °F (37.2 °C) Oral (!) 103 18 97 % 5' 11\" (1.803 m) 161 lb (73 kg)     Weight: Weight: 149 lb 12.8 oz (67.9 kg)     24 hour intake/output:  Intake/Output Summary (Last 24 hours) at 2/16/2023 1057  Last data filed at 2/16/2023 0602  Gross per 24 hour   Intake 50 ml   Output --   Net 50 ml       General appearance - alert, uncooperative,  and in no distress  Eyes - pupils equal and reactive, extraocular eye movements intact  Mouth - mucous membranes moist, pharynx normal without lesions  Neck - supple, no significant adenopathy  Chest - clear to auscultation, no wheezes, rales or rhonchi, symmetric air entry  Heart - normal rate, regular rhythm, normal S1, S2, no murmurs, rubs, clicks or gallops  Abdomen - soft, nontender, nondistended, no masses or organomegaly  Neurological - alert, oriented to hospital and , normal speech, no focal findings or movement disorder noted, no tremors seen  Musculoskeletal - no joint tenderness, deformity or swelling  Extremities - peripheral pulses normal, no pedal edema, no clubbing or cyanosis  Skin - normal coloration and turgor, no rashes, no suspicious skin lesions noted    Review of Labs and Diagnostic Testing:    CBC:   Recent Labs     23   WBC 11.5*   HGB 11.5*   HCT 35.1*   MCV 97.2*        BMP:   Recent Labs     23      K 3.8      CO2 26   BUN 20   CREATININE 1.3*   CALCIUM 9.1   GLUCOSE 100     PT/INR: No results for input(s): PROTIME, INR in the last 72 hours. APTT: No results for input(s): APTT in the last 72 hours. Lipids: No results for input(s): ALKPHOS, ALT, AST, BILITOT, BILIDIR, LABALBU, AMYLASE, LIPASE in the last 72 hours. Troponin:   Recent Labs     23   TROPONINT < 0.010        Imaging:  XR CHEST PORTABLE    Result Date: 2023  1 view chest x-ray. Comparison: CR/SR - XR CHEST PORTABLE - 2021 03:55 PM EDT CT/SR - CT CHEST W CONTRAST - 10/11/2017 12:43 PM EDT Findings: Normal lung volumes. Lungs are clear. No pneumothorax or pleural effusion. Heart size normal. No passive venous congestion. No mediastinal shift or tracheal deviation. No acute fracture. Impression: 1. No acute cardiopulmonary disease.  This document has been electronically signed by: Kaitlin Shelton MD on 2023 05:51 AM      Assessment  UTI  Dementia  CKD    Plan:    Continue Rocephin  Start IV fluids  Send urine for culture  PTOT  Monitor lactic acid  Am labs        Electronically signed by ZORAN Walls CNP on 2023 at 10:57 AM    Assessment and plan of care discussed with supervising physician, Dr Maximus Gallagher. Copy: Primary Care Physician: Natividad Murphy MD    I have discussed the case, including pertinent history and exam findings with the GIOVANNA/resident. I have seen and examined the patient and the key elements of the encounter have been performed by me. I agree with the assessment, plan and orders as documented by the NP.     Electronically signed by Natividad Murphy MD on 2/16/2023 at 1:22 PM

## 2023-02-16 NOTE — ED NOTES
Pt assisted with urinal. No other needs expressed at this time.      Catalina Phan RN  02/16/23 5183

## 2023-02-16 NOTE — ED NOTES
Pt resting comfortably with eyes closed at this time. Respirations even and unlabored.  XAVIER Lr RN  02/16/23 6576

## 2023-02-16 NOTE — ED NOTES
Pt transported to Replaced by [facility]. Floor contacted prior to transport.       Monique Saha  02/16/23 1074

## 2023-02-16 NOTE — ED PROVIDER NOTES
[facility] EMERGENCY DEPT      CHIEF COMPLAINT       Chief Complaint   Patient presents with    Tremors       Nurses Notes reviewed and I agree except as noted in the HPI. HISTORY OF PRESENT ILLNESS    Rigoberto Suh is a 81 y.o. male who presents with complaint of generalized fatigue, poor oral intake, history of dementia. Patient had a cystoscopy done yesterday by Dr. Richi Traylor. Patient lives at home with wife. Onset: Acute on chronic  Duration: Past 2 days  Timing:   Location of Pain: No pain reported  Intesity/severity:   Modifying Factors: History of dementia  Relieved by;  Previous Episodes; Tx Before arrival:   REVIEW OF SYSTEMS     History of dementia, poor historian. PAST MEDICAL HISTORY    has a past medical history of BPH (benign prostatic hypertrophy), CKD (chronic kidney disease) stage 2, GFR 60-89 ml/min, Dementia ([address] Erectile dysfunction, Frequent falls, [place] (hard of hearing), Hyperlipidemia, Hypertension, Memory disorder, Metabolic bone disease, and Orthostatic hypotension. SURGICAL HISTORY      has a past surgical history that includes shoulder surgery; Neck surgery; Tonsillectomy; TURP (5-31-12); Colonoscopy; Thyroid surgery; Prostate surgery; Endoscopy, colon, diagnostic; and Esophagus surgery. CURRENT MEDICATIONS       Previous Medications    ACETAMINOPHEN (TYLENOL) 500 MG TABLET    Take 500 mg by mouth every 6 hours as needed for Pain prn    ASPIRIN 81 MG EC TABLET    Take 81 mg by mouth daily     ATORVASTATIN (LIPITOR) 10 MG TABLET    Take 1 tablet by mouth daily    CHOLECALCIFEROL (VITAMIN D3) 3000 UNITS TABS    Take 3,000 Units by mouth nightly     CLOPIDOGREL (PLAVIX) 75 MG TABLET    Take 1 tablet by mouth daily    DIVALPROEX (DEPAKOTE ER) 250 MG EXTENDED RELEASE TABLET    Take 1 tablet by mouth daily.     DOCUSATE (COLACE, DULCOLAX) 100 MG CAPS    Take 100 mg by mouth daily    DONEPEZIL (ARICEPT) 10 MG TABLET    Take 1 tablet by mouth nightly    DOXAZOSIN (CARDURA) 2 MG TABLET    Take 2 mg by mouth daily    FOLIC ACID (FOLVITE) 1 MG TABLET    Take 1 tablet by mouth daily    HYDROCHLOROTHIAZIDE (HYDRODIURIL) 25 MG TABLET    Take 25 mg by mouth daily    MEMANTINE (NAMENDA) 5 MG TABLET    Take 5 mg by mouth 3 times daily    METOPROLOL TARTRATE (LOPRESSOR) 50 MG TABLET    Take 0.5 tablets by mouth daily    MIRTAZAPINE (REMERON) 15 MG TABLET    Take 15 mg by mouth nightly     MULTIPLE VITAMIN (MULTIVITAMIN) TABLET    Take 1 tablet by mouth daily    NIFEDIPINE (ADALAT CC) 30 MG EXTENDED RELEASE TABLET    Take 1 tablet by mouth every morning    ONDANSETRON (ZOFRAN-ODT) 4 MG DISINTEGRATING TABLET    Take 1 tablet by mouth every 8 hours as needed for Nausea or Vomiting    OXYBUTYNIN (DITROPAN-XL) 10 MG EXTENDED RELEASE TABLET    Take 1 tablet by mouth nightly    PANTOPRAZOLE (PROTONIX) 40 MG TABLET    Take 1 tablet by mouth every morning (before breakfast)    POTASSIUM CHLORIDE (KLOR-CON M) 10 MEQ EXTENDED RELEASE TABLET    Take 20 mEq by mouth daily    VITAMIN B-1 (THIAMINE) 100 MG TABLET    Take 100 mg by mouth daily       ALLERGIES     has No Known Allergies. FAMILY HISTORY     He indicated that his mother is . He indicated that his father is . He indicated that all of his five sisters are . He indicated that all of his four brothers are . family history includes Alzheimer's Disease in his sister; Arthritis in his sister and sister; Cancer in his sister; Dementia in his brother; Heart Disease in his sister; High Blood Pressure in his father; No Known Problems in his brother and brother; Other in his brother; Stroke in his father and sister. SOCIAL HISTORY      reports that he quit smoking about 31 years ago. His smoking use included cigarettes. He has never used smokeless tobacco. He reports that he does not currently use alcohol. He reports that he does not use drugs.     PHYSICAL EXAM     INITIAL VITALS:  height is 5' 11\" (1.803 m) and weight is 161 lb (73 kg). His oral temperature is 98.9 °F (37.2 °C). His blood pressure is 133/81 and his pulse is 85. His respiration is 18 and oxygen saturation is 95%. Physical Exam   Constitutional:  well-developed and well-nourished. HENT: Head: Normocephalic, atraumatic, Bilateral external ears normal, Oropharynx mosit, No oral exudates, Nose normal.   Eyes: PERRL, EOMI, Conjunctiva normal, No discharge. No scleral icterus  Neck: Normal range of motion, No tenderness, Supple  Cardiovascular: Normal rate, regular rhythm, S1 normal and S2 normal.  Exam reveals no gallop. Pulmonary/Chest: Effort normal and breath sounds normal. No accessory muscle usage or stridor. No respiratory distress. no wheezes. has no rales. exhibits no tenderness. Abdominal: Soft. Bowel sounds are normal.  exhibits no distension. There is no tenderness. There is no rebound and no guarding. Extremities: No edema, no tenderness, no cyanosis, no clubbing. Neurological: Alert, pleasantly confused, moving upper and lower extremities spontaneously, no focal deficits. GCS 15  Skin: Skin is warm, dry and intact. No rash noted. No erythema.    Psychiatric: Affect normal, judgment normal, mood normal.  DIFFERENTIAL DIAGNOSIS:           MEDICAL DECISION MAKING / ED COURSE:     1) Number and Complexity of Problems            Problem List This Visit:         Chief Complaint   Patient presents with    Tremors            Differential Diagnosis includes (but not limited to):  ACS, COVID/flu, UTI, prostatitis, pneumonia, dementia        Diagnoses Considered but I have low suspicion of:   ACS             Pertinent Comorbid Conditions:    History of dementia    2)  Data Reviewed (none if left blank)          My Independent interpretations:     EKG:      Nonacute    Imaging: No pneumonia    Labs:     UTI on UA                 Decision Rules/Clinical Scores utilized: SIRS criteria            External Documentation Reviewed:         Previous patient encounter documents & history available on EMR was reviewed              See Formal Diagnostic Results above for the lab and radiology tests and orders. 3)  Treatment and Disposition         ED Reassessment: Same, stable         Case discussed with consulting clinician: Hospitalist         Shared Decision-Making was performed and disposition discussed with the        Patient/Family and questions answered          Social determinants of health impacting treatment or disposition:           Code Status:        Summary of Patient Presentation:      LISA  /   Tho Franklin Reviewed:    Vitals:    02/16/23 0436 02/16/23 0447 02/16/23 0455 02/16/23 0550   BP: (!) 157/67  (!) 145/73 133/81   Pulse: 83 97  85   Resp: 19 19 20 18   Temp:       TempSrc:       SpO2: 94%   95%   Weight:       Height:           The patient was seen and examined. Appropriate diagnostic testing was performed and results reviewed with the patient. The results of pertinent diagnostic studies and exam findings were discussed. The patients provisional diagnosis and plan of care were discussed with the patient and present family who expressed understanding. Any medications were reviewed and indications and risks of medications were discussed with the patient /family present. Strict verbal and written return precautions, instructions and appropriate follow-up provided to  the patient.      ED Medications administered this visit:  (None if blank)  Medications   cefTRIAXone (ROCEPHIN) 1,000 mg in sodium chloride 0.9 % 50 mL IVPB (mini-bag) (0 mg IntraVENous Stopped 2/16/23 0602)               DIAGNOSTIC RESULTS     EKG: All EKG's are interpreted by the Emergency Department Physician who either signs or Co-signs this chart in the absence of a cardiologist.      RADIOLOGY: non-plain film images(s) such as CT, Ultrasound and MRI are read by the radiologist.  Plain radiographic images are visualized and preliminarily interpreted by the emergency physician unless otherwise stated below. LABS:   Labs Reviewed   BASIC METABOLIC PANEL - Abnormal; Notable for the following components:       Result Value    Creatinine 1.3 (*)     All other components within normal limits   CBC WITH AUTO DIFFERENTIAL - Abnormal; Notable for the following components:    WBC 11.5 (*)     RBC 3.61 (*)     Hemoglobin 11.5 (*)     Hematocrit 35.1 (*)     MCV 97.2 (*)     Segs Absolute 10.4 (*)     Lymphocytes Absolute 0.4 (*)     All other components within normal limits   GLOMERULAR FILTRATION RATE, ESTIMATED - Abnormal; Notable for the following components:    Est, Glom Filt Rate 53 (*)     All other components within normal limits   URINE WITH REFLEXED MICRO - Abnormal; Notable for the following components:    Ketones, Urine TRACE (*)     Blood, Urine LARGE (*)     Protein,  (*)     Nitrite, Urine POSITIVE (*)     Leukocyte Esterase, Urine SMALL (*)     Character, Urine CLOUDY (*)     All other components within normal limits   COVID-19 & INFLUENZA COMBO   CULTURE, REFLEXED, URINE    Narrative:     Source: cath urine       Site: catheter          Current Antibiotics: not stated   TROPONIN   ANION GAP   OSMOLALITY       EMERGENCY DEPARTMENT COURSE:   Vitals:    Vitals:    02/16/23 0436 02/16/23 0447 02/16/23 0455 02/16/23 0550   BP: (!) 157/67  (!) 145/73 133/81   Pulse: 83 97  85   Resp: 19 19 20 18   Temp:       TempSrc:       SpO2: 94%   95%   Weight:       Height:             CRITICAL CARE:       CONSULTS:  None    PROCEDURES:  none    FINAL IMPRESSION      1. Acute cystitis with hematuria    2. Generalized weakness          DISPOSITION/PLAN       PATIENT REFERRED TO:  No follow-up provider specified.     DISCHARGE MEDICATIONS:  New Prescriptions    No medications on file       (Please note that portions of this note were completed with a voice recognition program.  Efforts were made to edit the dictations but occasionally words are mis-transcribed.)    Servando ,  Chong Halsted, DO  02/16/23 0998

## 2023-02-16 NOTE — ED NOTES
Pt resting on cot comfortably a this time. Respirations even and unlabored.  XAVIER Bush, RN  02/16/23 3497

## 2023-02-16 NOTE — FLOWSHEET NOTE
02/16/23 0928   Safe Environment   Safety Measures Other (comment)  (pt resting in bed at this time , call light visible , no family present in the room)

## 2023-02-16 NOTE — FLOWSHEET NOTE
02/16/23 1129   Safe Environment   Safety Measures Other (comment)  (VN placed phone call to pts spouse Arias Martin to attempt admission required document completion , no answer at [phone] left message for return call ,)

## 2023-02-16 NOTE — FLOWSHEET NOTE
02/16/23 0852   Safe Environment   Safety Measures Other (comment)  (vn rounding , pt arrived to unit , pt unable to answer admission questions due to [address] , bedside nurse will notify me when family arrives .)

## 2023-02-17 LAB
ANION GAP SERPL CALC-SCNC: 8 MEQ/L (ref 8–16)
BASOPHILS ABSOLUTE: 0 THOU/MM3 (ref 0–0.1)
BASOPHILS NFR BLD AUTO: 0.1 %
BUN SERPL-MCNC: 26 MG/DL (ref 7–22)
CALCIUM SERPL-MCNC: 8.3 MG/DL (ref 8.5–10.5)
CHLORIDE SERPL-SCNC: 106 MEQ/L (ref 98–111)
CO2 SERPL-SCNC: 26 MEQ/L (ref 23–33)
CREAT SERPL-MCNC: 1.2 MG/DL (ref 0.4–1.2)
DEPRECATED RDW RBC AUTO: 42.5 FL (ref 35–45)
EKG ATRIAL RATE: 99 BPM
EKG P AXIS: 66 DEGREES
EKG P-R INTERVAL: 166 MS
EKG Q-T INTERVAL: 374 MS
EKG QRS DURATION: 132 MS
EKG QTC CALCULATION (BAZETT): 479 MS
EKG R AXIS: -17 DEGREES
EKG T AXIS: 121 DEGREES
EKG VENTRICULAR RATE: 99 BPM
EOSINOPHIL NFR BLD AUTO: 0.3 %
EOSINOPHILS ABSOLUTE: 0 THOU/MM3 (ref 0–0.4)
ERYTHROCYTE [DISTWIDTH] IN BLOOD BY AUTOMATED COUNT: 11.8 % (ref 11.5–14.5)
GFR SERPL CREATININE-BSD FRML MDRD: 59 ML/MIN/1.73M2
GLUCOSE BLD STRIP.AUTO-MCNC: 134 MG/DL (ref 70–108)
GLUCOSE SERPL-MCNC: 75 MG/DL (ref 70–108)
HCT VFR BLD AUTO: 28.7 % (ref 42–52)
HGB BLD-MCNC: 9.1 GM/DL (ref 14–18)
IMM GRANULOCYTES # BLD AUTO: 0.12 THOU/MM3 (ref 0–0.07)
IMM GRANULOCYTES NFR BLD AUTO: 0.8 %
LYMPHOCYTES ABSOLUTE: 1 THOU/MM3 (ref 1–4.8)
LYMPHOCYTES NFR BLD AUTO: 7.1 %
MAGNESIUM SERPL-MCNC: 1.7 MG/DL (ref 1.6–2.4)
MCH RBC QN AUTO: 31.5 PG (ref 26–33)
MCHC RBC AUTO-ENTMCNC: 31.7 GM/DL (ref 32.2–35.5)
MCV RBC AUTO: 99.3 FL (ref 80–94)
MONOCYTES ABSOLUTE: 0.8 THOU/MM3 (ref 0.4–1.3)
MONOCYTES NFR BLD AUTO: 5.3 %
NEUTROPHILS NFR BLD AUTO: 86.4 %
NRBC BLD AUTO-RTO: 0 /100 WBC
PLATELET # BLD AUTO: 115 THOU/MM3 (ref 130–400)
PMV BLD AUTO: 10.8 FL (ref 9.4–12.4)
POTASSIUM SERPL-SCNC: 3.4 MEQ/L (ref 3.5–5.2)
POTASSIUM SERPL-SCNC: 3.4 MEQ/L (ref 3.5–5.2)
RBC # BLD AUTO: 2.89 MILL/MM3 (ref 4.7–6.1)
SEGMENTED NEUTROPHILS ABSOLUTE COUNT: 12.6 THOU/MM3 (ref 1.8–7.7)
SODIUM SERPL-SCNC: 140 MEQ/L (ref 135–145)
WBC # BLD AUTO: 14.6 THOU/MM3 (ref 4.8–10.8)

## 2023-02-17 PROCEDURE — 85025 COMPLETE CBC W/AUTO DIFF WBC: CPT

## 2023-02-17 PROCEDURE — 80048 BASIC METABOLIC PNL TOTAL CA: CPT

## 2023-02-17 PROCEDURE — 6370000000 HC RX 637 (ALT 250 FOR IP): Performed by: INTERNAL MEDICINE

## 2023-02-17 PROCEDURE — 83735 ASSAY OF MAGNESIUM: CPT

## 2023-02-17 PROCEDURE — 36415 COLL VENOUS BLD VENIPUNCTURE: CPT

## 2023-02-17 PROCEDURE — 2580000003 HC RX 258

## 2023-02-17 PROCEDURE — 82948 REAGENT STRIP/BLOOD GLUCOSE: CPT

## 2023-02-17 PROCEDURE — 2580000003 HC RX 258: Performed by: INTERNAL MEDICINE

## 2023-02-17 PROCEDURE — 97110 THERAPEUTIC EXERCISES: CPT

## 2023-02-17 PROCEDURE — 1200000000 HC SEMI PRIVATE

## 2023-02-17 PROCEDURE — 6370000000 HC RX 637 (ALT 250 FOR IP)

## 2023-02-17 PROCEDURE — 6360000002 HC RX W HCPCS: Performed by: INTERNAL MEDICINE

## 2023-02-17 PROCEDURE — 97162 PT EVAL MOD COMPLEX 30 MIN: CPT

## 2023-02-17 RX ORDER — POTASSIUM CHLORIDE 7.45 MG/ML
10 INJECTION INTRAVENOUS PRN
Status: DISCONTINUED | OUTPATIENT
Start: 2023-02-17 | End: 2023-02-18 | Stop reason: HOSPADM

## 2023-02-17 RX ORDER — POTASSIUM CHLORIDE 20 MEQ/1
40 TABLET, EXTENDED RELEASE ORAL PRN
Status: DISCONTINUED | OUTPATIENT
Start: 2023-02-17 | End: 2023-02-18 | Stop reason: HOSPADM

## 2023-02-17 RX ORDER — CIPROFLOXACIN 500 MG/1
500 TABLET, FILM COATED ORAL EVERY 12 HOURS SCHEDULED
Status: DISCONTINUED | OUTPATIENT
Start: 2023-02-17 | End: 2023-02-18 | Stop reason: HOSPADM

## 2023-02-17 RX ADMIN — ASPIRIN 81 MG: 81 TABLET, COATED ORAL at 09:14

## 2023-02-17 RX ADMIN — SODIUM CHLORIDE: 9 INJECTION, SOLUTION INTRAVENOUS at 09:16

## 2023-02-17 RX ADMIN — METOPROLOL TARTRATE 25 MG: 25 TABLET, FILM COATED ORAL at 09:15

## 2023-02-17 RX ADMIN — MEMANTINE 10 MG: 10 TABLET ORAL at 09:15

## 2023-02-17 RX ADMIN — POTASSIUM CHLORIDE 40 MEQ: 1500 TABLET, EXTENDED RELEASE ORAL at 15:40

## 2023-02-17 RX ADMIN — CIPROFLOXACIN 500 MG: 500 TABLET, FILM COATED ORAL at 21:57

## 2023-02-17 RX ADMIN — DOCUSATE SODIUM 100 MG: 100 CAPSULE, LIQUID FILLED ORAL at 09:14

## 2023-02-17 RX ADMIN — DIVALPROEX SODIUM 250 MG: 250 TABLET, FILM COATED, EXTENDED RELEASE ORAL at 09:14

## 2023-02-17 RX ADMIN — Medication 1 TABLET: at 09:15

## 2023-02-17 RX ADMIN — ATORVASTATIN CALCIUM 10 MG: 10 TABLET, FILM COATED ORAL at 21:57

## 2023-02-17 RX ADMIN — OXYBUTYNIN CHLORIDE 10 MG: 10 TABLET, EXTENDED RELEASE ORAL at 21:57

## 2023-02-17 RX ADMIN — ENOXAPARIN SODIUM 40 MG: 100 INJECTION SUBCUTANEOUS at 09:21

## 2023-02-17 RX ADMIN — Medication 3000 UNITS: at 21:57

## 2023-02-17 RX ADMIN — CEFTRIAXONE SODIUM 1000 MG: 1 INJECTION, POWDER, FOR SOLUTION INTRAMUSCULAR; INTRAVENOUS at 04:51

## 2023-02-17 RX ADMIN — DOXAZOSIN MESYLATE 2 MG: 2 TABLET ORAL at 09:15

## 2023-02-17 RX ADMIN — DONEPEZIL HYDROCHLORIDE 10 MG: 10 TABLET, FILM COATED ORAL at 21:57

## 2023-02-17 RX ADMIN — PANTOPRAZOLE SODIUM 40 MG: 40 TABLET, DELAYED RELEASE ORAL at 07:21

## 2023-02-17 ASSESSMENT — PAIN SCALES - GENERAL
PAINLEVEL_OUTOF10: 0

## 2023-02-17 NOTE — PROGRESS NOTES
Physician Progress Note      PATIENT:               Nehemias Gunn  CSN #:                  [id number]  :                       1935  ADMIT DATE:       2023 1:02 AM  DISCH DATE:  Pinky Finney  PROVIDER #:        Blanca Stovall CNP          QUERY TEXT:    Pt admitted with UTI and underwent cystoscopy 2/15/23.? If possible, please   document in progress notes and discharge summary the relationship if any   between the UTI  and the procedure: The medical record reflects the following:    Risk Factors: s/p cystoscopy 2/15/23  Clinical Indicators: presents with generalized fatigue, UA many bacteria,   small leukocytes, + nitrite  Treatment: IV Rocephin, continuous IVF    Thank you! Sammy Velazco, CRCR  RN Clinical   P: [phone]  Options provided:  -- UTI is due to the cystoscopy procedure  -- UTI unrelated to procedure  -- Other - I will add my own diagnosis  -- Disagree - Not applicable / Not valid  -- Disagree - Clinically unable to determine / Unknown  -- Refer to Clinical Documentation Reviewer    PROVIDER RESPONSE TEXT:    Provider is clinically unable to determine a response to this query.     Query created by: Lucio Hogan on 2023 10:26 AM      Electronically signed by:  Blanca Stovall CNP 2023 10:46 AM

## 2023-02-17 NOTE — PROGRESS NOTES
[facility]  INPATIENT PHYSICAL THERAPY  EVALUATION  STRZ RENAL TELEMETRY 6K - 6K-05/005-A    Time In: 6052  Time Out: 1134  Timed Code Treatment Minutes: 10 Minutes  Minutes: 25          Date: 2023  Patient Name: Kian Luevano,  Gender:  male        MRN: [id number]  : 1935  (81 y.o.)      Referring Practitioner: ZORAN Mireles CNP  Diagnosis: UTI  Additional Pertinent Hx: The patient is a 81 y.o. male with pmhx of CKD, dementia, BPH who presents with per ED note tremor and feeling ill. Pt poor historian and not cooperative with assessment. Per ED notes  complaint of generalized fatigue, poor oral intake, history of dementia. Patient had a cystoscopy done yesterday by Dr. Salma Street. In the emergency department Creat 1.3, Trop neg, WBC 11.5, Covid/Flu negative, UA trace ketones, positive nitrites, small leukocyte, large blood. Restrictions/Precautions:  Restrictions/Precautions: Fall Risk  Position Activity Restriction  Other position/activity restrictions: dementia    Subjective:  Chart Reviewed: Yes  Patient assessed for rehabilitation services?: Yes  Family / Caregiver Present: No  Subjective: Pt is supine in bed, [place], only oriented to person,  Pt is cooperative with PT, daughter arrives when getting ready to ambulate, provides PLOF information as pt is unable to due to dementia.     General:  Overall Orientation Status: Impaired  Orientation Level: Oriented to person, Disoriented to place, Disoriented to time, Disoriented to situation  Vision: Within Functional Limits  Hearing: Exceptions to [facility]  Hearing Exceptions: Hard of hearing/hearing concerns       Pain: no pain behaviors demonstrated    Vitals: Vitals not assessed per clinical judgement, see nursing flowsheet    Social/Functional History:    Lives With: Spouse  Type of Home: House  Home Layout: Two level, Able to Live on Main level with bedroom/bathroom  Home Access: Stairs to enter with rails  Entrance Stairs - Number of Steps: 3        Ambulation Assistance: Independent  Transfer Assistance: Independent    Active : No  Occupation: Retired  Additional Comments: Per daughter, pt ambulates without AD    OBJECTIVE:  Range of Motion:  Bilateral Lower Extremity: WFL    Strength:  Bilateral Lower Extremity: Impaired - grossly 4/5    Balance:  Static Sitting Balance:  Stand By Assistance, X 1  Dynamic Sitting Balance: Stand By Assistance, X 1  Static Standing Balance: Minimal Assistance, X 1  Dynamic Standing Balance: Minimal Assistance, X 1    Bed Mobility:  Supine to Sit: Minimal Assistance, X 1, with head of bed raised, with verbal cues , with increased time for completion    Transfers:  Sit to Stand: Minimal Assistance, X 1  Stand to Sit:Minimal Assistance, X 1    Ambulation:  Minimal Assistance, X 1  Distance: 250 feet  Surface: Level Tile  Device:No Device  Gait Deviations:  Decreased Step Length Bilaterally, Decreased Weight Shift Bilaterally, Decreased Trunk Rotation, Decreased Gait Speed, and Decreased Heel Strike Bilaterally  **Min A for balance due to increased posterior lean throughout, daughter also providing CGA, cues for direction    Exercise:  Patient was guided in 1 set(s) 10 reps of exercise to both lower extremities, while seated EOB, verbal cues to slow down. Ankle pumps, Seated marches, and Long arc quads. Exercises were completed for increased independence with functional mobility. Functional Outcome Measures: Not completed     ASSESSMENT:  Activity Tolerance:  Patient tolerance of  treatment: good. Treatment Initiated: Treatment and education initiated within context of evaluation. Evaluation time included review of current medical information, gathering information related to past medical, social and functional history, completion of standardized testing, formal and informal observation of tasks, assessment of data and development of plan of care and goals.   Treatment time included skilled education and facilitation of tasks to increase safety and independence with functional mobility for improved independence and quality of life. Assessment: Body Structures, Functions, Activity Limitations Requiring Skilled Therapeutic Intervention: Decreased functional mobility , Decreased cognition, Decreased coordination, Decreased endurance, Decreased balance, Decreased strength, Decreased safe awareness  Assessment: Emma Bullard is a 81 y.o. male that presents with UTI. he is ind prior to admission now requiring assist for basic mobility. Pt demonstrates a decrease in baseline by way of bed mobility, transfers and ambulation secondary to decreased activity tolerance, strength, fatigue, and balance deficits. Pt will benefit from skilled PT services throughout admission and beyond hospital discharge for improvements in functional mobility and in order to decrease fall risk and return pt to OF. Therapy Prognosis: Shailesh Merchant    Requires PT Follow-Up: Yes    Discharge Recommendations:  Discharge Recommendations: 24 hour supervision or assist, Home with Home health PT    Patient Education:      . Patient Education  Education Given To: Patient, Family  Education Provided: Role of Therapy, Plan of Care  Education Method: Verbal  Barriers to Learning: Cognition  Education Outcome: Continued education needed       Equipment Recommendations:  Equipment Needed: No    Plan:  Current Treatment Recommendations: Strengthening, Balance training, Functional mobility training, Transfer training, Neuromuscular re-education, Gait training, Stair training, Patient/Caregiver education & training, Safety education & training, Therapeutic activities  General Plan:  (3-5x GM)    Goals:  Patient Goals : plan for pt to DC home with wife tomorrow  Short Term Goals  Time Frame for Short Term Goals: by discharge  Short Term Goal 1: Pt to transfer supine <--> sit SBA to enable pt to get in/out of bed.   Short Term Goal 2: Pt to transfer sit <--> stand SBA for increased functional mobility. Short Term Goal 3: Pt to ambulate >250 feet without AD SBA for household ambulation. Long Term Goals  Time Frame for Long Term Goals : NA due to short length of stay. Following session, patient left in safe position with all fall risk precautions in place.

## 2023-02-17 NOTE — PROGRESS NOTES
VN completed admission questions with wife and daughter. Pt resting in bed with call light in reach and daughter at bedside. No voiced questions or concerns.

## 2023-02-17 NOTE — PLAN OF CARE
Problem: Discharge Planning  Goal: Discharge to home or other facility with appropriate resources  2/17/2023 1736 by Christine Alvares RN  Outcome: Progressing  Flowsheets (Taken 2/17/2023 1736)  Discharge to home or other facility with appropriate resources:   Identify barriers to discharge with patient and caregiver   Arrange for needed discharge resources and transportation as appropriate   Identify discharge learning needs (meds, wound care, etc)  2/17/2023 1551 by Christine Alvares RN  Outcome: Progressing  Flowsheets (Taken 2/17/2023 1535)  Discharge to home or other facility with appropriate resources:   Identify barriers to discharge with patient and caregiver   Arrange for needed discharge resources and transportation as appropriate   Identify discharge learning needs (meds, wound care, etc)  2/17/2023 1429 by BRIANA Villa  Outcome: Progressing     Problem: Skin/Tissue Integrity  Goal: Absence of new skin breakdown  Description: 1. Monitor for areas of redness and/or skin breakdown  2. Assess vascular access sites hourly  3. Every 4-6 hours minimum:  Change oxygen saturation probe site  4. Every 4-6 hours:  If on nasal continuous positive airway pressure, respiratory therapy assess nares and determine need for appliance change or resting period. 2/17/2023 1736 by Christine Alvares RN  Outcome: Progressing  Note: No new skin breakdown noted. Encouraged patient to reposition in bed.      2/17/2023 1551 by Christine Alvares RN  Outcome: Progressing     Problem: Safety - Adult  Goal: Free from fall injury  2/17/2023 1736 by Christine Alvares RN  Outcome: Progressing  Flowsheets (Taken 2/17/2023 1736)  Free From Fall Injury: Instruct family/caregiver on patient safety  Note: Telesitter in room.   2/17/2023 1551 by Christine Alvares RN  Outcome: Progressing     Problem: Genitourinary - Adult  Goal: Absence of urinary retention  2/17/2023 1736 by Christine Alvares RN  Outcome: Progressing  Flowsheets (Taken 2/17/2023 1736)  Absence of urinary retention:   Assess patients ability to void and empty bladder   Monitor intake/output and perform bladder scan as needed  2/17/2023 1551 by Belen Busch RN  Outcome: Progressing     Problem: ABCDS Injury Assessment  Goal: Absence of physical injury  2/17/2023 1736 by Belen Busch RN  Outcome: Progressing  Flowsheets (Taken 2/17/2023 1736)  Absence of Physical Injury: Implement safety measures based on patient assessment  2/17/2023 1551 by Belen Busch RN  Outcome: Progressing     Problem: Confusion  Goal: Confusion, delirium, dementia, or psychosis is improved or at baseline  Description: INTERVENTIONS:  1. Assess for possible contributors to thought disturbance, including medications, impaired vision or hearing, underlying metabolic abnormalities, dehydration, psychiatric diagnoses, and notify attending LIP  2. [place] high risk fall precautions, as indicated  3. Provide frequent short contacts to provide reality reorientation, refocusing and direction  4. Decrease environmental stimuli, including noise as appropriate  5. Monitor and intervene to maintain adequate nutrition, hydration, elimination, sleep and activity  6. If unable to ensure safety without constant attention obtain sitter and review sitter guidelines with assigned personnel  7.  Initiate Psychosocial CNS and Spiritual Care consult, as indicated  2/17/2023 1736 by Belen Busch RN  Outcome: Progressing  Flowsheets (Taken 2/17/2023 1736)  Effect of thought disturbance (confusion, delirium, dementia, or psychosis) are managed with adequate functional status:   Assess for contributors to thought disturbance, including medications, impaired vision or hearing, underlying metabolic abnormalities, dehydration, psychiatric diagnoses, notify [facility] high risk fall precautions, as indicated   Provide frequent short contacts to provide reality reorientation, refocusing and direction   Decrease environmental stimuli, including noise as appropriate   Monitor and intervene to maintain adequate nutrition, hydration, elimination, sleep and activity   If unable to ensure safety without constant attention obtain sitter and review sitter guidelines with assigned personnel   Initiate Psychosocial Clinical Nurse Specialist and Spiritual Care consult, as indicated  Note: Telesitter in room. 2/17/2023 1551 by Madhu Torres RN  Outcome: Progressing     Problem: Pain  Goal: Verbalizes/displays adequate comfort level or baseline comfort level  2/17/2023 1736 by Madhu Torres RN  Outcome: Progressing  Flowsheets (Taken 2/17/2023 1736)  Verbalizes/displays adequate comfort level or baseline comfort level:   Encourage patient to monitor pain and request assistance   Assess pain using appropriate pain scale   Administer analgesics based on type and severity of pain and evaluate response  2/17/2023 1551 by Madhu Torres RN  Outcome: Progressing     Problem: Chronic Conditions and Co-morbidities  Goal: Patient's chronic conditions and co-morbidity symptoms are monitored and maintained or improved  2/17/2023 1736 by Madhu Torres RN  Outcome: Progressing  Flowsheets (Taken 2/17/2023 1736)  Care Plan - Patient's Chronic Conditions and Co-Morbidity Symptoms are Monitored and Maintained or Improved:   Monitor and assess patient's chronic conditions and comorbid symptoms for stability, deterioration, or improvement   Collaborate with multidisciplinary team to address chronic and comorbid conditions and prevent exacerbation or deterioration  2/17/2023 1551 by Madhu Torres RN  Outcome: Progressing    Care plan reviewed with patient and family. Patient verbalize understanding of the plan of care and contribute to goal setting.

## 2023-02-17 NOTE — CARE COORDINATION
DISCHARGE PLANNING EVALUATION  2/17/23, 12:55 PM EST    Reason for Referral: pt with Dementia  Mental Status: pt is alert, answered some questions appropriately, has Dementia  Decision Making: spouse make decisions   Family/Social/Home Environment: pt resides with his spouse in a two story home. Bedroom/bathroom on main level. Spouse completes all house hold chores, drives and assists pt with personal care. Son lives locally and daughter lives out of state. Pt has [employer] 2 times a week for 4 hrs each day. Pt and spouse are on a waiting list for [facility]  Current Services including food security, transportation and housekeeping: see above  Current Equipment:none  Payment Source:Medicare/BCBS  Concerns or Barriers to Discharge: none noted  Post-acute [facility]) provider list was provided to patient. Patient was informed of their freedom to choose [facility] provider. Discussed and offered to show the patient the relevant [facility] Providers quality and resource use measures on Medicare Compare web site via computer based on patient's goals of care and treatment preferences. Questions regarding selection process were answered. Teach Back Method used with pt regarding care plan   Patient and spouse verbalize understanding of the plan of care and contribute to goal setting. Patient goals, treatment preferences and discharge plan: SW met with pt, and daughter. Discussed discharge POC. Eileen plans on bringing pt home with current private duty services with [address] [place] [address] and [facility]. SW provided [facility] list to review. Requested they update GLENROY Kelsey on preference. GLENROY aware.       Electronically signed by BRIANA Gonzalez on 2/17/2023 at 12:55 PM

## 2023-02-17 NOTE — PLAN OF CARE
Problem: Discharge Planning  Goal: Discharge to home or other facility with appropriate resources  2/16/2023 2257 by Nathan Orona RN  Outcome: Progressing     Problem: Skin/Tissue Integrity  Goal: Absence of new skin breakdown  Description: 1. Monitor for areas of redness and/or skin breakdown  2. Assess vascular access sites hourly  3. Every 4-6 hours minimum:  Change oxygen saturation probe site  4. Every 4-6 hours:  If on nasal continuous positive airway pressure, respiratory therapy assess nares and determine need for appliance change or resting period. 2/16/2023 2257 by Nathan Orona RN  Outcome: Progressing     Problem: Safety - Adult  Goal: Free from fall injury  2/16/2023 2257 by Nathan Orona RN  Outcome: Progressing  Note: Bed alarm on, call light within reach, bed in lowest position, reminding patient to use call light when needing to get up. Problem: Genitourinary - Adult  Goal: Absence of urinary retention  2/16/2023 2257 by Nathan Orona RN  Outcome: Progressing     Problem: Genitourinary - Adult  Goal: Urinary catheter remains patent  2/16/2023 2257 by Nathan Orona RN  Outcome: Progressing     Problem: ABCDS Injury Assessment  Goal: Absence of physical injury  Outcome: Progressing     Problem: Confusion  Goal: Confusion, delirium, dementia, or psychosis is improved or at baseline  Description: INTERVENTIONS:  1. Assess for possible contributors to thought disturbance, including medications, impaired vision or hearing, underlying metabolic abnormalities, dehydration, psychiatric diagnoses, and notify attending LIP  2. [place] high risk fall precautions, as indicated  3. Provide frequent short contacts to provide reality reorientation, refocusing and direction  4. Decrease environmental stimuli, including noise as appropriate  5. Monitor and intervene to maintain adequate nutrition, hydration, elimination, sleep and activity  6.  If unable to ensure safety without constant attention obtain sitter and review sitter guidelines with assigned personnel  7.  Initiate Psychosocial CNS and Spiritual Care consult, as indicated  Outcome: Progressing     Problem: Pain  Goal: Verbalizes/displays adequate comfort level or baseline comfort level  Outcome: Progressing     Problem: Chronic Conditions and Co-morbidities  Goal: Patient's chronic conditions and co-morbidity symptoms are monitored and maintained or improved  Outcome: Progressing

## 2023-02-17 NOTE — PROGRESS NOTES
Progress note      [facility]             Patient:  Carlos Zamarripa  YOB: 1935    MRN: [id number]   Acct:  [de-identified]   [id number]  Primary Care Physician: Lai Ayala MD    Admit Date: 2/16/2023           Subjective: Pt in bed in on distress, not oriented to place or year. Therapy working with him now. Daughter updated on plan of care. Objective:      Physical Exam:    Vitals:Patient Vitals for the past 24 hrs:   BP Temp Temp src Pulse Resp SpO2   02/17/23 0900 (!) 139/59 98.3 °F (36.8 °C) Axillary 75 16 97 %   02/17/23 0329 (!) 169/66 98.2 °F (36.8 °C) Axillary 67 14 96 %   02/17/23 0003 (!) 171/70 97.2 °F (36.2 °C) Oral 59 16 98 %   02/16/23 2019 (!) 125/58 97.9 °F (36.6 °C) Oral 75 14 97 %   02/16/23 1715 (!) 134/55 97.5 °F (36.4 °C) Oral 69 16 97 %     Weight: Weight: 149 lb 12.8 oz (67.9 kg)     24 hour intake/output:  Intake/Output Summary (Last 24 hours) at 2/17/2023 1131  Last data filed at 2/17/2023 0500  Gross per 24 hour   Intake 758.3 ml   Output --   Net 758.3 ml       General appearance - alert, and in no distress  Eyes - pupils equal and reactive, extraocular eye movements intact  Mouth - mucous membranes moist, pharynx normal without lesions  Neck - supple, no significant adenopathy  Chest - clear to auscultation, no wheezes, rales or rhonchi, symmetric air entry  Heart - normal rate, regular rhythm, normal S1, S2, no murmurs, rubs, clicks or gallops  Abdomen - soft, nontender, nondistended, no masses or organomegaly, pos bs.   Neurological - alert, not oriented, normal speech, no focal findings or movement disorder noted  Musculoskeletal - no joint tenderness, deformity or swelling  Extremities - peripheral pulses normal, no pedal edema, no clubbing or cyanosis  Skin - normal coloration and turgor, no rashes, no suspicious skin lesions noted    Review of Labs and Diagnostic Testing:    CBC: Recent Labs     02/17/23  0512   WBC 14.6*   HGB 9.1*   HCT 28.7*   MCV 99.3*   *     BMP:   Recent Labs     02/17/23 0512      K 3.4*  3.4*      CO2 26   BUN 26*   CREATININE 1.2   CALCIUM 8.3*   GLUCOSE 75     PT/INR: No results for input(s): PROTIME, INR in the last 72 hours. APTT: No results for input(s): APTT in the last 72 hours. Lipids: No results for input(s): ALKPHOS, ALT, AST, BILITOT, BILIDIR, LABALBU, AMYLASE, LIPASE in the last 72 hours. Troponin:   Recent Labs     02/16/23  0115   TROPONINT < 0.010        Imaging:  [unfilled]    EKG:      Diet: ADULT DIET;  Regular; Low Fat/Low Chol/High Fiber/ANNELIESE  ADULT ORAL NUTRITION SUPPLEMENT; Breakfast, Lunch, Dinner; Standard High Calorie/High Protein Oral Supplement        Data:   Scheduled Meds: Scheduled Meds:   ciprofloxacin  500 mg Oral 2 times per day    aspirin  81 mg Oral Daily    atorvastatin  10 mg Oral Nightly    Vitamin D  3,000 Units Oral Nightly    docusate sodium  100 mg Oral Daily    donepezil  10 mg Oral Nightly    doxazosin  2 mg Oral Daily    metoprolol tartrate  25 mg Oral Daily    multivitamin  1 tablet Oral Daily    oxybutynin  10 mg Oral Nightly    pantoprazole  40 mg Oral QAM AC    divalproex  250 mg Oral Daily    sodium chloride flush  5-40 mL IntraVENous 2 times per day    enoxaparin  40 mg SubCUTAneous Daily    memantine  10 mg Oral Daily     Continuous Infusions:   sodium chloride      sodium chloride 75 mL/hr at 02/17/23 0916     PRN Meds:.potassium chloride **OR** potassium alternative oral replacement **OR** potassium chloride, sodium chloride flush, sodium chloride, ondansetron **OR** ondansetron, acetaminophen **OR** acetaminophen, polyethylene glycol  Continuous Infusions:   sodium chloride      sodium chloride 75 mL/hr at 02/17/23 0916         Assessment   UTI  Dementia  CKD  Hypokalemia    Urine culture- Pseudomonas aeruginosa Abnormal     Plan   DC rocephin and start Cipro  Replace electrolyte per protocol  Decrease IVF   PTOT  Home health ordered  Am labs  Probable dc tomorrow    Dr. Jessica Gillette covering this weekend      Electronically signed by ZORAN Ac - CNP on 2/17/2023 at 11:31 AM    Assessment and plan of care discussed with supervising physician, Dr Mike Mayers.

## 2023-02-17 NOTE — CARE COORDINATION
2/17/23, 2:30 PM EST    Patient goals/plan/ treatment preferences discussed by  and . Patient goals/plan/ treatment preferences reviewed with patient/ family. Patient/ family verbalize understanding of discharge plan and are in agreement with goal/plan/treatment preferences. Understanding was demonstrated using the teach back method. AVS provided by RN at time of discharge, which includes all necessary medical information pertaining to the patients current course of illness, treatment, post-discharge goals of care, and treatment preferences. Services At/After Discharge: Home Health, Aide services, Nursing service, OT, and PT       IMM Letter  IMM Letter given to Patient/Family/Significant other/Guardian/POA/by[de-identified] staff  IMM Letter date given[de-identified] 02/16/23  IMM Letter time given[de-identified] [id number]     Anticipated discharge 2/18/23 home with spouse and new HH. Family has list and deciding on agency. SW updated RN Laure, requested that family notify RN on [facility] preference. SW complete referral on Monday.

## 2023-02-17 NOTE — FLOWSHEET NOTE
02/17/23 1044   Safe Environment   Safety Measures Other (comment)  (vn safety quiet round completed , pt with noted confusion , pt resting in bed eyes closed resp easy no signs of distress noted)

## 2023-02-17 NOTE — PROGRESS NOTES
Urine specimen collected and sent to lab via tube system. Lab was called and no urine in sight. Will notify dr and obtain new order for urine. Daughter at bedside, made aware.

## 2023-02-17 NOTE — CARE COORDINATION
Case Management Assessment  Initial Evaluation    Date/Time of Evaluation: 2/17/2023 7:20 AM  Assessment Completed by: Oralia Sweet RN    If patient is discharged prior to next notation, then this note serves as note for discharge by case management. Patient Name: Clara Velásquez                   YOB: 1935  Diagnosis: Urinary tract infection [N39.0]  Generalized weakness [R53.1]  Acute cystitis with hematuria [N30.01]                   Date / Time: 2/16/2023  1:02 AM  [facility]     Patient Admission Status: Inpatient   Readmission Risk Low 0-14, Mod 15-19), High > 20: Readmission Risk Score: 17.5    Current PCP: Marie Dunlpa MD  PCP verified by CM? Chart Reviewed: Yes      History Provided by:    Patient Orientation:      Patient Cognition:      Hospitalization in the last 30 days (Readmission):  No    If yes, Readmission Assessment in  Navigator will be completed. Advance Directives:      Code Status: Full Code   Patient's Primary Decision Maker is:        Discharge Planning:    Patient lives with: Spouse/Significant Other Type of Home: House  Primary Care Giver:    Patient Support Systems include: Spouse/Significant Other   Current Financial resources:    Current community resources:    Current services prior to admission: Home Care            Current DME:              Type of Home Care services:  Aide Services    ADLS  Prior functional level:    Current functional level:      Family can provide assistance at DC: Would you like Case Management to discuss the discharge plan with any other family members/significant others, and if so, who?     Plans to Return to Present Housing:    Other Identified Issues/Barriers to RETURNING to current housing: none  Potential Assistance needed at discharge: Home Care            Potential DME:    Patient expects to discharge to: [address] [place] [address] for transportation at discharge:      Financial    Payor: [address] [place] [address] / Plan: MEDICARE PART A AND B / Product Type: *No Product type* /     Does insurance require precert for SNF: No    Potential assistance Purchasing Medications:    Meds-to-Beds request: Yes      [facility] [id number] [place] [address], [address] - F [phone]  [address]  Phone: [phone] Fax: [phone] [address], [address] [phone] - f [phone]  [facility] [address]  Phone: [phone] Fax: [phone] by [facility], [address] [place] [address] [phone] f [phone]  [address] [place] [address]  Phone: [phone] Fax: [phone]      Notes:    Factors facilitating achievement of predicted outcomes: Family support and Cooperative    Barriers to discharge: Cognitive deficit    Additional Case Management Notes: K+3.4, PCT 4.15, WBC 14.6, UA abn. IVF, IV rocephin. PT/OT. The Plan for Transition of Care is related to the following treatment goals of Urinary tract infection [N39.0]  Generalized weakness [R53.1]  Acute cystitis with hematuria [N30.01]    Patient Goals/Plan/Treatment Preferences: Danie Ruiz is from home with his wife and has [employer]. SW is following. Transportation/Food Security/Housekeeping Addressed: No issues identified.      Sixto Alexander RN  Case Management Department

## 2023-02-18 VITALS
TEMPERATURE: 98.2 F | HEART RATE: 74 BPM | DIASTOLIC BLOOD PRESSURE: 72 MMHG | RESPIRATION RATE: 16 BRPM | OXYGEN SATURATION: 98 % | HEIGHT: 71 IN | SYSTOLIC BLOOD PRESSURE: 163 MMHG | BODY MASS INDEX: 22.79 KG/M2 | WEIGHT: 162.8 LBS

## 2023-02-18 PROBLEM — I10 PRIMARY HYPERTENSION: Status: ACTIVE | Noted: 2023-02-18

## 2023-02-18 LAB
ANION GAP SERPL CALC-SCNC: 9 MEQ/L (ref 8–16)
BACTERIA BLD AEROBE CULT: NORMAL
BACTERIA BLD AEROBE CULT: NORMAL
BACTERIA UR CULT: ABNORMAL
BASOPHILS ABSOLUTE: 0 THOU/MM3 (ref 0–0.1)
BASOPHILS NFR BLD AUTO: 0.2 %
BUN SERPL-MCNC: 19 MG/DL (ref 7–22)
CALCIUM SERPL-MCNC: 8.2 MG/DL (ref 8.5–10.5)
CHLORIDE SERPL-SCNC: 107 MEQ/L (ref 98–111)
CO2 SERPL-SCNC: 26 MEQ/L (ref 23–33)
CREAT SERPL-MCNC: 0.9 MG/DL (ref 0.4–1.2)
DEPRECATED RDW RBC AUTO: 42.6 FL (ref 35–45)
EOSINOPHIL NFR BLD AUTO: 0.7 %
EOSINOPHILS ABSOLUTE: 0.1 THOU/MM3 (ref 0–0.4)
ERYTHROCYTE [DISTWIDTH] IN BLOOD BY AUTOMATED COUNT: 11.8 % (ref 11.5–14.5)
GFR SERPL CREATININE-BSD FRML MDRD: > 60 ML/MIN/1.73M2
GLUCOSE SERPL-MCNC: 86 MG/DL (ref 70–108)
HCT VFR BLD AUTO: 28.1 % (ref 42–52)
HGB BLD-MCNC: 9.1 GM/DL (ref 14–18)
IMM GRANULOCYTES # BLD AUTO: 0.04 THOU/MM3 (ref 0–0.07)
IMM GRANULOCYTES NFR BLD AUTO: 0.4 %
LYMPHOCYTES ABSOLUTE: 0.8 THOU/MM3 (ref 1–4.8)
LYMPHOCYTES NFR BLD AUTO: 7.9 %
MCH RBC QN AUTO: 32 PG (ref 26–33)
MCHC RBC AUTO-ENTMCNC: 32.4 GM/DL (ref 32.2–35.5)
MCV RBC AUTO: 98.9 FL (ref 80–94)
MONOCYTES ABSOLUTE: 0.8 THOU/MM3 (ref 0.4–1.3)
MONOCYTES NFR BLD AUTO: 7.6 %
NEUTROPHILS NFR BLD AUTO: 83.2 %
NRBC BLD AUTO-RTO: 0 /100 WBC
ORGANISM: ABNORMAL
PLATELET # BLD AUTO: 113 THOU/MM3 (ref 130–400)
PMV BLD AUTO: 11 FL (ref 9.4–12.4)
POTASSIUM SERPL-SCNC: 3.8 MEQ/L (ref 3.5–5.2)
RBC # BLD AUTO: 2.84 MILL/MM3 (ref 4.7–6.1)
SEGMENTED NEUTROPHILS ABSOLUTE COUNT: 8.8 THOU/MM3 (ref 1.8–7.7)
SODIUM SERPL-SCNC: 142 MEQ/L (ref 135–145)
WBC # BLD AUTO: 10.6 THOU/MM3 (ref 4.8–10.8)

## 2023-02-18 PROCEDURE — 2580000003 HC RX 258: Performed by: INTERNAL MEDICINE

## 2023-02-18 PROCEDURE — 87077 CULTURE AEROBIC IDENTIFY: CPT

## 2023-02-18 PROCEDURE — 80048 BASIC METABOLIC PNL TOTAL CA: CPT

## 2023-02-18 PROCEDURE — 6370000000 HC RX 637 (ALT 250 FOR IP)

## 2023-02-18 PROCEDURE — 87186 SC STD MICRODIL/AGAR DIL: CPT

## 2023-02-18 PROCEDURE — 87086 URINE CULTURE/COLONY COUNT: CPT

## 2023-02-18 PROCEDURE — 6360000002 HC RX W HCPCS: Performed by: INTERNAL MEDICINE

## 2023-02-18 PROCEDURE — 6370000000 HC RX 637 (ALT 250 FOR IP): Performed by: INTERNAL MEDICINE

## 2023-02-18 PROCEDURE — 36415 COLL VENOUS BLD VENIPUNCTURE: CPT

## 2023-02-18 PROCEDURE — 85025 COMPLETE CBC W/AUTO DIFF WBC: CPT

## 2023-02-18 RX ORDER — FOLIC ACID 1 MG/1
1 TABLET ORAL DAILY
Qty: 90 TABLET | Refills: 3 | Status: SHIPPED | OUTPATIENT
Start: 2023-02-18

## 2023-02-18 RX ORDER — CIPROFLOXACIN 500 MG/1
500 TABLET, FILM COATED ORAL EVERY 12 HOURS SCHEDULED
Qty: 12 TABLET | Refills: 0 | Status: SHIPPED | OUTPATIENT
Start: 2023-02-18 | End: 2023-02-24

## 2023-02-18 RX ADMIN — METOPROLOL TARTRATE 25 MG: 25 TABLET, FILM COATED ORAL at 08:46

## 2023-02-18 RX ADMIN — DOCUSATE SODIUM 100 MG: 100 CAPSULE, LIQUID FILLED ORAL at 08:45

## 2023-02-18 RX ADMIN — CIPROFLOXACIN 500 MG: 500 TABLET, FILM COATED ORAL at 08:46

## 2023-02-18 RX ADMIN — DIVALPROEX SODIUM 250 MG: 250 TABLET, FILM COATED, EXTENDED RELEASE ORAL at 08:46

## 2023-02-18 RX ADMIN — DOXAZOSIN MESYLATE 2 MG: 2 TABLET ORAL at 08:46

## 2023-02-18 RX ADMIN — POLYETHYLENE GLYCOL 3350 17 G: 17 POWDER, FOR SOLUTION ORAL at 10:43

## 2023-02-18 RX ADMIN — ENOXAPARIN SODIUM 40 MG: 100 INJECTION SUBCUTANEOUS at 08:45

## 2023-02-18 RX ADMIN — ASPIRIN 81 MG: 81 TABLET, COATED ORAL at 08:45

## 2023-02-18 RX ADMIN — Medication 1 TABLET: at 08:46

## 2023-02-18 RX ADMIN — MEMANTINE 10 MG: 10 TABLET ORAL at 08:46

## 2023-02-18 RX ADMIN — SODIUM CHLORIDE, PRESERVATIVE FREE 10 ML: 5 INJECTION INTRAVENOUS at 08:46

## 2023-02-18 RX ADMIN — PANTOPRAZOLE SODIUM 40 MG: 40 TABLET, DELAYED RELEASE ORAL at 05:02

## 2023-02-18 ASSESSMENT — PAIN SCALES - GENERAL: PAINLEVEL_OUTOF10: 0

## 2023-02-18 NOTE — PROGRESS NOTES
Wife, Yue Merrill, came in and gave choices of home health. She asked that  call her tomorrow please.  Eileen [address]

## 2023-02-18 NOTE — PLAN OF CARE
Problem: Discharge Planning  Goal: Discharge to home or other facility with appropriate resources  2/18/2023 0404 by Jasmin Thornton RN  Outcome: Tyler Berrios (Taken 2/17/2023 2152)  Discharge to home or other facility with appropriate resources:   Identify barriers to discharge with patient and caregiver   Arrange for needed discharge resources and transportation as appropriate   Identify discharge learning needs (meds, wound care, etc)   Refer to discharge planning if patient needs post-hospital services based on physician order or complex needs related to functional status, cognitive ability or social support system  2/17/2023 1736 by Kwabena Wheeler RN  Outcome: Progressing  Flowsheets (Taken 2/17/2023 1736)  Discharge to home or other facility with appropriate resources:   Identify barriers to discharge with patient and caregiver   Arrange for needed discharge resources and transportation as appropriate   Identify discharge learning needs (meds, wound care, etc)  2/17/2023 1551 by Kwabena Wheeler RN  Outcome: Progressing  Flowsheets (Taken 2/17/2023 1535)  Discharge to home or other facility with appropriate resources:   Identify barriers to discharge with patient and caregiver   Arrange for needed discharge resources and transportation as appropriate   Identify discharge learning needs (meds, wound care, etc)  2/17/2023 1429 by BRIANA Philippe  Outcome: Progressing     Problem: Skin/Tissue Integrity  Goal: Absence of new skin breakdown  Description: 1. Monitor for areas of redness and/or skin breakdown  2. Assess vascular access sites hourly  3. Every 4-6 hours minimum:  Change oxygen saturation probe site  4. Every 4-6 hours:  If on nasal continuous positive airway pressure, respiratory therapy assess nares and determine need for appliance change or resting period. 2/18/2023 0404 by Jasmin Thornton RN  Outcome: Progressing  Note: Routine skin assessments, purposeful hourly rounding.  Ambulation and up to chair encouraged as tolerated and appropriate. Pt encouraged and assisted to turn at least every 2 hours. Bath and hygiene care assistance offered to pt daily. Nutritional intake encouraged as ordered and appropriate. 2/17/2023 1736 by Jane Aguila RN  Outcome: Progressing  Note: No new skin breakdown noted. Encouraged patient to reposition in bed.      2/17/2023 1551 by Jane Aguila RN  Outcome: Progressing     Problem: Safety - Adult  Goal: Free from fall injury  2/18/2023 0404 by Tima Melendez RN  Outcome: Urrutia Copas (Taken 2/18/2023 0404)  Free From Fall Injury: Instruct family/caregiver on patient safety  2/17/2023 1736 by Jane Aguila RN  Outcome: Progressing  Flowsheets (Taken 2/17/2023 1736)  Free From Fall Injury: Instruct family/caregiver on patient safety  Note: Telesitter in room.   2/17/2023 1551 by Jane Aguila RN  Outcome: Progressing     Problem: Genitourinary - Adult  Goal: Absence of urinary retention  2/18/2023 0404 by Tima Melendez RN  Outcome: Progressing  Flowsheets (Taken 2/17/2023 2152)  Absence of urinary retention:   Assess patients ability to void and empty bladder   Monitor intake/output and perform bladder scan as needed  2/17/2023 1736 by Jane Aguila RN  Outcome: Progressing  Flowsheets (Taken 2/17/2023 1736)  Absence of urinary retention:   Assess patients ability to void and empty bladder   Monitor intake/output and perform bladder scan as needed  2/17/2023 1551 by Jane Aguila RN  Outcome: Progressing  Goal: Urinary catheter remains patent  2/18/2023 0404 by Tima Melendez RN  Outcome: Completed  2/17/2023 1551 by Jane Aguila RN  Outcome: Progressing     Problem: ABCDS Injury Assessment  Goal: Absence of physical injury  2/18/2023 0404 by Tima Melendez RN  Outcome: Progressing  Flowsheets (Taken 2/17/2023 1736 by Jane Aguila RN)  Absence of Physical Injury: Implement safety measures based on patient assessment  2/17/2023 1736 by Jane Aguila RN  Outcome: Progressing  Flowsheets (Taken 2/17/2023 1736)  Absence of Physical Injury: Implement safety measures based on patient assessment  2/17/2023 1551 by Giorgio Piedra RN  Outcome: Progressing     Problem: Confusion  Goal: Confusion, delirium, dementia, or psychosis is improved or at baseline  Description: INTERVENTIONS:  1. Assess for possible contributors to thought disturbance, including medications, impaired vision or hearing, underlying metabolic abnormalities, dehydration, psychiatric diagnoses, and notify attending LIP  2. [place] high risk fall precautions, as indicated  3. Provide frequent short contacts to provide reality reorientation, refocusing and direction  4. Decrease environmental stimuli, including noise as appropriate  5. Monitor and intervene to maintain adequate nutrition, hydration, elimination, sleep and activity  6. If unable to ensure safety without constant attention obtain sitter and review sitter guidelines with assigned personnel  7.  Initiate Psychosocial CNS and Spiritual Care consult, as indicated  2/18/2023 0404 by Kailey Ely RN  Outcome: Progressing  Flowsheets (Taken 2/17/2023 2152)  Effect of thought disturbance (confusion, delirium, dementia, or psychosis) are managed with adequate functional status:   Assess for contributors to thought disturbance, including medications, impaired vision or hearing, underlying metabolic abnormalities, dehydration, psychiatric diagnoses, notify [facility] high risk fall precautions, as indicated   Provide frequent short contacts to provide reality reorientation, refocusing and direction   Decrease environmental stimuli, including noise as appropriate   Monitor and intervene to maintain adequate nutrition, hydration, elimination, sleep and activity   If unable to ensure safety without constant attention obtain sitter and review sitter guidelines with assigned personnel  2/17/2023 1736 by Giorgio Piedra RN  Outcome: Progressing  Flowsheets (Taken 2/17/2023 1736)  Effect of thought disturbance (confusion, delirium, dementia, or psychosis) are managed with adequate functional status:   Assess for contributors to thought disturbance, including medications, impaired vision or hearing, underlying metabolic abnormalities, dehydration, psychiatric diagnoses, notify [place] high risk fall precautions, as indicated   Provide frequent short contacts to provide reality reorientation, refocusing and direction   Decrease environmental stimuli, including noise as appropriate   Monitor and intervene to maintain adequate nutrition, hydration, elimination, sleep and activity   If unable to ensure safety without constant attention obtain sitter and review sitter guidelines with assigned personnel   Initiate Psychosocial Clinical Nurse Specialist and [facility] consult, as indicated  Note: Telesitter in room.   2/17/2023 1551 by James Pack RN  Outcome: Progressing     Problem: Pain  Goal: Verbalizes/displays adequate comfort level or baseline comfort level  2/18/2023 0404 by Roxanne Alberto RN  Outcome: Progressing  Flowsheets (Taken 2/17/2023 2152)  Verbalizes/displays adequate comfort level or baseline comfort level:   Encourage patient to monitor pain and request assistance   Assess pain using appropriate pain scale   Administer analgesics based on type and severity of pain and evaluate response   Implement non-pharmacological measures as appropriate and evaluate response   Consider cultural and social influences on pain and pain management   Notify Licensed Independent Practitioner if interventions unsuccessful or patient reports new pain  2/17/2023 1736 by James Pack RN  Outcome: Progressing  Flowsheets (Taken 2/17/2023 1736)  Verbalizes/displays adequate comfort level or baseline comfort level:   Encourage patient to monitor pain and request assistance   Assess pain using appropriate pain scale   Administer analgesics based on type and severity of pain and evaluate response  2/17/2023 1551 by Quintin Swenson RN  Outcome: Progressing     Problem: Chronic Conditions and Co-morbidities  Goal: Patient's chronic conditions and co-morbidity symptoms are monitored and maintained or improved  2/18/2023 0404 by Irvin Arce RN  Outcome: Progressing  [address] (Taken 2/17/2023 2152)  Care Plan - Patient's Chronic Conditions and Co-Morbidity Symptoms are Monitored and Maintained or Improved:   Monitor and assess patient's chronic conditions and comorbid symptoms for stability, deterioration, or improvement   Collaborate with multidisciplinary team to address chronic and comorbid conditions and prevent exacerbation or deterioration   Update acute care plan with appropriate goals if chronic or comorbid symptoms are exacerbated and prevent overall improvement and discharge  2/17/2023 1736 by Quintin Swenson RN  Outcome: Progressing  Flowsheets (Taken 2/17/2023 1736)  Care Plan - Patient's Chronic Conditions and Co-Morbidity Symptoms are Monitored and Maintained or Improved:   Monitor and assess patient's chronic conditions and comorbid symptoms for stability, deterioration, or improvement   Collaborate with multidisciplinary team to address chronic and comorbid conditions and prevent exacerbation or deterioration  2/17/2023 1551 by Quintin Swenson RN  Outcome: Progressing     Care plan reviewed with patient and daughter. Patient and daughter verbalized understanding of the plan of care and contributed to goal setting.

## 2023-02-18 NOTE — PROGRESS NOTES
Progress note      Internal Medicine coverage for Dr. Cassy Gillespie. Patient:  Oliver Saeed  YOB: 1935    MRN: [id number]   Acct:  [de-identified]   6K-05/005-A  Primary Care Physician: Hayley Colvin MD      Subjective:   Chart reviewed. Patient seen. He appears comfortable at rest.    He is pleasantly confused. Objective:      Physical Exam:    Vitals:Patient Vitals for the past 24 hrs:   BP Temp Temp src Pulse Resp SpO2 Weight   02/18/23 1136 (!) 163/72 98.2 °F (36.8 °C) Oral 74 16 98 % --   02/18/23 0845 (!) 183/79 98.7 °F (37.1 °C) Oral 63 15 94 % --   02/18/23 0457 -- -- -- -- -- -- 162 lb 12.8 oz (73.8 kg)   02/18/23 0430 (!) 156/71 98.6 °F (37 °C) Oral 72 16 99 % --   02/17/23 2319 (!) 179/84 97.7 °F (36.5 °C) Oral 72 19 100 % --   02/17/23 2152 (!) 164/68 98.1 °F (36.7 °C) Oral 65 16 97 % --   02/17/23 1156 (!) 150/63 -- -- 64 -- 97 % --         General appearance - alert, and in no distress  Eyes - pupils equal and reactive, extraocular eye movements intact  Mouth - mucous membranes moist, pharynx normal without lesions  Neck - supple, no significant adenopathy  Chest - clear to auscultation, no wheezes, rales or rhonchi, symmetric air entry  Heart - normal rate, regular rhythm, normal S1, S2, no murmurs, rubs, clicks or gallops  Abdomen - soft, nontender, nondistended, no masses or organomegaly, pos bs.   Neurological - alert, not oriented, normal speech, no focal findings or movement disorder noted  Musculoskeletal - no joint tenderness, deformity or swelling  Extremities - peripheral pulses normal, no pedal edema, no clubbing or cyanosis  Skin - normal coloration and turgor, no rashes, no suspicious skin lesions noted    Review of Labs and Diagnostic Testing:    CBC:   Recent Labs     02/18/23  0453   WBC 10.6   HGB 9.1*   HCT 28.1*   MCV 98.9*   *       BMP:   Recent Labs     02/18/23 0453      K 3.8   CL 107   CO2 26   BUN 19   CREATININE 0.9   CALCIUM 8.2*   GLUCOSE 86       Data:   Scheduled Meds: Scheduled Meds:   ciprofloxacin  500 mg Oral 2 times per day    aspirin  81 mg Oral Daily    atorvastatin  10 mg Oral Nightly    Vitamin D  3,000 Units Oral Nightly    docusate sodium  100 mg Oral Daily    donepezil  10 mg Oral Nightly    doxazosin  2 mg Oral Daily    metoprolol tartrate  25 mg Oral Daily    multivitamin  1 tablet Oral Daily    oxybutynin  10 mg Oral Nightly    pantoprazole  40 mg Oral QAM AC    divalproex  250 mg Oral Daily    sodium chloride flush  5-40 mL IntraVENous 2 times per day    enoxaparin  40 mg SubCUTAneous Daily    memantine  10 mg Oral Daily     Continuous Infusions:   sodium chloride      sodium chloride 20 mL/hr at 02/17/23 1329     PRN Meds:.potassium chloride **OR** potassium alternative oral replacement **OR** potassium chloride, sodium chloride flush, sodium chloride, ondansetron **OR** ondansetron, acetaminophen **OR** acetaminophen, polyethylene glycol  Continuous Infusions:   sodium chloride      sodium chloride 20 mL/hr at 02/17/23 1329     Urine culture- Pseudomonas aeruginosa Abnormal     Assessment   UTI  Dementia  CKD  Hypokalemia  Hypertension    Plan   Continue ciprofloxacin. Optimize blood pressure medication. Increase of metoprolol 25 mg twice a day. Stable for discharge home with home health services.     Electronically signed by Erin Means MD on 2/18/2023 at 11:45 AM

## 2023-02-19 LAB
BACTERIA UR CULT: ABNORMAL
ORGANISM: ABNORMAL

## 2023-02-20 ENCOUNTER — CARE COORDINATION (OUTPATIENT)
Dept: CASE MANAGEMENT | Age: 88
End: 2023-02-20

## 2023-02-20 LAB
BACTERIA UR CULT: ABNORMAL
ORGANISM: ABNORMAL

## 2023-02-20 NOTE — CARE COORDINATION
[facility] Care Transitions Initial Follow Up Call    Call within 2 business days of discharge: Yes    Patient Current Location:  Home:  [facility] [address]    Care Transition Nurse contacted the family by telephone to perform post hospital discharge assessment. Verified name and  with family as identifiers. Provided introduction to self, and explanation of the Care Transition Nurse role. Patient: Curtis Strange Patient : 1935   MRN: <[id number]>  Reason for Admission: Acute Cystitis w/Hematuria  Discharge Date: 23 RARS: Readmission Risk Score: 17.5      Last Discharge [address]       Date Complaint Diagnosis Description Type Department Provider    23 Tremors Acute cystitis with hematuria . .. ED to Hosp-Admission (Discharged) (ADMITTED) Rmomel Moncada MD; Ramez Araya DO;. .. Challenges to be reviewed by the provider   Additional needs identified to be addressed with provider: No  none               Method of communication with provider: none. Spoke with wife Kristofer. Pt with Dementia, is pleasantly confused, +[place]. Saira Hudson is doing fair. He is resting now, seems to be \"pretty wiped out. \" Per wife, says he seems to be able to urinate w/out issue. Urine is yellow, w/out odor & he is not saying it's painful when he voids. His appetite is fair, seems to be getting a little better. No fever/chills. No N&V, stools are just slightly loose. No sob//edema/wheezing/cough. CTN placed [facility], spoke with Era Sanchez, says [facility] referral received, she will call wife today for soc most likely tomorrow (23) Wife Kristofer says Venessa Smith receives private duty home care from [facility] 2x/week 4 hours/day, which is a big help for her. Wife adds that she realizes Venessa Smith is requring more care; he is currently on a waiting list for [facility]. No falls since home. Using [facility]. Reviewed AVS & DC meds. Taking Cipro & all meds as prescribed.  Noted change in Metropolol to 25 mg 2x/day. Emphasized importance of HFU appt needed within 7 days. Has appt scheduled  with PCP for 2/27/23 which wife says she is keeping that appt, declining sooner f/up assistance from CTN. Wife will take him to all appts, has son who is local who can assist with appts PRN. Care Transition Nurse reviewed discharge instructions, medical action plan, and red flags with family who verbalized understanding. The family was given an opportunity to ask questions and does not have any further questions or concerns at this time. Were discharge instructions available to patient? Yes. Reviewed appropriate site of care based on symptoms and resources available to patient including: PCP  Specialist  Urgent care clinics  Home health  When to call [address] The family agrees to contact the PCP office for questions related to their healthcare. Advance Care Planning:   Does patient have an Advance Directive: not on file. Medication reconciliation was performed with family, who verbalizes understanding of administration of home medications. Medications reviewed, 1111F entered: yes    Was patient discharged with a pulse oximeter? no    Non-face-to-face services provided:  Communication with home health agencies or other community services the patient is currently using-[facility]   Education of patient/family/caregiver/guardian to support self-management-Dementia care in home. Fall prevention, s/s UTI  Assessment and support for treatment adherence and medication management-med review    Offered patient enrollment in the Remote Patient Monitoring (RPM) program for in-home monitoring: Yes, but did not enroll at this time.     Care Transitions 24 Hour Call    Schedule Follow Up Appointment with PCP: Declined  Do you have a copy of your discharge instructions?: Yes  Do you have all of your prescriptions and are they filled?: Yes  Have you been contacted by a [facility] Pharmacist?: No  Have you scheduled your follow up appointment?: Yes (Comment: 2/27/23 PCP)  How are you going to get to your appointment?: Car - family or friend to transport (Comment: wife Yessy Gallegos)  Do you feel like you have everything you need to keep you well at home?: Yes  Care Transitions Interventions    Physical Therapy: Completed       Transportation Support: Declined      DME Assistance: Declined     Senior Services: Completed     Occupational Therapy: Completed Social Work: Declined    Disease Association: Completed               Discussed follow-up appointments. If no appointment was previously scheduled, appointment scheduling offered: Yes. Is follow up appointment scheduled within 7 days of discharge? No 9days 2/27/23    Follow Up  PCP Dr Neville Venegas 2/27/23    Future Appointments   Date Time Provider Sudheer Salgado   9/8/2023  1:00 PM MD Simeon Cortes Neurology -   10/23/2023  2:00 PM ZORAN Anderson - CNP N ENT [facility] Transition Nurse provided contact information. Plan for follow-up call in 5-7 days based on severity of symptoms and risk factors. Plan for next call: symptom management-s/s UTI? Dysuria? Urinary retention? Fever?  Chills?   follow-up appointment-2/27/23 PCP    Fiona Jamil RN

## 2023-02-20 NOTE — CARE COORDINATION
2/20/23, 8:56 AM EST    Patient goals/plan/ treatment preferences discussed by  and . Patient goals/plan/ treatment preferences reviewed with patient/ family. Patient/ family verbalize understanding of discharge plan and are in agreement with goal/plan/treatment preferences. Understanding was demonstrated using the teach back method. AVS provided by RN at time of discharge, which includes all necessary medical information pertaining to the patients current course of illness, treatment, post-discharge goals of care, and treatment preferences. Services At/After Discharge: Home Health, Aide services, Nursing service, OT, and PT       IMM Letter  IMM Letter given to Patient/Family/Significant other/Guardian/POA/by[de-identified] staff  IMM Letter date given[de-identified] 02/16/23  IMM Letter time given[de-identified] 0692     Discharged home 2/18/23 with spouse. Per RN Ute Gleason note, spouse is requesting [facility]. Referral completed with Maryana at [facility]. SW requested she reach out to spouse [place] today.

## 2023-02-21 ENCOUNTER — TELEPHONE (OUTPATIENT)
Dept: UROLOGY | Age: 88
End: 2023-02-21

## 2023-02-21 DIAGNOSIS — N40.1 BENIGN LOCALIZED PROSTATIC HYPERPLASIA WITH LOWER URINARY TRACT SYMPTOMS (LUTS): Primary | ICD-10-CM

## 2023-02-21 DIAGNOSIS — Z01.818 PRE-OP TESTING: ICD-10-CM

## 2023-02-21 DIAGNOSIS — R32 URINARY INCONTINENCE, UNSPECIFIED TYPE: ICD-10-CM

## 2023-02-21 LAB
BACTERIA BLD AEROBE CULT: NORMAL
BACTERIA BLD AEROBE CULT: NORMAL

## 2023-02-21 NOTE — TELEPHONE ENCOUNTER
Patient is scheduled with Dr. María Lema on 3/24/2023. Surgery consent to be done upon arrival.  Patient to do urine culture, fasting labs, EKG and chest xray on 3/10/23; orders mailed to patient. Surgery instructions mailed to patient. Patient will have an adult over the age of 25 with them at discharge and 24 hours after procedure.         Lidia DONAHUE#[id number] per Armando Due

## 2023-02-21 NOTE — TELEPHONE ENCOUNTER
DO NOT TAKE FISH OIL, IBUPROFEN, MOTRIN-LIKE DRUGS AND ANY MULTIVITAMINS OR OVER THE COUNTER SUPPLEMENTS 14 DAYS PRIOR TO SURGERY. HOLD ASPIRIN FOR 5 DAYS PRIOR TO SURGERY. MUST HAVE AN ADULT OVER THE AGE OF 18 WITH YOU AT THE TIME OF THE PROCEDURE AND WITH YOU AT HOME AFTER THE PROCEDURE FOR 24 HOURS       Skyla Sexton 1935 Diagnosis:     Surgical Physician: Dr. Godwin Shown have been scheduled for the procedure marked below:      Surgery: Cystoscopy, Urethral Dilation, Bladder Biopsy, Greenlight Photo Vaporization of Prostate         Date: 3/24/2023     Anesthesia: Anesthesiologist (General/Spinal)     Place of Service: [facility] Second Floor Same Day Surgery         Arrive to same day surgery by:  9:00am  (Surgery time is subject to change)      INSTRUCTIONS AS MARKED BELOW:    1.  DO NOT eat or drink anything after midnight before surgery. 2.  We prefer you shower or bathe with an antibacterial soap (Dial) the morning of surgery. 3  Please bring a current medication list, photo ID and insurance card(s) with you  4. Okay to take Tylenol  5. If you take Glucophage, Metformin or Janumet, hold 48-hours prior to surgery  6. Take blood pressure or heart medication as directed, if taken in the morning take with a small sip of water  7. The office will call you in 1-2 days after your procedure to schedule a follow up. DATE SENSITIVE TESTING-DO ON THE DATE LISTED*WALK IN * NO APPOINTMENT NEEDED    DO URINE CULTURE, FASTING LABS, EKG AND CHEST XRAY ON 3/10/2023; ORDERS INCLUDED.         Date: 2/21/2023

## 2023-02-21 NOTE — TELEPHONE ENCOUNTER
SURGERY [address]  [address] [place] [address] [address], [address]      Phone *[phone] *[phone]   Surgical Scheduling Direct Line Phone *[phone] Fax *[phone]      Holley Steel 1935 male    [address] [facility]  [employer] [address] [address]   Marital Status:          Home Phone: [phone]      Cell Phone:    Telephone Information:   Mobile [phone]          Surgeon: Dr. Manjit Gaitan Surgery Date: 3/24/2023   Time: 11:00am    Procedure: Cystoscopy, Urethral Dilation, Bladder Biopsy, Greenlight Photo Vaporization of Prostate     Diagnosis: BPH with lower urinary tract symptoms, urinary incontinence, bladder diverticulum    Important [facility]    Special Inst/Equip: NESS Orr WZXF#[id number]    CPT Codes:    29529,35803,89234  Latex Allergy: No     Cardiac Device:  No    Anesthesia:  General          Admission Type:  Same Day                        Admit Prior to Day of Surgery: No    Case Location:  Main OR            Preadmission Testing:  Phone Call          PAT Date and Time:______________________________________________________    PAT Confirmation #: ______________________________________________________    Post Op Visit: ___________________________________________________________    Need Preop Cardiac Clearance: No    Does Patient have Cardiologist/physician?     none  none  Surgery Confirmation #: __________________________________________________    : ________________________   Date: __________________________     [employer] Name: Medicare

## 2023-02-28 ENCOUNTER — CARE COORDINATION (OUTPATIENT)
Dept: CASE MANAGEMENT | Age: 88
End: 2023-02-28

## 2023-02-28 NOTE — CARE COORDINATION
Care Transitions Outreach Attempt    Attempted to reach patient for transitions of care follow up. Unable to reach patient. #1    Patient: Inocencio Roman Patient : 1935 MRN: <[id number]>    Last Discharge  [address]       Date Complaint Diagnosis Description Type Department Provider    23 Tremors Acute cystitis with hematuria . .. ED to Hosp-Admission (Discharged) (ADMITTED) Juda Hatchet, MD; Kristian Ryder DO;. ..           Noted following upcoming appointments from discharge chart review:   [facility] follow up appointment(s):   Future Appointments   Date Time Provider Sudheer Monteroi   2023  1:00 PM [address], [address] [facility] Neurology -   10/23/2023  2:00 PM ZORAN Anderson [facility]     Non-[facility] follow up appointment(s): ANG Sifuentes RN [phone]

## 2023-03-07 ENCOUNTER — CARE COORDINATION (OUTPATIENT)
Dept: CASE MANAGEMENT | Age: 88
End: 2023-03-07

## 2023-03-07 ENCOUNTER — PREP FOR PROCEDURE (OUTPATIENT)
Dept: UROLOGY | Age: 88
End: 2023-03-07

## 2023-03-07 NOTE — CARE COORDINATION
Care Transitions Outreach Attempt      Attempted to reach patient for transitions of care follow up. Spouse Shreya Chaney states that she is not feeling well. States she is currently in her PCP's office. She states Jake Chilel is doing okay. Informed Eileen care transitions will call at a later time. She thanked CTN. Patient: Carlos Restrepo Patient : 1935 MRN: <[id number]>    Last Discharge [address]       Date Complaint Diagnosis Description Type Department Provider    23 Tremors Acute cystitis with hematuria . .. ED to Hosp-Admission (Discharged) (ADMITTED) Nadia Lipscomb MD; Desiree Fuller DO;. .. Was this an external facility discharge?  No Discharge Facility:  [facility]    Noted following upcoming appointments from discharge chart review:     [facility] follow up appointment(s):   Future Appointments   Date Time Provider Sudheer Salgado   2023  1:00 PM Shwetha Andres, [address] [facility] Neurology -   10/23/2023  2:00 PM ZORAN Anderson - CNP N ENT [facility]

## 2023-03-10 ENCOUNTER — HOSPITAL ENCOUNTER (OUTPATIENT)
Age: 88
Discharge: HOME | End: 2023-03-10
Payer: MEDICARE

## 2023-03-10 ENCOUNTER — HOSPITAL ENCOUNTER (OUTPATIENT)
Dept: GENERAL RADIOLOGY | Age: 88
Discharge: HOME | End: 2023-03-10
Payer: MEDICARE

## 2023-03-10 DIAGNOSIS — N40.1 BENIGN LOCALIZED PROSTATIC HYPERPLASIA WITH LOWER URINARY TRACT SYMPTOMS (LUTS): ICD-10-CM

## 2023-03-10 DIAGNOSIS — Z01.818 PRE-OP TESTING: ICD-10-CM

## 2023-03-10 DIAGNOSIS — R32 URINARY INCONTINENCE, UNSPECIFIED TYPE: ICD-10-CM

## 2023-03-10 LAB
ANION GAP SERPL CALC-SCNC: 9 MEQ/L (ref 8–16)
BASOPHILS ABSOLUTE: 0 THOU/MM3 (ref 0–0.1)
BASOPHILS NFR BLD AUTO: 0.7 %
BUN SERPL-MCNC: 13 MG/DL (ref 7–22)
CALCIUM SERPL-MCNC: 9 MG/DL (ref 8.5–10.5)
CHLORIDE SERPL-SCNC: 110 MEQ/L (ref 98–111)
CO2 SERPL-SCNC: 23 MEQ/L (ref 23–33)
CREAT SERPL-MCNC: 1.1 MG/DL (ref 0.4–1.2)
DEPRECATED RDW RBC AUTO: 43.7 FL (ref 35–45)
EOSINOPHIL NFR BLD AUTO: 1.7 %
EOSINOPHILS ABSOLUTE: 0.1 THOU/MM3 (ref 0–0.4)
ERYTHROCYTE [DISTWIDTH] IN BLOOD BY AUTOMATED COUNT: 12 % (ref 11.5–14.5)
GFR SERPL CREATININE-BSD FRML MDRD: > 60 ML/MIN/1.73M2
GLUCOSE SERPL-MCNC: 94 MG/DL (ref 70–108)
HCT VFR BLD AUTO: 34.3 % (ref 42–52)
HGB BLD-MCNC: 10.9 GM/DL (ref 14–18)
IMM GRANULOCYTES # BLD AUTO: 0.01 THOU/MM3 (ref 0–0.07)
IMM GRANULOCYTES NFR BLD AUTO: 0.2 %
LYMPHOCYTES ABSOLUTE: 1 THOU/MM3 (ref 1–4.8)
LYMPHOCYTES NFR BLD AUTO: 22.9 %
MCH RBC QN AUTO: 31.7 PG (ref 26–33)
MCHC RBC AUTO-ENTMCNC: 31.8 GM/DL (ref 32.2–35.5)
MCV RBC AUTO: 99.7 FL (ref 80–94)
MONOCYTES ABSOLUTE: 0.4 THOU/MM3 (ref 0.4–1.3)
MONOCYTES NFR BLD AUTO: 8.8 %
NEUTROPHILS NFR BLD AUTO: 65.7 %
NRBC BLD AUTO-RTO: 0 /100 WBC
PLATELET # BLD AUTO: 139 THOU/MM3 (ref 130–400)
PMV BLD AUTO: 11.1 FL (ref 9.4–12.4)
POIKILOCYTES: ABNORMAL
POTASSIUM SERPL-SCNC: 4.5 MEQ/L (ref 3.5–5.2)
RBC # BLD AUTO: 3.44 MILL/MM3 (ref 4.7–6.1)
SCAN OF BLOOD SMEAR: NORMAL
SEGMENTED NEUTROPHILS ABSOLUTE COUNT: 2.8 THOU/MM3 (ref 1.8–7.7)
SODIUM SERPL-SCNC: 142 MEQ/L (ref 135–145)
WBC # BLD AUTO: 4.2 THOU/MM3 (ref 4.8–10.8)

## 2023-03-10 PROCEDURE — 71046 X-RAY EXAM CHEST 2 VIEWS: CPT

## 2023-03-10 PROCEDURE — 93005 ELECTROCARDIOGRAM TRACING: CPT

## 2023-03-10 PROCEDURE — 80048 BASIC METABOLIC PNL TOTAL CA: CPT

## 2023-03-10 PROCEDURE — 36415 COLL VENOUS BLD VENIPUNCTURE: CPT

## 2023-03-10 PROCEDURE — 85025 COMPLETE CBC W/AUTO DIFF WBC: CPT

## 2023-03-12 LAB
EKG ATRIAL RATE: 54 BPM
EKG P AXIS: 36 DEGREES
EKG P-R INTERVAL: 190 MS
EKG Q-T INTERVAL: 500 MS
EKG QRS DURATION: 136 MS
EKG QTC CALCULATION (BAZETT): 474 MS
EKG R AXIS: 1 DEGREES
EKG T AXIS: 59 DEGREES
EKG VENTRICULAR RATE: 54 BPM

## 2023-03-14 ENCOUNTER — CARE COORDINATION (OUTPATIENT)
Dept: CASE MANAGEMENT | Age: 88
End: 2023-03-14

## 2023-03-14 DIAGNOSIS — Z01.818 PRE-OP TESTING: ICD-10-CM

## 2023-03-14 DIAGNOSIS — N40.1 BENIGN LOCALIZED PROSTATIC HYPERPLASIA WITH LOWER URINARY TRACT SYMPTOMS (LUTS): Primary | ICD-10-CM

## 2023-03-14 DIAGNOSIS — R32 URINARY INCONTINENCE, UNSPECIFIED TYPE: ICD-10-CM

## 2023-03-14 NOTE — CARE COORDINATION
Care Transitions Outreach Attempt    Patient: Osmany Castellanos Patient : 1935 MRN: <[id number]>    2nd unsuccessful attempt to reach for Care Transition discharge call. HIPAA compliant message left requesting call back. Episode closed per protocol, no further outreach scheduled.      UTR letter sent via My Chart    Last Discharge [facility]       Date Complaint Diagnosis Description Type Department Provider    23 Tremors Acute cystitis with hematuria ... ED to Hosp-Admission (Discharged) (ADMITTED) NURY 6K Dannielle Steward MD; Jaiden Sinclair DO;...          Noted following upcoming appointments from discharge chart review:   [facility] follow up appointment(s):   Future Appointments   Date Time Provider Department Center   2023  1:00 PM Alan Sibley MD N [facility] Neurology -   10/23/2023  2:00 PM ZORAN Anderson - CNP N ENT P - [place]     Non-[facility] follow up appointment(s): ANG Suárez RN [phone]

## 2023-03-15 ENCOUNTER — NURSE ONLY (OUTPATIENT)
Dept: LAB | Age: 88
End: 2023-03-15

## 2023-03-15 DIAGNOSIS — N40.1 BENIGN LOCALIZED PROSTATIC HYPERPLASIA WITH LOWER URINARY TRACT SYMPTOMS (LUTS): ICD-10-CM

## 2023-03-15 DIAGNOSIS — Z01.818 PRE-OP TESTING: ICD-10-CM

## 2023-03-15 DIAGNOSIS — R32 URINARY INCONTINENCE, UNSPECIFIED TYPE: ICD-10-CM

## 2023-03-15 RX ORDER — SODIUM CHLORIDE 9 MG/ML
INJECTION, SOLUTION INTRAVENOUS PRN
Status: CANCELLED | OUTPATIENT
Start: 2023-03-15

## 2023-03-15 RX ORDER — SODIUM CHLORIDE 0.9 % (FLUSH) 0.9 %
5-40 SYRINGE (ML) INJECTION PRN
Status: CANCELLED | OUTPATIENT
Start: 2023-03-15

## 2023-03-15 RX ORDER — SODIUM CHLORIDE 0.9 % (FLUSH) 0.9 %
5-40 SYRINGE (ML) INJECTION EVERY 12 HOURS SCHEDULED
Status: CANCELLED | OUTPATIENT
Start: 2023-03-15

## 2023-03-16 LAB
BACTERIA UR CULT: ABNORMAL
ORGANISM: ABNORMAL

## 2023-03-17 ENCOUNTER — TELEPHONE (OUTPATIENT)
Dept: UROLOGY | Age: 88
End: 2023-03-17

## 2023-03-17 RX ORDER — CIPROFLOXACIN 500 MG/1
500 TABLET, FILM COATED ORAL 2 TIMES DAILY
Qty: 14 TABLET | Refills: 0 | Status: SHIPPED | OUTPATIENT
Start: 2023-03-17 | End: 2023-03-24

## 2023-03-17 NOTE — TELEPHONE ENCOUNTER
If the patient can come in on Monday it won't be back until Wednesday or Thursday. Surgery is Friday. Do you still want him to be scheduled for the urine collection?

## 2023-03-17 NOTE — TELEPHONE ENCOUNTER
Please review urine culture on 3/15/2023. Surgery with DR Layo Cosby on 3/24/23 for a GREENLIGHT, BLADDER BX. Thanks.

## 2023-03-17 NOTE — TELEPHONE ENCOUNTER
Patient wife on HIPPA advised of the urine results and cipro was sent to the pharmacy. She voiced understanding.

## 2023-03-18 PROBLEM — N39.0 URINARY TRACT INFECTION: Status: RESOLVED | Noted: 2023-02-16 | Resolved: 2023-03-18

## 2023-03-24 ENCOUNTER — ANESTHESIA EVENT (OUTPATIENT)
Dept: OPERATING ROOM | Age: 88
End: 2023-03-24
Payer: MEDICARE

## 2023-03-24 ENCOUNTER — HOSPITAL ENCOUNTER (OUTPATIENT)
Age: 88
Setting detail: OUTPATIENT SURGERY
Discharge: HOME | End: 2023-03-24
Attending: UROLOGY | Admitting: UROLOGY
Payer: MEDICARE

## 2023-03-24 ENCOUNTER — ANESTHESIA (OUTPATIENT)
Dept: OPERATING ROOM | Age: 88
End: 2023-03-24
Payer: MEDICARE

## 2023-03-24 VITALS
OXYGEN SATURATION: 96 % | SYSTOLIC BLOOD PRESSURE: 157 MMHG | HEIGHT: 71 IN | BODY MASS INDEX: 22.4 KG/M2 | TEMPERATURE: 97.7 F | RESPIRATION RATE: 20 BRPM | WEIGHT: 160 LBS | DIASTOLIC BLOOD PRESSURE: 82 MMHG | HEART RATE: 88 BPM

## 2023-03-24 DIAGNOSIS — G89.18 POSTOPERATIVE PAIN: Primary | ICD-10-CM

## 2023-03-24 PROCEDURE — 7100000001 HC PACU RECOVERY - ADDTL 15 MIN: Performed by: UROLOGY

## 2023-03-24 PROCEDURE — 3700000001 HC ADD 15 MINUTES (ANESTHESIA): Performed by: UROLOGY

## 2023-03-24 PROCEDURE — 7100000010 HC PHASE II RECOVERY - FIRST 15 MIN: Performed by: UROLOGY

## 2023-03-24 PROCEDURE — 7100000011 HC PHASE II RECOVERY - ADDTL 15 MIN: Performed by: UROLOGY

## 2023-03-24 PROCEDURE — 3600000003 HC SURGERY LEVEL 3 BASE: Performed by: UROLOGY

## 2023-03-24 PROCEDURE — 6360000002 HC RX W HCPCS

## 2023-03-24 PROCEDURE — 2500000003 HC RX 250 WO HCPCS

## 2023-03-24 PROCEDURE — 3600000013 HC SURGERY LEVEL 3 ADDTL 15MIN: Performed by: UROLOGY

## 2023-03-24 PROCEDURE — 2709999900 HC NON-CHARGEABLE SUPPLY: Performed by: UROLOGY

## 2023-03-24 PROCEDURE — 2720000010 HC SURG SUPPLY STERILE: Performed by: UROLOGY

## 2023-03-24 PROCEDURE — 6360000002 HC RX W HCPCS: Performed by: UROLOGY

## 2023-03-24 PROCEDURE — 2580000003 HC RX 258: Performed by: UROLOGY

## 2023-03-24 PROCEDURE — 3700000000 HC ANESTHESIA ATTENDED CARE: Performed by: UROLOGY

## 2023-03-24 PROCEDURE — 7100000000 HC PACU RECOVERY - FIRST 15 MIN: Performed by: UROLOGY

## 2023-03-24 RX ORDER — SODIUM CHLORIDE 9 MG/ML
INJECTION, SOLUTION INTRAVENOUS PRN
Status: DISCONTINUED | OUTPATIENT
Start: 2023-03-24 | End: 2023-03-24 | Stop reason: HOSPADM

## 2023-03-24 RX ORDER — HYDROCODONE BITARTRATE AND ACETAMINOPHEN 5; 325 MG/1; MG/1
1 TABLET ORAL EVERY 6 HOURS PRN
Qty: 18 TABLET | Refills: 0 | Status: SHIPPED | OUTPATIENT
Start: 2023-03-24 | End: 2023-03-29

## 2023-03-24 RX ORDER — ONDANSETRON 2 MG/ML
INJECTION INTRAMUSCULAR; INTRAVENOUS PRN
Status: DISCONTINUED | OUTPATIENT
Start: 2023-03-24 | End: 2023-03-24 | Stop reason: SDUPTHER

## 2023-03-24 RX ORDER — SODIUM CHLORIDE 0.9 % (FLUSH) 0.9 %
5-40 SYRINGE (ML) INJECTION PRN
Status: DISCONTINUED | OUTPATIENT
Start: 2023-03-24 | End: 2023-03-24 | Stop reason: HOSPADM

## 2023-03-24 RX ORDER — ONDANSETRON 2 MG/ML
4 INJECTION INTRAMUSCULAR; INTRAVENOUS
Status: DISCONTINUED | OUTPATIENT
Start: 2023-03-24 | End: 2023-03-24 | Stop reason: HOSPADM

## 2023-03-24 RX ORDER — FENTANYL CITRATE 50 UG/ML
INJECTION, SOLUTION INTRAMUSCULAR; INTRAVENOUS PRN
Status: DISCONTINUED | OUTPATIENT
Start: 2023-03-24 | End: 2023-03-24 | Stop reason: SDUPTHER

## 2023-03-24 RX ORDER — LABETALOL HYDROCHLORIDE 5 MG/ML
INJECTION, SOLUTION INTRAVENOUS
Status: COMPLETED
Start: 2023-03-24 | End: 2023-03-24

## 2023-03-24 RX ORDER — SODIUM CHLORIDE 0.9 % (FLUSH) 0.9 %
5-40 SYRINGE (ML) INJECTION EVERY 12 HOURS SCHEDULED
Status: DISCONTINUED | OUTPATIENT
Start: 2023-03-24 | End: 2023-03-24 | Stop reason: HOSPADM

## 2023-03-24 RX ORDER — ALLOPURINOL 100 MG/1
100 TABLET ORAL DAILY
COMMUNITY

## 2023-03-24 RX ORDER — PHENYLEPHRINE HYDROCHLORIDE 10 MG/ML
INJECTION INTRAVENOUS PRN
Status: DISCONTINUED | OUTPATIENT
Start: 2023-03-24 | End: 2023-03-24 | Stop reason: SDUPTHER

## 2023-03-24 RX ORDER — LABETALOL HYDROCHLORIDE 5 MG/ML
10 INJECTION, SOLUTION INTRAVENOUS
Status: DISCONTINUED | OUTPATIENT
Start: 2023-03-24 | End: 2023-03-24 | Stop reason: HOSPADM

## 2023-03-24 RX ORDER — PROPOFOL 10 MG/ML
INJECTION, EMULSION INTRAVENOUS PRN
Status: DISCONTINUED | OUTPATIENT
Start: 2023-03-24 | End: 2023-03-24 | Stop reason: SDUPTHER

## 2023-03-24 RX ORDER — HYDRALAZINE HYDROCHLORIDE 20 MG/ML
10 INJECTION INTRAMUSCULAR; INTRAVENOUS
Status: DISCONTINUED | OUTPATIENT
Start: 2023-03-24 | End: 2023-03-24 | Stop reason: HOSPADM

## 2023-03-24 RX ORDER — GLYCOPYRROLATE 0.2 MG/ML
INJECTION INTRAMUSCULAR; INTRAVENOUS PRN
Status: DISCONTINUED | OUTPATIENT
Start: 2023-03-24 | End: 2023-03-24 | Stop reason: SDUPTHER

## 2023-03-24 RX ORDER — DEXAMETHASONE SODIUM PHOSPHATE 10 MG/ML
INJECTION, EMULSION INTRAMUSCULAR; INTRAVENOUS PRN
Status: DISCONTINUED | OUTPATIENT
Start: 2023-03-24 | End: 2023-03-24 | Stop reason: SDUPTHER

## 2023-03-24 RX ADMIN — PHENYLEPHRINE HYDROCHLORIDE 150 MCG: 10 INJECTION INTRAVENOUS at 13:45

## 2023-03-24 RX ADMIN — FENTANYL CITRATE 100 MCG: 50 INJECTION, SOLUTION INTRAMUSCULAR; INTRAVENOUS at 13:26

## 2023-03-24 RX ADMIN — ONDANSETRON 4 MG: 2 INJECTION INTRAMUSCULAR; INTRAVENOUS at 13:46

## 2023-03-24 RX ADMIN — LABETALOL HYDROCHLORIDE 10 MG: 5 INJECTION, SOLUTION INTRAVENOUS at 14:53

## 2023-03-24 RX ADMIN — GLYCOPYRROLATE 0.2 MG: 0.2 INJECTION, SOLUTION INTRAMUSCULAR; INTRAVENOUS at 13:39

## 2023-03-24 RX ADMIN — DEXAMETHASONE SODIUM PHOSPHATE 4 MG: 10 INJECTION, EMULSION INTRAMUSCULAR; INTRAVENOUS at 13:27

## 2023-03-24 RX ADMIN — PROPOFOL 100 MG: 10 INJECTION, EMULSION INTRAVENOUS at 13:26

## 2023-03-24 RX ADMIN — Medication 10 MG: at 14:53

## 2023-03-24 RX ADMIN — SODIUM CHLORIDE: 9 INJECTION, SOLUTION INTRAVENOUS at 12:01

## 2023-03-24 RX ADMIN — PHENYLEPHRINE HYDROCHLORIDE 100 MCG: 10 INJECTION INTRAVENOUS at 13:52

## 2023-03-24 RX ADMIN — Medication 2000 MG: at 13:26

## 2023-03-24 RX ADMIN — Medication 70 MG: at 13:26

## 2023-03-24 ASSESSMENT — PAIN SCALES - GENERAL
PAINLEVEL_OUTOF10: 0

## 2023-03-24 ASSESSMENT — PAIN - FUNCTIONAL ASSESSMENT: PAIN_FUNCTIONAL_ASSESSMENT: 0-10

## 2023-03-24 NOTE — PROGRESS NOTES
1417- pt to pacu. Oral airway in place. Pt appears in no acute distress. Vss.    1425- pt resting in bed eyes closed. 1439- pt medicated per for elevated bp per orders. 1446- pt resting in bed eyes closed. Denies pain. 1452- pt resting in bed eyes closed. Resp easy, unlabored. 1508- pt continues to deny pain, nausea,     [facility]  [facility] to [address]
[phone]  pt. Resting quietly with eyes closed on arrival to phase II. Pt. Responds slightly to name. Pt. Denies pain. Family at bedside. 1550  pt. Awake and oriented. Pt. Eating and drinking without difficulty. 1630  colvin instructions given. Standard colvin bag changed to leg bag. Wife verbalizes understanding. [id number]  discharge instructions given. Pt. Family verbalizes understanding. 1720 pt. Getting dressed with assist.  1730  phase II criteria met. Pt. Stable. Pt. Discharged per wheelchair. Pt. Tolerated well.
ADMITTED TO [facility] AND ORIENTED TO UNIT. SCDS ON. FALL AND ALLERGY BANDS ON. PT VERBALIZED APPROVAL FOR FIRST NAME, LAST INITIAL AND PHYSICIAN NAME ON UNIT WHITEBOARD.
PAT call attempted, patient unavailable, left message to please call us back at your earliest convenience; [phone]
Sent EKGs to anesthesia for review. Per Dr. Emiliano Martins Imm, patient is OK to proceed with surgery on 3/24/23.
Is This A New Presentation, Or A Follow-Up?: Skin Lesions
How Severe Is Your Skin Lesion?: mild
Have Your Skin Lesions Been Treated?: not been treated

## 2023-03-24 NOTE — DISCHARGE INSTRUCTIONS
Pt ok to discharge home in good condition  No heavy lifting, >10 lbs for today  Pt should avoid strenuous activity for today  Pt should walk moderately at home  Pt ok to shower   Pt may resume diet as tolerated  Pt should take Rx as directed  No driving while on narcotics  Please call attending physician or hospital  with questions  Call or Present to ED if fever (> 101F), intractable nausea vomiting or pain.   Rx in chart    Pt should follow up with John Munguia MD, in 4 weeks, call to confirm appointment

## 2023-03-24 NOTE — H&P
Jeet Dubois MD  History and Physical    Patient:  Guido Lee  MRN: [id number]  YOB: 1935    HISTORY OF PRESENT ILLNESS:     The patient is a 81 y.o. male who presents with bladder lesion, bph, urethral stricture. Here for procedure. Patient's old records, notes and chart reviewed and summarized above. Jeet Dubois MD independently reviewed the images and verified the radiology reports from:    XR CHEST (2 VW)    Result Date: 3/10/2023  PROCEDURE: XR CHEST (2 VW) CLINICAL INFORMATION: Preoperative evaluation for prostate surgery TECHNIQUE: PA and lateral chest radiographs. COMPARISON: [facility] chest radiograph 2/16/2023 FINDINGS: Cardiomediastinal silhouette is within normal limits. Lungs are hyperinflated with flattening of the hemidiaphragms. No lung consolidations are identified. Degenerative changes in the thoracic spine are poorly visualized. Soft tissues are unremarkable. 1. No acute intrathoracic process. 2. Findings suggestive of chronic lung disease. **This report has been created using voice recognition software. It may contain minor errors which are inherent in voice recognition technology. ** Final report electronically signed by Dr. Sherrie Buchanan on 3/10/2023 2:36 PM        Past Medical History:    Past Medical History:   Diagnosis Date    BPH (benign prostatic hypertrophy)     CKD (chronic kidney disease) stage 2, GFR 60-89 ml/min     Dementia (HCC)     Erectile dysfunction     Frequent falls     [place] (hard of hearing)     Hyperlipidemia     Hypertension     Memory disorder     Metabolic bone disease     Orthostatic hypotension        Past Surgical History:    Past Surgical History:   Procedure Laterality Date    COLONOSCOPY      ENDOSCOPY, COLON, DIAGNOSTIC      ESOPHAGUS SURGERY      NECK SURGERY      PROSTATE SURGERY      SHOULDER SURGERY      THYROID SURGERY      removal of a nodule    TONSILLECTOMY      TURP  5-31-12    Dr Othel Holter       Medications Prior to (CARDURA) 2 MG tablet Take 2 mg by mouth daily    Historical Provider, MD       Allergies:  Patient has no known allergies. Social History:    Social History     Socioeconomic History    Marital status:      Spouse name: Not on file    Number of children: Not on file    Years of education: Not on file    Highest education level: Not on file   Occupational History    Not on file   Tobacco Use    Smoking status: Former     Types: Cigarettes     Quit date: 1992     Years since quittin.1    Smokeless tobacco: Never   Vaping Use    Vaping Use: Never used   Substance and Sexual Activity    Alcohol use: Not Currently     Alcohol/week: 0.0 standard drinks     Comment: daily    Drug use: No    Sexual activity: Not Currently     Partners: Female   Other Topics Concern    Not on file   Social History Narrative    Not on file     Social Determinants of Health     Financial Resource Strain: Not on file   Food Insecurity: Not on file   Transportation Needs: Not on file   Physical Activity: Not on file   Stress: Not on file   Social Connections: Not on file   Intimate Partner Violence: Not on file   Housing Stability: Not on file       Family History:    Family History   Problem Relation Age of Onset    High Blood Pressure Father     Stroke Father     Stroke Sister     Arthritis Sister     Alzheimer's Disease Sister     Cancer Sister     Heart Disease Sister     Arthritis Sister     Other Brother         parkinsons    Dementia Brother     No Known Problems Brother     No Known Problems Brother        REVIEW OF SYSTEMS:  Constitutional: negative  Eyes: negative  Respiratory: negative  Cardiovascular: negative  Gastrointestinal: negative  Genitourinary: no acute issues  Musculoskeletal: negative  Skin: negative   Neurological: negative  Hematological/Lymphatic: negative  Psychological: negative    Physical Exam:      No data found. Constitutional: Patient in no acute distress;    Neuro: alert and oriented to

## 2023-03-24 NOTE — ANESTHESIA PRE PROCEDURE
(72.6 kg)   Height:  5' 10.5\" (1.791 m)                                              BP Readings from Last 3 Encounters:   03/24/23 (!) 160/71   02/18/23 (!) 163/72   02/14/23 (!) 162/69       NPO Status: Time of last liquid consumption: 0130                        Time of last solid consumption: 2330                        Date of last liquid consumption: 03/24/23                        Date of last solid food consumption: 03/23/23    BMI:   Wt Readings from Last 3 Encounters:   03/24/23 160 lb (72.6 kg)   02/18/23 162 lb 12.8 oz (73.8 kg)   01/06/23 161 lb (73 kg)     Body mass index is 22.63 kg/m². CBC:   Lab Results   Component Value Date/Time    WBC 4.2 03/10/2023 02:11 PM    RBC 3.44 03/10/2023 02:11 PM    RBC 3.85 12/23/2021 01:00 PM    HGB 10.9 03/10/2023 02:11 PM    HCT 34.3 03/10/2023 02:11 PM    MCV 99.7 03/10/2023 02:11 PM    RDW 12.9 12/23/2021 01:00 PM     03/10/2023 02:11 PM       CMP:   Lab Results   Component Value Date/Time     03/10/2023 02:11 PM    K 4.5 03/10/2023 02:11 PM    K 3.4 02/17/2023 05:12 AM     03/10/2023 02:11 PM    CO2 23 03/10/2023 02:11 PM    BUN 13 03/10/2023 02:11 PM    CREATININE 1.1 03/10/2023 02:11 PM    LABGLOM >60 03/10/2023 02:11 PM    GLUCOSE 94 03/10/2023 02:11 PM    GLUCOSE 85 12/23/2021 01:00 PM    PROT 7.2 12/23/2021 01:00 PM    CALCIUM 9.0 03/10/2023 02:11 PM    BILITOT 0.6 12/23/2021 01:00 PM    ALKPHOS 55 12/23/2021 01:00 PM    ALKPHOS 48 07/14/2021 03:00 PM    AST 18 12/23/2021 01:00 PM    ALT 11 12/23/2021 01:00 PM       POC Tests: No results for input(s): POCGLU, POCNA, POCK, POCCL, POCBUN, POCHEMO, POCHCT in the last 72 hours.     Coags:   Lab Results   Component Value Date/Time    INR 1.33 08/10/2017 08:47 PM    APTT 20.6 08/10/2017 08:47 PM       HCG (If Applicable): No results found for: PREGTESTUR, PREGSERUM, HCG, HCGQUANT     ABGs: No results found for: PHART, PO2ART, KZD9UMQ, JSK4ZMQ, BEART, Z9TJBGHZ     Type & Screen (If

## 2023-03-27 NOTE — ANESTHESIA POSTPROCEDURE EVALUATION
Department of Anesthesiology  Postprocedure Note    Patient: Danny Ruby  MRN: [id number]  YOB: 1935  Date of evaluation: 3/27/2023      Procedure Summary     Date: 03/24/23 Room / Location: [address]    Anesthesia Start: 0083 Anesthesia Stop: 5795    Procedure: CYSTOSCOPY, URETHRAL DILATION, GREENLIGHT PHOTO VAPORIZATION OF PROSTATE Diagnosis:       BPH with obstruction/lower urinary tract symptoms      Urinary incontinence, unspecified type      Bladder diverticulum      (BPH with obstruction/lower urinary tract symptoms [N40.1, N13.8])      (Urinary incontinence, unspecified type [R32])      (Bladder diverticulum [N32.3])    Surgeons: Nasreen Vazquez MD Responsible Provider: Tavo Coleman MD    Anesthesia Type: general ASA Status: 3          Anesthesia Type: No value filed.     Natanael Phase I: Natanael Score: 9    Natanael Phase II: Natanael Score: 10      Anesthesia Post Evaluation    Patient location during evaluation: PACU  Patient participation: complete - patient participated  Level of consciousness: awake and alert  Airway patency: patent  Nausea & Vomiting: no nausea  Complications: no  Cardiovascular status: blood pressure returned to baseline and hemodynamically stable  Respiratory status: acceptable and spontaneous ventilation  Hydration status: euvolemic

## 2023-03-27 NOTE — BRIEF OP NOTE
Brief Postoperative Note      Patient: Ginger Aguilera  YOB: 1935  MRN: [id number]    Date of Procedure: 3/24/2023    Pre-Op Diagnosis: BPH with obstruction/lower urinary tract symptoms [N40.1, N13.8]  Urinary incontinence, unspecified type [R32]  Bladder diverticulum [N32.3]    Post-Op Diagnosis: Same       Procedure(s):  CYSTOSCOPY, URETHRAL DILATION, GREENLIGHT PHOTO VAPORIZATION OF PROSTATE    Surgeon(s):  William Rodrigues MD    Assistant:  * No surgical staff found *    Anesthesia: General    Estimated Blood Loss (mL): Minimal    Complications: None    Specimens:   * No specimens in log *    Implants:  * No implants in log *      Drains:   [REMOVED] Urinary Catheter 03/24/23 2 Way (Removed)   $ Urethral catheter insertion Inserted for procedure 03/24/23 1516   Catheter Indications Perioperative use for selected surgical procedures 03/24/23 1516   Urine Color Yellow 03/24/23 1516   Urine Appearance Clear 03/24/23 1516   Collection Container Standard 03/24/23 1516       [REMOVED] External Urinary Catheter (Removed)       Findings: colvin for one week. Come late in week for removal in office.  Then follow up in 3-4 weeks with deon in office with pvr    Electronically signed by Collette Moose, MD on 3/27/2023 at 8:48 AM

## 2023-03-31 ENCOUNTER — NURSE ONLY (OUTPATIENT)
Dept: UROLOGY | Age: 88
End: 2023-03-31

## 2023-03-31 DIAGNOSIS — R33.9 URINARY RETENTION: Primary | ICD-10-CM

## 2023-03-31 PROCEDURE — 99999 PR OFFICE/OUTPT VISIT,PROCEDURE ONLY: CPT | Performed by: UROLOGY

## 2023-03-31 NOTE — PROGRESS NOTES
Patient has given me verbal consent to perform colvin removal  Yes    26 cc of water deflated from colvin balloon. 22 Fr colvin removed without difficulty. Foreskin reduced back down? Yes      Pt will drink fluids and report to ER in 6-8 hours if patient unable to urinate. F/u with provider as scheduled.

## 2023-04-18 NOTE — OP NOTE
[address] [place] [address]. [place]    DATE: 3/24/2023  Patient:  Salo Lou  MRN: [id number]  YOB: 1935    SURGEON: Monica Ronquillo MD.    ASSISTANT: none    PREOPERATIVE DIAGNOSIS: BPH with outlet obstruction    POSTOPERATIVE DIAGNOSIS: BPH with outlet obstruction    PROCEDURE PERFORMED:  Cystoscopy, Greenlight Photovaporization of Prostate, Urethral dilation      ANESTHESIA: General    COMPLICATIONS: none    OR BLOOD LOSS:  Minimal    FLUIDS: Cystalloids per Anesthesia    SPECIMENS:  * No specimens in log *  none    DRAINS: [address] colvin    INDICATIONS FOR PROCEDURE:  The patient is a 81 y.o. male who presents today with [unfilled] here for CYSTOSCOPY, URETHRAL DILATION, GREENLIGHT PHOTO VAPORIZATION OF PROSTATE. After risks, benefits and alternatives of the procedure were discussed with the patient, the patient elected to proceed. DETAILS OF PROCEDURE:  After informed consent was obtained in the preoperative area, the patient was taken back to the operating room and transferred to the operating table in supine position. EPC cuffs were placed. The machine was turned on. Anesthesia was induced and antibiotics were given. The patient was placed in modified dorsal lithotomy position and sterilely prepped and draped in a standard fashion. A timeout occurred. Two patient identifiers were used. The 25F rigid scope with the visual obturator was carefully advanced through the urethra. urethral dilation was needed to advance the scope. Once within the bladder the visual obturator was exchanged for the resection bridge. The ureteral orifices were located and noted to be remote from the bladder neck. The prostate was surveyed. the lateral lobes were moderately obstructing. There was no median lobe present. Enucleation of the median lobe was not performed. The vaporization was started proximal with a power setting of 80W.  Both the left and right lateral

## 2023-04-23 ENCOUNTER — HOSPITAL ENCOUNTER (EMERGENCY)
Age: 88
Discharge: HOME | End: 2023-04-23
Attending: EMERGENCY MEDICINE
Payer: MEDICARE

## 2023-04-23 ENCOUNTER — APPOINTMENT (OUTPATIENT)
Dept: GENERAL RADIOLOGY | Age: 88
End: 2023-04-23
Payer: MEDICARE

## 2023-04-23 VITALS
SYSTOLIC BLOOD PRESSURE: 175 MMHG | DIASTOLIC BLOOD PRESSURE: 86 MMHG | BODY MASS INDEX: 22.9 KG/M2 | RESPIRATION RATE: 18 BRPM | WEIGHT: 160 LBS | TEMPERATURE: 97.6 F | OXYGEN SATURATION: 100 % | HEART RATE: 55 BPM | HEIGHT: 70 IN

## 2023-04-23 DIAGNOSIS — K59.09 OTHER CONSTIPATION: Primary | ICD-10-CM

## 2023-04-23 PROCEDURE — 99283 EMERGENCY DEPT VISIT LOW MDM: CPT

## 2023-04-23 PROCEDURE — 74018 RADEX ABDOMEN 1 VIEW: CPT

## 2023-04-23 RX ORDER — POLYETHYLENE GLYCOL 3350 17 G/17G
17 POWDER, FOR SOLUTION ORAL DAILY PRN
Qty: 3 EACH | Refills: 0 | Status: SHIPPED | OUTPATIENT
Start: 2023-04-23 | End: 2023-04-26

## 2023-04-23 ASSESSMENT — PAIN SCALES - WONG BAKER: WONGBAKER_NUMERICALRESPONSE: 2

## 2023-04-23 ASSESSMENT — PAIN DESCRIPTION - PAIN TYPE: TYPE: ACUTE PAIN

## 2023-04-23 ASSESSMENT — PAIN - FUNCTIONAL ASSESSMENT
PAIN_FUNCTIONAL_ASSESSMENT: NONE - DENIES PAIN
PAIN_FUNCTIONAL_ASSESSMENT: WONG-BAKER FACES

## 2023-04-24 NOTE — ED PROVIDER NOTES
[facility] EMERGENCY DEPT      CHIEF COMPLAINT       Chief Complaint   Patient presents with    Flank Pain    Rectal Pain       Nurses Notes reviewed and I agree except as noted in the HPI. HISTORY OF PRESENT ILLNESS    Kristine Roland is a 81 y.o. male who presents with complaint of not resolved rectal pain/pressure, patient had no flank pain. Said that he currently feels fine and has no complaints. History of dementia, overall poor historian. Patient's history mostly obtained from his wife. Onset: Acute  Duration: Short-lived  Timing: Resolved  Location of Pain: Had rectal pain  Intesity/severity: Unknown  Modifying Factors: Unknown  Relieved by;  Previous Episodes; Tx Before arrival: Tylenol  REVIEW OF SYSTEMS          PAST MEDICAL HISTORY    has a past medical history of BPH (benign prostatic hypertrophy), CKD (chronic kidney disease) stage 2, GFR 60-89 ml/min, Dementia ([facility] [address] Erectile dysfunction, Frequent falls, [place] (hard of hearing), Hyperlipidemia, Hypertension, Memory disorder, Metabolic bone disease, Orthostatic hypotension, and TIA (transient ischemic attack). SURGICAL HISTORY      has a past surgical history that includes shoulder surgery; Neck surgery; Tonsillectomy; TURP (5-31-12); Colonoscopy; Thyroid surgery; Prostate surgery; Endoscopy, colon, diagnostic; Esophagus surgery; and TURP (N/A, 3/24/2023). CURRENT MEDICATIONS       Discharge Medication List as of 4/23/2023 10:25 PM        CONTINUE these medications which have NOT CHANGED    Details   allopurinol (ZYLOPRIM) 100 MG tablet Take 100 mg by mouth dailyHistorical Med      folic acid (FOLVITE) 1 MG tablet Take 1 tablet by mouth daily, Disp-90 tablet, R-3Normal      metoprolol tartrate (LOPRESSOR) 25 MG tablet Take 1 tablet by mouth 2 times daily, Disp-60 tablet, R-5Normal      divalproex (DEPAKOTE ER) 250 MG extended release tablet Take 1 tablet by mouth daily. , Disp-30 tablet, R-5Normal      memantine (NAMENDA) 10 MG tablet Take

## 2023-04-24 NOTE — ED TRIAGE NOTES
Pt to ED with wife from home with c/o flank pain and rectal pain that started this evening. Pt states he isn't in much pain at this time but wife states she gave him two tylenol around 1930. Wife states pt has hx of dementia and has trouble understanding what's being said. She thinks his last BM was on Friday but isn't sure. VSS.

## 2023-04-27 ENCOUNTER — OFFICE VISIT (OUTPATIENT)
Dept: UROLOGY | Age: 88
End: 2023-04-27
Payer: MEDICARE

## 2023-04-27 VITALS
BODY MASS INDEX: 22.9 KG/M2 | WEIGHT: 160 LBS | SYSTOLIC BLOOD PRESSURE: 132 MMHG | HEIGHT: 70 IN | DIASTOLIC BLOOD PRESSURE: 60 MMHG

## 2023-04-27 DIAGNOSIS — R32 URINARY INCONTINENCE, UNSPECIFIED TYPE: ICD-10-CM

## 2023-04-27 DIAGNOSIS — N35.912 STRICTURE OF BULBOUS URETHRA IN MALE, UNSPECIFIED STRICTURE TYPE: ICD-10-CM

## 2023-04-27 DIAGNOSIS — N40.1 BENIGN LOCALIZED PROSTATIC HYPERPLASIA WITH LOWER URINARY TRACT SYMPTOMS (LUTS): Primary | ICD-10-CM

## 2023-04-27 LAB — POST VOID RESIDUAL (PVR): 110 ML

## 2023-04-27 PROCEDURE — 1123F ACP DISCUSS/DSCN MKR DOCD: CPT

## 2023-04-27 PROCEDURE — G8420 CALC BMI NORM PARAMETERS: HCPCS

## 2023-04-27 PROCEDURE — 99214 OFFICE O/P EST MOD 30 MIN: CPT

## 2023-04-27 PROCEDURE — 51798 US URINE CAPACITY MEASURE: CPT

## 2023-04-27 PROCEDURE — 1036F TOBACCO NON-USER: CPT

## 2023-04-27 PROCEDURE — G8427 DOCREV CUR MEDS BY ELIG CLIN: HCPCS

## 2023-04-27 RX ORDER — SOLIFENACIN SUCCINATE 5 MG/1
5 TABLET, FILM COATED ORAL DAILY
Qty: 30 TABLET | Refills: 0 | Status: SHIPPED | OUTPATIENT
Start: 2023-04-27 | End: 2024-04-26

## 2023-04-27 RX ORDER — DOXAZOSIN 2 MG/1
2 TABLET ORAL DAILY
Qty: 30 TABLET | Refills: 0 | Status: SHIPPED | OUTPATIENT
Start: 2023-04-27

## 2023-04-27 NOTE — PROGRESS NOTES
DUARTE Brady is a 81 y.o. male that presents to the urology clinic to obtain a PVR        4/27/23  Mr. Carole Burch is an 81year old male that presents to the office with concerns of incontinence. He has tried Ditropan and Cardura. Cystoscopy revealed a bulbar stricture. Patient subsequently underwent a cystoscopy, urethral dilation, greenlight, photovaporization of the prostate with Dr. Joseluis Stevens on 3/27/2023. His colvin catheter was removed and he presents today to obtain a PVR. Wife is concerned with the number of times he gets per night. PVR: 110- patient did not void prior. He has dementia. Pain Scale 0      I independently reviewed and verified the images and reports from:    XR ABDOMEN (KUB) (SINGLE AP VIEW)    Result Date: 4/23/2023  1 view abdomen Comparison: None Findings: Normal bowel gas pattern. Moderate solid stool. No obvious pneumoperitoneum or pneumatosis. No abnormal nonvascular calcifications. Severe spondylosis of the thoracolumbar spine. No acute fractures     Impression: 1. Moderate stool.  This document has been electronically signed by: Nadia Cartagena MD on 04/23/2023 09:40 PM          Last total testosterone:  No results found for: TESTOSTERONE      Last BUN and creatinine:  Lab Results   Component Value Date    BUN 13 03/10/2023     Lab Results   Component Value Date    CREATININE 1.1 03/10/2023           PAST MEDICAL, FAMILY AND SOCIAL HISTORY UPDATE:  Past Medical History:   Diagnosis Date    BPH (benign prostatic hypertrophy)     CKD (chronic kidney disease) stage 2, GFR 60-89 ml/min     Dementia (HCC)     Erectile dysfunction     Frequent falls     [place] (hard of hearing)     Hyperlipidemia     Hypertension     Memory disorder     Metabolic bone disease     Orthostatic hypotension     TIA (transient ischemic attack)      Past Surgical History:   Procedure Laterality Date    COLONOSCOPY      ENDOSCOPY, COLON, DIAGNOSTIC      ESOPHAGUS SURGERY      NECK SURGERY      PROSTATE SURGERY

## 2023-04-27 NOTE — PROGRESS NOTES
Patient has a sever case dimensia, wife is here to help. Patient is hard of hearing and seems very confused of appointment and what is going on.

## 2023-04-27 NOTE — PATIENT INSTRUCTIONS
-Start Cardura 2 mg daily.   -Start Vesicare 5 mg daily.   -Avoid consuming large amounts of water at night  -Follow up in 1 month  -Call with questions, comments, or concerns. I recommend going to the ED for further evaluation if you develop fever, chills, nausea, vomiting, chest pain, SOB, or calf pain.

## 2023-05-02 ENCOUNTER — TELEPHONE (OUTPATIENT)
Dept: UROLOGY | Age: 88
End: 2023-05-02

## 2023-05-02 RX ORDER — TROSPIUM CHLORIDE 20 MG/1
20 TABLET, FILM COATED ORAL 2 TIMES DAILY
Qty: 60 TABLET | Refills: 3 | Status: SHIPPED | OUTPATIENT
Start: 2023-05-02

## 2023-05-02 NOTE — TELEPHONE ENCOUNTER
Prior Auth initiated for solifenacin . PA sent to [facility]. Should receive response in 3-5 days. Denied auth. See scan. Please advise.

## 2023-05-02 NOTE — TELEPHONE ENCOUNTER
Can try sanctura instead, follow up as scheduled. Discontinue Vesicare.        The patient should go to the ED should they develop fever, chills, nausea, vomiting, chest pain, SOB, calf pain, feelings of incomplete emptying, or should they otherwise feel they need evaluated

## 2023-05-23 ENCOUNTER — HOSPITAL ENCOUNTER (EMERGENCY)
Age: 88
Discharge: HOME | End: 2023-05-23
Attending: EMERGENCY MEDICINE
Payer: MEDICARE

## 2023-05-23 VITALS
HEART RATE: 63 BPM | TEMPERATURE: 98.3 F | OXYGEN SATURATION: 96 % | RESPIRATION RATE: 16 BRPM | HEIGHT: 70 IN | BODY MASS INDEX: 20.76 KG/M2 | DIASTOLIC BLOOD PRESSURE: 69 MMHG | SYSTOLIC BLOOD PRESSURE: 194 MMHG | WEIGHT: 145 LBS

## 2023-05-23 DIAGNOSIS — S70.02XA CONTUSION OF LEFT HIP, INITIAL ENCOUNTER: ICD-10-CM
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
## 2023-05-23 PROCEDURE — 99283 EMERGENCY DEPT VISIT LOW MDM: CPT

## 2023-05-23 ASSESSMENT — PAIN - FUNCTIONAL ASSESSMENT: PAIN_FUNCTIONAL_ASSESSMENT: NONE - DENIES PAIN

## 2023-05-23 NOTE — ED PROVIDER NOTES
kidney disease) stage 2, GFR 60-89 ml/min O14.4    Metabolic bone disease S69.4W6    Insomnia G47.00    Alcohol withdrawal (HCC) F10.939    Falls W19. XXXA    Hist of Near syncope R55    Orthostatic hypotension I95.1    Low serum cortisol level R79.89    Intractable back pain M54.9    Frequent falls R29.6    Hypokalemia E87.6    Alcohol intoxication ([address] F10.929    Alcohol intoxication delirium ([address] F10.121    Acute alcohol intoxication with alcoholism, with unspecified complication ([address] E35.233    Severe malnutrition ([address] E43    Delirium tremens ([address] F10.931    Dementia ([address] F03.90    TIA (transient ischemic attack) G45.9    SIRS (systemic inflammatory response syndrome) ([address] R65.10    Primary hypertension I10     SURGICAL HISTORY       Past Surgical History:   Procedure Laterality Date    COLONOSCOPY      ENDOSCOPY, COLON, DIAGNOSTIC      ESOPHAGUS SURGERY      NECK SURGERY      PROSTATE SURGERY      SHOULDER SURGERY      THYROID SURGERY      removal of a nodule    TONSILLECTOMY      TURP  5-31-12    Dr Marc Olivarez    TURP N/A 3/24/2023    CYSTOSCOPY, URETHRAL DILATION, GREENLIGHT PHOTO VAPORIZATION OF PROSTATE performed by Moon Badillo MD at [facility]       Discharge Medication List as of 5/23/2023  9:47 AM        CONTINUE these medications which have NOT CHANGED    Details   trospium (SANCTURA) 20 MG tablet Take 1 tablet by mouth 2 times daily, Disp-60 tablet, R-3Normal      doxazosin (CARDURA) 2 MG tablet Take 1 tablet by mouth daily, Disp-30 tablet, R-0Normal      solifenacin (VESICARE) 5 MG tablet Take 1 tablet by mouth daily, Disp-30 tablet, R-0Normal      allopurinol (ZYLOPRIM) 100 MG tablet Take 1 tablet by mouth dailyHistorical Med      folic acid (FOLVITE) 1 MG tablet Take 1 tablet by mouth daily, Disp-90 tablet, R-3Normal      metoprolol tartrate (LOPRESSOR) 25 MG tablet Take 1 tablet by mouth 2 times daily, Disp-60 tablet, R-5Normal      divalproex (DEPAKOTE ER) 250 MG

## 2023-05-23 NOTE — ED NOTES
Pt presents to the ER for a fall. Pt is from home and has a Hx of dementia and hypertension. Pt denies any pain. EMS states that pt's wife thought he hurt his hip. Pt denies any pain upon palpation.      Jsoeph Leon  05/23/23 9616

## 2023-06-08 ENCOUNTER — OFFICE VISIT (OUTPATIENT)
Dept: UROLOGY | Age: 88
End: 2023-06-08
Payer: MEDICARE

## 2023-06-08 VITALS — RESPIRATION RATE: 16 BRPM | HEIGHT: 70 IN | WEIGHT: 140 LBS | BODY MASS INDEX: 20.04 KG/M2

## 2023-06-08 DIAGNOSIS — N40.1 BENIGN LOCALIZED PROSTATIC HYPERPLASIA WITH LOWER URINARY TRACT SYMPTOMS (LUTS): Primary | ICD-10-CM

## 2023-06-08 DIAGNOSIS — R32 URINARY INCONTINENCE, UNSPECIFIED TYPE: ICD-10-CM

## 2023-06-08 DIAGNOSIS — N35.912 STRICTURE OF BULBOUS URETHRA IN MALE, UNSPECIFIED STRICTURE TYPE: ICD-10-CM

## 2023-06-08 LAB — POST VOID RESIDUAL (PVR): 131 ML

## 2023-06-08 PROCEDURE — 1036F TOBACCO NON-USER: CPT

## 2023-06-08 PROCEDURE — 1123F ACP DISCUSS/DSCN MKR DOCD: CPT

## 2023-06-08 PROCEDURE — G8427 DOCREV CUR MEDS BY ELIG CLIN: HCPCS

## 2023-06-08 PROCEDURE — 51798 US URINE CAPACITY MEASURE: CPT

## 2023-06-08 PROCEDURE — G8420 CALC BMI NORM PARAMETERS: HCPCS

## 2023-06-08 PROCEDURE — 99214 OFFICE O/P EST MOD 30 MIN: CPT

## 2023-06-08 RX ORDER — FINASTERIDE 5 MG/1
5 TABLET, FILM COATED ORAL DAILY
Qty: 30 TABLET | Refills: 2 | Status: SHIPPED | OUTPATIENT
Start: 2023-06-08

## 2023-06-08 NOTE — PATIENT INSTRUCTIONS
Do not take Trospium, vesciare 5 mg daily, or ditropan. Start Myrbetriq 50 mg daily. Start Finasteride. Continue Cardura. Limit consumption of fluids 2-3 hours prior to bed  Follow-up in 6- 8 weeks  Call with questions, comments, or concerns. I recommend going to the ED for further evaluation if you develop fever, chills, nausea, vomiting, chest pain, SOB, or calf pain.

## 2023-06-08 NOTE — PROGRESS NOTES
HPI  Moises Colindres is a 81 y.o. male that presents to the urology clinic for a medication check. 6/8/23  Mr. Thao Rizzo is an 81year old male that presents to the office with concerns of incontinence. He has tried Ditropan and Cardura. Cystoscopy revealed a bulbar stricture. Patient subsequently underwent a cystoscopy, urethral dilation, greenlight, photovaporization of the prostate with Dr. Simeon Bal on 3/27/2023. KUB on 4/23/2023 with moderate stool. Wife is concerned with the number of times he gets per night. Took ditropan in 2018. Vesicare was not approved. Sanctura 20 mg 2x per day. Not helpful. Still having episodes of incontinence     No fevers or chills. No nausea or vomiting. Pain Controlled.       Most recent PSA:   Lab Results   Component Value Date    PSA 0.68 09/14/2018        PVR: 131      Last BUN and creatinine:  Lab Results   Component Value Date    BUN 13 03/10/2023     Lab Results   Component Value Date    CREATININE 1.1 03/10/2023           PAST MEDICAL, FAMILY AND SOCIAL HISTORY UPDATE:  Past Medical History:   Diagnosis Date    BPH (benign prostatic hypertrophy)     CKD (chronic kidney disease) stage 2, GFR 60-89 ml/min     Dementia (HCC)     Erectile dysfunction     Frequent falls     [place] (hard of hearing)     Hyperlipidemia     Hypertension     Memory disorder     Metabolic bone disease     Orthostatic hypotension     TIA (transient ischemic attack)      Past Surgical History:   Procedure Laterality Date    COLONOSCOPY      ENDOSCOPY, COLON, DIAGNOSTIC      ESOPHAGUS SURGERY      NECK SURGERY      PROSTATE SURGERY      SHOULDER SURGERY      THYROID SURGERY      removal of a nodule    TONSILLECTOMY      TURP  5-31-12    Dr Shawanda Mariscal    TURP N/A 3/24/2023    CYSTOSCOPY, URETHRAL DILATION, GREENLIGHT PHOTO VAPORIZATION OF PROSTATE performed by Rose Dumont MD at [facility] History   Problem Relation Age of Onset    High Blood Pressure Father     Stroke Father     Stroke Sister

## 2023-07-07 LAB
ABSOLUTE BASO #: 0.02 K/UL (ref 0–0.2)
ABSOLUTE EOS #: 0.1 K/UL (ref 0–0.5)
ABSOLUTE LYMPH #: 1.02 K/UL (ref 1–4)
ABSOLUTE MONO #: 0.39 K/UL (ref 0.2–1)
ABSOLUTE NEUT #: 2.31 K/UL (ref 1.5–7.5)
BASOPHILS RELATIVE PERCENT: 0.5 %
EOSINOPHILS RELATIVE PERCENT: 2.6 %
HCT VFR BLD CALC: 32.5 % (ref 40–51)
HEMOGLOBIN: 11.1 G/DL (ref 13.5–17)
LYMPHOCYTE %: 26.5 %
MCH RBC QN AUTO: 33.2 PG (ref 25–33)
MCHC RBC AUTO-ENTMCNC: 34.2 G/DL (ref 31–36)
MCV RBC AUTO: 97.3 FL (ref 80–99)
MONOCYTES # BLD: 10.1 %
NEUTROPHILS RELATIVE PERCENT: 60 %
PDW BLD-RTO: 11.7 % (ref 11.5–15)
PLATELETS: 151 K/UL (ref 130–400)
PMV BLD AUTO: 10.5 FL (ref 9.3–13)
RBC: 3.34 M/UL (ref 4.5–6.1)
WBC: 3.9 K/UL (ref 3.5–11)

## 2023-07-08 LAB
ALBUMIN SERPL-MCNC: 4.1 G/DL (ref 3.5–5.2)
ALK PHOSPHATASE: 54 U/L (ref 40–125)
ALT SERPL-CCNC: 9 U/L (ref 5–50)
ANION GAP SERPL CALCULATED.3IONS-SCNC: 12 MEQ/L (ref 7–16)
AST SERPL-CCNC: 17 U/L (ref 9–50)
BILIRUB SERPL-MCNC: 0.5 MG/DL
BUN BLDV-MCNC: 17 MG/DL (ref 8–23)
CALCIUM SERPL-MCNC: 8.8 MG/DL (ref 8.5–10.5)
CHLORIDE BLD-SCNC: 107 MEQ/L (ref 95–107)
CHOLESTEROL/HDL RATIO: 2.8 RATIO
CHOLESTEROL: 184 MG/DL
CO2: 25 MEQ/L (ref 19–31)
CREAT SERPL-MCNC: 1.04 MG/DL (ref 0.8–1.4)
EGFR IF NONAFRICAN AMERICAN: 69 ML/MIN/1.73
GLUCOSE: 86 MG/DL (ref 70–99)
HDLC SERPL-MCNC: 66 MG/DL
LDL CHOLESTEROL CALCULATED: 104 MG/DL
LDL/HDL RATIO: 1.6 RATIO
POTASSIUM SERPL-SCNC: 3.9 MEQ/L (ref 3.5–5.4)
SODIUM BLD-SCNC: 144 MEQ/L (ref 133–146)
T4 FREE: 1 NG/DL (ref 0.8–1.9)
TOTAL PROTEIN: 6.3 G/DL (ref 6.1–8.3)
TRIGL SERPL-MCNC: 71 MG/DL
TSH SERPL DL<=0.05 MIU/L-ACNC: 3.13 UIU/ML (ref 0.4–4.1)
VLDLC SERPL CALC-MCNC: 14 MG/DL

## 2023-07-17 ENCOUNTER — OFFICE VISIT (OUTPATIENT)
Dept: UROLOGY | Age: 88
End: 2023-07-17
Payer: MEDICARE

## 2023-07-17 VITALS
SYSTOLIC BLOOD PRESSURE: 152 MMHG | HEIGHT: 70 IN | WEIGHT: 145.6 LBS | BODY MASS INDEX: 20.84 KG/M2 | DIASTOLIC BLOOD PRESSURE: 70 MMHG

## 2023-07-17 DIAGNOSIS — R32 URINARY INCONTINENCE, UNSPECIFIED TYPE: ICD-10-CM

## 2023-07-17 DIAGNOSIS — N35.912 STRICTURE OF BULBOUS URETHRA IN MALE, UNSPECIFIED STRICTURE TYPE: ICD-10-CM

## 2023-07-17 DIAGNOSIS — R35.1 NOCTURIA: ICD-10-CM

## 2023-07-17 DIAGNOSIS — N40.1 BENIGN LOCALIZED PROSTATIC HYPERPLASIA WITH LOWER URINARY TRACT SYMPTOMS (LUTS): Primary | ICD-10-CM

## 2023-07-17 LAB — POST VOID RESIDUAL (PVR): 153 ML

## 2023-07-17 PROCEDURE — 1036F TOBACCO NON-USER: CPT

## 2023-07-17 PROCEDURE — 1123F ACP DISCUSS/DSCN MKR DOCD: CPT

## 2023-07-17 PROCEDURE — 51798 US URINE CAPACITY MEASURE: CPT

## 2023-07-17 PROCEDURE — 99214 OFFICE O/P EST MOD 30 MIN: CPT

## 2023-07-17 PROCEDURE — G8427 DOCREV CUR MEDS BY ELIG CLIN: HCPCS

## 2023-07-17 PROCEDURE — G8420 CALC BMI NORM PARAMETERS: HCPCS

## 2023-07-17 RX ORDER — FINASTERIDE 5 MG/1
5 TABLET, FILM COATED ORAL DAILY
Qty: 30 TABLET | Refills: 2 | Status: SHIPPED | OUTPATIENT
Start: 2023-07-17

## 2023-07-17 NOTE — PROGRESS NOTES
DUARTE Pate is a 81 y.o. male that presents to the urology clinic for a medication check         7/17/23  Mr. Sherrell Louis is an 81year old male that presents to the office with concerns of incontinence. He has tried Ditropan and Cardura. Cystoscopy revealed a bulbar stricture. Patient subsequently underwent a cystoscopy, urethral dilation, greenlight, photovaporization of the prostate with Dr. Hollie Narayanan on 3/27/2023. KUB on 4/23/2023 with moderate stool. Wife is concerned with the number of times he gets per night. Took ditropan in 2018. Vesicare was not approved. Sanctura 20 mg 2x per day. Not helpful. Still having episodes of incontinence   Continue Cardura, myrbetriq, and finasteride.        Most recent PSA:   Lab Results   Component Value Date    PSA 0.68 09/14/2018      UA: unable to give urine sample  PVR: 153          Last BUN and creatinine:  Lab Results   Component Value Date    BUN 17 07/07/2023     Lab Results   Component Value Date    CREATININE 1.04 07/07/2023           PAST MEDICAL, FAMILY AND SOCIAL HISTORY UPDATE:  Past Medical History:   Diagnosis Date    BPH (benign prostatic hypertrophy)     CKD (chronic kidney disease) stage 2, GFR 60-89 ml/min     Dementia (HCC)     Erectile dysfunction     Frequent falls     [place] (hard of hearing)     Hyperlipidemia     Hypertension     Memory disorder     Metabolic bone disease     Orthostatic hypotension     TIA (transient ischemic attack)      Past Surgical History:   Procedure Laterality Date    COLONOSCOPY      ENDOSCOPY, COLON, DIAGNOSTIC      ESOPHAGUS SURGERY      NECK SURGERY      PROSTATE SURGERY      SHOULDER SURGERY      THYROID SURGERY      removal of a nodule    TONSILLECTOMY      TURP  5-31-12    Dr Freddy Scales    TURP N/A 3/24/2023    CYSTOSCOPY, URETHRAL DILATION, GREENLIGHT PHOTO VAPORIZATION OF PROSTATE performed by Last Cortez MD at [address] [place] [address] Drive History   Problem Relation Age of Onset    High Blood Pressure Father     Stroke Father

## 2023-07-17 NOTE — PATIENT INSTRUCTIONS
Limit intake of fluids 2-3 hours before bed  Continue finasteride and myrbetriq   Follow-up in 6 months or sooner if needed  Call with questions, comments, or concerns. I recommend going to the ED for further evaluation if you develop fever, chills, nausea, vomiting, chest pain, SOB, or calf pain.

## 2023-09-20 LAB
ALBUMIN SERPL-MCNC: 4.2 G/DL (ref 3.5–5.2)
ALK PHOSPHATASE: 56 U/L (ref 40–125)
ALT SERPL-CCNC: 10 U/L (ref 5–50)
ANION GAP SERPL CALCULATED.3IONS-SCNC: 10 MEQ/L (ref 7–16)
AST SERPL-CCNC: 23 U/L (ref 9–50)
BILIRUB SERPL-MCNC: 0.5 MG/DL
BUN BLDV-MCNC: 19 MG/DL (ref 8–23)
CALCIUM SERPL-MCNC: 8.9 MG/DL (ref 8.5–10.5)
CHLORIDE BLD-SCNC: 108 MEQ/L (ref 95–107)
CO2: 24 MEQ/L (ref 19–31)
CREAT SERPL-MCNC: 1.55 MG/DL (ref 0.8–1.4)
EGFR IF NONAFRICAN AMERICAN: 43 ML/MIN/1.73
FERRITIN: 834 NG/ML (ref 30–400)
GLUCOSE: 95 MG/DL (ref 70–99)
IRON: 82 UG/DL (ref 59–158)
POTASSIUM SERPL-SCNC: 4.5 MEQ/L (ref 3.5–5.4)
SODIUM BLD-SCNC: 142 MEQ/L (ref 133–146)
T4 FREE: 1.11 NG/DL (ref 0.8–1.9)
TOTAL PROTEIN: 6.9 G/DL (ref 6.1–8.3)
TSH SERPL DL<=0.05 MIU/L-ACNC: 2.25 UIU/ML (ref 0.4–4.1)

## 2023-09-21 LAB — COMMENT: NORMAL

## 2023-10-23 ENCOUNTER — OFFICE VISIT (OUTPATIENT)
Dept: ENT CLINIC | Age: 88
End: 2023-10-23
Payer: MEDICARE

## 2023-10-23 VITALS
TEMPERATURE: 98.1 F | RESPIRATION RATE: 20 BRPM | HEART RATE: 72 BPM | BODY MASS INDEX: 28.44 KG/M2 | HEIGHT: 60 IN | SYSTOLIC BLOOD PRESSURE: 136 MMHG | DIASTOLIC BLOOD PRESSURE: 58 MMHG | OXYGEN SATURATION: 98 %

## 2023-10-23 DIAGNOSIS — H90.3 SENSORINEURAL HEARING LOSS (SNHL), BILATERAL: ICD-10-CM

## 2023-10-23 DIAGNOSIS — H61.23 BILATERAL IMPACTED CERUMEN: Primary | ICD-10-CM

## 2023-10-23 PROCEDURE — 69210 REMOVE IMPACTED EAR WAX UNI: CPT | Performed by: NURSE PRACTITIONER

## 2023-10-23 PROCEDURE — 1036F TOBACCO NON-USER: CPT | Performed by: NURSE PRACTITIONER

## 2023-10-23 PROCEDURE — G8419 CALC BMI OUT NRM PARAM NOF/U: HCPCS | Performed by: NURSE PRACTITIONER

## 2023-10-23 PROCEDURE — 1123F ACP DISCUSS/DSCN MKR DOCD: CPT | Performed by: NURSE PRACTITIONER

## 2023-10-23 PROCEDURE — G8484 FLU IMMUNIZE NO ADMIN: HCPCS | Performed by: NURSE PRACTITIONER

## 2023-10-23 PROCEDURE — 99212 OFFICE O/P EST SF 10 MIN: CPT | Performed by: NURSE PRACTITIONER

## 2023-10-23 PROCEDURE — G8427 DOCREV CUR MEDS BY ELIG CLIN: HCPCS | Performed by: NURSE PRACTITIONER

## 2023-10-23 RX ORDER — TRAZODONE HYDROCHLORIDE 50 MG/1
TABLET ORAL
COMMUNITY
Start: 2023-10-16

## 2023-10-25 ENCOUNTER — TELEPHONE (OUTPATIENT)
Dept: ENT CLINIC | Age: 88
End: 2023-10-25

## 2023-10-25 NOTE — TELEPHONE ENCOUNTER
Please let the patient's daughter know that we have requested but not yet received the hearing test records from [facility]. clinic. It may take them some time to get them faxed to us.

## 2023-10-26 NOTE — TELEPHONE ENCOUNTER
[facility] Faxed back response on 10/26/2023 stating that the patient is not a registered pt at their facility. Left message for patient to call back and find out if it's another   [address] [place] [address] location.

## 2023-11-02 RX ORDER — FINASTERIDE 5 MG/1
5 TABLET, FILM COATED ORAL DAILY
Qty: 30 TABLET | Refills: 2 | Status: SHIPPED | OUTPATIENT
Start: 2023-11-02

## 2023-11-02 NOTE — TELEPHONE ENCOUNTER
Patient would like a 90 day supply          [facility] called requesting a refill on the following medications:  Requested Prescriptions     Pending Prescriptions Disp Refills    finasteride (PROSCAR) 5 MG tablet [Pharmacy Med Name: FINASTERIDE 5 MG TABLET] 30 tablet 2     Sig: take 1 tablet by mouth once daily     Pharmacy verified:  [email or web address]      Date of last visit:   Date of next visit (if applicable): 7/06/3104

## 2023-11-06 ENCOUNTER — OFFICE VISIT (OUTPATIENT)
Dept: NEUROLOGY | Age: 88
End: 2023-11-06
Payer: MEDICARE

## 2023-11-06 VITALS
HEART RATE: 72 BPM | DIASTOLIC BLOOD PRESSURE: 58 MMHG | HEIGHT: 60 IN | SYSTOLIC BLOOD PRESSURE: 135 MMHG | OXYGEN SATURATION: 97 % | BODY MASS INDEX: 28.44 KG/M2

## 2023-11-06 DIAGNOSIS — R44.3 HALLUCINATIONS: ICD-10-CM

## 2023-11-06 DIAGNOSIS — F01.50 VASCULAR DEMENTIA WITHOUT BEHAVIORAL DISTURBANCE (HCC): Primary | ICD-10-CM

## 2023-11-06 DIAGNOSIS — R41.3 MEMORY PROBLEM: ICD-10-CM

## 2023-11-06 PROCEDURE — 99213 OFFICE O/P EST LOW 20 MIN: CPT | Performed by: NURSE PRACTITIONER

## 2023-11-06 PROCEDURE — 1123F ACP DISCUSS/DSCN MKR DOCD: CPT | Performed by: NURSE PRACTITIONER

## 2023-11-06 PROCEDURE — G8484 FLU IMMUNIZE NO ADMIN: HCPCS | Performed by: NURSE PRACTITIONER

## 2023-11-06 PROCEDURE — G8419 CALC BMI OUT NRM PARAM NOF/U: HCPCS | Performed by: NURSE PRACTITIONER

## 2023-11-06 PROCEDURE — G8427 DOCREV CUR MEDS BY ELIG CLIN: HCPCS | Performed by: NURSE PRACTITIONER

## 2023-11-06 PROCEDURE — 1036F TOBACCO NON-USER: CPT | Performed by: NURSE PRACTITIONER

## 2023-11-06 RX ORDER — OXYBUTYNIN CHLORIDE 10 MG/1
10 TABLET, EXTENDED RELEASE ORAL DAILY
COMMUNITY

## 2023-11-06 RX ORDER — TROSPIUM CHLORIDE 20 MG/1
20 TABLET, FILM COATED ORAL NIGHTLY
COMMUNITY

## 2023-11-06 RX ORDER — MEGESTROL ACETATE 40 MG/ML
200 SUSPENSION ORAL DAILY
COMMUNITY

## 2023-11-06 RX ORDER — POLYETHYLENE GLYCOL 1450
POWDER (GRAM) MISCELLANEOUS
COMMUNITY

## 2023-11-06 RX ORDER — CLOPIDOGREL BISULFATE 75 MG/1
75 TABLET ORAL DAILY
COMMUNITY

## 2023-11-06 NOTE — PROGRESS NOTES
CONTRAST    Narrative  PROCEDURE: MRI BRAIN W WO CONTRAST    CLINICAL INFORMATION: Memory problem. Confusion    COMPARISON: MRI of the brain dated April 26, 2017. TECHNIQUE: Multiplanar and multiple spin echo T1 and T2-weighted images were obtained through the brain before and after the administration of 15 mL ProHance intravenous contrast.    FINDINGS:    There is prominence of the sulcal spaces and ventricular system secondary to generalized, age-related parenchymal volume loss. Moderate patchy foci of hyperintense T2 signal are again noted throughout the periventricular and deep cerebral white matter  which likely correspond to sequela of chronic microvascular ischemic change. There is a stable appearance of subtle hyperintense T2 signal along the medial temporal lobes bilaterally. This is nonspecific and may be artifactual. No restricted diffusion is  identified. Punctate foci of susceptibility are again noted within the posterior right frontal lobe and right parietal lobe which may correspond to remote microhemorrhages versus small cavernous malformations. The ventricles are midline and stable in size and morphology without evidence of hydrocephalus. No mass, mass effect or extra-axial fluid collection is identified. The basal cisterns and visualized vascular flow voids are patent. The pituitary, brain  stem and cervical medullary junction are within normal limits. No abnormal intracranial enhancement is identified. The visualized orbits and temporal bone structures are unremarkable. The paranasal sinuses are within normal limits. Impression  Stable senescent changes are present including moderate sequela of chronic microvascular ischemic angiopathy within the periventricular and deep cerebral white matter. No acute intracranial abnormality is identified. **This report has been created using voice recognition software.  It may contain minor errors which are inherent in voice recognition

## 2023-11-06 NOTE — PATIENT INSTRUCTIONS
Continue with  Depakote at current dose. Continue with Aspirin 81 mg daily  Continue with folic acid 1 mg daily   Continue with Aricept 10 mg nightly. Continue taking Vitamin D   Follow up as needed.    Call if any questions or concerns

## 2023-11-30 ENCOUNTER — HOSPITAL ENCOUNTER (OUTPATIENT)
Age: 88
End: 2023-11-30
Payer: MEDICARE

## 2023-11-30 ENCOUNTER — HOSPITAL ENCOUNTER (OUTPATIENT)
Dept: GENERAL RADIOLOGY | Age: 88
Discharge: HOME | End: 2023-11-30
Payer: MEDICARE

## 2023-11-30 DIAGNOSIS — M53.3 COCCYX PAIN: ICD-10-CM

## 2023-11-30 PROCEDURE — 72170 X-RAY EXAM OF PELVIS: CPT

## 2023-12-15 NOTE — DISCHARGE SUMMARY
Discharge Summary    Bella Hernandez  :  1935  MRN:  [id number]    Admit date:  2023  Discharge date:  2023    Admitting Physician:  Kameron Chambers MD    Discharge Diagnoses:    UTI  Dementia  CKD  Hypokalemia  Hypertension      Patient Active Problem List   Diagnosis    Erectile dysfunction    BPH with urinary obstruction    Lower urinary tract symptoms (LUTS)    Nocturia    Urinary retention    Feeling of incomplete bladder emptying    Hyperlipemia    Frequency of urination    ETD (eustachian tube dysfunction)    Dysphagia    Dysphonia    GERD (gastroesophageal reflux disease)    Zenker diverticula    Presbyesophagus    Voice disturbance    Disease of larynx    Subjective tinnitus    Sensorineural hearing loss, bilateral    Otalgia of left ear    supine Hypertension    CKD (chronic kidney disease) stage 2, GFR 60-89 ml/min    Metabolic bone disease    Insomnia    Alcohol withdrawal (HCC)    Falls    Hist of Near syncope    Orthostatic hypotension    Low serum cortisol level    Intractable back pain    Frequent falls    Hypokalemia    Alcohol intoxication ([address]    Alcohol intoxication delirium ([address]    Acute alcohol intoxication with alcoholism, with unspecified complication (HCC)    Severe malnutrition (HCC)    Delirium tremens ([address]    Dementia ([address]    TIA (transient ischemic attack)    SIRS (systemic inflammatory response syndrome) (HCC)    Urinary tract infection    Primary hypertension       Admission Condition:  serious  Discharged Condition:  good    Hospital Course:   patient with h/o dementia was admitted and treated for Pseudomonas UTI. His electrolytes abnormalities were corrected. He was discharged home to family members with OP HHS.     Discharge Medications:         Medication List        START taking these medications      ciprofloxacin 500 MG tablet  Commonly known as: CIPRO  Take 1 tablet by mouth every 12 hours for 12 doses            CHANGE how you take these medications This was placed in VF bin. Please check this was faxed.     Reena Marin PA-C     metoprolol tartrate 25 MG tablet  Commonly known as: LOPRESSOR  Take 1 tablet by mouth 2 times daily  What changed:   medication strength  when to take this            CONTINUE taking these medications      acetaminophen 500 MG tablet  Commonly known as: TYLENOL     aspirin 81 MG EC tablet     atorvastatin 10 MG tablet  Commonly known as: Lipitor  Take 1 tablet by mouth daily     divalproex 250 MG extended release tablet  Commonly known as: DEPAKOTE ER  Take 1 tablet by mouth daily.      docusate 100 MG Caps  Commonly known as: Colace  Take 100 mg by mouth daily     donepezil 10 MG tablet  Commonly known as: ARICEPT  Take 1 tablet by mouth nightly     doxazosin 2 MG tablet  Commonly known as: CARDURA     folic acid 1 MG tablet  Commonly known as: FOLVITE  Take 1 tablet by mouth daily     memantine 10 MG tablet  Commonly known as: NAMENDA     multivitamin tablet  Take 1 tablet by mouth daily     ondansetron 4 MG disintegrating tablet  Commonly known as: ZOFRAN-ODT  Take 1 tablet by mouth every 8 hours as needed for Nausea or Vomiting     oxybutynin 10 MG extended release tablet  Commonly known as: DITROPAN-XL  Take 1 tablet by mouth nightly     pantoprazole 40 MG tablet  Commonly known as: PROTONIX  Take 1 tablet by mouth every morning (before breakfast)     vitamin B-1 100 MG tablet  Commonly known as: THIAMINE     Vitamin D3 75 MCG (3000 UT) Tabs               Where to Get Your Medications        These medications were sent to [id number] - [place], [address] - F [phone]  [facility] [id number]      Phone: [phone]   ciprofloxacin 161 MG tablet  folic acid 1 MG tablet  metoprolol tartrate 25 MG tablet         Consults:  none    Significant Diagnostic Studies: labs: CBC:       Recent Labs     02/18/23  0453   WBC 10.6   HGB 9.1*   HCT 28.1*   MCV 98.9*   *         BMP:       Recent Labs     02/18/23 0453      K 3.8      CO2 26   BUN 19 CREATININE 0.9   CALCIUM 8.2*   GLUCOSE 86      Treatments:   antibiotics. Corrected lytes. Disposition:   home with family.     Signed:  Devante Burrows MD  2/24/2023, 12:59 PM

## 2024-01-08 NOTE — PATIENT INSTRUCTIONS
1. Continue with Depakote  mg daily. Refills given. 2. Continue with Aspirin 81 mg daily  3. Continue with folic acid 1 mg daily   4. Continue with Aricept 10 mg nightly. Refills given  5. No driving   6. Continue taking Vitamin D   7. Follow up in 4-5 months or sooner if needed.    8. Call if any questions or concerns show

## 2024-01-27 ENCOUNTER — HOSPITAL ENCOUNTER (EMERGENCY)
Age: 89
Discharge: HOME | End: 2024-01-27
Attending: EMERGENCY MEDICINE
Payer: MEDICARE

## 2024-01-27 ENCOUNTER — APPOINTMENT (OUTPATIENT)
Dept: GENERAL RADIOLOGY | Age: 89
End: 2024-01-27
Payer: MEDICARE

## 2024-01-27 VITALS
HEART RATE: 81 BPM | DIASTOLIC BLOOD PRESSURE: 75 MMHG | SYSTOLIC BLOOD PRESSURE: 125 MMHG | TEMPERATURE: 97.8 F | RESPIRATION RATE: 16 BRPM | OXYGEN SATURATION: 99 %

## 2024-01-27 DIAGNOSIS — U07.1 COVID-19: Primary | ICD-10-CM

## 2024-01-27 DIAGNOSIS — F02.80 DEMENTIA ASSOCIATED WITH OTHER UNDERLYING DISEASE, WITHOUT BEHAVIORAL DISTURBANCE, PSYCHOTIC DISTURBANCE, MOOD DISTURBANCE, OR ANXIETY, UNSPECIFIED DEMENTIA SEVERITY (HCC): ICD-10-CM

## 2024-01-27 DIAGNOSIS — R63.0 ANOREXIA: ICD-10-CM

## 2024-01-27 LAB
ALBUMIN SERPL BCG-MCNC: 3.6 G/DL (ref 3.5–5.1)
ALP SERPL-CCNC: 51 U/L (ref 38–126)
ALT SERPL W/O P-5'-P-CCNC: 6 U/L (ref 11–66)
ANION GAP SERPL CALC-SCNC: 7 MEQ/L (ref 8–16)
AST SERPL-CCNC: 15 U/L (ref 5–40)
BASOPHILS ABSOLUTE: 0 THOU/MM3 (ref 0–0.1)
BASOPHILS NFR BLD AUTO: 0.2 %
BILIRUB SERPL-MCNC: 0.4 MG/DL (ref 0.3–1.2)
BILIRUB UR QL STRIP.AUTO: NEGATIVE
BUN SERPL-MCNC: 17 MG/DL (ref 7–22)
CALCIUM SERPL-MCNC: 8.8 MG/DL (ref 8.5–10.5)
CHARACTER UR: CLEAR
CHLORIDE SERPL-SCNC: 108 MEQ/L (ref 98–111)
CO2 SERPL-SCNC: 25 MEQ/L (ref 23–33)
COLOR: YELLOW
CREAT SERPL-MCNC: 1.2 MG/DL (ref 0.4–1.2)
DEPRECATED RDW RBC AUTO: 41.7 FL (ref 35–45)
EOSINOPHIL NFR BLD AUTO: 1.3 %
EOSINOPHILS ABSOLUTE: 0.1 THOU/MM3 (ref 0–0.4)
ERYTHROCYTE [DISTWIDTH] IN BLOOD BY AUTOMATED COUNT: 11.6 % (ref 11.5–14.5)
FLUAV RNA RESP QL NAA+PROBE: NOT DETECTED
FLUBV RNA RESP QL NAA+PROBE: NOT DETECTED
GFR SERPL CREATININE-BSD FRML MDRD: 58 ML/MIN/1.73M2
GLUCOSE SERPL-MCNC: 85 MG/DL (ref 70–108)
GLUCOSE UR QL STRIP.AUTO: NEGATIVE MG/DL
HCT VFR BLD AUTO: 27.8 % (ref 42–52)
HGB BLD-MCNC: 9.2 GM/DL (ref 14–18)
HGB UR QL STRIP.AUTO: NEGATIVE
IMM GRANULOCYTES # BLD AUTO: 0.02 THOU/MM3 (ref 0–0.07)
IMM GRANULOCYTES NFR BLD AUTO: 0.4 %
KETONES UR QL STRIP.AUTO: NEGATIVE
LACTATE SERPL-SCNC: 1.3 MMOL/L (ref 0.5–2)
LYMPHOCYTES ABSOLUTE: 0.6 THOU/MM3 (ref 1–4.8)
LYMPHOCYTES NFR BLD AUTO: 10.5 %
MAGNESIUM SERPL-MCNC: 2 MG/DL (ref 1.6–2.4)
MCH RBC QN AUTO: 32.9 PG (ref 26–33)
MCHC RBC AUTO-ENTMCNC: 33.1 GM/DL (ref 32.2–35.5)
MCV RBC AUTO: 99.3 FL (ref 80–94)
MONOCYTES ABSOLUTE: 0.8 THOU/MM3 (ref 0.4–1.3)
MONOCYTES NFR BLD AUTO: 13.9 %
NEUTROPHILS NFR BLD AUTO: 73.7 %
NITRITE UR QL STRIP: NEGATIVE
NRBC BLD AUTO-RTO: 0 /100 WBC
OSMOLALITY SERPL CALC.SUM OF ELEC: 280.2 MOSMOL/KG (ref 275–300)
PH UR STRIP.AUTO: 8 [PH] (ref 5–9)
PLATELET # BLD AUTO: 153 THOU/MM3 (ref 130–400)
PMV BLD AUTO: 9.7 FL (ref 9.4–12.4)
POTASSIUM SERPL-SCNC: 4.3 MEQ/L (ref 3.5–5.2)
PROCALCITONIN SERPL IA-MCNC: 0.03 NG/ML (ref 0.01–0.09)
PROT SERPL-MCNC: 6.8 G/DL (ref 6.1–8)
PROT UR STRIP.AUTO-MCNC: NEGATIVE MG/DL
RBC # BLD AUTO: 2.8 MILL/MM3 (ref 4.7–6.1)
SARS-COV-2 RNA RESP QL NAA+PROBE: DETECTED
SEGMENTED NEUTROPHILS ABSOLUTE COUNT: 4.1 THOU/MM3 (ref 1.8–7.7)
SODIUM SERPL-SCNC: 140 MEQ/L (ref 135–145)
SP GR UR REFRACT.AUTO: 1.02 (ref 1–1.03)
T4 FREE SERPL-MCNC: 1.08 NG/DL (ref 0.93–1.76)
TROPONIN, HIGH SENSITIVITY: 20 NG/L (ref 0–12)
UROBILINOGEN, URINE: 1 EU/DL (ref 0–1)
WBC # BLD AUTO: 5.5 THOU/MM3 (ref 4.8–10.8)
WBC #/AREA URNS HPF: NEGATIVE /[HPF]

## 2024-01-27 PROCEDURE — 36415 COLL VENOUS BLD VENIPUNCTURE: CPT

## 2024-01-27 PROCEDURE — 96360 HYDRATION IV INFUSION INIT: CPT

## 2024-01-27 PROCEDURE — 6370000000 HC RX 637 (ALT 250 FOR IP): Performed by: EMERGENCY MEDICINE

## 2024-01-27 PROCEDURE — 2580000003 HC RX 258: Performed by: EMERGENCY MEDICINE

## 2024-01-27 PROCEDURE — 96361 HYDRATE IV INFUSION ADD-ON: CPT

## 2024-01-27 PROCEDURE — 83735 ASSAY OF MAGNESIUM: CPT

## 2024-01-27 PROCEDURE — 81003 URINALYSIS AUTO W/O SCOPE: CPT

## 2024-01-27 PROCEDURE — 85025 COMPLETE CBC W/AUTO DIFF WBC: CPT

## 2024-01-27 PROCEDURE — 84484 ASSAY OF TROPONIN QUANT: CPT

## 2024-01-27 PROCEDURE — 87636 SARSCOV2 & INF A&B AMP PRB: CPT

## 2024-01-27 PROCEDURE — 71046 X-RAY EXAM CHEST 2 VIEWS: CPT

## 2024-01-27 PROCEDURE — 83605 ASSAY OF LACTIC ACID: CPT

## 2024-01-27 PROCEDURE — 93005 ELECTROCARDIOGRAM TRACING: CPT | Performed by: EMERGENCY MEDICINE

## 2024-01-27 PROCEDURE — 99285 EMERGENCY DEPT VISIT HI MDM: CPT

## 2024-01-27 PROCEDURE — 80053 COMPREHEN METABOLIC PANEL: CPT

## 2024-01-27 PROCEDURE — 84439 ASSAY OF FREE THYROXINE: CPT

## 2024-01-27 PROCEDURE — 84145 PROCALCITONIN (PCT): CPT

## 2024-01-27 RX ORDER — SODIUM CHLORIDE 9 MG/ML
INJECTION, SOLUTION INTRAVENOUS CONTINUOUS
Status: DISCONTINUED | OUTPATIENT
Start: 2024-01-27 | End: 2024-01-27 | Stop reason: HOSPADM

## 2024-01-27 RX ORDER — ACETAMINOPHEN 325 MG/1
650 TABLET ORAL ONCE
Status: COMPLETED | OUTPATIENT
Start: 2024-01-27 | End: 2024-01-27

## 2024-01-27 RX ADMIN — SODIUM CHLORIDE: 9 INJECTION, SOLUTION INTRAVENOUS at 15:25

## 2024-01-27 RX ADMIN — ACETAMINOPHEN 650 MG: 325 TABLET ORAL at 18:11

## 2024-01-27 NOTE — ED NOTES
Pt arrived to ED via EMS with complaints of pts wife questioning his medication list at nursing home. States pt was started on depakote and she doesn't know why or what its for. Pt is in stable condition and at baseline mentation with respirations even and unlabored. Pts call light in reach.

## 2024-01-27 NOTE — ED PROVIDER NOTES
[facility] EMERGENCY DEPT      EMERGENCY MEDICINE     Room # 41/041A    Pt Name: Osmany Castellanos  MRN: [id number]  Birthdate 1935  Date of evaluation: 1/27/2024  Provider: Martir Sanz MD    CHIEF COMPLAINT       Chief Complaint   Patient presents with    Family believes pt is overmedicated      HISTORY OF PRESENT ILLNESS   Osmany Castellanos is a pleasant 88 y.o. male who presents to the emergency department from from nursing home, brought in by EMS for evaluation of getting weak this has been ongoing for the past 2 days however he has been noted also the progressive decline for the past 2 weeks.  The patient hardly can stand up he did not eat at all today.  Patient however did not have any shortness of breath or chest pain ,he had diarrhea 2 days ago with has resolved.  He has been noted to be shaking with chills for the past 2 weeks.  The patient had known history of dementia and hearing loss and lives at [facility].  The wife has been concerned about the Depakote that he is taking and thinks that he is overmedicated..    PASTMEDICAL HISTORY     Past Medical History:   Diagnosis Date    BPH (benign prostatic hypertrophy)     CKD (chronic kidney disease) stage 2, GFR 60-89 ml/min     Dementia (HCC)     Erectile dysfunction     Frequent falls     [place] (hard of hearing)     Hyperlipidemia     Hypertension     Memory disorder     Metabolic bone disease     Orthostatic hypotension     TIA (transient ischemic attack)        Patient Active Problem List   Diagnosis Code    Erectile dysfunction N52.9    BPH with urinary obstruction N40.1, N13.8    Lower urinary tract symptoms (LUTS) R39.9    Nocturia R35.1    Urinary retention R33.9    Feeling of incomplete bladder emptying R39.14    Hyperlipemia E78.5    Frequency of urination R35.0    ETD (eustachian tube dysfunction) H69.90    Dysphagia R13.10    Dysphonia R49.0    GERD (gastroesophageal reflux disease) K21.9    Zenker diverticula K22.5     The transcription may contain errors not detected in proofreading.  This transcription was electronically signed.)     01/27/24 6:17 PM      Martir Sanz MD      Emergency room physician

## 2024-01-27 NOTE — ED NOTES
Pt resting quietly with eyes closed on cot. Updated on POC. Denies any current needs. Call light within reach.

## 2024-01-27 NOTE — ED NOTES
Pt assisted with feeding by this RN. Pt tolerated well. Remains in stable condition. Report called to pts assisted living community at this time.

## 2024-01-27 NOTE — DISCHARGE INSTRUCTIONS
Check your blood pressure, pulse rate, respiratory rate, pulse oximetry 3 times a day, make a record and bring it to your family doctor.  Notify your doctor if your pulse oximetry is 92 and below or go to the emergency room.    Take over-the-counter vitamin C, vitamin D, zinc and aspirin

## 2024-01-28 LAB
EKG ATRIAL RATE: 84 BPM
EKG P AXIS: 22 DEGREES
EKG P-R INTERVAL: 140 MS
EKG Q-T INTERVAL: 416 MS
EKG QRS DURATION: 132 MS
EKG QTC CALCULATION (BAZETT): 491 MS
EKG R AXIS: -35 DEGREES
EKG T AXIS: 129 DEGREES
EKG VENTRICULAR RATE: 84 BPM

## 2024-01-28 PROCEDURE — 93010 ELECTROCARDIOGRAM REPORT: CPT | Performed by: INTERNAL MEDICINE

## 2024-02-03 ENCOUNTER — APPOINTMENT (OUTPATIENT)
Dept: GENERAL RADIOLOGY | Age: 89
DRG: 811 | End: 2024-02-03
Payer: MEDICARE

## 2024-02-03 ENCOUNTER — HOSPITAL ENCOUNTER (INPATIENT)
Age: 89
LOS: 4 days | Discharge: HOME | DRG: 811 | End: 2024-02-07
Attending: PHYSICIAN ASSISTANT | Admitting: INTERNAL MEDICINE
Payer: MEDICARE

## 2024-02-03 DIAGNOSIS — D64.9 SYMPTOMATIC ANEMIA: ICD-10-CM

## 2024-02-03 DIAGNOSIS — D59.9 ACQUIRED HEMOLYTIC ANEMIA (HCC): ICD-10-CM

## 2024-02-03 DIAGNOSIS — D64.9 ANEMIA, UNSPECIFIED TYPE: Primary | ICD-10-CM

## 2024-02-03 DIAGNOSIS — R55 NEAR SYNCOPE: ICD-10-CM

## 2024-02-03 LAB
ABO: NORMAL
ALBUMIN SERPL BCG-MCNC: 3.6 G/DL (ref 3.5–5.1)
ALP SERPL-CCNC: 45 U/L (ref 38–126)
ALT SERPL W/O P-5'-P-CCNC: 6 U/L (ref 11–66)
ANION GAP SERPL CALC-SCNC: 11 MEQ/L (ref 8–16)
ANTIBODY SCREEN: NORMAL
AST SERPL-CCNC: 22 U/L (ref 5–40)
BILIRUB CONJ SERPL-MCNC: 0.3 MG/DL (ref 0–0.3)
BILIRUB INDIRECT SERPL-MCNC: 2.1 MG/DL (ref 0–0.6)
BILIRUB SERPL-MCNC: 2.4 MG/DL (ref 0.3–1.2)
BILIRUB SERPL-MCNC: 2.5 MG/DL (ref 0.3–1.2)
BUN SERPL-MCNC: 33 MG/DL (ref 7–22)
CALCIUM SERPL-MCNC: 8.5 MG/DL (ref 8.5–10.5)
CHLORIDE SERPL-SCNC: 111 MEQ/L (ref 98–111)
CO2 SERPL-SCNC: 25 MEQ/L (ref 23–33)
CREAT SERPL-MCNC: 1 MG/DL (ref 0.4–1.2)
FERRITIN SERPL IA-MCNC: 5542 NG/ML (ref 22–322)
FOLATE SERPL-MCNC: > 20 NG/ML (ref 4.8–24.2)
GFR SERPL CREATININE-BSD FRML MDRD: > 60 ML/MIN/1.73M2
GLUCOSE SERPL-MCNC: 93 MG/DL (ref 70–108)
HCT VFR BLD AUTO: 22.6 % (ref 42–52)
HEMOCCULT STL QL: NEGATIVE
HGB BLD-MCNC: 7.4 GM/DL (ref 14–18)
IRON SATN MFR SERPL: 90 % (ref 20–50)
IRON SERPL-MCNC: 157 UG/DL (ref 65–195)
LACTATE SERPL-SCNC: 1.2 MMOL/L (ref 0.5–2)
LDH SERPL L TO P-CCNC: 306 U/L (ref 100–190)
OSMOLALITY SERPL CALC.SUM OF ELEC: 299.4 MOSMOL/KG (ref 275–300)
POTASSIUM SERPL-SCNC: 3.8 MEQ/L (ref 3.5–5.2)
PROCALCITONIN SERPL IA-MCNC: 0.05 NG/ML (ref 0.01–0.09)
PROT SERPL-MCNC: 6.6 G/DL (ref 6.1–8)
RH FACTOR: NORMAL
SCAN OF BLOOD SMEAR: NORMAL
SODIUM SERPL-SCNC: 147 MEQ/L (ref 135–145)
TIBC SERPL-MCNC: < 174 UG/DL (ref 171–450)
VIT B12 SERPL-MCNC: 548 PG/ML (ref 211–911)

## 2024-02-03 PROCEDURE — 82248 BILIRUBIN DIRECT: CPT

## 2024-02-03 PROCEDURE — 1200000003 HC TELEMETRY R&B

## 2024-02-03 PROCEDURE — 83010 ASSAY OF HAPTOGLOBIN QUANT: CPT

## 2024-02-03 PROCEDURE — 2580000003 HC RX 258: Performed by: PHYSICIAN ASSISTANT

## 2024-02-03 PROCEDURE — 86900 BLOOD TYPING SEROLOGIC ABO: CPT

## 2024-02-03 PROCEDURE — 84145 PROCALCITONIN (PCT): CPT

## 2024-02-03 PROCEDURE — 85018 HEMOGLOBIN: CPT

## 2024-02-03 PROCEDURE — 36415 COLL VENOUS BLD VENIPUNCTURE: CPT

## 2024-02-03 PROCEDURE — 86923 COMPATIBILITY TEST ELECTRIC: CPT

## 2024-02-03 PROCEDURE — 99285 EMERGENCY DEPT VISIT HI MDM: CPT

## 2024-02-03 PROCEDURE — 82247 BILIRUBIN TOTAL: CPT

## 2024-02-03 PROCEDURE — 86850 RBC ANTIBODY SCREEN: CPT

## 2024-02-03 PROCEDURE — 83605 ASSAY OF LACTIC ACID: CPT

## 2024-02-03 PROCEDURE — 85014 HEMATOCRIT: CPT

## 2024-02-03 PROCEDURE — 86880 COOMBS TEST DIRECT: CPT

## 2024-02-03 PROCEDURE — 83540 ASSAY OF IRON: CPT

## 2024-02-03 PROCEDURE — 82607 VITAMIN B-12: CPT

## 2024-02-03 PROCEDURE — 30233N1 TRANSFUSION OF NONAUTOLOGOUS RED BLOOD CELLS INTO PERIPHERAL VEIN, PERCUTANEOUS APPROACH: ICD-10-PCS | Performed by: INTERNAL MEDICINE

## 2024-02-03 PROCEDURE — 83550 IRON BINDING TEST: CPT

## 2024-02-03 PROCEDURE — 82728 ASSAY OF FERRITIN: CPT

## 2024-02-03 PROCEDURE — 99223 1ST HOSP IP/OBS HIGH 75: CPT | Performed by: PHYSICIAN ASSISTANT

## 2024-02-03 PROCEDURE — 82746 ASSAY OF FOLIC ACID SERUM: CPT

## 2024-02-03 PROCEDURE — 93005 ELECTROCARDIOGRAM TRACING: CPT | Performed by: PHYSICIAN ASSISTANT

## 2024-02-03 PROCEDURE — 86901 BLOOD TYPING SEROLOGIC RH(D): CPT

## 2024-02-03 PROCEDURE — 2580000003 HC RX 258: Performed by: NURSE PRACTITIONER

## 2024-02-03 PROCEDURE — 36430 TRANSFUSION BLD/BLD COMPNT: CPT

## 2024-02-03 PROCEDURE — 83615 LACTATE (LD) (LDH) ENZYME: CPT

## 2024-02-03 PROCEDURE — 71046 X-RAY EXAM CHEST 2 VIEWS: CPT

## 2024-02-03 PROCEDURE — 85025 COMPLETE CBC W/AUTO DIFF WBC: CPT

## 2024-02-03 PROCEDURE — P9016 RBC LEUKOCYTES REDUCED: HCPCS

## 2024-02-03 PROCEDURE — 82272 OCCULT BLD FECES 1-3 TESTS: CPT

## 2024-02-03 PROCEDURE — 80053 COMPREHEN METABOLIC PANEL: CPT

## 2024-02-03 RX ORDER — SODIUM CHLORIDE 0.9 % (FLUSH) 0.9 %
5-40 SYRINGE (ML) INJECTION PRN
Status: DISCONTINUED | OUTPATIENT
Start: 2024-02-03 | End: 2024-02-07 | Stop reason: HOSPADM

## 2024-02-03 RX ORDER — PANTOPRAZOLE SODIUM 40 MG/1
40 TABLET, DELAYED RELEASE ORAL
Status: DISCONTINUED | OUTPATIENT
Start: 2024-02-04 | End: 2024-02-07 | Stop reason: HOSPADM

## 2024-02-03 RX ORDER — OXYBUTYNIN CHLORIDE 10 MG/1
10 TABLET, EXTENDED RELEASE ORAL DAILY
Status: DISCONTINUED | OUTPATIENT
Start: 2024-02-04 | End: 2024-02-07 | Stop reason: HOSPADM

## 2024-02-03 RX ORDER — SODIUM CHLORIDE 0.9 % (FLUSH) 0.9 %
5-40 SYRINGE (ML) INJECTION EVERY 12 HOURS SCHEDULED
Status: DISCONTINUED | OUTPATIENT
Start: 2024-02-03 | End: 2024-02-07 | Stop reason: HOSPADM

## 2024-02-03 RX ORDER — TRAZODONE HYDROCHLORIDE 50 MG/1
50 TABLET ORAL NIGHTLY
Status: DISCONTINUED | OUTPATIENT
Start: 2024-02-03 | End: 2024-02-07 | Stop reason: HOSPADM

## 2024-02-03 RX ORDER — SODIUM CHLORIDE 450 MG/100ML
INJECTION, SOLUTION INTRAVENOUS CONTINUOUS
Status: ACTIVE | OUTPATIENT
Start: 2024-02-03 | End: 2024-02-04

## 2024-02-03 RX ORDER — MIRTAZAPINE 15 MG/1
15 TABLET, FILM COATED ORAL NIGHTLY
Status: DISCONTINUED | OUTPATIENT
Start: 2024-02-03 | End: 2024-02-07 | Stop reason: HOSPADM

## 2024-02-03 RX ORDER — FOLIC ACID 1 MG/1
1 TABLET ORAL DAILY
Status: DISCONTINUED | OUTPATIENT
Start: 2024-02-03 | End: 2024-02-07 | Stop reason: HOSPADM

## 2024-02-03 RX ORDER — CLOPIDOGREL BISULFATE 75 MG/1
75 TABLET ORAL DAILY
Status: DISCONTINUED | OUTPATIENT
Start: 2024-02-03 | End: 2024-02-07 | Stop reason: HOSPADM

## 2024-02-03 RX ORDER — FOLIC ACID/VIT B COMPLEX AND C 5 MG
TABLET ORAL DAILY
Status: DISCONTINUED | OUTPATIENT
Start: 2024-02-03 | End: 2024-02-07 | Stop reason: HOSPADM

## 2024-02-03 RX ORDER — MEMANTINE HYDROCHLORIDE 5 MG/1
5 TABLET ORAL DAILY
Status: DISCONTINUED | OUTPATIENT
Start: 2024-02-04 | End: 2024-02-07 | Stop reason: HOSPADM

## 2024-02-03 RX ORDER — ATORVASTATIN CALCIUM 10 MG/1
10 TABLET, FILM COATED ORAL DAILY
Status: DISCONTINUED | OUTPATIENT
Start: 2024-02-03 | End: 2024-02-07 | Stop reason: HOSPADM

## 2024-02-03 RX ORDER — AMLODIPINE BESYLATE 10 MG/1
10 TABLET ORAL DAILY
Status: DISCONTINUED | OUTPATIENT
Start: 2024-02-03 | End: 2024-02-07 | Stop reason: HOSPADM

## 2024-02-03 RX ORDER — DONEPEZIL HYDROCHLORIDE 10 MG/1
10 TABLET, FILM COATED ORAL NIGHTLY
Status: DISCONTINUED | OUTPATIENT
Start: 2024-02-03 | End: 2024-02-07 | Stop reason: HOSPADM

## 2024-02-03 RX ORDER — POLYETHYLENE GLYCOL 3350 17 G/17G
17 POWDER, FOR SOLUTION ORAL DAILY PRN
Status: DISCONTINUED | OUTPATIENT
Start: 2024-02-03 | End: 2024-02-07 | Stop reason: HOSPADM

## 2024-02-03 RX ORDER — ASPIRIN 81 MG/1
81 TABLET ORAL DAILY
Status: DISCONTINUED | OUTPATIENT
Start: 2024-02-03 | End: 2024-02-07 | Stop reason: HOSPADM

## 2024-02-03 RX ORDER — SODIUM CHLORIDE 9 MG/ML
INJECTION, SOLUTION INTRAVENOUS PRN
Status: DISCONTINUED | OUTPATIENT
Start: 2024-02-03 | End: 2024-02-07 | Stop reason: HOSPADM

## 2024-02-03 RX ORDER — TROSPIUM CHLORIDE 20 MG/1
20 TABLET, FILM COATED ORAL NIGHTLY
Status: DISCONTINUED | OUTPATIENT
Start: 2024-02-03 | End: 2024-02-07 | Stop reason: HOSPADM

## 2024-02-03 RX ORDER — ONDANSETRON 4 MG/1
4 TABLET, ORALLY DISINTEGRATING ORAL EVERY 8 HOURS PRN
Status: DISCONTINUED | OUTPATIENT
Start: 2024-02-03 | End: 2024-02-07 | Stop reason: HOSPADM

## 2024-02-03 RX ORDER — MIRTAZAPINE 15 MG/1
15 TABLET, FILM COATED ORAL NIGHTLY
COMMUNITY

## 2024-02-03 RX ORDER — AMLODIPINE BESYLATE 10 MG/1
10 TABLET ORAL DAILY
Status: ON HOLD | COMMUNITY
End: 2024-02-07 | Stop reason: HOSPADM

## 2024-02-03 RX ORDER — ALLOPURINOL 100 MG/1
100 TABLET ORAL DAILY
Status: DISCONTINUED | OUTPATIENT
Start: 2024-02-04 | End: 2024-02-07 | Stop reason: HOSPADM

## 2024-02-03 RX ORDER — DOXAZOSIN 2 MG/1
2 TABLET ORAL DAILY
Status: DISCONTINUED | OUTPATIENT
Start: 2024-02-03 | End: 2024-02-07 | Stop reason: HOSPADM

## 2024-02-03 RX ORDER — DIVALPROEX SODIUM 250 MG/1
250 TABLET, EXTENDED RELEASE ORAL DAILY
Status: DISCONTINUED | OUTPATIENT
Start: 2024-02-04 | End: 2024-02-07 | Stop reason: HOSPADM

## 2024-02-03 RX ORDER — MEGESTROL ACETATE 40 MG/ML
200 SUSPENSION ORAL DAILY
Status: DISCONTINUED | OUTPATIENT
Start: 2024-02-04 | End: 2024-02-07 | Stop reason: HOSPADM

## 2024-02-03 RX ORDER — MAGNESIUM SULFATE IN WATER 40 MG/ML
2000 INJECTION, SOLUTION INTRAVENOUS PRN
Status: DISCONTINUED | OUTPATIENT
Start: 2024-02-03 | End: 2024-02-07 | Stop reason: HOSPADM

## 2024-02-03 RX ORDER — ACETAMINOPHEN 325 MG/1
650 TABLET ORAL EVERY 6 HOURS PRN
Status: DISCONTINUED | OUTPATIENT
Start: 2024-02-03 | End: 2024-02-07 | Stop reason: HOSPADM

## 2024-02-03 RX ORDER — POTASSIUM CHLORIDE 20 MEQ/1
40 TABLET, EXTENDED RELEASE ORAL PRN
Status: DISCONTINUED | OUTPATIENT
Start: 2024-02-03 | End: 2024-02-07 | Stop reason: HOSPADM

## 2024-02-03 RX ORDER — SODIUM CHLORIDE 9 MG/ML
INJECTION, SOLUTION INTRAVENOUS CONTINUOUS
Status: DISCONTINUED | OUTPATIENT
Start: 2024-02-03 | End: 2024-02-03

## 2024-02-03 RX ORDER — ACETAMINOPHEN 650 MG/1
650 SUPPOSITORY RECTAL EVERY 6 HOURS PRN
Status: DISCONTINUED | OUTPATIENT
Start: 2024-02-03 | End: 2024-02-07 | Stop reason: HOSPADM

## 2024-02-03 RX ORDER — ONDANSETRON 2 MG/ML
4 INJECTION INTRAMUSCULAR; INTRAVENOUS EVERY 6 HOURS PRN
Status: DISCONTINUED | OUTPATIENT
Start: 2024-02-03 | End: 2024-02-07 | Stop reason: HOSPADM

## 2024-02-03 RX ORDER — 0.9 % SODIUM CHLORIDE 0.9 %
500 INTRAVENOUS SOLUTION INTRAVENOUS ONCE
Status: COMPLETED | OUTPATIENT
Start: 2024-02-03 | End: 2024-02-03

## 2024-02-03 RX ORDER — ESOMEPRAZOLE MAGNESIUM 40 MG/1
40 GRANULE, DELAYED RELEASE ORAL DAILY
COMMUNITY

## 2024-02-03 RX ORDER — POTASSIUM CHLORIDE 7.45 MG/ML
10 INJECTION INTRAVENOUS PRN
Status: DISCONTINUED | OUTPATIENT
Start: 2024-02-03 | End: 2024-02-07 | Stop reason: HOSPADM

## 2024-02-03 RX ADMIN — SODIUM CHLORIDE 500 ML: 9 INJECTION, SOLUTION INTRAVENOUS at 13:51

## 2024-02-03 RX ADMIN — SODIUM CHLORIDE, PRESERVATIVE FREE 10 ML: 5 INJECTION INTRAVENOUS at 20:54

## 2024-02-03 RX ADMIN — SODIUM CHLORIDE: 4.5 INJECTION, SOLUTION INTRAVENOUS at 21:16

## 2024-02-03 ASSESSMENT — PAIN SCALES - WONG BAKER
WONGBAKER_NUMERICALRESPONSE: 0

## 2024-02-03 ASSESSMENT — PAIN SCALES - GENERAL
PAINLEVEL_OUTOF10: 0

## 2024-02-03 ASSESSMENT — PAIN - FUNCTIONAL ASSESSMENT
PAIN_FUNCTIONAL_ASSESSMENT: 0-10
PAIN_FUNCTIONAL_ASSESSMENT: 0-10
PAIN_FUNCTIONAL_ASSESSMENT: NONE - DENIES PAIN

## 2024-02-03 NOTE — ED NOTES
ED to inpatient nurses report      Chief Complaint:  Chief Complaint   Patient presents with    Dizziness     Present to ED from: [facility]    MOA:     LOC: alert to only name  Mobility: Requires assistance * 2  Oxygen Baseline: RA    Current needs required: RA     Code Status:   Prior    What abnormal results were found and what did you give/do to treat them? Hemoglobin 6.2. Type and screen drawn. Transfusion consent signed  Any procedures or intervention occur?     Mental Status:  Level of Consciousness: Alert (0)    Psych Assessment:        Vitals:  Patient Vitals for the past 24 hrs:   BP Temp Temp src Pulse Resp SpO2 Height Weight   02/03/24 1530 (!) 136/50 -- -- 74 18 98 % -- --   02/03/24 1353 (!) 129/52 -- -- 78 17 98 % -- --   02/03/24 1301 137/65 98.1 °F (36.7 °C) Oral 81 18 98 % 1.524 m (5') 68.4 kg (150 lb 14.4 oz)        LDAs:   Peripheral IV 02/03/24 Right Forearm (Active)   Site Assessment Clean, dry & intact 02/03/24 1537   Line Status Infusing 02/03/24 1537   Phlebitis Assessment No symptoms 02/03/24 1537   Infiltration Assessment 0 02/03/24 1537   Dressing Status Clean, dry & intact 02/03/24 1537   Dressing Type Transparent 02/03/24 1537       Ambulatory Status:  No data recorded    Diagnosis:  DISPOSITION Admitted 02/03/2024 04:18:49 PM   Final diagnoses:   Anemia, unspecified type   Near syncope        Consults:  None     Pain Score:  Pain Assessment  Pain Assessment: 0-10  Pain Level: 0    C-SSRS:        Sepsis Screening:  Sepsis Risk Score: 1.09    Rachael Fall Risk:       Swallow Screening        Preferred Language:   English      ALLERGIES     Patient has no known allergies.    SURGICAL HISTORY       Past Surgical History:   Procedure Laterality Date    COLONOSCOPY      ENDOSCOPY, COLON, DIAGNOSTIC      ESOPHAGUS SURGERY      NECK SURGERY      PROSTATE SURGERY      SHOULDER SURGERY      THYROID SURGERY      removal of a nodule    TONSILLECTOMY      TURP  5-31-12    Dr Maradiaga

## 2024-02-03 NOTE — ED NOTES
Patient transported to [facility] on cart and in stable condition. Contacted floor before transport, and spoke with Charge Maryana.

## 2024-02-03 NOTE — H&P
Regular    Fluids: half-normal saline at 100 mL/h for 12 hours    Code Status: Full Code    Therapies: Physical therapy, Occupational therapy, and Speech language pathology    Tele:   [x] yes             [] no      Thank you Dannielle Steward MD for the opportunity to be involved in this patient's care.    Electronically signed by Vinnie Rowe PA-C on 2/3/2024 at 6:11 PM

## 2024-02-03 NOTE — ED PROVIDER NOTES
[facility] Emergency Department    CHIEF COMPLAINT       Chief Complaint   Patient presents with    Dizziness       Nurses Notes reviewed and I agree except as noted in the HPI.    HISTORY OF PRESENT ILLNESS   Osmany Castellanos is a 88 y.o. male who presents to the ED for evaluation of lightheadedness.  Grandson at bedside reports patient was at his nursing home today, and that they tried to stand him up to go eat, and he nearly passed out.  They sat him down in a chair, and he also nearly passed out.  They note he has not been eating much.  He was recently diagnosed with COVID-19.  He has a history of dementia, and is confused at baseline.  They deny any fevers, diarrhea, nausea or vomiting.  Patient has past medical history of hypertension, chronic kidney disease, orthostatic hypotension, TIA.          HPI was provided by the patient.         PAST MEDICAL HISTORY     Past Medical History:   Diagnosis Date    BPH (benign prostatic hypertrophy)     CKD (chronic kidney disease) stage 2, GFR 60-89 ml/min     Dementia (HCC)     Disease of larynx     Dysphagia     Erectile dysfunction     Frequent falls     GERD (gastroesophageal reflux disease)     [place] (hard of hearing)     Hyperlipidemia     Hypertension     Memory disorder     Metabolic bone disease     Orthostatic hypotension     Sleep apnea     TIA (transient ischemic attack)     Zenker diverticula        SURGICALHISTORY      has a past surgical history that includes shoulder surgery; Neck surgery; Tonsillectomy; TURP (5-31-12); Colonoscopy; Thyroid surgery; Prostate surgery; Endoscopy, colon, diagnostic; Esophagus surgery; and TURP (N/A, 3/24/2023).    CURRENT MEDICATIONS       Previous Medications    ACETAMINOPHEN (TYLENOL) 500 MG TABLET    Take 1 tablet by mouth every 6 hours as needed for Pain prn    ALLOPURINOL (ZYLOPRIM) 100 MG TABLET    Take 1 tablet by mouth daily    AMLODIPINE (NORVASC) 10 MG TABLET    Take 1 tablet by mouth daily    ASPIRIN 81 MG EC

## 2024-02-03 NOTE — ED TRIAGE NOTES
Pt to ED from [facility] with c/o dizziness. Pt has hx of orthostatic hypotension. EMS states pt was getting up to eat lunch and felt like he was going to pass out. Family at bedside states that when they stood him up pt started to collapse and they caught him. States when they sat him in the chair he started to fall forward as well. Pt has hx of dementia. VSS

## 2024-02-04 LAB
ALBUMIN SERPL BCG-MCNC: 3.2 G/DL (ref 3.5–5.1)
ALP SERPL-CCNC: 43 U/L (ref 38–126)
ALT SERPL W/O P-5'-P-CCNC: < 5 U/L (ref 11–66)
ANION GAP SERPL CALC-SCNC: 8 MEQ/L (ref 8–16)
ANISOCYTOSIS BLD QL SMEAR: PRESENT
AST SERPL-CCNC: 22 U/L (ref 5–40)
AUTO DIFF PNL BLD: ABNORMAL
BASOPHILS ABSOLUTE: 0 THOU/MM3 (ref 0–0.1)
BASOPHILS ABSOLUTE: 0 THOU/MM3 (ref 0–0.1)
BASOPHILS NFR BLD AUTO: 0.1 %
BASOPHILS NFR BLD AUTO: 0.1 %
BILIRUB CONJ SERPL-MCNC: 0.4 MG/DL (ref 0–0.3)
BILIRUB SERPL-MCNC: 2.2 MG/DL (ref 0.3–1.2)
BUN SERPL-MCNC: 26 MG/DL (ref 7–22)
BURR CELLS BLD QL SMEAR: ABNORMAL
CALCIUM SERPL-MCNC: 8.6 MG/DL (ref 8.5–10.5)
CHLORIDE SERPL-SCNC: 110 MEQ/L (ref 98–111)
CO2 SERPL-SCNC: 23 MEQ/L (ref 23–33)
CREAT SERPL-MCNC: 0.9 MG/DL (ref 0.4–1.2)
DAT IGG: NORMAL
DAT POLY-SP REAG RBC QL: NORMAL
DEPRECATED RDW RBC AUTO: 45.1 FL (ref 35–45)
DEPRECATED RDW RBC AUTO: 53.3 FL (ref 35–45)
EKG ATRIAL RATE: 79 BPM
EKG P AXIS: 29 DEGREES
EKG P-R INTERVAL: 140 MS
EKG Q-T INTERVAL: 440 MS
EKG QRS DURATION: 134 MS
EKG QTC CALCULATION (BAZETT): 504 MS
EKG R AXIS: 12 DEGREES
EKG T AXIS: 158 DEGREES
EKG VENTRICULAR RATE: 79 BPM
EOSINOPHIL NFR BLD AUTO: 1.1 %
EOSINOPHIL NFR BLD AUTO: 1.4 %
EOSINOPHILS ABSOLUTE: 0.1 THOU/MM3 (ref 0–0.4)
EOSINOPHILS ABSOLUTE: 0.1 THOU/MM3 (ref 0–0.4)
ERYTHROCYTE [DISTWIDTH] IN BLOOD BY AUTOMATED COUNT: 11.9 % (ref 11.5–14.5)
ERYTHROCYTE [DISTWIDTH] IN BLOOD BY AUTOMATED COUNT: 14.6 % (ref 11.5–14.5)
GFR SERPL CREATININE-BSD FRML MDRD: > 60 ML/MIN/1.73M2
GLUCOSE SERPL-MCNC: 77 MG/DL (ref 70–108)
HCT VFR BLD AUTO: 19.8 % (ref 42–52)
HCT VFR BLD AUTO: 22.5 % (ref 42–52)
HGB BLD-MCNC: 6.2 GM/DL (ref 14–18)
HGB BLD-MCNC: 7.2 GM/DL (ref 14–18)
HGB RETIC QN AUTO: 35 PG (ref 28.2–35.7)
IMM GRANULOCYTES # BLD AUTO: 0.13 THOU/MM3 (ref 0–0.07)
IMM GRANULOCYTES # BLD AUTO: 0.2 THOU/MM3 (ref 0–0.07)
IMM GRANULOCYTES NFR BLD AUTO: 1.8 %
IMM GRANULOCYTES NFR BLD AUTO: 2.8 %
IMM RETICS NFR: 36.9 % (ref 2.3–13.4)
LYMPHOCYTES ABSOLUTE: 1.3 THOU/MM3 (ref 1–4.8)
LYMPHOCYTES ABSOLUTE: 1.5 THOU/MM3 (ref 1–4.8)
LYMPHOCYTES NFR BLD AUTO: 17.9 %
LYMPHOCYTES NFR BLD AUTO: 20.8 %
MCH RBC QN AUTO: 32.1 PG (ref 26–33)
MCH RBC QN AUTO: 32.5 PG (ref 26–33)
MCHC RBC AUTO-ENTMCNC: 31.3 GM/DL (ref 32.2–35.5)
MCHC RBC AUTO-ENTMCNC: 32 GM/DL (ref 32.2–35.5)
MCV RBC AUTO: 100.4 FL (ref 80–94)
MCV RBC AUTO: 103.7 FL (ref 80–94)
MONOCYTES ABSOLUTE: 0.5 THOU/MM3 (ref 0.4–1.3)
MONOCYTES ABSOLUTE: 0.5 THOU/MM3 (ref 0.4–1.3)
MONOCYTES NFR BLD AUTO: 6.9 %
MONOCYTES NFR BLD AUTO: 7.5 %
NEUTROPHILS NFR BLD AUTO: 67.4 %
NEUTROPHILS NFR BLD AUTO: 72.2 %
NRBC BLD AUTO-RTO: 1 /100 WBC
NRBC BLD AUTO-RTO: 1 /100 WBC
PATHOLOGIST REVIEW: ABNORMAL
PLATELET # BLD AUTO: 173 THOU/MM3 (ref 130–400)
PLATELET # BLD AUTO: 201 THOU/MM3 (ref 130–400)
PMV BLD AUTO: 9.3 FL (ref 9.4–12.4)
PMV BLD AUTO: 9.6 FL (ref 9.4–12.4)
POIKILOCYTES: ABNORMAL
POTASSIUM SERPL-SCNC: 3.7 MEQ/L (ref 3.5–5.2)
PROT SERPL-MCNC: 5.8 G/DL (ref 6.1–8)
RBC # BLD AUTO: 1.91 MILL/MM3 (ref 4.7–6.1)
RBC # BLD AUTO: 2.24 MILL/MM3 (ref 4.7–6.1)
RETICS # AUTO: 62 THOU/MM3 (ref 20–115)
RETICS/RBC NFR AUTO: 3.1 % (ref 0.5–2)
REVIEWED BY: NORMAL
ROULEAUX BLD QL SMEAR: SLIGHT
SEGMENTED NEUTROPHILS ABSOLUTE COUNT: 4.8 THOU/MM3 (ref 1.8–7.7)
SEGMENTED NEUTROPHILS ABSOLUTE COUNT: 5.3 THOU/MM3 (ref 1.8–7.7)
SMEAR REVIEW: NORMAL
SODIUM SERPL-SCNC: 141 MEQ/L (ref 135–145)
WBC # BLD AUTO: 7.1 THOU/MM3 (ref 4.8–10.8)
WBC # BLD AUTO: 7.3 THOU/MM3 (ref 4.8–10.8)

## 2024-02-04 PROCEDURE — 99233 SBSQ HOSP IP/OBS HIGH 50: CPT | Performed by: PHYSICIAN ASSISTANT

## 2024-02-04 PROCEDURE — 36415 COLL VENOUS BLD VENIPUNCTURE: CPT

## 2024-02-04 PROCEDURE — 1200000003 HC TELEMETRY R&B

## 2024-02-04 PROCEDURE — 85025 COMPLETE CBC W/AUTO DIFF WBC: CPT

## 2024-02-04 PROCEDURE — 6370000000 HC RX 637 (ALT 250 FOR IP): Performed by: PHYSICIAN ASSISTANT

## 2024-02-04 PROCEDURE — 80048 BASIC METABOLIC PNL TOTAL CA: CPT

## 2024-02-04 PROCEDURE — 2580000003 HC RX 258: Performed by: PHYSICIAN ASSISTANT

## 2024-02-04 PROCEDURE — 85046 RETICYTE/HGB CONCENTRATE: CPT

## 2024-02-04 PROCEDURE — 80076 HEPATIC FUNCTION PANEL: CPT

## 2024-02-04 PROCEDURE — 93010 ELECTROCARDIOGRAM REPORT: CPT | Performed by: INTERNAL MEDICINE

## 2024-02-04 RX ADMIN — SODIUM CHLORIDE: 4.5 INJECTION, SOLUTION INTRAVENOUS at 05:55

## 2024-02-04 RX ADMIN — PANTOPRAZOLE SODIUM 40 MG: 40 TABLET, DELAYED RELEASE ORAL at 10:28

## 2024-02-04 RX ADMIN — TRAZODONE HYDROCHLORIDE 50 MG: 50 TABLET ORAL at 22:37

## 2024-02-04 RX ADMIN — ALLOPURINOL 100 MG: 100 TABLET ORAL at 10:28

## 2024-02-04 RX ADMIN — SODIUM CHLORIDE, PRESERVATIVE FREE 10 ML: 5 INJECTION INTRAVENOUS at 22:37

## 2024-02-04 RX ADMIN — Medication 1 TABLET: at 10:28

## 2024-02-04 RX ADMIN — ATORVASTATIN CALCIUM 10 MG: 10 TABLET, FILM COATED ORAL at 10:28

## 2024-02-04 RX ADMIN — OXYBUTYNIN CHLORIDE 10 MG: 10 TABLET, EXTENDED RELEASE ORAL at 10:28

## 2024-02-04 RX ADMIN — MEGESTROL ACETATE 200 MG: 400 SUSPENSION ORAL at 10:28

## 2024-02-04 RX ADMIN — MIRTAZAPINE 15 MG: 15 TABLET, FILM COATED ORAL at 22:39

## 2024-02-04 RX ADMIN — DIVALPROEX SODIUM 250 MG: 250 TABLET, EXTENDED RELEASE ORAL at 10:28

## 2024-02-04 RX ADMIN — FOLIC ACID 1 MG: 1 TABLET ORAL at 10:28

## 2024-02-04 RX ADMIN — MEMANTINE HYDROCHLORIDE 5 MG: 5 TABLET ORAL at 10:28

## 2024-02-04 RX ADMIN — TROSPIUM CHLORIDE 20 MG: 20 TABLET, FILM COATED ORAL at 22:39

## 2024-02-04 RX ADMIN — DONEPEZIL HYDROCHLORIDE 10 MG: 10 TABLET, FILM COATED ORAL at 22:39

## 2024-02-04 ASSESSMENT — PAIN SCALES - WONG BAKER

## 2024-02-04 NOTE — CARE COORDINATION
02/04/24 1325   Service Assessment   Patient Orientation Person   Cognition Dementia / Early Alzheimer's   History Provided By Spouse   Primary Caregiver Other (Comment)  [place]   Accompanied By/Relationship none; Phone call to ericka Arellano   Support Systems Spouse/Significant Other;Children;Family Members   Patient's Healthcare Decision Maker is: Legal Next of Kin   PCP Verified by CM Yes   Last Visit to PCP Within last 3 months   Prior Functional Level Assistance with the following:;Bathing;Dressing;Toileting;Feeding;Cooking;Housework;Shopping   Current Functional Level Assistance with the following:;Bathing;Dressing;Toileting;Feeding;Cooking;Housework;Shopping   Can patient return to prior living arrangement Yes   Ability to make needs known: Poor   Family able to assist with home care needs: No   Would you like for me to discuss the discharge plan with any other family members/significant others, and if so, who? Yes  (ericka Arellano)   Financial Resources Medicare;Other (Comment)  (BCBS)   Community Resources Assisted Living   CM/SW Referral ADLs/IADLs;DME   Social/Functional History   Lives With Alone   Type of Home Assisted living   Active  No   Patient's  Info [facility]    Discharge Planning   Type of Residence Assisted living   Living Arrangements Alone   Current Services Prior To Admission Other (Comment)  ([facility])   Potential Assistance Needed Transportation   DME Ordered? No   Potential Assistance Purchasing Medications No   Type of Home Care Services None   Patient expects to be discharged to: Assisted living     Patient Goals/Plan/Treatment Preferences: Spoke w/ patients ericka Arellano. Osmany is from [facility]. At the AL, the staff assists with all ADLs. They provide transportation. Plan is for Osmany to return to [facility]. He will need transportation. SW consult placed.    If patient is discharged prior to next notation, then this note serves as note for discharge by case

## 2024-02-05 ENCOUNTER — APPOINTMENT (OUTPATIENT)
Dept: CT IMAGING | Age: 89
DRG: 811 | End: 2024-02-05
Payer: MEDICARE

## 2024-02-05 LAB
ALBUMIN SERPL BCG-MCNC: 3.4 G/DL (ref 3.5–5.1)
ALP SERPL-CCNC: 44 U/L (ref 38–126)
ALT SERPL W/O P-5'-P-CCNC: 7 U/L (ref 11–66)
ANION GAP SERPL CALC-SCNC: 8 MEQ/L (ref 8–16)
AST SERPL-CCNC: 25 U/L (ref 5–40)
BILIRUB CONJ SERPL-MCNC: 0.4 MG/DL (ref 0–0.3)
BILIRUB SERPL-MCNC: 1.8 MG/DL (ref 0.3–1.2)
BUN SERPL-MCNC: 22 MG/DL (ref 7–22)
CALCIUM SERPL-MCNC: 8.4 MG/DL (ref 8.5–10.5)
CHLORIDE SERPL-SCNC: 111 MEQ/L (ref 98–111)
CO2 SERPL-SCNC: 24 MEQ/L (ref 23–33)
CREAT SERPL-MCNC: 0.8 MG/DL (ref 0.4–1.2)
DEPRECATED RDW RBC AUTO: 53.2 FL (ref 35–45)
ERYTHROCYTE [DISTWIDTH] IN BLOOD BY AUTOMATED COUNT: 14.8 % (ref 11.5–14.5)
GFR SERPL CREATININE-BSD FRML MDRD: > 60 ML/MIN/1.73M2
GLUCOSE SERPL-MCNC: 89 MG/DL (ref 70–108)
HCT VFR BLD AUTO: 19.8 % (ref 42–52)
HCT VFR BLD AUTO: 21.5 % (ref 42–52)
HCT VFR BLD AUTO: 25.4 % (ref 42–52)
HGB BLD-MCNC: 6.5 GM/DL (ref 14–18)
HGB BLD-MCNC: 6.9 GM/DL (ref 14–18)
HGB BLD-MCNC: 8.6 GM/DL (ref 14–18)
LDH SERPL L TO P-CCNC: 359 U/L (ref 100–190)
MAGNESIUM SERPL-MCNC: 1.9 MG/DL (ref 1.6–2.4)
MCH RBC QN AUTO: 31.9 PG (ref 26–33)
MCHC RBC AUTO-ENTMCNC: 32.1 GM/DL (ref 32.2–35.5)
MCV RBC AUTO: 99.5 FL (ref 80–94)
PATHOLOGIST REVIEW: ABNORMAL
PLATELET # BLD AUTO: 176 THOU/MM3 (ref 130–400)
PMV BLD AUTO: 9.2 FL (ref 9.4–12.4)
POTASSIUM SERPL-SCNC: 3.5 MEQ/L (ref 3.5–5.2)
PROT SERPL-MCNC: 6 G/DL (ref 6.1–8)
RBC # BLD AUTO: 2.16 MILL/MM3 (ref 4.7–6.1)
SCAN OF BLOOD SMEAR: NORMAL
SODIUM SERPL-SCNC: 143 MEQ/L (ref 135–145)
WBC # BLD AUTO: 5.6 THOU/MM3 (ref 4.8–10.8)

## 2024-02-05 PROCEDURE — 70450 CT HEAD/BRAIN W/O DYE: CPT

## 2024-02-05 PROCEDURE — 1200000003 HC TELEMETRY R&B

## 2024-02-05 PROCEDURE — 97162 PT EVAL MOD COMPLEX 30 MIN: CPT

## 2024-02-05 PROCEDURE — 97166 OT EVAL MOD COMPLEX 45 MIN: CPT

## 2024-02-05 PROCEDURE — 97116 GAIT TRAINING THERAPY: CPT

## 2024-02-05 PROCEDURE — 85027 COMPLETE CBC AUTOMATED: CPT

## 2024-02-05 PROCEDURE — 99232 SBSQ HOSP IP/OBS MODERATE 35: CPT | Performed by: PHYSICIAN ASSISTANT

## 2024-02-05 PROCEDURE — 97530 THERAPEUTIC ACTIVITIES: CPT

## 2024-02-05 PROCEDURE — 83010 ASSAY OF HAPTOGLOBIN QUANT: CPT

## 2024-02-05 PROCEDURE — 36415 COLL VENOUS BLD VENIPUNCTURE: CPT

## 2024-02-05 PROCEDURE — 2580000003 HC RX 258: Performed by: PHYSICIAN ASSISTANT

## 2024-02-05 PROCEDURE — 85018 HEMOGLOBIN: CPT

## 2024-02-05 PROCEDURE — 83735 ASSAY OF MAGNESIUM: CPT

## 2024-02-05 PROCEDURE — 92610 EVALUATE SWALLOWING FUNCTION: CPT

## 2024-02-05 PROCEDURE — 83615 LACTATE (LD) (LDH) ENZYME: CPT

## 2024-02-05 PROCEDURE — 36430 TRANSFUSION BLD/BLD COMPNT: CPT

## 2024-02-05 PROCEDURE — 80076 HEPATIC FUNCTION PANEL: CPT

## 2024-02-05 PROCEDURE — 80048 BASIC METABOLIC PNL TOTAL CA: CPT

## 2024-02-05 PROCEDURE — 85014 HEMATOCRIT: CPT

## 2024-02-05 PROCEDURE — 6370000000 HC RX 637 (ALT 250 FOR IP): Performed by: PHYSICIAN ASSISTANT

## 2024-02-05 RX ORDER — SODIUM CHLORIDE 9 MG/ML
INJECTION, SOLUTION INTRAVENOUS PRN
Status: DISCONTINUED | OUTPATIENT
Start: 2024-02-05 | End: 2024-02-07 | Stop reason: HOSPADM

## 2024-02-05 RX ADMIN — PANTOPRAZOLE SODIUM 40 MG: 40 TABLET, DELAYED RELEASE ORAL at 06:07

## 2024-02-05 RX ADMIN — TROSPIUM CHLORIDE 20 MG: 20 TABLET, FILM COATED ORAL at 22:02

## 2024-02-05 RX ADMIN — DIVALPROEX SODIUM 250 MG: 250 TABLET, EXTENDED RELEASE ORAL at 09:33

## 2024-02-05 RX ADMIN — OXYBUTYNIN CHLORIDE 10 MG: 10 TABLET, EXTENDED RELEASE ORAL at 09:32

## 2024-02-05 RX ADMIN — ATORVASTATIN CALCIUM 10 MG: 10 TABLET, FILM COATED ORAL at 09:33

## 2024-02-05 RX ADMIN — ALLOPURINOL 100 MG: 100 TABLET ORAL at 09:33

## 2024-02-05 RX ADMIN — DONEPEZIL HYDROCHLORIDE 10 MG: 10 TABLET, FILM COATED ORAL at 22:02

## 2024-02-05 RX ADMIN — Medication 1 TABLET: at 09:32

## 2024-02-05 RX ADMIN — MIRTAZAPINE 15 MG: 15 TABLET, FILM COATED ORAL at 22:02

## 2024-02-05 RX ADMIN — MEGESTROL ACETATE 200 MG: 400 SUSPENSION ORAL at 09:34

## 2024-02-05 RX ADMIN — MEMANTINE HYDROCHLORIDE 5 MG: 5 TABLET ORAL at 09:32

## 2024-02-05 RX ADMIN — SODIUM CHLORIDE, PRESERVATIVE FREE 10 ML: 5 INJECTION INTRAVENOUS at 09:32

## 2024-02-05 RX ADMIN — FOLIC ACID 1 MG: 1 TABLET ORAL at 09:32

## 2024-02-05 RX ADMIN — SODIUM CHLORIDE, PRESERVATIVE FREE 10 ML: 5 INJECTION INTRAVENOUS at 22:01

## 2024-02-05 RX ADMIN — TRAZODONE HYDROCHLORIDE 50 MG: 50 TABLET ORAL at 22:13

## 2024-02-05 NOTE — CARE COORDINATION
2/5/24, 10:51 AM EST  Discharge Planning Evaluation  Social work consult received, patient from [facility].   Patient/Family preference is to return to [facility].    Would patient be willing to go to a skilled facility if needed: not at this time .  Is there skilled care available at current facility: no, but can do outpatient PT/OT.  Barriers to return to current living situation: n/a  Spoke with RN at the facility. And Patient's spouse regarding his return.  Patient bed hold: yes  Anticipated transport plan: ambulette  Patient's Healthcare Decision Maker: Legal Next of Kin    Readmission Risk Low 0-14, Mod 15-19), High > 20: Readmission Risk Score: 19.8    Current PCP: Dannielle Steward MD  PCP verified by CM? Yes    Patient Orientation: Person    Patient Cognition: Dementia / Early Alzheimer's  History Provided by: Spouse    Advance Directives:      Code Status: DNR-CCA   Patient's Primary Decision Maker is: Legal Next of Kin       Discharge Planning:    Patient lives with: Other (Comment) (assisted living) Type of Home: Assisted living  Primary Care Giver: Spouse  Patient Support Systems include: Spouse/Significant Other, Family Members   Current Financial resources: None  Current community resources: None  Current services prior to admission: Other (Comment) ([facility])            Current DME:              Type of Home Care services:  None    ADLS  Prior functional level: Assistance with the following:, Bathing, Dressing, Feeding, Cooking, Housework, Shopping, Toileting  Current functional level: Assistance with the following:, Bathing, Dressing, Feeding, Cooking, Housework, Shopping, Toileting    Family can provide assistance at DC: No  Would you like Case Management to discuss the discharge plan with any other family members/significant others, and if so, who? No  Plans to Return to Present Housing: Yes  Other Identified Issues/Barriers to RETURNING to current housing: n/a  Potential Assistance needed at discharge:

## 2024-02-05 NOTE — CARE COORDINATION
2/5/24, 2:30 PM EST    DISCHARGE ON GOING EVALUATION    [place] [facility] day: 2  Location: [facility]17/017-A Reason for admit: Near syncope [R55]  Symptomatic anemia [D64.9]  Anemia, unspecified type [D64.9]   Procedure: 2/3 1 unit PRBC transfusion for Hgb of 6.2  Barriers to Discharge: Hgb decreased to 6.9. Hem/onc consulted, awaiting haptoglobin result. CT head pending. Working with therapies.   PCP: Dannielle Steward MD  Readmission Risk Score: 20%  Patient Goals/Plan/Treatment Preferences: return to [facility]

## 2024-02-06 LAB
BASOPHILS ABSOLUTE: 0 THOU/MM3 (ref 0–0.1)
BASOPHILS NFR BLD AUTO: 0.4 %
DEPRECATED RDW RBC AUTO: 51.4 FL (ref 35–45)
EOSINOPHIL NFR BLD AUTO: 1.5 %
EOSINOPHILS ABSOLUTE: 0.1 THOU/MM3 (ref 0–0.4)
ERYTHROCYTE [DISTWIDTH] IN BLOOD BY AUTOMATED COUNT: 15.9 % (ref 11.5–14.5)
HAPTOGLOB SERPL-MCNC: < 10 MG/DL (ref 30–200)
HCT VFR BLD AUTO: 24.5 % (ref 42–52)
HGB BLD-MCNC: 8.4 GM/DL (ref 14–18)
IMM GRANULOCYTES # BLD AUTO: 0.14 THOU/MM3 (ref 0–0.07)
IMM GRANULOCYTES NFR BLD AUTO: 2.1 %
LYMPHOCYTES ABSOLUTE: 1.6 THOU/MM3 (ref 1–4.8)
LYMPHOCYTES NFR BLD AUTO: 23.5 %
MCH RBC QN AUTO: 32.3 PG (ref 26–33)
MCHC RBC AUTO-ENTMCNC: 34.3 GM/DL (ref 32.2–35.5)
MCV RBC AUTO: 94.2 FL (ref 80–94)
MONOCYTES ABSOLUTE: 0.7 THOU/MM3 (ref 0.4–1.3)
MONOCYTES NFR BLD AUTO: 9.9 %
NEUTROPHILS NFR BLD AUTO: 62.6 %
NRBC BLD AUTO-RTO: 0 /100 WBC
PLATELET # BLD AUTO: 168 THOU/MM3 (ref 130–400)
PMV BLD AUTO: 9.2 FL (ref 9.4–12.4)
RBC # BLD AUTO: 2.6 MILL/MM3 (ref 4.7–6.1)
SEGMENTED NEUTROPHILS ABSOLUTE COUNT: 4.2 THOU/MM3 (ref 1.8–7.7)
WBC # BLD AUTO: 6.7 THOU/MM3 (ref 4.8–10.8)

## 2024-02-06 PROCEDURE — 85025 COMPLETE CBC W/AUTO DIFF WBC: CPT

## 2024-02-06 PROCEDURE — 81256 HFE GENE: CPT

## 2024-02-06 PROCEDURE — 2580000003 HC RX 258: Performed by: PHYSICIAN ASSISTANT

## 2024-02-06 PROCEDURE — 99232 SBSQ HOSP IP/OBS MODERATE 35: CPT | Performed by: PHYSICIAN ASSISTANT

## 2024-02-06 PROCEDURE — 1200000003 HC TELEMETRY R&B

## 2024-02-06 PROCEDURE — 36415 COLL VENOUS BLD VENIPUNCTURE: CPT

## 2024-02-06 PROCEDURE — 6370000000 HC RX 637 (ALT 250 FOR IP): Performed by: PHYSICIAN ASSISTANT

## 2024-02-06 RX ADMIN — MEMANTINE HYDROCHLORIDE 5 MG: 5 TABLET ORAL at 09:31

## 2024-02-06 RX ADMIN — SODIUM CHLORIDE, PRESERVATIVE FREE 10 ML: 5 INJECTION INTRAVENOUS at 09:32

## 2024-02-06 RX ADMIN — Medication 1 TABLET: at 09:31

## 2024-02-06 RX ADMIN — MEGESTROL ACETATE 200 MG: 400 SUSPENSION ORAL at 09:31

## 2024-02-06 RX ADMIN — TROSPIUM CHLORIDE 20 MG: 20 TABLET, FILM COATED ORAL at 20:18

## 2024-02-06 RX ADMIN — FOLIC ACID 1 MG: 1 TABLET ORAL at 09:31

## 2024-02-06 RX ADMIN — DIVALPROEX SODIUM 250 MG: 250 TABLET, EXTENDED RELEASE ORAL at 09:31

## 2024-02-06 RX ADMIN — MIRTAZAPINE 15 MG: 15 TABLET, FILM COATED ORAL at 20:18

## 2024-02-06 RX ADMIN — DONEPEZIL HYDROCHLORIDE 10 MG: 10 TABLET, FILM COATED ORAL at 20:19

## 2024-02-06 RX ADMIN — TRAZODONE HYDROCHLORIDE 50 MG: 50 TABLET ORAL at 20:18

## 2024-02-06 RX ADMIN — SODIUM CHLORIDE, PRESERVATIVE FREE 10 ML: 5 INJECTION INTRAVENOUS at 20:19

## 2024-02-06 RX ADMIN — OXYBUTYNIN CHLORIDE 10 MG: 10 TABLET, EXTENDED RELEASE ORAL at 09:31

## 2024-02-06 RX ADMIN — ATORVASTATIN CALCIUM 10 MG: 10 TABLET, FILM COATED ORAL at 09:31

## 2024-02-06 RX ADMIN — PANTOPRAZOLE SODIUM 40 MG: 40 TABLET, DELAYED RELEASE ORAL at 06:05

## 2024-02-06 RX ADMIN — ALLOPURINOL 100 MG: 100 TABLET ORAL at 09:31

## 2024-02-06 ASSESSMENT — PAIN SCALES - GENERAL
PAINLEVEL_OUTOF10: 0

## 2024-02-07 ENCOUNTER — TELEPHONE (OUTPATIENT)
Dept: UROLOGY | Age: 89
End: 2024-02-07

## 2024-02-07 VITALS
HEIGHT: 60 IN | DIASTOLIC BLOOD PRESSURE: 84 MMHG | RESPIRATION RATE: 18 BRPM | OXYGEN SATURATION: 98 % | SYSTOLIC BLOOD PRESSURE: 141 MMHG | TEMPERATURE: 96.9 F | WEIGHT: 150.79 LBS | BODY MASS INDEX: 29.61 KG/M2 | HEART RATE: 91 BPM

## 2024-02-07 LAB
BASOPHILS ABSOLUTE: 0 THOU/MM3 (ref 0–0.1)
BASOPHILS NFR BLD AUTO: 0.3 %
DEPRECATED RDW RBC AUTO: 52.9 FL (ref 35–45)
EOSINOPHIL NFR BLD AUTO: 1.8 %
EOSINOPHILS ABSOLUTE: 0.1 THOU/MM3 (ref 0–0.4)
ERYTHROCYTE [DISTWIDTH] IN BLOOD BY AUTOMATED COUNT: 16.5 % (ref 11.5–14.5)
HAPTOGLOB SERPL-MCNC: < 10 MG/DL (ref 30–200)
HCT VFR BLD AUTO: 26 % (ref 42–52)
HGB BLD-MCNC: 8.7 GM/DL (ref 14–18)
IMM GRANULOCYTES # BLD AUTO: 0.12 THOU/MM3 (ref 0–0.07)
IMM GRANULOCYTES NFR BLD AUTO: 2 %
LYMPHOCYTES ABSOLUTE: 1.4 THOU/MM3 (ref 1–4.8)
LYMPHOCYTES NFR BLD AUTO: 24.1 %
MCH RBC QN AUTO: 32.3 PG (ref 26–33)
MCHC RBC AUTO-ENTMCNC: 33.5 GM/DL (ref 32.2–35.5)
MCV RBC AUTO: 96.7 FL (ref 80–94)
MONOCYTES ABSOLUTE: 0.6 THOU/MM3 (ref 0.4–1.3)
MONOCYTES NFR BLD AUTO: 10.3 %
NEUTROPHILS NFR BLD AUTO: 61.5 %
NRBC BLD AUTO-RTO: 0 /100 WBC
PLATELET # BLD AUTO: 171 THOU/MM3 (ref 130–400)
PMV BLD AUTO: 9.2 FL (ref 9.4–12.4)
RBC # BLD AUTO: 2.69 MILL/MM3 (ref 4.7–6.1)
SEGMENTED NEUTROPHILS ABSOLUTE COUNT: 3.7 THOU/MM3 (ref 1.8–7.7)
WBC # BLD AUTO: 6 THOU/MM3 (ref 4.8–10.8)

## 2024-02-07 PROCEDURE — 36415 COLL VENOUS BLD VENIPUNCTURE: CPT

## 2024-02-07 PROCEDURE — 85025 COMPLETE CBC W/AUTO DIFF WBC: CPT

## 2024-02-07 PROCEDURE — 94761 N-INVAS EAR/PLS OXIMETRY MLT: CPT

## 2024-02-07 PROCEDURE — 6370000000 HC RX 637 (ALT 250 FOR IP): Performed by: PHYSICIAN ASSISTANT

## 2024-02-07 PROCEDURE — 99239 HOSP IP/OBS DSCHRG MGMT >30: CPT | Performed by: PHYSICIAN ASSISTANT

## 2024-02-07 RX ADMIN — OXYBUTYNIN CHLORIDE 10 MG: 10 TABLET, EXTENDED RELEASE ORAL at 10:10

## 2024-02-07 RX ADMIN — ATORVASTATIN CALCIUM 10 MG: 10 TABLET, FILM COATED ORAL at 10:09

## 2024-02-07 RX ADMIN — MEGESTROL ACETATE 200 MG: 400 SUSPENSION ORAL at 10:15

## 2024-02-07 RX ADMIN — ALLOPURINOL 100 MG: 100 TABLET ORAL at 10:08

## 2024-02-07 RX ADMIN — MEMANTINE HYDROCHLORIDE 5 MG: 5 TABLET ORAL at 10:09

## 2024-02-07 RX ADMIN — PANTOPRAZOLE SODIUM 40 MG: 40 TABLET, DELAYED RELEASE ORAL at 05:44

## 2024-02-07 RX ADMIN — DIVALPROEX SODIUM 250 MG: 250 TABLET, EXTENDED RELEASE ORAL at 10:09

## 2024-02-07 RX ADMIN — FOLIC ACID 1 MG: 1 TABLET ORAL at 10:09

## 2024-02-07 RX ADMIN — Medication 1 TABLET: at 10:09

## 2024-02-07 NOTE — TELEPHONE ENCOUNTER
Attempted to call x 2. No answer. Unable to leave VM. VM not set up.  Patient needs OV for symptom check and PVR-   I also tried to call pt- also called the nursing home as well with no answer

## 2024-02-07 NOTE — PROGRESS NOTES
Hospitalist Progress Note      Patient:  Osmany Castellanos    Unit/Bed:6K-17/017-A  YOB: 1935  MRN: [id number]   Acct: [id number]   PCP: Dannielle Steward MD  Date of Admission: 2/3/2024    Assessment/Plan:    Symptomatic anemia, unknown etiology at this time   Hemoglobin 6.2 s/p 1 unit of pRBC. Most recent Hgb stable ~7.   Etiology unclear.  Blood occult stool negative.  No reported melena, hematemesis, hematochezia.  UA on 120 7/2024 negative for blood.  Ferritin significantly elevated, likely secondary to COVID-19.  B12 and folate are normal.  Iron sat high, iron within normal limits, TIBC low.  LFTs WNL. Bilirubin high. Awaiting blood smear. Retic count pending. Dora test pending.   Hold aspirin and Plavix.  Near syncope  Check orthostatic vital signs every shift  IV fluids & Hold antihypertensives for now.  Hypernatremia  Suspect likely secondary to poor oral oral intake in the setting of dementia and superimposed COVID-19  Start half-normal saline  Recheck BMP tomorrow morning  COVID-19  Diagnosed on 1/27/2024.  Not currently on room air.  Supportive care.  Dementia with behavioral disturbance  Continue Aricept, Namenda, Depakote  Urinary incontinence  Continue home regimen  Hypertension  Hold antihypertensives given #2  GERD  Continue Protonix  Hyperlipidemia  Continue Lipitor  Gout  Continue allopurinol    Chief Complaint: Near Syncope     Initial H and P:-    Initial H&P \"Osmany Castellanos is a 88 y.o. male with a history of dementia, urinary incontinence, hypertension, hyperlipidemia, and GERD who presented to [facility] with chief complaint of near syncope.  History is provided by the patient's wife and grandson who are present at bedside as the patient has a history of dementia.  The patient's grandson was with the patient today when he attempted to stand up.  He had a near syncopal episode and was noted to be confused at that 
                                                                       Hospitalist Progress Note      Patient:  Osmany Castellanos    Unit/Bed:6K-17/017-A  YOB: 1935  MRN: [id number]   Acct: [id number]   PCP: Dannielle Steward MD  Date of Admission: 2/3/2024    Assessment/Plan:    Symptomatic anemia, unknown etiology at this time   Hemoglobin trend 6.2, 1 unit pRBC, 7.4, 7.2, 6.9, 6.5, 1 unit pRBC 8.6, 8.4. CBC in the AM.   Etiology unclear.  Blood occult stool negative.  No reported melena, hematemesis, hematochezia.  UA on 120 7/2024 negative for blood.  Ferritin significantly elevated, likely secondary to COVID-19 vs. Hemochromatosis.  B12 and folate are normal.  Iron sat high, iron within normal limits, TIBC low. HFE genetic testing obtained to rule out hemochromatosis. Awaiting haptoglobin to rule out hemolysis.   LFTs WNL. Bilirubin high. Blood smear showed no schistocytes.  Dora test slightly positive.   Hold aspirin and Plavix.  Hematology consulted.   Fall   Pt fell last night. CT head pending. Bedside sitter.   Hypernatremia  Suspect likely secondary to poor oral oral intake in the setting of dementia and superimposed COVID-19  Recheck BMP tomorrow morning, Sodium 143.   COVID-19  Diagnosed on 1/27/2024.  Not currently on room air.  Supportive care.  Dementia with behavioral disturbance  Continue Aricept, Namenda, Depakote  Urinary incontinence  Continue home regimen  Hypertension  Hold antihypertensives given #2  GERD  Continue Protonix  Hyperlipidemia  Continue Lipitor  Gout  Continue allopurinol    Chief Complaint: Near syncope    Initial H and P:-    Initial H&P \"Osmany Castellanos is a 88 y.o. male with a history of dementia, urinary incontinence, hypertension, hyperlipidemia, and GERD who presented to [facility] with chief complaint of near syncope.  History is provided by the patient's wife and grandson who are present at bedside as the patient has a history of dementia.  The patient's grandson 
                                             Post-fall pharmacy consult    Pharmacy has been consulted to review medication profile for fall risk.  Medications increasing risk of falling: mirtazapine, trazodone - not given yet today  Medications increasing risk of bleeding: clopidogrel, aspirin (on hold)  Findings discussed with Anabella TIM  Recommendations: no medications given recently - doesn't appear to be medication-related.  No recommendations at this time.  Janna Coleman [facility], BCPS, BCGP  2/4/2024     9:23 PM      
  [facility]    Patient - Osmany Castellanos   MRN -  [id number]   [facility] [id number]   - 1935      Date of Admission -  2/3/2024 12:57 PM  Date of evaluation -  2024  Room - -17/017-A   Hospital Day - 2  Consulting - Hope Hart PA Primary Care Physician - Dannielle Steward MD       Reason for Consult    anemia  Active Hospital Problem List      Active Hospital Problems    Diagnosis Date Noted    Symptomatic anemia [D64.9] 2024     HPI/Subjective   Osmany Castellanos is a 88 y.o. male admitted 2/3/2024 for evaluation of lightheadedness, near syncopal episode.  He lives at a nursing [place], [facility].  Recent diagnosis of COVID-19 on 2024.    History of dementia with baseline confusion.    -2/3/2024 Hemoglobin upon presentation was 6.2 with MCV of 103.7. (Baseline hemoglobin anywhere from 9-12, with MCV of 98-99).  WBC 7.2 with normal differential, plts 201K (pt has hx of mild thrombocytopenia to 113K in 2023). CMP with normal total protein and Cr, bilirubin increased to 2.5 (usually WNL). Peripheral smear with no schistocytes.  - procalcitonin and lactic acid WNL.  -Iron 157, iron saturation elevated at 90% (suspect drawn after PRBC).  Ferritin elevated at 5542 (suspect drawn after PRBC and with active COVID, of note has been elevated 834 2023). B12 and folate WNL. Reticulocyte count elevated at 3.1%.  Occult stool negative. INO weakly positive at 1+ IgG.  (H)  -Patient given 1 unit packed red blood cells with improvement in hemoglobin to 7.4.    -2/3/2024 CXR with no focal consolidation, pleural effusion or pneumothorax is seen. There is mild hyperinflation.   Hemoglobin today has dropped to 6.9.  White blood cell count and platelet count are normal.  No signs or symptoms of bleeding.      Due to active COVID-19 infection, full chart review completed to provide recommendations in order to protect vulnerable Oncology population.  Please see assessment and plan for 
Comprehensive Nutrition Assessment    Type and Reason for Visit:  Initial, Positive Nutrition Screen (weight loss and decreased appetite)    Nutrition Recommendations/Plan:   Recommend continuation of Folbee Plus supplement  Consider changes in appetite stimulant regimen to assist with improvement of PO intake  Continue diet per SLP and provider - sitting in chair to eat to prevent fatigue  Modify ONS: Ensure Enlive (TID) as pt is primarily drinking this only from trays     Malnutrition Assessment:  Malnutrition Status:  Severe malnutrition (02/05/24 1332)    Context:  Chronic Illness     Findings of the 6 clinical characteristics of malnutrition:  Energy Intake:  75% or less estimated energy requirements for 1 month or longer (r/t dementia and baseline confusion)  Weight Loss:  No significant weight loss (difficult to confirm with fluctuations in EMR weights)     Body Fat Loss:  Severe body fat loss Triceps   Muscle Mass Loss:  Severe muscle mass loss Temples (temporalis), Clavicles (pectoralis & deltoids), Calf (gastrocnemius), Scapula (trapezius)  Fluid Accumulation:  No significant fluid accumulation     Strength:  Not Performed    Nutrition Assessment:     Pt. severely malnourished AEB criteria listed above.  At risk for further nutritional compromise r/t symptomatic anemia of unknown etiology at this time, near syncope, hypernatremia - suspected 2/2 poor PO intake, COVID-19 positive 1/27/24, dementia with behavioral disturbances, advanced age, and underlying medical condition (PMHx: CKD stage II, dementia, dysphagia, ETOH abuse, GERD, gout, Jackson, HLD, HTN, metabolic bone disease, sleep apnea, TIA, zenker diverticula s/p esophageal surgery, severe malnutrition 2018).       Nutrition Related Findings:    Pt. Report/Treatments/Miscellaneous: Pt seen, groaned in response to questions. Pt did not eat any of his lunch tray, Tech (Clarissa) states that he is sleepy in bed but is usually more alert when sitting 
Discharge teaching and instructions for diagnosis/procedure of anemia completed with patient using teachback method. AVS reviewed with nurse Marc at [facility] voiced understanding regarding prescriptions, follow up appointments, and care.Discharged in a wheelchair to  assisted living per  [facility].    
[facility]  SPEECH THERAPY  STRZ RENAL TELEMETRY 6K  Clinical Swallow Evaluation      SLP Individual Minutes  Time In: 1135  Time Out: 1152  Minutes: 17  Timed Code Treatment Minutes: 0 Minutes       Date: 2024  Patient Name: Osmany Castellanos      CSN: [id number]   : 1935  (88 y.o.)  Gender: male   Referring Physician:  Vinnie Rowe PA-C     Diagnosis: Symptomatic anemia    History of Present Illness/Injury: Patient presents to [facility] with above diagnoses. Per physician note, \"Osmany Castellanos is a 88 y.o. male with a history of dementia, urinary incontinence, hypertension, hyperlipidemia, and GERD who presented to [facility] with chief complaint of near syncope.  History is provided by the patient's wife and grandson who are present at bedside as the patient has a history of dementia.  The patient's grandson was with the patient today when he attempted to stand up.  He had a near syncopal episode and was noted to be confused at that time.  As such, he was brought to the emergency department.  In the ER, he was found to be anemic.  He was recently seen in the emergency department and was diagnosed with COVID-19.  His hemoglobin at that time was 9.2.  In July it was 11.1.  The patient's wife and grandson deny any recent melanotic stools, hematemesis, or hematochezia.  It does appear that the patient is on aspirin and Plavix.  The patient's wife denies any history of ulcers or gastrointestinal bleeding.  She does report that she has noticed that the patient's eyes have maybe been more yellow on and off.  He has no history of liver disease.  His mentation varies and sometimes he will be relatively lucid, but other times he will not recognize his wife or grandson.  He has not been eating well lately.\"    ST consulted to assess swallow function to assist in POC development.    Past Medical History:   Diagnosis Date    BPH (benign prostatic hypertrophy)     CKD (chronic kidney disease) stage 2, GFR 
Pt brought up to unit by ED staff.  Pt overstimulated with multiple people in the room.  Agitated and guarded with staff.  Once PRBC hung and other staff members left, pt calmed down and was very cooperative with staff.  Dementia oriented to birthday at the time of admitting to us.  Grandson and wife accompanied pt.  Wife left shortly after admission.  Pt [place].  Will continue to monitor.   
[facility]  INPATIENT OCCUPATIONAL THERAPY  STRZ RENAL TELEMETRY 6K  EVALUATION    Time:   Time In: 1009  Time Out: 1027  Timed Code Treatment Minutes: 9 Minutes  Minutes: 18          Date: 2024  Patient Name: Osmany Castellanos,   Gender: male      MRN: [id number]  : 1935  (88 y.o.)  Referring Practitioner: Vinnie Rowe PA-C  Diagnosis: symptomatic anemia  Additional Pertinent Hx: per chart review; Osmany Castellanos is a 88 y.o. male with a history of dementia, urinary incontinence, hypertension, hyperlipidemia, and GERD who presented to [facility] with chief complaint of near syncope.  History is provided by the patient's wife and grandson who are present at bedside as the patient has a history of dementia.  The patient's grandson was with the patient today when he attempted to stand up.  He had a near syncopal episode and was noted to be confused at that time.  As such, he was brought to the emergency department.  In the ER, he was found to be anemic.  He was recently seen in the emergency department and was diagnosed with COVID-19.  His hemoglobin at that time was 9.2.  In July it was 11.1.  The patient's wife and grandson deny any recent melanotic stools, hematemesis, or hematochezia.  It does appear that the patient is on aspirin and Plavix.  The patient's wife denies any history of ulcers or gastrointestinal bleeding.  She does report that she has noticed that the patient's eyes have maybe been more yellow on and off.  He has no history of liver disease.  His mentation varies and sometimes he will be relatively lucid, but other times he will not recognize his wife or grandson.  He has not been eating well lately    Restrictions/Precautions:  Restrictions/Precautions: Isolation, Fall Risk, General Precautions  Position Activity Restriction  Other position/activity restrictions: hard of hearing, monitor Hgb.    Subjective  Chart Reviewed: Yes, Orders, Progress Notes, History and 
Tele sitter is alarming at this time. This nurse enters room and pt is attempting to get out of bed unassisted. Pt is redirected and reoriented at this time. Call light in reach.   
02/04/2024Time:   9071          
25 mg Oral BID    mirtazapine  15 mg Oral Nightly    oxyBUTYnin  10 mg Oral Daily    pantoprazole  40 mg Oral QAM AC    trospium  20 mg Oral Nightly    traZODone  50 mg Oral Nightly    allopurinol  100 mg Oral Daily     PRN Meds: sodium chloride, sodium chloride flush, sodium chloride, potassium chloride **OR** potassium alternative oral replacement **OR** potassium chloride, magnesium sulfate, ondansetron **OR** ondansetron, polyethylene glycol, acetaminophen **OR** acetaminophen      Intake/Output Summary (Last 24 hours) at 2/5/2024 1507  Last data filed at 2/5/2024 1320  Gross per 24 hour   Intake 500 ml   Output 350 ml   Net 150 ml       Diet:  ADULT DIET; Dysphagia - Pureed  ADULT ORAL NUTRITION SUPPLEMENT; Breakfast, Lunch, Dinner; Standard High Calorie/High Protein Oral Supplement    Physical Exam:  BP (!) 145/74   Pulse 85   Temp 97.5 °F (36.4 °C) (Oral)   Resp 18   Ht 1.524 m (5')   Wt 68.4 kg (150 lb 14.4 oz)   SpO2 100%   BMI 29.47 kg/m²   General appearance: No apparent distress, appears stated age and cooperative.  HEENT: Pupils equal, round, and reactive to light. Conjunctivae/corneas clear.  Neck: Supple, with full range of motion. No jugular venous distention. Trachea midline.  Respiratory:  Normal respiratory effort. Clear to auscultation, bilaterally without Rales/Wheezes/Rhonchi.  Cardiovascular: Regular rate and rhythm with normal S1/S2 without murmurs, rubs or gallops.  Abdomen: Soft, non-tender, non-distended with normal bowel sounds.  Musculoskeletal: passive and active ROM x 4 extremities.  Skin: Skin color, texture, turgor normal.  No rashes or lesions.  Neurologic:   Moves all 4 extremities. No focal deficits   Psychiatric: Alert,  Kiana, answered simple yes & no questions    Capillary Refill: Brisk,< 3 seconds   Peripheral Pulses: +2 palpable, equal bilaterally     Labs:   Recent Labs     02/03/24  1400 02/03/24  2240 02/04/24  0607 02/05/24  1058   WBC 7.3  --  7.1 5.6   HGB 6.2* 
precautions in place.  Pt left in Chair with chair alarm on, call light and all needs within reach. Pt instructed not to get up on their own and to use call light to ask for help. Pt verbalized understanding.   *live sitter present    Leo Clarke ([place]) PT, DPT

## 2024-02-07 NOTE — DISCHARGE INSTRUCTIONS
BP has been low without taking most of his BP medications. Will stop Norvasc & Cardura at this time. Pt will need BP monitored & logged and bring to PCP for proper follow-up.

## 2024-02-07 NOTE — CARE COORDINATION
2/7/24, 9:36 AM EST    DISCHARGE ON GOING EVALUATION    Call to wife Eileen. Informed of discharge back to [facility] today. They will transport at 11am. IMM explained to Eileen. She would like copy sent to [facility] with [place].     Pt's wife verbalized understanding and gave permission for possible discharge within 4 hours of receiving IMM.      Electronically signed by Brandy Lyons RN on 2/7/2024 at 9:39 AM

## 2024-02-07 NOTE — PLAN OF CARE
Problem: Discharge Planning  Goal: Discharge to home or other facility with appropriate resources  2/4/2024 1613 by Narda Smallwood, RN  Outcome: Progressing  Flowsheets (Taken 2/4/2024 1613)  Discharge to home or other facility with appropriate resources: Identify barriers to discharge with patient and caregiver  Note: Pt's wife prefers he discharges back to Previous place of residence     Problem: Skin/Tissue Integrity  Goal: Absence of new skin breakdown  Description: 1.  Monitor for areas of redness and/or skin breakdown  2.  Assess vascular access sites hourly  3.  Every 4-6 hours minimum:  Change oxygen saturation probe site  4.  Every 4-6 hours:  If on nasal continuous positive airway pressure, respiratory therapy assess nares and determine need for appliance change or resting period.  2/4/2024 1613 by Narda Smallwood, RN  Outcome: Progressing  Note: No new signs or symptoms of skin breakdown noted this shift, encouraging patient to turn and reposition self in bed q2h       Problem: Safety - Adult  Goal: Free from fall injury  2/4/2024 1613 by Narda Smallwood, RN  Outcome: Progressing  Flowsheets (Taken 2/4/2024 1613)  Free From Fall Injury: Instruct family/caregiver on patient safety  Note: No falls noted this shift. Continue falling star program. Bed alarm on, bed in low position. Call light and personal belongings in reach.      
  Problem: Discharge Planning  Goal: Discharge to home or other facility with appropriate resources  2/5/2024 0256 by Anabella Yusuf RN  Flowsheets (Taken 2/5/2024 0256)  Discharge to home or other facility with appropriate resources:   Identify barriers to discharge with patient and caregiver   Identify discharge learning needs (meds, wound care, etc)   Refer to discharge planning if patient needs post-hospital services based on physician order or complex needs related to functional status, cognitive ability or social support system  2/4/2024 1613 by Narda Smallwood RN  Outcome: Progressing  Flowsheets (Taken 2/4/2024 1613)  Discharge to home or other facility with appropriate resources: Identify barriers to discharge with patient and caregiver  Note: Pt's wife prefers he discharges back to Previous place of residence  2/4/2024 1611 by Narda Smallwood RN  Outcome: Progressing     Problem: Pain  Goal: Verbalizes/displays adequate comfort level or baseline comfort level  2/5/2024 0256 by Anabella Yusuf RN  Flowsheets (Taken 2/5/2024 0256)  Verbalizes/displays adequate comfort level or baseline comfort level:   Administer analgesics based on type and severity of pain and evaluate response   Consider cultural and social influences on pain and pain management   Encourage patient to monitor pain and request assistance  2/4/2024 1613 by Narda Smallwood RN  Outcome: Progressing  2/4/2024 1611 by Narda Smallwood RN  Outcome: Progressing     Problem: Skin/Tissue Integrity  Goal: Absence of new skin breakdown  Description: 1.  Monitor for areas of redness and/or skin breakdown  2.  Assess vascular access sites hourly  3.  Every 4-6 hours minimum:  Change oxygen saturation probe site  4.  Every 4-6 hours:  If on nasal continuous positive airway pressure, respiratory therapy assess nares and determine need for appliance change or resting period.  2/4/2024 1613 by Narda Smallwood RN  Outcome: Progressing  Note: No new signs 
  Problem: Discharge Planning  Goal: Discharge to home or other facility with appropriate resources  2/5/2024 0913 by Flori Logan, MSW, LSW  Outcome: Progressing  Flowsheets (Taken 2/5/2024 0913)  Discharge to home or other facility with appropriate resources: Identify barriers to discharge with patient and caregiver  Note: Return to [facility], see MICHELLE notes 2/5/24.  
  Problem: Discharge Planning  Goal: Discharge to home or other facility with appropriate resources  2/7/2024 1000 by Arlin Chaney RN  Outcome: Completed  2/6/2024 2242 by Javon Tate RN  Flowsheets (Taken 2/6/2024 2242)  Discharge to home or other facility with appropriate resources:   Identify barriers to discharge with patient and caregiver   Identify discharge learning needs (meds, wound care, etc)   Refer to discharge planning if patient needs post-hospital services based on physician order or complex needs related to functional status, cognitive ability or social support system   Arrange for needed discharge resources and transportation as appropriate   Arrange for interpreters to assist at discharge as needed     Problem: Pain  Goal: Verbalizes/displays adequate comfort level or baseline comfort level  2/7/2024 1000 by Arlin Chaney RN  Outcome: Completed  2/6/2024 2242 by Javon Tate RN  Flowsheets (Taken 2/6/2024 2242)  Verbalizes/displays adequate comfort level or baseline comfort level:   Encourage patient to monitor pain and request assistance   Administer analgesics based on type and severity of pain and evaluate response   Consider cultural and social influences on pain and pain management   Assess pain using appropriate pain scale   Implement non-pharmacological measures as appropriate and evaluate response   Notify Licensed Independent Practitioner if interventions unsuccessful or patient reports new pain     Problem: Skin/Tissue Integrity  Goal: Absence of new skin breakdown  Description: 1.  Monitor for areas of redness and/or skin breakdown  2.  Assess vascular access sites hourly  3.  Every 4-6 hours minimum:  Change oxygen saturation probe site  4.  Every 4-6 hours:  If on nasal continuous positive airway pressure, respiratory therapy assess nares and determine need for appliance change or resting period.  Outcome: Completed     Problem: Safety - Adult  Goal: Free from fall 
nutritional needs:   Assess nutritional status and recommend course of action   Monitor oral intake, labs, and treatment plans   Recommend appropriate diets, oral nutritional supplements, and vitamin/mineral supplements   Order, calculate, and assess calorie counts as needed   Provide specific nutrition education to patient or family as appropriate  Care plan reviewed with patient.  Patient verbalize understanding of the plan of care and contribute to goal setting.

## 2024-02-07 NOTE — DISCHARGE SUMMARY
Hospitalist Discharge Note      Patient:  Osmany Castellanos    Unit/Bed:6K-17/017-A  YOB: 1935  MRN: [id number]   Acct: [id number]     PCP: Dannielle Steward MD  Date of Admission: 2/3/2024      Discharge date: 2/7/2024 11:08 AM    Chief Complaint on presentation :-  Near Syncope     Discharge Assessment and Plan:-   Symptomatic anemia, unknown etiology at this time   Hemoglobin trend 6.2, 1 unit pRBC, 7.4, 7.2, 6.9, 6.5, 1 unit pRBC 8.6, 8.4, 8.7  Etiology unclear.  Blood occult stool negative.  No reported melena, hematemesis, hematochezia.  UA on 120 7/2024 negative for blood.  Ferritin significantly elevated, likely secondary to COVID-19 vs. Hemochromatosis.  B12 and folate are normal.  Iron sat high, iron within normal limits, TIBC low. HFE genetic testing obtained to rule out hemochromatosis. Haptoglobin was less than 10.   LFTs WNL. Bilirubin high. Blood smear showed no schistocytes.  Dora test slightly positive.   Restarted aspirin and Plavix due to Hgb being stable.   Hematology will be following as an OP. CBC next week.   Fall   Pt fell last night. CT head negative.   Hypernatremia  Suspect likely secondary to poor oral oral intake in the setting of dementia and superimposed COVID-19  COVID-19  Diagnosed on 1/27/2024.  Out of isolation.   Dementia with behavioral disturbance  Continue Aricept, Namenda, Depakote  Urinary incontinence  Continue home regimen  Hypertension  Hold antihypertensives given #2  GERD  Continue Protonix  Hyperlipidemia  Continue Lipitor  Gout  Continue allopurinol    Initial H and P and Hospital course:-  Initial H&P \"Osmany Castellanos is a 88 y.o. male with a history of dementia, urinary incontinence, hypertension, hyperlipidemia, and GERD who presented to [facility] with chief complaint of near syncope.  History is provided by the patient's wife and grandson who are present at bedside as the patient has a history of dementia.  The patient's grandson was

## 2024-02-07 NOTE — CARE COORDINATION
2/7/24, 10:25 AM EST    Patient goals/plan/ treatment preferences discussed by  and .  Patient goals/plan/ treatment preferences reviewed with patient/ family.  Patient/ family verbalize understanding of discharge plan and are in agreement with goal/plan/treatment preferences.  Understanding was demonstrated using the teach back method.  AVS provided by RN at time of discharge, which includes all necessary medical information pertaining to the patients current course of illness, treatment, post-discharge goals of care, and treatment preferences.     Services At/After Discharge: Assisted Living, Aide services, and Nursing service       IMM Letter  IMM Letter given to Patient/Family/Significant other/Guardian/POA/by:: Brandy TIM CM  IMM Letter date given:: 02/07/24  IMM Letter time given:: 0935     Pt is being discharged back to [facility].  MICHELLE notified staff at the AL.  The AL will pick pt up at 11 am.  MICHELLE faxed H&P and AVS.  GLENROY Mijares is aware

## 2024-02-09 ENCOUNTER — HOSPITAL ENCOUNTER (OUTPATIENT)
Dept: INFUSION THERAPY | Age: 89
Discharge: HOME | End: 2024-02-09
Payer: MEDICARE

## 2024-02-09 ENCOUNTER — OFFICE VISIT (OUTPATIENT)
Dept: ONCOLOGY | Age: 89
End: 2024-02-09
Payer: MEDICARE

## 2024-02-09 VITALS
HEIGHT: 60 IN | RESPIRATION RATE: 16 BRPM | BODY MASS INDEX: 29.45 KG/M2 | DIASTOLIC BLOOD PRESSURE: 66 MMHG | HEART RATE: 74 BPM | SYSTOLIC BLOOD PRESSURE: 138 MMHG | OXYGEN SATURATION: 98 % | TEMPERATURE: 97.7 F

## 2024-02-09 VITALS
OXYGEN SATURATION: 98 % | DIASTOLIC BLOOD PRESSURE: 66 MMHG | RESPIRATION RATE: 16 BRPM | SYSTOLIC BLOOD PRESSURE: 138 MMHG | TEMPERATURE: 97.7 F | HEART RATE: 74 BPM

## 2024-02-09 DIAGNOSIS — D59.4 HEMOLYTIC ANEMIA ASSOCIATED WITH INFECTION (HCC): Primary | ICD-10-CM

## 2024-02-09 DIAGNOSIS — D59.4 HEMOLYTIC ANEMIA ASSOCIATED WITH INFECTION (HCC): ICD-10-CM

## 2024-02-09 LAB
ALBUMIN SERPL BCG-MCNC: 3.5 G/DL (ref 3.5–5.1)
ALP SERPL-CCNC: 51 U/L (ref 38–126)
ALT SERPL W/O P-5'-P-CCNC: 7 U/L (ref 11–66)
ANION GAP SERPL CALC-SCNC: 10 MEQ/L (ref 8–16)
AST SERPL-CCNC: 21 U/L (ref 5–40)
BASOPHILS ABSOLUTE: 0 THOU/MM3 (ref 0–0.1)
BASOPHILS NFR BLD AUTO: 0.3 %
BILIRUB SERPL-MCNC: 0.4 MG/DL (ref 0.3–1.2)
BUN SERPL-MCNC: 27 MG/DL (ref 7–22)
CALCIUM SERPL-MCNC: 8.6 MG/DL (ref 8.5–10.5)
CHLORIDE SERPL-SCNC: 110 MEQ/L (ref 98–111)
CO2 SERPL-SCNC: 24 MEQ/L (ref 23–33)
CREAT SERPL-MCNC: 1.1 MG/DL (ref 0.4–1.2)
DEPRECATED RDW RBC AUTO: 55 FL (ref 35–45)
EOSINOPHIL NFR BLD AUTO: 1.9 %
EOSINOPHILS ABSOLUTE: 0.1 THOU/MM3 (ref 0–0.4)
ERYTHROCYTE [DISTWIDTH] IN BLOOD BY AUTOMATED COUNT: 15.8 % (ref 11.5–14.5)
GFR SERPL CREATININE-BSD FRML MDRD: > 60 ML/MIN/1.73M2
GLUCOSE SERPL-MCNC: 101 MG/DL (ref 70–108)
HCT VFR BLD AUTO: 27.5 % (ref 42–52)
HGB BLD-MCNC: 8.8 GM/DL (ref 14–18)
HGB RETIC QN AUTO: 34.3 PG (ref 28.2–35.7)
IMM GRANULOCYTES # BLD AUTO: 0.07 THOU/MM3 (ref 0–0.07)
IMM GRANULOCYTES NFR BLD AUTO: 1 %
IMM RETICS NFR: 20.5 % (ref 2.3–13.4)
LDH SERPL L TO P-CCNC: 280 U/L (ref 100–190)
LYMPHOCYTES ABSOLUTE: 1.2 THOU/MM3 (ref 1–4.8)
LYMPHOCYTES NFR BLD AUTO: 17.8 %
MCH RBC QN AUTO: 32.5 PG (ref 26–33)
MCHC RBC AUTO-ENTMCNC: 32 GM/DL (ref 32.2–35.5)
MCV RBC AUTO: 101.5 FL (ref 80–94)
MONOCYTES ABSOLUTE: 0.6 THOU/MM3 (ref 0.4–1.3)
MONOCYTES NFR BLD AUTO: 9.2 %
NEUTROPHILS NFR BLD AUTO: 69.8 %
NRBC BLD AUTO-RTO: 0 /100 WBC
PLATELET # BLD AUTO: 194 THOU/MM3 (ref 130–400)
PMV BLD AUTO: 9.8 FL (ref 9.4–12.4)
POTASSIUM SERPL-SCNC: 3.8 MEQ/L (ref 3.5–5.2)
PROT SERPL-MCNC: 6.4 G/DL (ref 6.1–8)
RBC # BLD AUTO: 2.71 MILL/MM3 (ref 4.7–6.1)
RETICS # AUTO: 130 THOU/MM3 (ref 20–115)
RETICS/RBC NFR AUTO: 4.8 % (ref 0.5–2)
SEGMENTED NEUTROPHILS ABSOLUTE COUNT: 4.9 THOU/MM3 (ref 1.8–7.7)
SODIUM SERPL-SCNC: 144 MEQ/L (ref 135–145)
WBC # BLD AUTO: 7 THOU/MM3 (ref 4.8–10.8)

## 2024-02-09 PROCEDURE — 99211 OFF/OP EST MAY X REQ PHY/QHP: CPT

## 2024-02-09 PROCEDURE — 36415 COLL VENOUS BLD VENIPUNCTURE: CPT

## 2024-02-09 PROCEDURE — 85046 RETICYTE/HGB CONCENTRATE: CPT

## 2024-02-09 PROCEDURE — 99213 OFFICE O/P EST LOW 20 MIN: CPT | Performed by: INTERNAL MEDICINE

## 2024-02-09 PROCEDURE — 80053 COMPREHEN METABOLIC PANEL: CPT

## 2024-02-09 PROCEDURE — 1123F ACP DISCUSS/DSCN MKR DOCD: CPT | Performed by: INTERNAL MEDICINE

## 2024-02-09 PROCEDURE — 1111F DSCHRG MED/CURRENT MED MERGE: CPT | Performed by: INTERNAL MEDICINE

## 2024-02-09 PROCEDURE — G8427 DOCREV CUR MEDS BY ELIG CLIN: HCPCS | Performed by: INTERNAL MEDICINE

## 2024-02-09 PROCEDURE — 1036F TOBACCO NON-USER: CPT | Performed by: INTERNAL MEDICINE

## 2024-02-09 PROCEDURE — 83615 LACTATE (LD) (LDH) ENZYME: CPT

## 2024-02-09 PROCEDURE — G8484 FLU IMMUNIZE NO ADMIN: HCPCS | Performed by: INTERNAL MEDICINE

## 2024-02-09 PROCEDURE — 83010 ASSAY OF HAPTOGLOBIN QUANT: CPT

## 2024-02-09 PROCEDURE — 85025 COMPLETE CBC W/AUTO DIFF WBC: CPT

## 2024-02-09 PROCEDURE — G8419 CALC BMI OUT NRM PARAM NOF/U: HCPCS | Performed by: INTERNAL MEDICINE

## 2024-02-09 RX ORDER — DOXAZOSIN 2 MG/1
2 TABLET ORAL NIGHTLY
COMMUNITY
Start: 2024-02-07

## 2024-02-09 NOTE — PROGRESS NOTES
of systems noted in HPI, all other ROS negative.   Objective:   Physical Exam   /66   Pulse 74   Temp 97.7 °F (36.5 °C) (Oral)   Resp 16   Ht 1.524 m (5')   SpO2 98%   BMI 29.45 kg/m²    General appearance: Thin somewhat ill-appearing unable to participate in the conversation because of severe dementia  HEENT: Pupils equal, round, and reactive to light. Conjunctivae/corneas clear. Oral mucosa   Neck: Supple, with full range of motion. Trachea midline.   Respiratory:  Normal respiratory effort. Clear to auscultation, bilaterally without Rales/Wheezes/Rhonchi.  Cardiovascular: Regular rate and rhythm with normal S1/S2 without murmurs, rubs or gallops.   Abdomen: Soft, non-tender, non-distended with active bowel sounds.  No splenomegaly  Musculoskeletal: No clubbing, cyanosis or edema bilaterally.    Skin: Skin color, texture, turgor normal.  No visible rashes or lesions.  Neurologic:  Neurovascularly intact without any focal sensory/motor deficits.   Psychiatric: Dementia  Capillary Refill: Brisk,< 3 seconds   Peripheral Pulses: +2 palpable, equal bilaterally       Imaging Studies and Labs:   CBC:   Lab Results   Component Value Date    WBC 6.0 02/07/2024    HGB 8.7 (L) 02/07/2024    HCT 26.0 (L) 02/07/2024    MCV 96.7 (H) 02/07/2024     02/07/2024     BMP:   Lab Results   Component Value Date/Time     02/05/2024 06:54 AM    K 3.5 02/05/2024 06:54 AM     02/05/2024 06:54 AM    CO2 24 02/05/2024 06:54 AM    BUN 22 02/05/2024 06:54 AM    CREATININE 0.8 02/05/2024 06:54 AM    GLUCOSE 89 02/05/2024 06:54 AM    GLUCOSE 95 09/20/2023 02:44 PM    CALCIUM 8.4 02/05/2024 06:54 AM      LFT:   Lab Results   Component Value Date    ALT 7 (L) 02/05/2024    AST 25 02/05/2024    ALKPHOS 44 02/05/2024    BILITOT 1.8 (H) 02/05/2024         Assessment and Plan:   Hemolytic anemia: Patient the above history of hemolytic anemia that does appear to be improving.  At this point timing to keep him off of any

## 2024-02-10 LAB
HFE GENE MUT ANL BLD/T: NORMAL
HFE P.C282Y BLD/T QL: NORMAL
HFE P.H63D BLD/T QL: NEGATIVE
HFE P.S65C BLD/T QL: NEGATIVE
SPECIMEN SOURCE: NORMAL

## 2024-02-11 LAB — HAPTOGLOB SERPL-MCNC: 57 MG/DL (ref 30–200)

## 2024-05-15 ENCOUNTER — APPOINTMENT (OUTPATIENT)
Dept: CT IMAGING | Age: 89
End: 2024-05-15
Payer: MEDICARE

## 2024-05-15 ENCOUNTER — HOSPITAL ENCOUNTER (EMERGENCY)
Age: 89
Discharge: HOME | End: 2024-05-15
Attending: EMERGENCY MEDICINE
Payer: MEDICARE

## 2024-05-15 VITALS
TEMPERATURE: 97.7 F | HEART RATE: 75 BPM | DIASTOLIC BLOOD PRESSURE: 80 MMHG | OXYGEN SATURATION: 99 % | RESPIRATION RATE: 14 BRPM | SYSTOLIC BLOOD PRESSURE: 144 MMHG

## 2024-05-15 DIAGNOSIS — R29.810 FACIAL DROOP: Primary | ICD-10-CM

## 2024-05-15 LAB
ALBUMIN SERPL BCG-MCNC: 3.6 G/DL (ref 3.5–5.1)
ALP SERPL-CCNC: 61 U/L (ref 38–126)
ALT SERPL W/O P-5'-P-CCNC: 7 U/L (ref 11–66)
ANION GAP SERPL CALC-SCNC: 11 MEQ/L (ref 8–16)
AST SERPL-CCNC: 18 U/L (ref 5–40)
BASOPHILS ABSOLUTE: 0 THOU/MM3 (ref 0–0.1)
BASOPHILS NFR BLD AUTO: 0.4 %
BILIRUB SERPL-MCNC: 0.5 MG/DL (ref 0.3–1.2)
BUN SERPL-MCNC: 22 MG/DL (ref 7–22)
CALCIUM SERPL-MCNC: 8.6 MG/DL (ref 8.5–10.5)
CHLORIDE SERPL-SCNC: 110 MEQ/L (ref 98–111)
CO2 SERPL-SCNC: 23 MEQ/L (ref 23–33)
CREAT SERPL-MCNC: 1.1 MG/DL (ref 0.4–1.2)
DEPRECATED RDW RBC AUTO: 51.7 FL (ref 35–45)
EKG ATRIAL RATE: 87 BPM
EKG P AXIS: 47 DEGREES
EKG P-R INTERVAL: 148 MS
EKG Q-T INTERVAL: 434 MS
EKG QRS DURATION: 130 MS
EKG QTC CALCULATION (BAZETT): 522 MS
EKG R AXIS: -8 DEGREES
EKG T AXIS: 157 DEGREES
EKG VENTRICULAR RATE: 87 BPM
EOSINOPHIL NFR BLD AUTO: 1 %
EOSINOPHILS ABSOLUTE: 0.1 THOU/MM3 (ref 0–0.4)
ERYTHROCYTE [DISTWIDTH] IN BLOOD BY AUTOMATED COUNT: 13.2 % (ref 11.5–14.5)
GFR SERPL CREATININE-BSD FRML MDRD: 65 ML/MIN/1.73M2
GLUCOSE SERPL-MCNC: 90 MG/DL (ref 70–108)
HCT VFR BLD AUTO: 27.2 % (ref 42–52)
HGB BLD-MCNC: 8.5 GM/DL (ref 14–18)
IMM GRANULOCYTES # BLD AUTO: 0.04 THOU/MM3 (ref 0–0.07)
IMM GRANULOCYTES NFR BLD AUTO: 0.5 %
LYMPHOCYTES ABSOLUTE: 1.4 THOU/MM3 (ref 1–4.8)
LYMPHOCYTES NFR BLD AUTO: 16.1 %
MACROCYTES BLD QL SMEAR: PRESENT
MCH RBC QN AUTO: 34.4 PG (ref 26–33)
MCHC RBC AUTO-ENTMCNC: 31.3 GM/DL (ref 32.2–35.5)
MCV RBC AUTO: 110.1 FL (ref 80–94)
MONOCYTES ABSOLUTE: 0.8 THOU/MM3 (ref 0.4–1.3)
MONOCYTES NFR BLD AUTO: 9.3 %
NEUTROPHILS ABSOLUTE: 6.1 THOU/MM3 (ref 1.8–7.7)
NEUTROPHILS NFR BLD AUTO: 72.7 %
NRBC BLD AUTO-RTO: 0 /100 WBC
OSMOLALITY SERPL CALC.SUM OF ELEC: 289.7 MOSMOL/KG (ref 275–300)
PLATELET # BLD AUTO: 172 THOU/MM3 (ref 130–400)
PMV BLD AUTO: 10.2 FL (ref 9.4–12.4)
POTASSIUM SERPL-SCNC: 4.4 MEQ/L (ref 3.5–5.2)
PROT SERPL-MCNC: 7 G/DL (ref 6.1–8)
RBC # BLD AUTO: 2.47 MILL/MM3 (ref 4.7–6.1)
SCAN OF BLOOD SMEAR: NORMAL
SODIUM SERPL-SCNC: 144 MEQ/L (ref 135–145)
TROPONIN, HIGH SENSITIVITY: 18 NG/L (ref 0–12)
TROPONIN, HIGH SENSITIVITY: 21 NG/L (ref 0–12)
VALPROATE SERPL-MCNC: 30.1 UG/ML (ref 50–100)
WBC # BLD AUTO: 8.4 THOU/MM3 (ref 4.8–10.8)

## 2024-05-15 PROCEDURE — 84484 ASSAY OF TROPONIN QUANT: CPT

## 2024-05-15 PROCEDURE — 80164 ASSAY DIPROPYLACETIC ACD TOT: CPT

## 2024-05-15 PROCEDURE — 85025 COMPLETE CBC W/AUTO DIFF WBC: CPT

## 2024-05-15 PROCEDURE — 93005 ELECTROCARDIOGRAM TRACING: CPT | Performed by: EMERGENCY MEDICINE

## 2024-05-15 PROCEDURE — 70450 CT HEAD/BRAIN W/O DYE: CPT

## 2024-05-15 PROCEDURE — 99284 EMERGENCY DEPT VISIT MOD MDM: CPT

## 2024-05-15 PROCEDURE — 36415 COLL VENOUS BLD VENIPUNCTURE: CPT

## 2024-05-15 PROCEDURE — 80053 COMPREHEN METABOLIC PANEL: CPT

## 2024-05-15 PROCEDURE — 93010 ELECTROCARDIOGRAM REPORT: CPT | Performed by: INTERNAL MEDICINE

## 2024-05-15 NOTE — ED TRIAGE NOTES
Presents to ED with c/o facial droop that [place] [facility] reports patient always has a facial droop but today it was worse. Patient alert upon arrival. Report states patient is a hospice patient. Patient is oriented to self only. Respirations easy and unlabored. Unknown LKW.

## 2024-05-15 NOTE — ED PROVIDER NOTES
[facility]  EMERGENCY DEPARTMENT ENCOUNTER        PATIENT NAME: Osmany Castellanos  MRN: [id number]  : 1935  ERVIN: 5/15/2024  PROVIDER: Bruce Parker MD    Patient was seen and evaluated at 2:54 PM EDT. Nurses Notes are reviewed and I agree except as noted in the HPI.  Chief Complaint   Patient presents with    Facial Droop     HISTORY OF PRESENT ILLNESS     Osmany Castellanos is a 88 y.o. male with PMH of advanced vascular dementia, BPH, CKD, GERD, hard of hearing, HLD, HTN, and TIA is brought in from SNF for evaluation of sudden onset right-sided facial droop.  This was noticed by one of patient's family members around noontime.  Patient arrived in the ED, facial droop already resolved.  On arrival, patient moved 4 extremities, not following commands.    Of note, this is a hospice patient with CODE STATUS DNRCC.  Hospice nurse was already aware of this patient arrival and patient is temporarily moved out of hospice.    This HPI was provided by EMS and SNF staff.     PAST MEDICAL HISTORY    has a past medical history of BPH (benign prostatic hypertrophy), CKD (chronic kidney disease) stage 2, GFR 60-89 ml/min, Dementia (HCC), Disease of larynx, Dysphagia, Erectile dysfunction, Frequent falls, GERD (gastroesophageal reflux disease), [place] (hard of hearing), Hyperlipidemia, Hypertension, Memory disorder, Metabolic bone disease, Orthostatic hypotension, Sleep apnea, TIA (transient ischemic attack), and Zenker diverticula.    SURGICAL HISTORY      has a past surgical history that includes shoulder surgery; Neck surgery; Tonsillectomy; TURP (12); Colonoscopy; Thyroid surgery; Prostate surgery; Endoscopy, colon, diagnostic; Esophagus surgery; and TURP (N/A, 3/24/2023).    CURRENT MEDICATIONS       Previous Medications    ACETAMINOPHEN (TYLENOL) 500 MG TABLET    Take 1 tablet by mouth every 6 hours as needed for Pain prn    ALLOPURINOL (ZYLOPRIM) 100 MG TABLET    Take 1 tablet by mouth daily    ASPIRIN

## 2024-05-15 NOTE — DISCHARGE INSTRUCTIONS
Osmany' facial droop seems to be positional.  Especially when he sits up in the neutral position, he does not have facial symmetry.  His speech is not slurred, and he has no extremity weakness.  His head CT does not show acute findings.  Clinically there is no evidence he has a large stroke.  His facial droop family saw may be due to a mini stroke, though less likely.  Given his situation, we recommend blood pressure control, taking aspirin and cholesterol medication.

## 2024-07-30 ENCOUNTER — HOSPITAL ENCOUNTER (OUTPATIENT)
Dept: AUDIOLOGY | Age: 89
Discharge: HOME | End: 2024-07-30
Payer: COMMERCIAL

## 2024-07-30 PROCEDURE — V5267 HEARING AID SUP/ACCESS/DEV: HCPCS | Performed by: AUDIOLOGIST

## 2024-07-30 PROCEDURE — 9990000010 HC NO CHARGE VISIT: Performed by: AUDIOLOGIST

## 2024-07-30 NOTE — PROGRESS NOTES
Patient scheduled for audio/HAE. Family is interested in pursuing new hearing aids in order to help facilitate physical and occupational therapy. The patient is suffering from dementia and therapies have been cancelled as the patient is not responding to instructions. The patient's family is concerned that he is not responding because he cannot hear the instructions. He was last fit with [employer] M50-RT hearing aids 12/30/2019. 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 Word understanding ability was poor in each ear at 40%. His hearing loss had increased and his word understanding ability decreased compared to his previous audiogram from 07/25/2016.    The patient is not currently wearing hearing aids. He came to the appointment wearing a personal sound amplification product with supra aural headphones.    Otoscopy revealed soft non-occluding cerumen, bilaterally.    Was able to establish a speech detection threshold at 85 dBHL in the right ear and at 80 dBHL in the left ear. Additional speech testing and pure tone testing was unsuccessful. The patient was quite fatigued/drowsy during the visit today.    Speech mapping was completed with the patient's PSAP. Target's based on the patient's previous audiogram could not be met at the volume limits of the device. Testing was repeated with a [employer] pocket talker. An improved target match was noted. Device settings were: Volume control- 6.5 and tone control set to 11o'Clock. The patient seemed slightly more alert and was able to answer questions while wearing the device    The patient's wife agreed to purchasing the [employer] pocket talker with over the ear headphones. Mrs. Castellanos and the patient's son were instructed on the use of the device.

## 2024-08-29 ENCOUNTER — APPOINTMENT (OUTPATIENT)
Dept: CT IMAGING | Age: 89
End: 2024-08-29
Payer: MEDICARE

## 2024-08-29 ENCOUNTER — HOSPITAL ENCOUNTER (INPATIENT)
Age: 89
LOS: 8 days | Discharge: HOME | End: 2024-09-06
Attending: FAMILY MEDICINE | Admitting: NURSE PRACTITIONER
Payer: MEDICARE

## 2024-08-29 ENCOUNTER — APPOINTMENT (OUTPATIENT)
Dept: GENERAL RADIOLOGY | Age: 89
End: 2024-08-29
Payer: MEDICARE

## 2024-08-29 DIAGNOSIS — M10.00 IDIOPATHIC GOUT, UNSPECIFIED CHRONICITY, UNSPECIFIED SITE: ICD-10-CM

## 2024-08-29 DIAGNOSIS — M79.89 BILATERAL HAND SWELLING: ICD-10-CM

## 2024-08-29 DIAGNOSIS — R53.1 GENERAL WEAKNESS: Primary | ICD-10-CM

## 2024-08-29 DIAGNOSIS — R06.02 SHORTNESS OF BREATH: ICD-10-CM

## 2024-08-29 DIAGNOSIS — D64.89 ANEMIA DUE TO OTHER CAUSE, NOT CLASSIFIED: ICD-10-CM

## 2024-08-29 DIAGNOSIS — E86.0 DEHYDRATION: ICD-10-CM

## 2024-08-29 DIAGNOSIS — F01.C0 SEVERE VASCULAR DEMENTIA, UNSPECIFIED WHETHER BEHAVIORAL, PSYCHOTIC, OR MOOD DISTURBANCE OR ANXIETY (HCC): ICD-10-CM

## 2024-08-29 DIAGNOSIS — Z51.5 HOSPICE CARE: ICD-10-CM

## 2024-08-29 PROBLEM — K92.2 LOWER GI BLEED: Status: ACTIVE | Noted: 2024-08-29

## 2024-08-29 PROBLEM — N17.9 ACUTE KIDNEY INJURY (HCC): Status: ACTIVE | Noted: 2024-08-29

## 2024-08-29 LAB
ABO: NORMAL
ALBUMIN SERPL BCG-MCNC: 3.7 G/DL (ref 3.5–5.1)
ALP SERPL-CCNC: 61 U/L (ref 38–126)
ALT SERPL W/O P-5'-P-CCNC: < 5 U/L (ref 11–66)
ANION GAP SERPL CALC-SCNC: 11 MEQ/L (ref 8–16)
ANTIBODY SCREEN: NORMAL
AST SERPL-CCNC: 15 U/L (ref 5–40)
BACTERIA: ABNORMAL
BASOPHILS ABSOLUTE: 0 THOU/MM3 (ref 0–0.1)
BASOPHILS NFR BLD AUTO: 0.3 %
BILIRUB SERPL-MCNC: 1 MG/DL (ref 0.3–1.2)
BILIRUB UR QL STRIP: NEGATIVE
BUN SERPL-MCNC: 31 MG/DL (ref 7–22)
CALCIUM SERPL-MCNC: 8.5 MG/DL (ref 8.5–10.5)
CASTS #/AREA URNS LPF: ABNORMAL /LPF
CASTS #/AREA URNS LPF: ABNORMAL /LPF
CHARACTER UR: ABNORMAL
CHARCOAL URNS QL MICRO: ABNORMAL
CHLORIDE SERPL-SCNC: 111 MEQ/L (ref 98–111)
CO2 SERPL-SCNC: 23 MEQ/L (ref 23–33)
COLOR UR: YELLOW
CREAT SERPL-MCNC: 1.2 MG/DL (ref 0.4–1.2)
CRYSTALS URNS QL MICRO: ABNORMAL
DEPRECATED RDW RBC AUTO: 50.9 FL (ref 35–45)
EOSINOPHIL NFR BLD AUTO: 1 %
EOSINOPHILS ABSOLUTE: 0.1 THOU/MM3 (ref 0–0.4)
EPITHELIAL CELLS, UA: ABNORMAL /HPF
ERYTHROCYTE [DISTWIDTH] IN BLOOD BY AUTOMATED COUNT: 12.9 % (ref 11.5–14.5)
GFR SERPL CREATININE-BSD FRML MDRD: 58 ML/MIN/1.73M2
GLUCOSE SERPL-MCNC: 92 MG/DL (ref 70–108)
GLUCOSE UR QL STRIP.AUTO: NEGATIVE MG/DL
HCT VFR BLD AUTO: 22.6 % (ref 42–52)
HGB BLD-MCNC: 7 GM/DL (ref 14–18)
HGB UR QL STRIP.AUTO: ABNORMAL
IMM GRANULOCYTES # BLD AUTO: 0.03 THOU/MM3 (ref 0–0.07)
IMM GRANULOCYTES NFR BLD AUTO: 0.4 %
KETONES UR QL STRIP.AUTO: ABNORMAL
LACTIC ACID, SEPSIS: 1.2 MMOL/L (ref 0.5–1.9)
LEUKOCYTE ESTERASE UR QL STRIP.AUTO: ABNORMAL
LYMPHOCYTES ABSOLUTE: 1.2 THOU/MM3 (ref 1–4.8)
LYMPHOCYTES NFR BLD AUTO: 17.8 %
MACROCYTES BLD QL SMEAR: PRESENT
MAGNESIUM SERPL-MCNC: 2.1 MG/DL (ref 1.6–2.4)
MCH RBC QN AUTO: 34.1 PG (ref 26–33)
MCHC RBC AUTO-ENTMCNC: 31 GM/DL (ref 32.2–35.5)
MCV RBC AUTO: 110.2 FL (ref 80–94)
MONOCYTES ABSOLUTE: 0.5 THOU/MM3 (ref 0.4–1.3)
MONOCYTES NFR BLD AUTO: 7.8 %
NEUTROPHILS ABSOLUTE: 5 THOU/MM3 (ref 1.8–7.7)
NEUTROPHILS NFR BLD AUTO: 72.7 %
NITRITE UR QL STRIP.AUTO: POSITIVE
NRBC BLD AUTO-RTO: 0 /100 WBC
NT-PROBNP SERPL IA-MCNC: 4174 PG/ML (ref 0–449)
OSMOLALITY SERPL CALC.SUM OF ELEC: 294.9 MOSMOL/KG (ref 275–300)
PH UR STRIP.AUTO: 7.5 [PH] (ref 5–9)
PLATELET # BLD AUTO: 188 THOU/MM3 (ref 130–400)
PLATELET BLD QL SMEAR: ADEQUATE
PMV BLD AUTO: 10 FL (ref 9.4–12.4)
POTASSIUM SERPL-SCNC: 3.8 MEQ/L (ref 3.5–5.2)
PROT SERPL-MCNC: 7.2 G/DL (ref 6.1–8)
PROT UR STRIP.AUTO-MCNC: 100 MG/DL
RBC # BLD AUTO: 2.05 MILL/MM3 (ref 4.7–6.1)
RBC #/AREA URNS HPF: ABNORMAL /HPF
RENAL EPI CELLS #/AREA URNS HPF: ABNORMAL /[HPF]
RH FACTOR: NORMAL
SCAN OF BLOOD SMEAR: NORMAL
SODIUM SERPL-SCNC: 145 MEQ/L (ref 135–145)
SPECIFIC GRAVITY UA: 1.02 (ref 1–1.03)
TROPONIN, HIGH SENSITIVITY: 23 NG/L (ref 0–12)
URATE SERPL-MCNC: 4.6 MG/DL (ref 3.7–7)
UROBILINOGEN, URINE: 1 EU/DL (ref 0–1)
WBC # BLD AUTO: 6.9 THOU/MM3 (ref 4.8–10.8)
WBC #/AREA URNS HPF: ABNORMAL /HPF
YEAST LIKE FUNGI URNS QL MICRO: ABNORMAL

## 2024-08-29 PROCEDURE — 99223 1ST HOSP IP/OBS HIGH 75: CPT | Performed by: NURSE PRACTITIONER

## 2024-08-29 PROCEDURE — 86901 BLOOD TYPING SEROLOGIC RH(D): CPT

## 2024-08-29 PROCEDURE — 83605 ASSAY OF LACTIC ACID: CPT

## 2024-08-29 PROCEDURE — 83880 ASSAY OF NATRIURETIC PEPTIDE: CPT

## 2024-08-29 PROCEDURE — 86900 BLOOD TYPING SEROLOGIC ABO: CPT

## 2024-08-29 PROCEDURE — 84484 ASSAY OF TROPONIN QUANT: CPT

## 2024-08-29 PROCEDURE — 2580000003 HC RX 258: Performed by: NURSE PRACTITIONER

## 2024-08-29 PROCEDURE — 85025 COMPLETE CBC W/AUTO DIFF WBC: CPT

## 2024-08-29 PROCEDURE — 80053 COMPREHEN METABOLIC PANEL: CPT

## 2024-08-29 PROCEDURE — 71045 X-RAY EXAM CHEST 1 VIEW: CPT

## 2024-08-29 PROCEDURE — 99285 EMERGENCY DEPT VISIT HI MDM: CPT

## 2024-08-29 PROCEDURE — 74177 CT ABD & PELVIS W/CONTRAST: CPT

## 2024-08-29 PROCEDURE — 83735 ASSAY OF MAGNESIUM: CPT

## 2024-08-29 PROCEDURE — P9016 RBC LEUKOCYTES REDUCED: HCPCS

## 2024-08-29 PROCEDURE — 6360000004 HC RX CONTRAST MEDICATION: Performed by: FAMILY MEDICINE

## 2024-08-29 PROCEDURE — 86850 RBC ANTIBODY SCREEN: CPT

## 2024-08-29 PROCEDURE — 86923 COMPATIBILITY TEST ELECTRIC: CPT

## 2024-08-29 PROCEDURE — 84550 ASSAY OF BLOOD/URIC ACID: CPT

## 2024-08-29 PROCEDURE — 2580000003 HC RX 258: Performed by: FAMILY MEDICINE

## 2024-08-29 PROCEDURE — 36415 COLL VENOUS BLD VENIPUNCTURE: CPT

## 2024-08-29 PROCEDURE — 81001 URINALYSIS AUTO W/SCOPE: CPT

## 2024-08-29 PROCEDURE — 1200000000 HC SEMI PRIVATE

## 2024-08-29 PROCEDURE — 30233N1 TRANSFUSION OF NONAUTOLOGOUS RED BLOOD CELLS INTO PERIPHERAL VEIN, PERCUTANEOUS APPROACH: ICD-10-PCS | Performed by: INTERNAL MEDICINE

## 2024-08-29 RX ORDER — ACETAMINOPHEN 650 MG/1
650 SUPPOSITORY RECTAL EVERY 6 HOURS PRN
Status: DISCONTINUED | OUTPATIENT
Start: 2024-08-29 | End: 2024-09-06 | Stop reason: HOSPADM

## 2024-08-29 RX ORDER — ACETAMINOPHEN 325 MG/1
650 TABLET ORAL EVERY 6 HOURS PRN
Status: DISCONTINUED | OUTPATIENT
Start: 2024-08-29 | End: 2024-09-06 | Stop reason: HOSPADM

## 2024-08-29 RX ORDER — POLYETHYLENE GLYCOL 3350 17 G/17G
17 POWDER, FOR SOLUTION ORAL DAILY PRN
Status: DISCONTINUED | OUTPATIENT
Start: 2024-08-29 | End: 2024-09-06 | Stop reason: HOSPADM

## 2024-08-29 RX ORDER — POTASSIUM CHLORIDE 7.45 MG/ML
10 INJECTION INTRAVENOUS PRN
Status: DISCONTINUED | OUTPATIENT
Start: 2024-08-29 | End: 2024-09-06 | Stop reason: HOSPADM

## 2024-08-29 RX ORDER — MAGNESIUM SULFATE IN WATER 40 MG/ML
2000 INJECTION, SOLUTION INTRAVENOUS PRN
Status: DISCONTINUED | OUTPATIENT
Start: 2024-08-29 | End: 2024-09-06 | Stop reason: HOSPADM

## 2024-08-29 RX ORDER — SODIUM CHLORIDE 0.9 % (FLUSH) 0.9 %
5-40 SYRINGE (ML) INJECTION EVERY 12 HOURS SCHEDULED
Status: DISCONTINUED | OUTPATIENT
Start: 2024-08-29 | End: 2024-09-06 | Stop reason: HOSPADM

## 2024-08-29 RX ORDER — ONDANSETRON 2 MG/ML
4 INJECTION INTRAMUSCULAR; INTRAVENOUS EVERY 6 HOURS PRN
Status: DISCONTINUED | OUTPATIENT
Start: 2024-08-29 | End: 2024-09-06 | Stop reason: HOSPADM

## 2024-08-29 RX ORDER — SODIUM CHLORIDE 0.9 % (FLUSH) 0.9 %
5-40 SYRINGE (ML) INJECTION PRN
Status: DISCONTINUED | OUTPATIENT
Start: 2024-08-29 | End: 2024-09-06 | Stop reason: HOSPADM

## 2024-08-29 RX ORDER — SODIUM CHLORIDE 9 MG/ML
INJECTION, SOLUTION INTRAVENOUS PRN
Status: DISCONTINUED | OUTPATIENT
Start: 2024-08-29 | End: 2024-09-06 | Stop reason: HOSPADM

## 2024-08-29 RX ORDER — IOPAMIDOL 755 MG/ML
80 INJECTION, SOLUTION INTRAVASCULAR
Status: COMPLETED | OUTPATIENT
Start: 2024-08-29 | End: 2024-08-29

## 2024-08-29 RX ORDER — ONDANSETRON 4 MG/1
4 TABLET, ORALLY DISINTEGRATING ORAL EVERY 8 HOURS PRN
Status: DISCONTINUED | OUTPATIENT
Start: 2024-08-29 | End: 2024-09-06 | Stop reason: HOSPADM

## 2024-08-29 RX ORDER — 0.9 % SODIUM CHLORIDE 0.9 %
500 INTRAVENOUS SOLUTION INTRAVENOUS ONCE
Status: COMPLETED | OUTPATIENT
Start: 2024-08-29 | End: 2024-08-29

## 2024-08-29 RX ORDER — POTASSIUM CHLORIDE 1500 MG/1
40 TABLET, EXTENDED RELEASE ORAL PRN
Status: DISCONTINUED | OUTPATIENT
Start: 2024-08-29 | End: 2024-09-06 | Stop reason: HOSPADM

## 2024-08-29 RX ADMIN — SODIUM CHLORIDE 500 ML: 9 INJECTION, SOLUTION INTRAVENOUS at 18:30

## 2024-08-29 RX ADMIN — SODIUM CHLORIDE, PRESERVATIVE FREE 10 ML: 5 INJECTION INTRAVENOUS at 23:50

## 2024-08-29 RX ADMIN — IOPAMIDOL 80 ML: 755 INJECTION, SOLUTION INTRAVENOUS at 19:38

## 2024-08-29 ASSESSMENT — PAIN - FUNCTIONAL ASSESSMENT
PAIN_FUNCTIONAL_ASSESSMENT: NONE - DENIES PAIN

## 2024-08-29 ASSESSMENT — PAIN SCALES - GENERAL: PAINLEVEL_OUTOF10: 0

## 2024-08-29 NOTE — ED PROVIDER NOTES
EMERGENCY DEPARTMENT ENCOUNTER      CHIEF COMPLAINT    Chief Complaint   Patient presents with    Joint Swelling       HPI    Osmany Castellanos is a 89 y.o. male living at home with his spouse Eileen, who presents with generalized weakness, lower abdominal and hand swelling without chest pain or dyspnea on exertion since the onset last week. The duration has been constant since the onset, and progressing over the past few days. The generalized weakness was not associated with fever, vomiting, chest pain or cough.  He endorses not feeling well and has decreased appetite. He has a hx of gout and felt that both his hands and joints are swollen. No aggravating or alleviating factors.      REVIEW OF SYSTEMS    Cardiac: No chest pain or palpitations  Respiratory: No shortness of breath or new cough  General: No weight loss or night sweats   : No dysuria or hematuria  See HPI for further details.  All other systems reviewed and are negative.      PAST MEDICAL OR SURGICAL HISTORY    Past Medical History:   Diagnosis Date    BPH (benign prostatic hypertrophy)     CKD (chronic kidney disease) stage 2, GFR 60-89 ml/min     Dementia (HCC)     Disease of larynx     Dysphagia     Erectile dysfunction     Frequent falls     GERD (gastroesophageal reflux disease)     [place] (hard of hearing)     Hyperlipidemia     Hypertension     Memory disorder     Metabolic bone disease     Orthostatic hypotension     Sleep apnea     TIA (transient ischemic attack)     Zenker diverticula      Past Surgical History:   Procedure Laterality Date    COLONOSCOPY      ENDOSCOPY, COLON, DIAGNOSTIC      ESOPHAGUS SURGERY      NECK SURGERY      PROSTATE SURGERY      SHOULDER SURGERY      THYROID SURGERY      removal of a nodule    TONSILLECTOMY      TURP  5-31-12    Dr Ashwini LIU N/A 3/24/2023    CYSTOSCOPY, URETHRAL DILATION, GREENLIGHT PHOTO VAPORIZATION OF PROSTATE performed by Arnoldo Nichols MD at [facility] OR       CURRENT MEDICATIONS    Current  panel of labs, with specific inflammatory markers and uric acid in relation to his gout. Pt has no active chest pain or vital signs abnormalities. I reckon pt may have renal failure given his hands and lower abdominal swelling. However, he has no LE edema.      ED COURSE & MEDICAL DECISION MAKING     Rm 20, pleasant 89 AA male from [facility] Osmany Castellanos, who presented for worsening generalized weakness, lower abdominal distension and swollen hands. Family was concerned about his general state. No active bleeding, fever or vomiting noted. Pt underwent general cardiac and abdominal workup, with notable finding of mild pre-renal dehydration (BUN 31), borderline Hgb of 7.0 (down from 8.5 last month), without signs of active bleeding. I suspect anemia of chronic disease but pt will benefit from serial H&H. I did not start pt on blood transfusion pending hospitalist evaluation. I did not consult GI but I would if you would like.      FINAL DIAGNOSES:    1. General weakness    2. Bilateral hand swelling    3. Idiopathic gout, unspecified chronicity, unspecified site    4. Anemia due to other cause, not classified        Final Disposition:  Admit to medicine service for further inpatient monitoring and management (accepted by Jacinto Shepard).        Salo Craig MD  08/29/24 2043

## 2024-08-29 NOTE — ED TRIAGE NOTES
Pt presents to ED via EMS with bilateral hand and joint swelling. Pt has hx of dementia and [place]. Pt confused at baseline. Pt only able to state date of birth. Pt's wife states she wanted him \"checked up for the fluid build up\". Per EMS, pt has been on and off of hospice care.

## 2024-08-30 ENCOUNTER — APPOINTMENT (OUTPATIENT)
Age: 89
End: 2024-08-30
Attending: NURSE PRACTITIONER
Payer: MEDICARE

## 2024-08-30 PROBLEM — R31.9 HEMATURIA: Status: ACTIVE | Noted: 2024-08-30

## 2024-08-30 PROBLEM — D64.9 SYMPTOMATIC ANEMIA: Status: RESOLVED | Noted: 2024-02-03 | Resolved: 2024-08-30

## 2024-08-30 PROBLEM — D62 ACUTE BLOOD LOSS ANEMIA: Status: ACTIVE | Noted: 2024-08-30

## 2024-08-30 PROBLEM — Z86.79 HISTORY OF ESSENTIAL HYPERTENSION: Status: ACTIVE | Noted: 2024-08-30

## 2024-08-30 PROBLEM — K92.2 LOWER GI BLEED: Status: RESOLVED | Noted: 2024-08-29 | Resolved: 2024-08-30

## 2024-08-30 PROBLEM — N17.9 ACUTE KIDNEY INJURY (HCC): Status: RESOLVED | Noted: 2024-08-29 | Resolved: 2024-08-30

## 2024-08-30 PROBLEM — R65.10 SIRS (SYSTEMIC INFLAMMATORY RESPONSE SYNDROME) (HCC): Status: RESOLVED | Noted: 2023-02-16 | Resolved: 2024-08-30

## 2024-08-30 PROBLEM — E87.6 HYPOKALEMIA: Status: RESOLVED | Noted: 2017-09-10 | Resolved: 2024-08-30

## 2024-08-30 PROBLEM — I10 PRIMARY HYPERTENSION: Status: RESOLVED | Noted: 2023-02-18 | Resolved: 2024-08-30

## 2024-08-30 PROBLEM — M54.9 INTRACTABLE BACK PAIN: Status: RESOLVED | Noted: 2017-09-10 | Resolved: 2024-08-30

## 2024-08-30 PROBLEM — F10.931 DELIRIUM TREMENS (HCC): Status: RESOLVED | Noted: 2018-03-03 | Resolved: 2024-08-30
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
## 2024-08-30 PROBLEM — R53.1 GENERAL WEAKNESS: Status: ACTIVE | Noted: 2024-08-30

## 2024-08-30 PROBLEM — G45.9 TIA (TRANSIENT ISCHEMIC ATTACK): Status: RESOLVED | Noted: 2021-07-14 | Resolved: 2024-08-30

## 2024-08-30 PROBLEM — F10.121 ALCOHOL INTOXICATION DELIRIUM (HCC): Status: RESOLVED | Noted: 2018-02-26 | Resolved: 2024-08-30

## 2024-08-30 PROBLEM — R29.6 FALLS: Status: RESOLVED | Noted: 2017-04-27 | Resolved: 2024-08-30

## 2024-08-30 PROBLEM — R29.6 FREQUENT FALLS: Status: RESOLVED | Noted: 2017-09-10 | Resolved: 2024-08-30

## 2024-08-30 PROBLEM — N39.0 COMPLICATED UTI (URINARY TRACT INFECTION): Status: ACTIVE | Noted: 2024-08-30

## 2024-08-30 PROBLEM — F10.229 ACUTE ALCOHOL INTOXICATION WITH ALCOHOLISM, WITH UNSPECIFIED COMPLICATION (HCC): Status: RESOLVED | Noted: 2018-02-27 | Resolved: 2024-08-30

## 2024-08-30 PROBLEM — F10.929 ALCOHOL INTOXICATION (HCC): Status: RESOLVED | Noted: 2018-02-26 | Resolved: 2024-08-30

## 2024-08-30 LAB
ECHO AO ASC DIAM: 3.5 CM
ECHO AO ASCENDING AORTA INDEX: 2.12 CM/M2
ECHO AO SINUS VALSALVA DIAM: 3.4 CM
ECHO AO SINUS VALSALVA INDEX: 2.06 CM/M2
ECHO AO ST JNCT DIAM: 3 CM
ECHO AR MAX VEL PISA: 4.1 M/S
ECHO AV CUSP MM: 2.1 CM
ECHO AV MEAN GRADIENT: 3 MMHG
ECHO AV MEAN VELOCITY: 0.9 M/S
ECHO AV PEAK GRADIENT: 7 MMHG
ECHO AV PEAK VELOCITY: 1.3 M/S
ECHO AV REGURGITANT PHT: 483 MS
ECHO AV VELOCITY RATIO: 0.77
ECHO AV VTI: 24.8 CM
ECHO BSA: 1.7 M2
ECHO EST RA PRESSURE: 5 MMHG
ECHO LA AREA 2C: 16.2 CM2
ECHO LA AREA 4C: 15.1 CM2
ECHO LA DIAMETER INDEX: 1.88 CM/M2
ECHO LA DIAMETER: 3.1 CM
ECHO LA MAJOR AXIS: 5.1 CM
ECHO LA MINOR AXIS: 4.9 CM
ECHO LA VOL BP: 40 ML (ref 18–58)
ECHO LA VOL MOD A2C: 45 ML (ref 18–58)
ECHO LA VOL MOD A4C: 35 ML (ref 18–58)
ECHO LA VOL/BSA BIPLANE: 24 ML/M2 (ref 16–34)
ECHO LA VOLUME INDEX MOD A2C: 27 ML/M2 (ref 16–34)
ECHO LA VOLUME INDEX MOD A4C: 21 ML/M2 (ref 16–34)
ECHO LV E' LATERAL VELOCITY: 6 CM/S
ECHO LV E' SEPTAL VELOCITY: 4 CM/S
ECHO LV EDV A2C: 69 ML
ECHO LV EDV A4C: 116 ML
ECHO LV EDV INDEX A4C: 70 ML/M2
ECHO LV EDV NDEX A2C: 42 ML/M2
ECHO LV EJECTION FRACTION A2C: 34 %
ECHO LV EJECTION FRACTION A4C: 32 %
ECHO LV EJECTION FRACTION BIPLANE: 33 % (ref 55–100)
ECHO LV ESV A2C: 45 ML
ECHO LV ESV A4C: 79 ML
ECHO LV ESV INDEX A2C: 27 ML/M2
ECHO LV ESV INDEX A4C: 48 ML/M2
ECHO LV FRACTIONAL SHORTENING: 14 % (ref 28–44)
ECHO LV INTERNAL DIMENSION DIASTOLE INDEX: 2.97 CM/M2
ECHO LV INTERNAL DIMENSION DIASTOLIC: 4.9 CM (ref 4.2–5.9)
ECHO LV INTERNAL DIMENSION SYSTOLIC INDEX: 2.55 CM/M2
ECHO LV INTERNAL DIMENSION SYSTOLIC: 4.2 CM
ECHO LV ISOVOLUMETRIC RELAXATION TIME (IVRT): 87 MS
ECHO LV IVSD: 1.2 CM (ref 0.6–1)
ECHO LV MASS 2D: 226.4 G (ref 88–224)
ECHO LV MASS INDEX 2D: 137.2 G/M2 (ref 49–115)
ECHO LV POSTERIOR WALL DIASTOLIC: 1.2 CM (ref 0.6–1)
ECHO LV RELATIVE WALL THICKNESS RATIO: 0.49
ECHO LVOT AV VTI INDEX: 0.78
ECHO LVOT MEAN GRADIENT: 2 MMHG
ECHO LVOT PEAK GRADIENT: 4 MMHG
ECHO LVOT PEAK VELOCITY: 1 M/S
ECHO LVOT VTI: 19.4 CM
ECHO MV A VELOCITY: 1.11 M/S
ECHO MV E DECELERATION TIME (DT): 124 MS
ECHO MV E VELOCITY: 0.78 M/S
ECHO MV E/A RATIO: 0.7
ECHO MV E/E' LATERAL: 13
ECHO MV E/E' RATIO (AVERAGED): 16.25
ECHO MV E/E' SEPTAL: 19.5
ECHO MV REGURGITANT PEAK GRADIENT: 67 MMHG
ECHO MV REGURGITANT PEAK VELOCITY: 4.1 M/S
ECHO PV MAX VELOCITY: 0.8 M/S
ECHO PV PEAK GRADIENT: 2 MMHG
ECHO RV FREE WALL PEAK S': 17 CM/S
ECHO RV INTERNAL DIMENSION: 2.8 CM
ECHO RV TAPSE: 2.5 CM (ref 1.7–?)
ECHO TV E WAVE: 0.5 M/S
FIBRINOGEN PPP-MCNC: 273 MG/100ML (ref 155–475)
HCT VFR BLD AUTO: 21.1 % (ref 42–52)
HCT VFR BLD AUTO: 24.5 % (ref 42–52)
HCT VFR BLD AUTO: 24.8 % (ref 42–52)
HGB BLD-MCNC: 6.4 GM/DL (ref 14–18)
HGB BLD-MCNC: 7.7 GM/DL (ref 14–18)
HGB BLD-MCNC: 8.1 GM/DL (ref 14–18)
INR PPP: 1.18 (ref 0.85–1.13)

## 2024-08-30 PROCEDURE — 85014 HEMATOCRIT: CPT

## 2024-08-30 PROCEDURE — 85018 HEMOGLOBIN: CPT

## 2024-08-30 PROCEDURE — 2580000003 HC RX 258: Performed by: STUDENT IN AN ORGANIZED HEALTH CARE EDUCATION/TRAINING PROGRAM

## 2024-08-30 PROCEDURE — 6360000002 HC RX W HCPCS: Performed by: NURSE PRACTITIONER

## 2024-08-30 PROCEDURE — 36415 COLL VENOUS BLD VENIPUNCTURE: CPT

## 2024-08-30 PROCEDURE — 36430 TRANSFUSION BLD/BLD COMPNT: CPT

## 2024-08-30 PROCEDURE — 85610 PROTHROMBIN TIME: CPT

## 2024-08-30 PROCEDURE — 6360000004 HC RX CONTRAST MEDICATION: Performed by: STUDENT IN AN ORGANIZED HEALTH CARE EDUCATION/TRAINING PROGRAM

## 2024-08-30 PROCEDURE — 99232 SBSQ HOSP IP/OBS MODERATE 35: CPT | Performed by: STUDENT IN AN ORGANIZED HEALTH CARE EDUCATION/TRAINING PROGRAM

## 2024-08-30 PROCEDURE — 93306 TTE W/DOPPLER COMPLETE: CPT | Performed by: INTERNAL MEDICINE

## 2024-08-30 PROCEDURE — 2580000003 HC RX 258: Performed by: NURSE PRACTITIONER

## 2024-08-30 PROCEDURE — 6370000000 HC RX 637 (ALT 250 FOR IP): Performed by: NURSE PRACTITIONER

## 2024-08-30 PROCEDURE — 85384 FIBRINOGEN ACTIVITY: CPT

## 2024-08-30 PROCEDURE — 1200000000 HC SEMI PRIVATE

## 2024-08-30 PROCEDURE — C8929 TTE W OR WO FOL WCON,DOPPLER: HCPCS

## 2024-08-30 PROCEDURE — 99221 1ST HOSP IP/OBS SF/LOW 40: CPT | Performed by: UROLOGY

## 2024-08-30 RX ORDER — MIRTAZAPINE 15 MG/1
15 TABLET, FILM COATED ORAL NIGHTLY
Status: DISCONTINUED | OUTPATIENT
Start: 2024-08-30 | End: 2024-09-06 | Stop reason: HOSPADM

## 2024-08-30 RX ORDER — LORAZEPAM 0.5 MG/1
1 TABLET ORAL EVERY 4 HOURS PRN
Status: ON HOLD | COMMUNITY
End: 2024-09-05 | Stop reason: HOSPADM

## 2024-08-30 RX ORDER — ESOMEPRAZOLE MAGNESIUM 40 MG/1
40 GRANULE, DELAYED RELEASE ORAL DAILY
Status: DISCONTINUED | OUTPATIENT
Start: 2024-08-30 | End: 2024-08-30 | Stop reason: CLARIF

## 2024-08-30 RX ORDER — FOLIC ACID/VIT B COMPLEX AND C 5 MG
1 TABLET ORAL DAILY
Status: DISCONTINUED | OUTPATIENT
Start: 2024-08-30 | End: 2024-09-04

## 2024-08-30 RX ORDER — MORPHINE SULFATE 100 MG/5ML
5 SOLUTION ORAL
Status: ON HOLD | COMMUNITY
End: 2024-09-05

## 2024-08-30 RX ORDER — DIVALPROEX SODIUM 250 MG/1
250 TABLET, EXTENDED RELEASE ORAL DAILY
Status: DISCONTINUED | OUTPATIENT
Start: 2024-08-30 | End: 2024-09-06 | Stop reason: HOSPADM

## 2024-08-30 RX ORDER — CLOPIDOGREL BISULFATE 75 MG/1
75 TABLET ORAL DAILY
Status: DISCONTINUED | OUTPATIENT
Start: 2024-08-30 | End: 2024-09-06 | Stop reason: HOSPADM

## 2024-08-30 RX ORDER — DONEPEZIL HYDROCHLORIDE 10 MG/1
10 TABLET, FILM COATED ORAL NIGHTLY
Status: DISCONTINUED | OUTPATIENT
Start: 2024-08-30 | End: 2024-09-06 | Stop reason: HOSPADM

## 2024-08-30 RX ORDER — ASPIRIN 81 MG/1
81 TABLET ORAL DAILY
Status: DISCONTINUED | OUTPATIENT
Start: 2024-08-30 | End: 2024-09-06 | Stop reason: HOSPADM

## 2024-08-30 RX ORDER — VITAMIN B COMPLEX
3000 TABLET ORAL NIGHTLY
Status: DISCONTINUED | OUTPATIENT
Start: 2024-08-30 | End: 2024-09-04

## 2024-08-30 RX ORDER — PANTOPRAZOLE SODIUM 40 MG/1
40 TABLET, DELAYED RELEASE ORAL
Status: DISCONTINUED | OUTPATIENT
Start: 2024-08-30 | End: 2024-09-04

## 2024-08-30 RX ORDER — MORPHINE SULFATE 100 MG/5ML
10 SOLUTION ORAL
Status: ON HOLD | COMMUNITY
End: 2024-09-05 | Stop reason: HOSPADM

## 2024-08-30 RX ORDER — OXYBUTYNIN CHLORIDE 10 MG/1
10 TABLET, EXTENDED RELEASE ORAL DAILY
Status: DISCONTINUED | OUTPATIENT
Start: 2024-08-30 | End: 2024-09-06 | Stop reason: HOSPADM

## 2024-08-30 RX ORDER — MULTIVITAMIN WITH IRON
1 TABLET ORAL DAILY
Status: DISCONTINUED | OUTPATIENT
Start: 2024-08-30 | End: 2024-09-04

## 2024-08-30 RX ORDER — ALLOPURINOL 100 MG/1
100 TABLET ORAL DAILY
Status: DISCONTINUED | OUTPATIENT
Start: 2024-08-30 | End: 2024-09-04

## 2024-08-30 RX ORDER — METOPROLOL TARTRATE 25 MG/1
25 TABLET, FILM COATED ORAL 2 TIMES DAILY
Status: DISCONTINUED | OUTPATIENT
Start: 2024-08-30 | End: 2024-09-06 | Stop reason: HOSPADM

## 2024-08-30 RX ORDER — LORAZEPAM 0.5 MG/1
0.5 TABLET ORAL EVERY 4 HOURS PRN
Status: ON HOLD | COMMUNITY
End: 2024-09-05

## 2024-08-30 RX ORDER — DOCUSATE SODIUM 100 MG/1
100 CAPSULE, LIQUID FILLED ORAL DAILY
Status: DISCONTINUED | OUTPATIENT
Start: 2024-08-30 | End: 2024-09-04

## 2024-08-30 RX ORDER — SODIUM CHLORIDE 9 MG/ML
INJECTION, SOLUTION INTRAVENOUS PRN
Status: DISCONTINUED | OUTPATIENT
Start: 2024-08-30 | End: 2024-09-06 | Stop reason: HOSPADM

## 2024-08-30 RX ORDER — FOLIC ACID 1 MG/1
1 TABLET ORAL DAILY
Status: DISCONTINUED | OUTPATIENT
Start: 2024-08-30 | End: 2024-09-04

## 2024-08-30 RX ORDER — TROSPIUM CHLORIDE 20 MG/1
20 TABLET, FILM COATED ORAL NIGHTLY
Status: DISCONTINUED | OUTPATIENT
Start: 2024-08-30 | End: 2024-09-06 | Stop reason: HOSPADM

## 2024-08-30 RX ORDER — DOXAZOSIN 2 MG/1
2 TABLET ORAL NIGHTLY
Status: DISCONTINUED | OUTPATIENT
Start: 2024-08-30 | End: 2024-09-06 | Stop reason: HOSPADM

## 2024-08-30 RX ORDER — PANTOPRAZOLE SODIUM 40 MG/1
40 TABLET, DELAYED RELEASE ORAL
Status: DISCONTINUED | OUTPATIENT
Start: 2024-08-30 | End: 2024-09-06 | Stop reason: HOSPADM

## 2024-08-30 RX ORDER — LIDOCAINE HYDROCHLORIDE 20 MG/ML
JELLY TOPICAL ONCE
Status: COMPLETED | OUTPATIENT
Start: 2024-08-30 | End: 2024-08-30

## 2024-08-30 RX ORDER — MEMANTINE HYDROCHLORIDE 5 MG/1
5 TABLET ORAL DAILY
Status: DISCONTINUED | OUTPATIENT
Start: 2024-08-30 | End: 2024-09-06 | Stop reason: HOSPADM

## 2024-08-30 RX ORDER — MEGESTROL ACETATE 40 MG/ML
200 SUSPENSION ORAL DAILY
Status: DISCONTINUED | OUTPATIENT
Start: 2024-08-30 | End: 2024-09-06 | Stop reason: HOSPADM

## 2024-08-30 RX ORDER — ATORVASTATIN CALCIUM 10 MG/1
10 TABLET, FILM COATED ORAL DAILY
Status: DISCONTINUED | OUTPATIENT
Start: 2024-08-30 | End: 2024-09-06 | Stop reason: HOSPADM

## 2024-08-30 RX ORDER — LANOLIN ALCOHOL/MO/W.PET/CERES
100 CREAM (GRAM) TOPICAL DAILY
Status: DISCONTINUED | OUTPATIENT
Start: 2024-08-30 | End: 2024-09-04

## 2024-08-30 RX ADMIN — DOCUSATE SODIUM 100 MG: 100 CAPSULE, LIQUID FILLED ORAL at 09:34

## 2024-08-30 RX ADMIN — SODIUM CHLORIDE, PRESERVATIVE FREE 10 ML: 5 INJECTION INTRAVENOUS at 09:34

## 2024-08-30 RX ADMIN — DIVALPROEX SODIUM 250 MG: 250 TABLET, EXTENDED RELEASE ORAL at 09:35

## 2024-08-30 RX ADMIN — OXYBUTYNIN CHLORIDE 10 MG: 10 TABLET, EXTENDED RELEASE ORAL at 09:36

## 2024-08-30 RX ADMIN — LIDOCAINE HYDROCHLORIDE: 20 JELLY TOPICAL at 02:42

## 2024-08-30 RX ADMIN — METOPROLOL TARTRATE 25 MG: 25 TABLET, FILM COATED ORAL at 19:58

## 2024-08-30 RX ADMIN — ATORVASTATIN CALCIUM 10 MG: 10 TABLET, FILM COATED ORAL at 09:35

## 2024-08-30 RX ADMIN — PERFLUTREN 10 ML: 6.52 INJECTION, SUSPENSION INTRAVENOUS at 09:18

## 2024-08-30 RX ADMIN — Medication 100 MG: at 09:36

## 2024-08-30 RX ADMIN — DONEPEZIL HYDROCHLORIDE 10 MG: 10 TABLET, FILM COATED ORAL at 19:55

## 2024-08-30 RX ADMIN — MEMANTINE HYDROCHLORIDE 5 MG: 5 TABLET ORAL at 09:36

## 2024-08-30 RX ADMIN — DOXAZOSIN MESYLATE 2 MG: 2 TABLET ORAL at 19:55

## 2024-08-30 RX ADMIN — TROSPIUM CHLORIDE 20 MG: 20 TABLET, FILM COATED ORAL at 19:58

## 2024-08-30 RX ADMIN — Medication 1 TABLET: at 09:36

## 2024-08-30 RX ADMIN — Medication 3000 UNITS: at 19:58

## 2024-08-30 RX ADMIN — CEFTRIAXONE SODIUM 1000 MG: 1 INJECTION, POWDER, FOR SOLUTION INTRAMUSCULAR; INTRAVENOUS at 05:11

## 2024-08-30 RX ADMIN — MIRTAZAPINE 15 MG: 15 TABLET, FILM COATED ORAL at 19:58

## 2024-08-30 RX ADMIN — ALLOPURINOL 100 MG: 100 TABLET ORAL at 09:35

## 2024-08-30 RX ADMIN — SODIUM CHLORIDE, PRESERVATIVE FREE 10 ML: 5 INJECTION INTRAVENOUS at 19:57

## 2024-08-30 RX ADMIN — ONDANSETRON 4 MG: 4 TABLET, ORALLY DISINTEGRATING ORAL at 09:34

## 2024-08-30 RX ADMIN — Medication 1 TABLET: at 09:35

## 2024-08-30 ASSESSMENT — PAIN SCALES - GENERAL
PAINLEVEL_OUTOF10: 0

## 2024-08-30 NOTE — ED NOTES
Notified 6K of transport to room 6K20. Pt in stable condition for transport.  
Patient resting in bed with eyes closed. Respirations easy and unlabored. No distress noted. Call light within reach.  Family stating \"I'm wanting him admitted.\" Denies further needs  
Patient resting in bed. Respirations easy and unlabored. No distress noted. Call light within reach.  Denies needs  
Patient resting in bed. Respirations easy and unlabored. No distress noted. Call light within reach.  Family at bedside  
Patient resting in bed. Respirations easy and unlabored. No distress noted. Call light within reach.  Family at bedside  
Patient resting in bed. Respirations easy and unlabored. No distress noted. Call light within reach.  IV inserted by LW  Family at bedside   
Patient resting in bed. Respirations easy and unlabored. No distress noted. Call light within reach.  Lab at bedside   
Patient resting in bed. Respirations easy and unlabored. No distress noted. Call light within reach.  Pt denies needs  
08/29/2024 08:51:17 PM   Final diagnoses:   General weakness   Bilateral hand swelling   Idiopathic gout, unspecified chronicity, unspecified site   Anemia due to other cause, not classified   Dehydration        Consults:  None     Pain Score:  Pain Assessment  Pain Assessment: None - Denies Pain    C-SSRS:        Sepsis Screening:       [place] Fall Risk:  [place] 1 Fall Risk  Presents to emergency department  because of falls (Syncope, seizure, or loss of consciousness): No  Age > 70: Yes  Altered Mental Status, Intoxication with alcohol or substance confusion (Disorientation, impaired judgment, poor safety awaremess, or inability to follow instructions): Yes  Impaired Mobility: Ambulates or transfers with assistive devices or assistance; Unable to ambulate or transer.: Yes  Nursing Judgement: Yes  Standard Fall Risk Interventions : Bed in lowest position and wheels locked, as applicable to the type of bed, when a patient is resting in bed, raise bed to a comfortable height when the patient is transferring out of bed, as appropriate, Provide a safe environment by clearing a pathway free of plugs, IV poles, and other obstructing items, Call light and frequently used items within patient reach, Ada the patient to the environment, especially the location of the bathroom, Intentional Rounding    Swallow Screening        Preferred Language:   English      ALLERGIES     Patient has no known allergies.    SURGICAL HISTORY       Past Surgical History:   Procedure Laterality Date    COLONOSCOPY      ENDOSCOPY, COLON, DIAGNOSTIC      ESOPHAGUS SURGERY      NECK SURGERY      PROSTATE SURGERY      SHOULDER SURGERY      THYROID SURGERY      removal of a nodule    TONSILLECTOMY      TURP  5-31-12    Dr Ashwini LIU N/A 3/24/2023    CYSTOSCOPY, URETHRAL DILATION, GREENLIGHT PHOTO VAPORIZATION OF PROSTATE performed by Arnoldo Nichols MD at [facility] OR       PAST MEDICAL HISTORY       Past Medical History:   Diagnosis Date    BPH

## 2024-08-30 NOTE — PLAN OF CARE
Problem: Discharge Planning  Goal: Discharge to home or other facility with appropriate resources  8/30/2024 1445 by Flori Logan, MSW, LSW  Outcome: Progressing  Flowsheets (Taken 8/30/2024 1445)  Discharge to home or other facility with appropriate resources: Identify barriers to discharge with patient and caregiver  Note[place] [facility] [place]. See SW notes 8/30/24.

## 2024-08-30 NOTE — CARE COORDINATION
Case Management Assessment Initial Evaluation    Date/Time of Evaluation: 2024 7:42 AM  Assessment Completed by: Brnady Lyons RN    If patient is discharged prior to next notation, then this note serves as note for discharge by case management.    Patient Name: Osmany Castellanos                   YOB: 1935  Diagnosis: Dehydration [E86.0]  Lower GI bleed [K92.2]  General weakness [R53.1]  Acute kidney injury (HCC) [N17.9]  Bilateral hand swelling [M79.89]  Idiopathic gout, unspecified chronicity, unspecified site [M10.00]  Anemia due to other cause, not classified [D64.89]                   Date / Time: 2024  3:39 PM  Location: [address] [place] [address]     Patient Admission Status: Inpatient   Readmission Risk Low 0-14, Mod 15-19), High > 20: Readmission Risk Score: 16.2    Current PCP: Dannielle Steward MD  Health Care Decision Makers:     Additional Case Management Notes: To ED from [facility] with weakness, lower abdominal and hand swelling that has been progressing over several days. Hospitalist following. Urology to see for hematuria w/ clots. Order for 3-way colvin insertion w/ irrigation. NPO. Oriented to person only- baseline. IV rocephin q24h. Oxybutynin. Bowel regimen. Hgb 7.7 (6.4-1 unit PRBC) H&H q4h.    Procedures:    1 unit PRBC    Imagin/29 CXR: Negative   CT A/P: Moderate-to-large colonic stool burden. No bowel obstruction.  2. Appendix is not definitively seen. No secondary evidence of   appendicitis. 3. Cardiomegaly with coronary artery atherosclerosis and small pericardial   effusion.    Patient Goals/Plan/Treatment Preferences: Osmany is from [facility]. CM assessment deferred to SW. SW consult placed.

## 2024-08-30 NOTE — CONSENT
Informed Consent for Blood Component Transfusion Note    I have discussed with the patient, son, and spouse the rationale for blood component transfusion; its benefits in treating or preventing fatigue, organ damage, or death; and its risk which includes mild transfusion reactions, rare risk of blood borne infection, or more serious but rare reactions. I have discussed the alternatives to transfusion, including the risk and consequences of not receiving transfusion. The patient, son, and spouse had an opportunity to ask questions and had agreed to proceed with transfusion of blood components.    Electronically signed by ZORAN Maguire CNP on 8/29/24 at 9:29 PM EDT

## 2024-08-30 NOTE — PROGRESS NOTES
Hospitalist Progress Note    Patient:  Osmany Castellanos      Unit/Bed:6K-20/020-A    YOB: 1935    MRN: [id number]       Acct: [id number]     PCP: Dannielle Steward MD    Date of Admission: 8/29/2024    Assessment/Plan:    Complicated UTI: UA positive for nitrates, moderate LE, WBC 50-75 and many bacteria.  No prior history of ESBL.  CTA/PE with moderate-large colonic stool burden, mild diffuse urinary bladder wall thickening.  Urine culture pending.  Start Rocephin for empiric antibiotic coverage.  Discussed appropriate precautions and sensitivities.  Hematuria: Concern for urinary retention and clots in bladder.  Prior history of cystoscopy with urethral dilation, greenlight photo vaporization of prostate under care of Dr. Nichols, urology in 2023.  Per RN bleeding from penis with clots.  Guy placed, plan for hand interrogation if unable to initiate three-way bladder irrigation.  NPO.  Urology consulted  Acute on chronic microcytic anemia: Multifactorial with hematuria appreciated and suspect chronic malnutrition.  Baseline Hgb 8.4-8.8.  Monitor Hgb with daily CBC.  Transfuse PRBC if Hb <7.0 or hemodynamic stable  Chronic HFpEF, NYHA III: Suspect chronic and progressive worsening, but DNR CC.  Last ECHO EF 65%, no RWMA, G1 DD.  proBNP 4147.  Repeat echo ordered.  HTN: Continue metoprolol 25 mg p.o. twice daily with holding parameters  HLD: Continue Lipitor 10 mg p.o. daily  GERD: Started metoprolol 40 mg p.o. daily  Gout: Continue allopurinol 100 mg p.o. daily  BPH: s/p TURP.  Follows outpatient urology.  Continue oxybutynin 10 mg p.o. daily, [place] 20 mg p.o. nightly and Cardura 2 mg p.o. nightly  Vascular dementia: Continue home Depakote to 50 mg p.o. daily, donepzil 10 mg p.o. nightly and memantine 5 mg p.o. daily  Dysphagia: Per chart review.  Bedside swallow ordered.  Will consult speech.        Expected discharge date: TBD    Disposition:    [] Home       [] TCU       [] Rehab       []  Result   1. No consolidation.      This document has been electronically signed by: Killian Chaney MD on    08/29/2024 06:10 PM          DVT prophylaxis: [] Lovenox                                 [] SCDs                                 [] SQ Heparin                                 [] Encourage ambulation           [] Already on Anticoagulation     Code Status: DNR-CC    PT/OT Eval Status: N/A    Tele:   [] yes             [x] no    Electronically signed by Landry Young DO on 8/30/2024 at 9:46 AM

## 2024-08-30 NOTE — PROGRESS NOTES
Blood administration completed. Assessment completed per policy. No s/s of distress. Vitals stable . No changes. Patient resting in bed eyes closed. Copy in chart / one sent to lab.

## 2024-08-30 NOTE — PLAN OF CARE
Problem: Discharge Planning  Goal: Discharge to home or other facility with appropriate resources  Outcome: Progressing  Flowsheets (Taken 8/30/2024 1143)  Discharge to home or other facility with appropriate resources: Identify barriers to discharge with patient and caregiver     Problem: Pain  Goal: Verbalizes/displays adequate comfort level or baseline comfort level  Outcome: Progressing  Flowsheets (Taken 8/30/2024 1143)  Verbalizes/displays adequate comfort level or baseline comfort level: Encourage patient to monitor pain and request assistance     Problem: Safety - Adult  Goal: Free from fall injury  Outcome: Progressing  Flowsheets (Taken 8/30/2024 1143)  Free From Fall Injury: Instruct family/caregiver on patient safety

## 2024-08-30 NOTE — PROGRESS NOTES
Patient admitted for weakness. From ED, hard of hearing. Has hearing aid in place with microphone. Patient resting eyes closed. No s/s distress at ths time, noted some bleeding/ clots coming from penis. Notified NP on call Jacinto peterson . Urology consult placed

## 2024-08-30 NOTE — PROGRESS NOTES
When patient initially arrived found strings of large blood lcots coming out tip of penis, cleaned up 3 x with similar episodes, of large string of clots. Brief was also saturated with urine + blood. Patient has no output from rectum. No BM this evening. NP Jacinto Murillo notified. Urology consult placed. Colvin placed. Catherter 3 way CBI started, but  no clots were evacutaed pt tolerated well. No red tinged flow when CBI connected. Yellow urine was flowing from colvin cath. CBI stopped at this time. BLADDER SCAN was 48ml. EVARISTO Casey notified. Orders to clamp CBI for now await urology input in the AM. No needs at this time. Pt resting in bed eyes closed. Vitals stable . No signs of distress. Will continue to monitor.

## 2024-08-30 NOTE — CARE COORDINATION
8/30/24, 2:14 PM EDT  Discharge Planning Evaluation  Social work consult received, patient from [facility].   Patient/Family preference is to return to [facility] vs home with family support.  Spoke with spouse and she is thinking of taking Patient home with hospice at discharge.   Would patient be willing to go to a skilled facility if needed: if able to participate in therapy.  Is there skilled care available at current facility: no.  Barriers to return to current living situation: na  Spoke with Aleja at the facility.  Patient bed hold: yes  Anticipated transport plan: ambulance  Patient's Healthcare Decision Maker: Named in Scanned ACP Document    SW spoke with Tana from [facility] and they have been seeing Patient but the wife has wanted him treated on/off over the past few months. Tana stated that they will accept Patient back at discharge if they family wants to continue with hospice at discharge and their agency.     Readmission Risk Low 0-14, Mod 15-19), High > 20: Readmission Risk Score: 18.2    Current PCP: Dannielle Steward MD  PCP verified by CM? Yes    Patient Orientation: Unable to Assess    Patient Cognition: Other (see comment) (sleeping and unable to wake)  History Provided by: Significant Other    Advance Directives:      Code Status: DNR-CC   Patient's Primary Decision Maker is: Named in Scanned ACP Document       Discharge Planning:    Patient lives with: Other (Comment) (assisted living) Type of Home: Assisted living  Primary Care Giver: Other (Comment) (from [place])  Patient Support Systems include: Spouse/Significant Other, Family Members   Current Financial resources: None  Current community resources: None  Current services prior to admission: Hospice Services, Other (Comment) (assisted living)            Current DME:              Type of Home Care services:  None    ADLS  Prior functional level: Assistance with the following:, Bathing, Dressing, Toileting, Feeding, Cooking, Housework,

## 2024-08-30 NOTE — CONSULTS
tenderness of lower extremities.   Extremities:    No cyanosis, clubbing, or edema present.  Neurological:    Alert and oriented.     DATA:  CBC:   Lab Results   Component Value Date/Time    WBC 6.9 08/29/2024 05:20 PM    RBC 2.05 08/29/2024 05:20 PM    RBC 3.34 07/07/2023 02:24 PM    HGB 6.4 08/30/2024 12:11 AM    HGB 11.3 05/30/2012 04:55 PM    HCT 21.1 08/30/2024 12:11 AM    .2 08/29/2024 05:20 PM    MCH 34.1 08/29/2024 05:20 PM    MCHC 31.0 08/29/2024 05:20 PM    RDW 11.7 07/07/2023 02:24 PM     08/29/2024 05:20 PM    MPV 10.0 08/29/2024 05:20 PM     BMP:    Lab Results   Component Value Date/Time     08/29/2024 05:20 PM    K 3.8 08/29/2024 05:20 PM    K 3.5 02/05/2024 06:54 AM     08/29/2024 05:20 PM    CO2 23 08/29/2024 05:20 PM    BUN 31 08/29/2024 05:20 PM    CREATININE 1.2 08/29/2024 05:20 PM    CALCIUM 8.5 08/29/2024 05:20 PM    LABGLOM 58 08/29/2024 05:20 PM    LABGLOM >60 02/09/2024 12:29 PM    GLUCOSE 92 08/29/2024 05:20 PM    GLUCOSE 95 09/20/2023 02:44 PM     BUN/Creatinine:    Lab Results   Component Value Date/Time    BUN 31 08/29/2024 05:20 PM    CREATININE 1.2 08/29/2024 05:20 PM     Magnesium:    Lab Results   Component Value Date/Time    MG 2.1 08/29/2024 05:20 PM     Phosphorus:  No results found for: \"PHOS\"  PT/INR:    Lab Results   Component Value Date/Time    INR 1.18 08/30/2024 12:11 AM     U/A:    Lab Results   Component Value Date/Time    NITRITE neg 03/17/2016 01:52 PM    COLORU YELLOW 08/29/2024 08:00 PM    PHUR 7.5 08/29/2024 08:00 PM    PHUR 8.0 01/27/2024 03:45 PM    LABCAST 8-15 HYALINE 08/29/2024 08:00 PM    LABCAST NONE SEEN 08/29/2024 08:00 PM    WBCUA 50-75 08/29/2024 08:00 PM    RBCUA 5-10 08/29/2024 08:00 PM    YEAST NONE SEEN 08/29/2024 08:00 PM    BACTERIA MANY 08/29/2024 08:00 PM    LEUKOCYTESUR MODERATE 08/29/2024 08:00 PM    UROBILINOGEN 1.0 08/29/2024 08:00 PM    BILIRUBINUR NEGATIVE 08/29/2024 08:00 PM    BLOODU LARGE 08/29/2024 08:00 PM     ZORAN Swartz - CNP, ZOARN  08/30/24 6:57 AM  Urology

## 2024-08-30 NOTE — H&P
Hospitalist  History and Physical    Patient:  Osmany Castellanos  MRN: [id number]    CHIEF COMPLAINT: Weakness    History Obtained From:  patient, spouse, family member -son  PCP: Dannielle Steward MD    HISTORY OF PRESENT ILLNESS:   Osmany Castellanos is a 89-year-old octogenarian male presented to [facility] 8/29/2024 via EMS with chief complaint of weakness, lower abdominal and hand swelling.  This has been progressive over the last several days.  Patient currently resides at [facility].  Patient is a DNR CC.  Patient has been on and off hospice in the past.  Family identifies patient is bedbound for the past several months.    Past Medical History:        Diagnosis Date    BPH (benign prostatic hypertrophy)     CKD (chronic kidney disease) stage 2, GFR 60-89 ml/min     Dementia (HCC)     Disease of larynx     Dysphagia     Erectile dysfunction     Frequent falls     GERD (gastroesophageal reflux disease)     [place] (hard of hearing)     Hyperlipidemia     Hypertension     Memory disorder     Metabolic bone disease     Orthostatic hypotension     Sleep apnea     TIA (transient ischemic attack)     Zenker diverticula    Former smoker  TTE 2017 LVEF 65% with grade 1 diastolic dysfunction.  Left atrium is mildly dilated.      Past Surgical History:        Procedure Laterality Date    COLONOSCOPY      ENDOSCOPY, COLON, DIAGNOSTIC      ESOPHAGUS SURGERY      NECK SURGERY      PROSTATE SURGERY      SHOULDER SURGERY      THYROID SURGERY      removal of a nodule    TONSILLECTOMY      TURP  5-31-12    Dr Maradiaga    TURP N/A 3/24/2023    CYSTOSCOPY, URETHRAL DILATION, GREENLIGHT PHOTO VAPORIZATION OF PROSTATE performed by Arnoldo Nichols MD at [facility] OR       Medications Prior to Admission:    Prior to Admission medications    Medication Sig Start Date End Date Taking? Authorizing Provider   doxazosin (CARDURA) 2 MG tablet Take 1 tablet by mouth nightly 2/7/24   Provider, MD Merced   esomeprazole Magnesium (NEXIUM) 40 MG PACK  D3) 3000 units TABS Take 3,000 Units by mouth nightly     ProviderMerced MD   atorvastatin (LIPITOR) 10 MG tablet Take 1 tablet by mouth daily 3/2/18   Dannielle Steward MD   Multiple Vitamin (MULTIVITAMIN) tablet Take 1 tablet by mouth daily 3/3/18   Dannielle Steward MD   donepezil (ARICEPT) 10 MG tablet Take 1 tablet by mouth nightly 3/2/18   Dannielle Steward MD   docusate (COLACE, DULCOLAX) 100 MG CAPS Take 100 mg by mouth daily 3/3/18   Dannielle Steward MD   vitamin B-1 (THIAMINE) 100 MG tablet Take 1 tablet by mouth daily    Provider, MD Merced       Allergies:  Patient has no known allergies.    Social History:   TOBACCO:   reports that he quit smoking about 32 years ago. His smoking use included cigarettes. He has never used smokeless tobacco.  ETOH:   reports that he does not currently use alcohol.  OCCUPATION: , retired, resides at [facility]    Family History:       Problem Relation Age of Onset    High Blood Pressure Father     Stroke Father     Stroke Sister     Arthritis Sister     Alzheimer's Disease Sister     Cancer Sister     Heart Disease Sister     Arthritis Sister     Other Brother         parkinsons    Dementia Brother     No Known Problems Brother     No Known Problems Brother        REVIEW OF SYSTEMS:  GENERAL: Positive for fatigue, weakness  SKN: No lesions or rashes.  HEAD: No headaches or recent injury  EYES: No drainage  EARS: Positive for hard of hearing  NOSE/THROAT: No rhinorrhea or pharyngitis, no nasal drainage  NECK: No lumps or unusual neck stiffness  PULMONARY: No dyspnea, orthopnea or paroxysmal nocturnal dyspnea, stridor wheezing cough  CARDIAC: No chest pain, pressure, heaviness tightness no lower leg edema  GI: No history melena or hematochezia, no diarrhea or constipation  : Positive for hematuria and clots  PERIPHERAL VASCULAR: No intermittent claudication or unusual leg cramps  MUSCULOSKELETAL: Occasional arthralgias, myalgias  NEUROLOGICAL: No headache, Seizures or

## 2024-08-31 PROCEDURE — 6370000000 HC RX 637 (ALT 250 FOR IP): Performed by: NURSE PRACTITIONER

## 2024-08-31 PROCEDURE — 99232 SBSQ HOSP IP/OBS MODERATE 35: CPT | Performed by: STUDENT IN AN ORGANIZED HEALTH CARE EDUCATION/TRAINING PROGRAM

## 2024-08-31 PROCEDURE — 2580000003 HC RX 258: Performed by: NURSE PRACTITIONER

## 2024-08-31 PROCEDURE — 6360000002 HC RX W HCPCS: Performed by: NURSE PRACTITIONER

## 2024-08-31 PROCEDURE — 1200000000 HC SEMI PRIVATE

## 2024-08-31 RX ADMIN — MEGESTROL ACETATE 200 MG: 400 SUSPENSION ORAL at 09:27

## 2024-08-31 RX ADMIN — Medication 1 TABLET: at 09:26

## 2024-08-31 RX ADMIN — DIVALPROEX SODIUM 250 MG: 250 TABLET, EXTENDED RELEASE ORAL at 09:27

## 2024-08-31 RX ADMIN — SODIUM CHLORIDE, PRESERVATIVE FREE 10 ML: 5 INJECTION INTRAVENOUS at 21:23

## 2024-08-31 RX ADMIN — FOLIC ACID 1 MG: 1 TABLET ORAL at 09:26

## 2024-08-31 RX ADMIN — METOPROLOL TARTRATE 25 MG: 25 TABLET, FILM COATED ORAL at 09:26

## 2024-08-31 RX ADMIN — SODIUM CHLORIDE, PRESERVATIVE FREE 10 ML: 5 INJECTION INTRAVENOUS at 09:27

## 2024-08-31 RX ADMIN — ALLOPURINOL 100 MG: 100 TABLET ORAL at 09:27

## 2024-08-31 RX ADMIN — DOCUSATE SODIUM 100 MG: 100 CAPSULE, LIQUID FILLED ORAL at 09:27

## 2024-08-31 RX ADMIN — Medication 100 MG: at 09:26

## 2024-08-31 RX ADMIN — OXYBUTYNIN CHLORIDE 10 MG: 10 TABLET, EXTENDED RELEASE ORAL at 09:26

## 2024-08-31 RX ADMIN — ATORVASTATIN CALCIUM 10 MG: 10 TABLET, FILM COATED ORAL at 09:27

## 2024-08-31 RX ADMIN — CEFTRIAXONE SODIUM 1000 MG: 1 INJECTION, POWDER, FOR SOLUTION INTRAMUSCULAR; INTRAVENOUS at 02:31

## 2024-08-31 RX ADMIN — MEMANTINE HYDROCHLORIDE 5 MG: 5 TABLET ORAL at 09:26

## 2024-08-31 ASSESSMENT — PAIN SCALES - WONG BAKER

## 2024-08-31 ASSESSMENT — PAIN SCALES - GENERAL
PAINLEVEL_OUTOF10: 0
PAINLEVEL_OUTOF10: 0

## 2024-08-31 NOTE — PROGRESS NOTES
Hospitalist Progress Note    Patient:  Osmany Castellanos      Unit/Bed:6K-20/020-A    YOB: 1935    MRN: [id number]       Acct: [id number]     PCP: Dannielle Steward MD    Date of Admission: 8/29/2024    Assessment/Plan:    Complicated UTI: UA positive for nitrates, moderate LE, WBC 50-75 and many bacteria.  No prior history of ESBL.  CT A/P with moderate-large colonic stool burden, mild diffuse urinary bladder wall thickening.  Urine culture pending.  Start Rocephin for empiric antibiotic coverage.  Discussed appropriate precautions and sensitivities.  Hematuria: Concern for urinary retention and clots in bladder.  Prior history of cystoscopy with urethral dilation, greenlight photo vaporization of prostate under care of Dr. Nichols, urology in 2023.  Per RN bleeding from penis with clots.  Guy placed, plan for hand interrogation if unable to initiate three-way bladder irrigation.  NPO.  Urology consulted  Acute on chronic microcytic anemia: Multifactorial with hematuria appreciated and suspect chronic malnutrition.  Baseline Hgb 8.4-8.8.  Monitor Hgb with daily CBC.  Transfuse PRBC if Hb <7.0 or hemodynamic stable  New chronic HFpEF with drop in EF, NYHA III: Suspect chronic and progressive worsening.  Last Echo EF 30-35%, severe global hypokinesis, G1 DD, mild AR/MR/TR, small (<1 cm) anterior pericardial effusion without tamponade 8/30/2024. proBNP 4147.  CODE STATUS DNR CC, will continue with symptomatic management.  HTN: Continue metoprolol 25 mg p.o. twice daily with holding parameters  HLD: Continue Lipitor 10 mg p.o. daily  GERD: Started metoprolol 40 mg p.o. daily  Gout: Continue allopurinol 100 mg p.o. daily  BPH: s/p TURP.  Follows outpatient urology.  Continue oxybutynin 10 mg p.o. daily, Sanctura 20 mg p.o. nightly and Cardura 2 mg p.o. nightly  Vascular dementia: Continue home Depakote to 50 mg p.o. daily, donepzil 10 mg p.o. nightly and memantine 5 mg p.o. daily  Dysphagia: Per chart  light. Conjunctivae/corneas clear.  Neck: Supple, with full range of motion. No jugular venous distention. Trachea midline.  Respiratory:  Normal respiratory effort. Clear to auscultation, bilaterally without Rales/Wheezes/Rhonchi.  Cardiovascular: Regular rate and rhythm with normal S1/S2 without murmurs, rubs or gallops.  Abdomen: Soft, non-tender, non-distended with normal bowel sounds.  Musculoskeletal: passive and active ROM x 4 extremities.  Skin: Skin color, texture, turgor normal.  No rashes or lesions.  Neurologic:  Neurovascularly intact without any focal sensory/motor deficits. Cranial nerves: II-XII intact, grossly non-focal.  Psychiatric: Nonverbal but AAOx1 on provoking, thought content appropriate, normal insight  Capillary Refill: Brisk,< 3 seconds   Peripheral Pulses: +2 palpable, equal bilaterally       Labs:   Recent Labs     08/29/24  1720 08/30/24  0011 08/30/24  0604 08/30/24  0757   WBC 6.9  --   --   --    HGB 7.0* 6.4* 7.7* 8.1*   HCT 22.6* 21.1* 24.5* 24.8*     --   --   --      Recent Labs     08/29/24  1720      K 3.8      CO2 23   BUN 31*   CREATININE 1.2   CALCIUM 8.5     Recent Labs     08/29/24  1720   AST 15   ALT <5*   BILITOT 1.0   ALKPHOS 61     Recent Labs     08/30/24  0011   INR 1.18*     No results for input(s): \"CKTOTAL\", \"TROPONINI\" in the last 72 hours.    Microbiology:      Urinalysis:      Lab Results   Component Value Date/Time    NITRU POSITIVE 08/29/2024 08:00 PM    WBCUA 50-75 08/29/2024 08:00 PM    BACTERIA MANY 08/29/2024 08:00 PM    RBCUA 5-10 08/29/2024 08:00 PM    BLOODU LARGE 08/29/2024 08:00 PM    GLUCOSEU NEGATIVE 08/29/2024 08:00 PM       Radiology:  CT ABDOMEN PELVIS W IV CONTRAST Additional Contrast? None   Final Result   1. Moderate-to-large colonic stool burden. No bowel obstruction.   2. Appendix is not definitively seen. No secondary evidence of    appendicitis.   3. Cardiomegaly with coronary artery atherosclerosis and small

## 2024-09-01 PROCEDURE — 2580000003 HC RX 258: Performed by: NURSE PRACTITIONER

## 2024-09-01 PROCEDURE — 6360000002 HC RX W HCPCS: Performed by: NURSE PRACTITIONER

## 2024-09-01 PROCEDURE — 1200000000 HC SEMI PRIVATE

## 2024-09-01 PROCEDURE — 99232 SBSQ HOSP IP/OBS MODERATE 35: CPT | Performed by: STUDENT IN AN ORGANIZED HEALTH CARE EDUCATION/TRAINING PROGRAM

## 2024-09-01 RX ADMIN — SODIUM CHLORIDE, PRESERVATIVE FREE 10 ML: 5 INJECTION INTRAVENOUS at 20:19

## 2024-09-01 RX ADMIN — CEFTRIAXONE SODIUM 1000 MG: 1 INJECTION, POWDER, FOR SOLUTION INTRAMUSCULAR; INTRAVENOUS at 02:05

## 2024-09-01 RX ADMIN — SODIUM CHLORIDE, PRESERVATIVE FREE 10 ML: 5 INJECTION INTRAVENOUS at 09:38

## 2024-09-01 ASSESSMENT — PAIN SCALES - WONG BAKER

## 2024-09-01 ASSESSMENT — PAIN SCALES - GENERAL
PAINLEVEL_OUTOF10: 0

## 2024-09-01 NOTE — PROGRESS NOTES
Hospitalist Progress Note    Patient:  Osmany Castellanos      Unit/Bed:6K-20/020-A    YOB: 1935    MRN: [id number]       Acct: [id number]     PCP: Dannielle Steward MD    Date of Admission: 8/29/2024    Assessment/Plan:    Complicated UTI: UA positive for nitrates, moderate LE, WBC 50-75 and many bacteria.  No prior history of ESBL.  CT A/P with moderate-large colonic stool burden, mild diffuse urinary bladder wall thickening.  Urine culture pending.  Start Rocephin for empiric antibiotic coverage.  Discussed appropriate precautions and sensitivities.  Hematuria: Concern for urinary retention and clots in bladder.  Prior history of cystoscopy with urethral dilation, greenlight photo vaporization of prostate under care of Dr. Nichols, urology in 2023.  Per RN bleeding from penis with clots.  Guy placed, plan for hand interrogation if unable to initiate three-way bladder irrigation.  NPO.  Urology consulted  Acute on chronic microcytic anemia: Multifactorial with hematuria appreciated and suspect chronic malnutrition.  Baseline Hgb 8.4-8.8.  Monitor Hgb with daily CBC.  Transfuse PRBC if Hb <7.0 or hemodynamic stable  New chronic HFpEF with drop in EF, NYHA III: Suspect chronic and progressive worsening.  Last Echo EF 30-35%, severe global hypokinesis, G1 DD, mild AR/MR/TR, small (<1 cm) anterior pericardial effusion without tamponade 8/30/2024. proBNP 4147.  CODE STATUS DNR CC, will continue with symptomatic management.  HTN: Continue metoprolol 25 mg p.o. twice daily with holding parameters  HLD: Continue Lipitor 10 mg p.o. daily  GERD: Started metoprolol 40 mg p.o. daily  Gout: Continue allopurinol 100 mg p.o. daily  BPH: s/p TURP.  Follows outpatient urology.  Continue oxybutynin 10 mg p.o. daily, Sanctura 20 mg p.o. nightly and Cardura 2 mg p.o. nightly  Vascular dementia: Continue home Depakote to 50 mg p.o. daily, donepzil 10 mg p.o. nightly and memantine 5 mg p.o. daily  Dysphagia: Per chart

## 2024-09-02 LAB
ANION GAP SERPL CALC-SCNC: 12 MEQ/L (ref 8–16)
BASOPHILS ABSOLUTE: 0 THOU/MM3 (ref 0–0.1)
BASOPHILS NFR BLD AUTO: 0.7 %
BUN SERPL-MCNC: 29 MG/DL (ref 7–22)
CALCIUM SERPL-MCNC: 8.3 MG/DL (ref 8.5–10.5)
CHLORIDE SERPL-SCNC: 110 MEQ/L (ref 98–111)
CO2 SERPL-SCNC: 21 MEQ/L (ref 23–33)
CREAT SERPL-MCNC: 1 MG/DL (ref 0.4–1.2)
DEPRECATED RDW RBC AUTO: 56.4 FL (ref 35–45)
EOSINOPHIL NFR BLD AUTO: 2.4 %
EOSINOPHILS ABSOLUTE: 0.1 THOU/MM3 (ref 0–0.4)
ERYTHROCYTE [DISTWIDTH] IN BLOOD BY AUTOMATED COUNT: 15 % (ref 11.5–14.5)
GFR SERPL CREATININE-BSD FRML MDRD: 72 ML/MIN/1.73M2
GLUCOSE SERPL-MCNC: 73 MG/DL (ref 70–108)
HCT VFR BLD AUTO: 27.3 % (ref 42–52)
HGB BLD-MCNC: 8.8 GM/DL (ref 14–18)
IMM GRANULOCYTES # BLD AUTO: 0.01 THOU/MM3 (ref 0–0.07)
IMM GRANULOCYTES NFR BLD AUTO: 0.2 %
LYMPHOCYTES ABSOLUTE: 1 THOU/MM3 (ref 1–4.8)
LYMPHOCYTES NFR BLD AUTO: 18.6 %
MCH RBC QN AUTO: 33.5 PG (ref 26–33)
MCHC RBC AUTO-ENTMCNC: 32.2 GM/DL (ref 32.2–35.5)
MCV RBC AUTO: 103.8 FL (ref 80–94)
MONOCYTES ABSOLUTE: 0.5 THOU/MM3 (ref 0.4–1.3)
MONOCYTES NFR BLD AUTO: 9 %
NEUTROPHILS ABSOLUTE: 3.8 THOU/MM3 (ref 1.8–7.7)
NEUTROPHILS NFR BLD AUTO: 69.1 %
NRBC BLD AUTO-RTO: 0 /100 WBC
PLATELET # BLD AUTO: 171 THOU/MM3 (ref 130–400)
PMV BLD AUTO: 9.7 FL (ref 9.4–12.4)
POTASSIUM SERPL-SCNC: 3.6 MEQ/L (ref 3.5–5.2)
RBC # BLD AUTO: 2.63 MILL/MM3 (ref 4.7–6.1)
SODIUM SERPL-SCNC: 143 MEQ/L (ref 135–145)
WBC # BLD AUTO: 5.5 THOU/MM3 (ref 4.8–10.8)

## 2024-09-02 PROCEDURE — 2580000003 HC RX 258: Performed by: HOSPITALIST

## 2024-09-02 PROCEDURE — 2580000003 HC RX 258: Performed by: NURSE PRACTITIONER

## 2024-09-02 PROCEDURE — 36415 COLL VENOUS BLD VENIPUNCTURE: CPT

## 2024-09-02 PROCEDURE — 1200000000 HC SEMI PRIVATE

## 2024-09-02 PROCEDURE — 80048 BASIC METABOLIC PNL TOTAL CA: CPT

## 2024-09-02 PROCEDURE — 85025 COMPLETE CBC W/AUTO DIFF WBC: CPT

## 2024-09-02 PROCEDURE — 99233 SBSQ HOSP IP/OBS HIGH 50: CPT | Performed by: HOSPITALIST

## 2024-09-02 PROCEDURE — 6360000002 HC RX W HCPCS: Performed by: NURSE PRACTITIONER

## 2024-09-02 RX ORDER — SODIUM CHLORIDE 9 MG/ML
INJECTION, SOLUTION INTRAVENOUS CONTINUOUS
Status: DISCONTINUED | OUTPATIENT
Start: 2024-09-02 | End: 2024-09-05

## 2024-09-02 RX ORDER — SENNOSIDES A AND B 8.6 MG/1
1 TABLET, FILM COATED ORAL NIGHTLY
Status: DISCONTINUED | OUTPATIENT
Start: 2024-09-02 | End: 2024-09-06 | Stop reason: HOSPADM

## 2024-09-02 RX ADMIN — SODIUM CHLORIDE, PRESERVATIVE FREE 10 ML: 5 INJECTION INTRAVENOUS at 07:45

## 2024-09-02 RX ADMIN — CEFTRIAXONE SODIUM 1000 MG: 1 INJECTION, POWDER, FOR SOLUTION INTRAMUSCULAR; INTRAVENOUS at 01:59

## 2024-09-02 RX ADMIN — SODIUM CHLORIDE: 9 INJECTION, SOLUTION INTRAVENOUS at 16:25

## 2024-09-02 ASSESSMENT — PAIN SCALES - WONG BAKER

## 2024-09-02 ASSESSMENT — PAIN SCALES - GENERAL
PAINLEVEL_OUTOF10: 0
PAINLEVEL_OUTOF10: 0

## 2024-09-02 NOTE — PROGRESS NOTES
Hospitalist Progress Note    Patient:  Osmany Castellanos      Unit/Bed:6K-20/020-A    YOB: 1935    MRN: [id number]       Acct: [id number]     PCP: Dannielle Steward MD    Date of Admission: 8/29/2024    Assessment/Plan:    Complicated UTI: UA positive for nitrates, moderate LE, WBC 50-75 and many bacteria.  No prior history of ESBL.  CT A/P with moderate-large colonic stool burden, mild diffuse urinary bladder wall thickening.  Urine culture pending.  Start Rocephin for empiric antibiotic coverage.  Discussed appropriate precautions and sensitivities.  Hematuria: Concern for urinary retention and clots in bladder.  Prior history of cystoscopy with urethral dilation, greenlight photo vaporization of prostate under care of Dr. Nichols, urology in 2023.  Per RN bleeding from penis with clots.  Guy placed, plan for hand interrogation if unable to initiate three-way bladder irrigation.  NPO.  Urology consulted, hematuria has resolved.  Acute on chronic microcytic anemia: Multifactorial with hematuria appreciated and suspect chronic malnutrition.  Baseline Hgb 8.4-8.8.  Monitor Hgb with daily CBC.  Transfuse PRBC if Hb <7.0 or hemodynamic stable  New chronic HFpEF with drop in EF, NYHA III: Suspect chronic and progressive worsening.  Last Echo EF 30-35%, severe global hypokinesis, G1 DD, mild AR/MR/TR, small (<1 cm) anterior pericardial effusion without tamponade 8/30/2024. proBNP 4147.  CODE STATUS DNR CC, will continue with symptomatic management.  Discussed with patient's son, daughter and wife via phone and in person and at this time they do not want any heroic measures, no cardiac catheterization.  They are agreeable to beta-blocker medical management if his blood pressure tolerates.  Constipation: Continue stool softeners  HTN: Continue metoprolol 25 mg p.o. twice daily with holding parameters  HLD: Continue Lipitor 10 mg p.o. daily  GERD: Started metoprolol 40 mg p.o. daily  Gout: Continue allopurinol

## 2024-09-02 NOTE — PLAN OF CARE
Problem: Discharge Planning  Goal: Discharge to home or other facility with appropriate resources  Outcome: Progressing  Flowsheets (Taken 9/2/2024 0131)  Discharge to home or other facility with appropriate resources: Identify barriers to discharge with patient and caregiver     Problem: Pain  Goal: Verbalizes/displays adequate comfort level or baseline comfort level  Outcome: Progressing  Flowsheets (Taken 9/2/2024 0131)  Verbalizes/displays adequate comfort level or baseline comfort level: Encourage patient to monitor pain and request assistance     Problem: Skin/Tissue Integrity  Goal: Absence of new skin breakdown  Description: 1.  Monitor for areas of redness and/or skin breakdown  2.  Assess vascular access sites hourly  3.  Every 4-6 hours minimum:  Change oxygen saturation probe site  4.  Every 4-6 hours:  If on nasal continuous positive airway pressure, respiratory therapy assess nares and determine need for appliance change or resting period.  Outcome: Progressing     Problem: Safety - Adult  Goal: Free from fall injury  Outcome: Progressing  Flowsheets (Taken 9/2/2024 0131)  Free From Fall Injury: Instruct family/caregiver on patient safety     Problem: ABCDS Injury Assessment  Goal: Absence of physical injury  Outcome: Progressing  Flowsheets (Taken 9/2/2024 0131)  Absence of Physical Injury: Implement safety measures based on patient assessment

## 2024-09-03 PROCEDURE — 1200000000 HC SEMI PRIVATE

## 2024-09-03 PROCEDURE — 2580000003 HC RX 258: Performed by: NURSE PRACTITIONER

## 2024-09-03 PROCEDURE — 2580000003 HC RX 258: Performed by: HOSPITALIST

## 2024-09-03 PROCEDURE — 6370000000 HC RX 637 (ALT 250 FOR IP): Performed by: NURSE PRACTITIONER

## 2024-09-03 PROCEDURE — 6360000002 HC RX W HCPCS: Performed by: NURSE PRACTITIONER

## 2024-09-03 PROCEDURE — 6370000000 HC RX 637 (ALT 250 FOR IP): Performed by: HOSPITALIST

## 2024-09-03 PROCEDURE — 92610 EVALUATE SWALLOWING FUNCTION: CPT

## 2024-09-03 PROCEDURE — 92526 ORAL FUNCTION THERAPY: CPT

## 2024-09-03 RX ADMIN — MIRTAZAPINE 15 MG: 15 TABLET, FILM COATED ORAL at 20:38

## 2024-09-03 RX ADMIN — CEFTRIAXONE SODIUM 1000 MG: 1 INJECTION, POWDER, FOR SOLUTION INTRAMUSCULAR; INTRAVENOUS at 02:14

## 2024-09-03 RX ADMIN — Medication 3000 UNITS: at 20:37

## 2024-09-03 RX ADMIN — SENNOSIDES 8.6 MG: 8.6 TABLET, FILM COATED ORAL at 20:38

## 2024-09-03 RX ADMIN — TROSPIUM CHLORIDE 20 MG: 20 TABLET, FILM COATED ORAL at 20:38

## 2024-09-03 RX ADMIN — DOXAZOSIN MESYLATE 2 MG: 2 TABLET ORAL at 20:38

## 2024-09-03 RX ADMIN — SODIUM CHLORIDE: 9 INJECTION, SOLUTION INTRAVENOUS at 12:22

## 2024-09-03 RX ADMIN — METOPROLOL TARTRATE 25 MG: 25 TABLET, FILM COATED ORAL at 20:38

## 2024-09-03 RX ADMIN — DONEPEZIL HYDROCHLORIDE 10 MG: 10 TABLET, FILM COATED ORAL at 20:38

## 2024-09-03 ASSESSMENT — PAIN SCALES - GENERAL
PAINLEVEL_OUTOF10: 0
PAINLEVEL_OUTOF10: 0

## 2024-09-03 ASSESSMENT — PAIN SCALES - WONG BAKER
WONGBAKER_NUMERICALRESPONSE: NO HURT
WONGBAKER_NUMERICALRESPONSE: NO HURT

## 2024-09-03 NOTE — PROGRESS NOTES
Elevates Tongue Up and Back Not Tested    Protrusion   Not Tested    Lateralizes Tongue Not Tested    Sensation Not Tested      Other Observations Dentition Informally observed at least some dentition    Vocal Quality Weak intensity    Cough dystussia     Patient Evaluated Using: Thin Liquids and Puree    Oral Phase:  Impaired:  Impaired AP Movement, Pocketing Left, Pocketing Right, Reduced Bolus Formation, and Impaired Oral Initiation    Pharyngeal Phase: Impaired:  Suspected Decreased Airway Protection    Signs and Symptoms of Laryngeal Penetration/Aspiration: Delayed Cough    Aspiration Pneumonia Predictors:  Decreased Cognition, Limited Mobility, Chronic Medical Issues/Pertinent Co-Morbidities, Dependence for Feeding, Dependence for Oral Care, Impaired Cough Function, and Gastrointestinal Disease    Functional Oral Intake Scale: Total Oral Intake: 5.  Total oral intake of multiple consistencies requiring special preparation    Impressions: Patient presents with moderate oral dysphagia with inability to fully discern potential presence of pharyngeal phase deficits without formal instrumentation. Initially patient with decreased oral acceptance keeping lips tightly sealed to presentation of PO material on spoon. Improved acceptance with verbal support referencing spouse requesting patient eat. Small sips of water through straw largely unremarkable. Large sip of water through straw followed by delayed cough. Puree trials revealed decreased oral acceptance, requiring continuous verbal support for appropriate mandibular opening. Decreased bolus formation/coordination with bilateral buccal pocketing observed. Bolus holding present requiring verbal cue to engage in swallow. Improved oral clearance evident with alternating solids + liquids.   Given age, degree of debility and limited alertness levels, patient certainly remains at HIGH risk for aspiration event. Given continuous need for verbal encouragement to engage in  with acceptance for recommendations of altered diet level and/or formal instrumental assessment recommendations should it be indicated. Family reports they DO NOT want patient to have a feeding tube.    Following CSE, ST provided skilled education r/t bedside findings. Education provided on recommended diet level and compensatory strategies. Family at bedside verbally receptive and agreeable to ST recommendations. Skilled dysphagia interventions to follow as indicated.     LONG TERM GOALS:  No LTGs established d/t short ZBIGNIEW Davidson MA, CCC-SLP [id number]

## 2024-09-03 NOTE — PROGRESS NOTES
HOSPICE REFERRAL NOTE    Not a hospice patient, see notes at bottom    Terminal Diagnosis:  To be determined.    Reviewed Nursing Notes for Previous 24 hours of Inpatient Charting:       Pain Scale:   0     Graphics:   BP (!) 170/76   Pulse 87   Temp 98.5 °F (36.9 °C) (Axillary)   Resp 18   Ht 1.524 m (5')   Wt 67.2 kg (148 lb 2.4 oz)   SpO2 100%   BMI 28.93 kg/m²     Current Medications:    Current Facility-Administered Medications: senna (SENOKOT) tablet 8.6 mg, 1 tablet, Oral, Nightly  magnesium hydroxide (MILK OF MAGNESIA) 400 MG/5ML suspension 30 mL, 30 mL, Oral, Once  0.9 % sodium chloride infusion, , IntraVENous, Continuous  allopurinol (ZYLOPRIM) tablet 100 mg, 100 mg, Oral, Daily  [Held by provider] aspirin EC tablet 81 mg, 81 mg, Oral, Daily  atorvastatin (LIPITOR) tablet 10 mg, 10 mg, Oral, Daily  Vitamin D (CHOLECALCIFEROL) tablet 3,000 Units, 3,000 Units, Oral, Nightly  [Held by provider] clopidogrel (PLAVIX) tablet 75 mg, 75 mg, Oral, Daily  divalproex (DEPAKOTE ER) extended release tablet 250 mg, 250 mg, Oral, Daily  docusate sodium (COLACE) capsule 100 mg, 100 mg, Oral, Daily  donepezil (ARICEPT) tablet 10 mg, 10 mg, Oral, Nightly  doxazosin (CARDURA) tablet 2 mg, 2 mg, Oral, Nightly  folic acid (FOLVITE) tablet 1 mg, 1 mg, Oral, Daily  folbee plus tablet 1 tablet, 1 tablet, Oral, Daily  megestrol (MEGACE) 40 MG/ML suspension 200 mg, 200 mg, Oral, Daily  memantine (NAMENDA) tablet 5 mg, 5 mg, Oral, Daily  metoprolol tartrate (LOPRESSOR) tablet 25 mg, 25 mg, Oral, BID  mirtazapine (REMERON) tablet 15 mg, 15 mg, Oral, Nightly  multivitamin 1 tablet, 1 tablet, Oral, Daily  oxyBUTYnin (DITROPAN-XL) extended release tablet 10 mg, 10 mg, Oral, Daily  pantoprazole (PROTONIX) tablet 40 mg, 40 mg, Oral, QAM AC  trospium (SANCTURA) tablet 20 mg, 20 mg, Oral, Nightly  thiamine tablet 100 mg, 100 mg, Oral, Daily  0.9 % sodium chloride infusion, , IntraVENous, PRN  [Held by provider] pantoprazole  education about malnutrition, pureed diet and him sleeping through meals along with decrease reported despite medications being on regimin to increase appetite  3) Pt has been on [facility] and possibly used 3 benefit periods per family calculation. Aware they can use [facility] or change to our services.  4) Daughter Arielle feels her mom needs medication management. Discussed what agency staff can and cannot do in different scenarios.  Ex, we can set up pill box but not come at the times meds are due to administer and some agencies cannot place meds in the pt's mouth  5) Pt is  private pay [facility] paying over 8,000/month. Some discussion of talking to their , and general Medicaid questions answered. Pt wife mentioned a friends stated to \"have your  sell you the house for a dollar.\"  Discussed the look-back and advised to not make any transactions that could hinder them.  6) Family had questions about \"being paid to caregiver\" and \"about VA benefits.\"  BRIANA Kc had already spent time with family but Arielle specified she wanted a the  and thought our 1pm meeting was . Arielle asked that I call her with a time that Yamini will meet. Yamini went to pt room and stated to pt wife that the meetings will need to be set up with her (Eileen) and then if others wish to be present, she will speak with them present. Pt not in the VA system when Yamini checked.   7)Family presented a lot of good questions such as \"what scenarios do you often come across that families don't seem to understand.\" I have examples of morphine misconceptions and that often people think \"going home with hospice\" means we provide shift care. Examples of what care at home typically looks like presented.

## 2024-09-03 NOTE — PALLIATIVE CARE
Initial Evaluation        Patient:   Osmany Castellanos  YOB: 1935  Age:  89 y.o.  Room:  [facility]20/020-A  MRN:  [id number]   Acct: [id number]    Date of Admission:  8/29/2024  3:39 PM  Date of Service:  9/3/2024  Completed By:  Ariana García RN        Reason for Palliative Care Evaluation:-   Goals of Care     Current Concerns   fatigue and decreased appetite     Palliative Performance Scale   20%  Bed Bound; Extensive disease; Total care; intake minimal; Drowsy/coma     History    Patient into ED due to bilateral hand swelling. Patient admitted for a complicated UTI. Patient also found to have hematuria. Urology consulted. PMH includes HF, GERD, vascular dementia, dysphagia. Per notes patient has been signed on/off hospice multiple times.     Goals of Care Discussions and Plan         Advance Care Planning   Goals of Care/Advance Care Planning (ACP) Conversation    Date of Conversation: 09/03/24    Individuals present for the conversation: Patient, Spouse Eileen, Son Fritz, and Granddaughter Leo     ACP documents on file prior to discussion:  -None    Previously completed document/s not on file: Not discussed.    Healthcare Power of /Healthcare Surrogate Decision Makers:  Per [place] Code 2133.08: [place] Hierarchy of Surrogate Decision Makers   Patient's Spouse - Eileen Castellanos  Patient has 4 living children    Conversation Summary: In room to speak with patient/family. Patient resting in bed with eyes closed, did not respond when this Rn entered room. Patient did not participate in conversation. Fritz, Eileen, and family friend at bedside. Leo on speaker phone. Discussed reason for admission. Family state that patient had started complaining of pain on Thursday, and had swelling in his hands. Discussed SLP recommendations. Family share that patient has not had a good appetite in a month or two. Family share that the patient does not ask for food or drinks. Discussed progression

## 2024-09-03 NOTE — PLAN OF CARE
Problem: Discharge Planning  Goal: Discharge to home or other facility with appropriate resources  9/3/2024 1650 by Yanci Castellanos, RN  Outcome: Progressing  Note: Home or new facility with hospice.     Problem: Pain  Goal: Verbalizes/displays adequate comfort level or baseline comfort level  9/3/2024 1650 by Yanci Castellanos, RN  Outcome: Progressing  Note: Appears comfortable.    Problem: Skin/Tissue Integrity  Goal: Absence of new skin breakdown  Description: 1.  Monitor for areas of redness and/or skin breakdown  2.  Assess vascular access sites hourly  3.  Every 4-6 hours minimum:  Change oxygen saturation probe site  4.  Every 4-6 hours:  If on nasal continuous positive airway pressure, respiratory therapy assess nares and determine need for appliance change or resting period.  9/3/2024 1650 by Yanci Castellanos, RN  Outcome: Progressing  Note: No new breakdown this shift.   Pillow support.  Encourage repositioning.     Problem: Safety - Adult  Goal: Free from fall injury  9/3/2024 1650 by Yanci Castellanos, RN  Outcome: Progressing  Note: No falls this shift.  Call light within reach.  Bed alarm in use.     Problem: ABCDS Injury Assessment  Goal: Absence of physical injury  9/3/2024 1650 by Yanci Castellanos, RN  Outcome: Progressing  Note: No injury this shift.     Problem: Nutrition Deficit:  Goal: Optimize nutritional status  Outcome: Progressing

## 2024-09-03 NOTE — PROGRESS NOTES
09/03/24 1620   Encounter Summary   Encounter Overview/Reason Attempted Encounter   Service Provided For Patient not available   Referral/Consult From Rounding   Last Encounter  09/03/24  (attempt)   Begin Time 1605   End Time  1610   Total Time Calculated 5 min   Assessment/Intervention/Outcome   Assessment Unable to assess     Assessment:  This was an attempted spiritual care visit as a part of rounds. Patient unavailable at this time.    Plan of Care:   team will remain available for patient/family, PRN.     Olinda Chacko M.Div     Spiritual Care Department  [address] [address]  [phone]

## 2024-09-03 NOTE — PROGRESS NOTES
Comprehensive Nutrition Assessment    Type and Reason for Visit:  Initial, Positive Nutrition Screen (poor oral intake, unplanned weight loss)    Nutrition Recommendations/Plan:   Diet initiated today per SLP.  ONS started: Ensure Plus TID, patient has liked in the past.  Note patient is on megace 200 mg daily for appetite stimulation and also on remeron.   Continue MVI.  Palliative care following, patient is DNR-CC, family reports they do no want feeding tube, hospice consulted.      Malnutrition Assessment:  Malnutrition Status:  Severe malnutrition (09/03/24 1231)    Context:  Chronic Illness     Findings of the 6 clinical characteristics of malnutrition:  Energy Intake:  75% or less estimated energy requirements for 1 month or longer (50% or less per family report)  Weight Loss:   (difficult to confirm with fluctuations in EMR weights)     Body Fat Loss:  Severe body fat loss Triceps, Fat Overlying Ribs, Orbital   Muscle Mass Loss:  Severe muscle mass loss Temples (temporalis), Clavicles (pectoralis & deltoids), Thigh (quadriceps), Calf (gastrocnemius)  Fluid Accumulation:  No significant fluid accumulation     Strength:  Not Performed    Nutrition Assessment:     Pt. severely malnourished AEB criteria listed above.  At risk for further nutritional compromise r/t previous NPO status (8/29-9/3) d/t dysphagia concerns, admit with dehydration, complicated UTI, hematuria, acute on chronic anemia,  CHF, constipation, dementia, advanced age, and underlying medical condition (hx: zenker diverticula-s/p esophagus surgery, HTN, BPH, [place], alcohol abuse, CKD, HLD, metabolic bone disease, dementia, frequent falls, dysphagia, GERD, sleep apnea, severe malnutrition).       Nutrition Related Findings:    Pt. Report/Treatments/Miscellaneous: Patient seen earlier this morning, son in patient's room, SLP present as well, son had sister Arielle on speaker phone.  SLP had just talked with wife Eileen on the phone.  Patient had been  eating regular diet with thin liquids prior to admit.  Poor appetite, meal intake a little less than 50% of meals.  Has liked ONS in past.  SLP did bedside swallow-results noted.  Family reports they do not want feeding tube.   GI Status: BM 9/2/24  Pertinent Labs: 9/2/24: BUN 29, Creatinine 1  Pertinent Meds: 0.9 NaCl at 50 ml/hr, megace 200 mg, MVI, folbee plus, protonix, lipitor, rocephin, depakote, colace, aricept, folvite, MOM, namenda, remeron, senkot, thiamin, vitamin D     Wound Type: None       Current Nutrition Intake & Therapies:    Average Meal Intake:  (previously NPO, diet just initiated today)  Average Supplements Intake:  (just initiated)  ADULT DIET; Dysphagia - Pureed  ADULT ORAL NUTRITION SUPPLEMENT; Breakfast, Lunch, Dinner; Standard High Calorie/High Protein Oral Supplement    Anthropometric Measures:  Height: 152.4 cm (5')  Ideal Body Weight (IBW): 106 lbs (48 kg)    Admission Body Weight: 62.5 kg (137 lb 12.6 oz) (8/31/24 bedscale, no edema)  Current Body Weight: 67.2 kg (148 lb 2.4 oz) (9/3/24 bedscale, no edema),     Current BMI (kg/m2): 28.9  Usual Body Weight:  (per EMR: 4/30/17: 151#6oz, 11/12/19: 168#3oz, 2/16/23: 149#13oz, 7/17/23: 145#10oz, 2/7/24: 150#13oz)                       BMI Categories: Overweight (BMI 25.0-29.9)    Estimated Daily Nutrient Needs:  Energy Requirements Based On: Kcal/kg  Weight Used for Energy Requirements:  (67 kg)  Energy (kcal/day): ~ 5569-8676 kcals (20-25 kcals/kg/day)  Weight Used for Protein Requirements: Ideal (48 kg)  Protein (g/day): ~ 48-58 grams (1-1.2 grams/kg IBW/day) pending renal status, hx CKD         Nutrition Diagnosis:   Severe malnutrition, In context of chronic illness related to inadequate protein-energy intake, cognitive or neurological impairment as evidenced by Criteria as identified in malnutrition assessment    Nutrition Interventions:   Food and/or Nutrient Delivery: Start Oral Nutrition Supplement (Start Oral Diet per SLP

## 2024-09-03 NOTE — CARE COORDINATION
9/3/24, 11:45 AM EDT    DISCHARGE PLANNING EVALUATION       SW notified of hospice meeting today and possible change in facility at discharge.  SW provided spouse with list of ecf's and also faxed private duty list to her daughter Maryana.  SW also spoke with Maryaan and she stated that she would be in town tomorrow and would speak with her siblings and then they would be in on Thursday to speak with us about further plans at discharge.  SW encouraged that they actively be working on a plan today and update SW as they make decisions.     SW was asked to speak with family member Arielle by  Hospice after meeting with the family. SW spoke with spouse and asked that they designate one family member to assist in asking questions and making decisions to assist in the line of communication due to multiple family members making decisions. Spouse was in agreement and will speak with her family tonight and will have questions and some decisions for SW tomorrow.

## 2024-09-03 NOTE — PROGRESS NOTES
Hospitalist Progress Note    Patient:  Osmany Castellanos      Unit/Bed:6K-20/020-A    YOB: 1935    MRN: [id number]       Acct: [id number]     PCP: Dannielle Steward MD    Date of Admission: 8/29/2024    Assessment/Plan:    Complicated UTI: UA positive for nitrates, moderate LE, WBC 50-75 and many bacteria.  No prior history of ESBL.  CT A/P with moderate-large colonic stool burden, mild diffuse urinary bladder wall thickening.  Urine culture pending.  Start Rocephin for empiric antibiotic coverage.  Discussed appropriate precautions and sensitivities.  Hematuria: Concern for urinary retention and clots in bladder.  Prior history of cystoscopy with urethral dilation, greenlight photo vaporization of prostate under care of Dr. Nichols, urology in 2023.  Per RN bleeding from penis with clots.  Guy placed, plan for hand interrogation if unable to initiate three-way bladder irrigation.  NPO.  Urology consulted, hematuria has resolved.  CBI discontinued  Acute on chronic microcytic anemia: Multifactorial with hematuria appreciated and suspect chronic malnutrition.  Baseline Hgb 8.4-8.8.  Monitor Hgb with daily CBC.  Transfuse PRBC if Hb <7.0 or hemodynamic stable  New chronic HFpEF with drop in EF, NYHA III: Suspect chronic and progressive worsening.  Last Echo EF 30-35%, severe global hypokinesis, G1 DD, mild AR/MR/TR, small (<1 cm) anterior pericardial effusion without tamponade 8/30/2024. proBNP 4147.  CODE STATUS DNR CC, will continue with symptomatic management.  Discussed with patient's son, daughter and wife via phone and in person and at this time they do not want any heroic measures, no cardiac catheterization.  They are agreeable to beta-blocker medical management if his blood pressure tolerates.  Constipation: Continue stool softeners  HTN: Continue metoprolol 25 mg p.o. twice daily with holding parameters  HLD: Continue Lipitor 10 mg p.o. daily  GERD: Started metoprolol 40 mg p.o. daily  Gout:

## 2024-09-03 NOTE — PLAN OF CARE
Problem: Discharge Planning  Goal: Discharge to home or other facility with appropriate resources  Outcome: Progressing  Flowsheets (Taken 9/3/2024 0304)  Discharge to home or other facility with appropriate resources: Identify barriers to discharge with patient and caregiver     Problem: Pain  Goal: Verbalizes/displays adequate comfort level or baseline comfort level  Outcome: Progressing  Flowsheets (Taken 9/3/2024 0304)  Verbalizes/displays adequate comfort level or baseline comfort level: Encourage patient to monitor pain and request assistance     Problem: Skin/Tissue Integrity  Goal: Absence of new skin breakdown  Description: 1.  Monitor for areas of redness and/or skin breakdown  2.  Assess vascular access sites hourly  3.  Every 4-6 hours minimum:  Change oxygen saturation probe site  4.  Every 4-6 hours:  If on nasal continuous positive airway pressure, respiratory therapy assess nares and determine need for appliance change or resting period.  Outcome: Progressing     Problem: Safety - Adult  Goal: Free from fall injury  Outcome: Progressing  Flowsheets (Taken 9/3/2024 0304)  Free From Fall Injury: Instruct family/caregiver on patient safety     Problem: ABCDS Injury Assessment  Goal: Absence of physical injury  Outcome: Progressing  Flowsheets (Taken 9/3/2024 0304)  Absence of Physical Injury: Implement safety measures based on patient assessment

## 2024-09-04 PROCEDURE — 6360000002 HC RX W HCPCS: Performed by: NURSE PRACTITIONER

## 2024-09-04 PROCEDURE — 2580000003 HC RX 258: Performed by: NURSE PRACTITIONER

## 2024-09-04 PROCEDURE — 2580000003 HC RX 258: Performed by: HOSPITALIST

## 2024-09-04 PROCEDURE — 6370000000 HC RX 637 (ALT 250 FOR IP): Performed by: HOSPITALIST

## 2024-09-04 PROCEDURE — 99232 SBSQ HOSP IP/OBS MODERATE 35: CPT | Performed by: PHYSICIAN ASSISTANT

## 2024-09-04 PROCEDURE — 1200000000 HC SEMI PRIVATE

## 2024-09-04 PROCEDURE — 6370000000 HC RX 637 (ALT 250 FOR IP): Performed by: NURSE PRACTITIONER

## 2024-09-04 RX ADMIN — DONEPEZIL HYDROCHLORIDE 10 MG: 10 TABLET, FILM COATED ORAL at 20:06

## 2024-09-04 RX ADMIN — DOXAZOSIN MESYLATE 2 MG: 2 TABLET ORAL at 20:06

## 2024-09-04 RX ADMIN — MIRTAZAPINE 15 MG: 15 TABLET, FILM COATED ORAL at 20:06

## 2024-09-04 RX ADMIN — MEGESTROL ACETATE 200 MG: 400 SUSPENSION ORAL at 08:38

## 2024-09-04 RX ADMIN — METOPROLOL TARTRATE 25 MG: 25 TABLET, FILM COATED ORAL at 20:06

## 2024-09-04 RX ADMIN — ALLOPURINOL 100 MG: 100 TABLET ORAL at 08:33

## 2024-09-04 RX ADMIN — SENNOSIDES 8.6 MG: 8.6 TABLET, FILM COATED ORAL at 20:06

## 2024-09-04 RX ADMIN — TROSPIUM CHLORIDE 20 MG: 20 TABLET, FILM COATED ORAL at 20:06

## 2024-09-04 RX ADMIN — CEFTRIAXONE SODIUM 1000 MG: 1 INJECTION, POWDER, FOR SOLUTION INTRAMUSCULAR; INTRAVENOUS at 02:26

## 2024-09-04 RX ADMIN — SODIUM CHLORIDE: 9 INJECTION, SOLUTION INTRAVENOUS at 08:40

## 2024-09-04 RX ADMIN — DIVALPROEX SODIUM 250 MG: 250 TABLET, EXTENDED RELEASE ORAL at 08:33

## 2024-09-04 RX ADMIN — METOPROLOL TARTRATE 25 MG: 25 TABLET, FILM COATED ORAL at 08:33

## 2024-09-04 RX ADMIN — MEMANTINE HYDROCHLORIDE 5 MG: 5 TABLET ORAL at 08:33

## 2024-09-04 ASSESSMENT — PAIN SCALES - GENERAL
PAINLEVEL_OUTOF10: 0

## 2024-09-04 ASSESSMENT — PAIN SCALES - WONG BAKER: WONGBAKER_NUMERICALRESPONSE: NO HURT

## 2024-09-04 NOTE — PLAN OF CARE
Problem: Discharge Planning  Goal: Discharge to home or other facility with appropriate resources  9/4/2024 1432 by Yanci Castellanos, RN  Outcome: Progressing     Problem: Pain  Goal: Verbalizes/displays adequate comfort level or baseline comfort level  9/4/2024 1432 by Yanci Castellanos, RN  Outcome: Progressing     Problem: Skin/Tissue Integrity  Goal: Absence of new skin breakdown  Description: 1.  Monitor for areas of redness and/or skin breakdown  2.  Assess vascular access sites hourly  3.  Every 4-6 hours minimum:  Change oxygen saturation probe site  4.  Every 4-6 hours:  If on nasal continuous positive airway pressure, respiratory therapy assess nares and determine need for appliance change or resting period.  9/4/2024 1432 by Yanci Castellanos, RN  Outcome: Progressing     Problem: Safety - Adult  Goal: Free from fall injury  9/4/2024 1432 by Yanci Castellanos, RN  Outcome: Progressing     Problem: ABCDS Injury Assessment  Goal: Absence of physical injury  9/4/2024 1432 by Yanci Castellanos, RN  Outcome: Progressing     Problem: Nutrition Deficit:  Goal: Optimize nutritional status  9/4/2024 1432 by Yanci Castellanos, RN  Outcome: Progressing

## 2024-09-04 NOTE — CARE COORDINATION
9/4/24, 11:03 AM EDT    DISCHARGE ON GOING EVALUATION    [facility] day: 6  Location: -20/020-A Reason for admit: Dehydration [E86.0]  Lower GI bleed [K92.2]  General weakness [R53.1]  Acute kidney injury (HCC) [N17.9]  Bilateral hand swelling [M79.89]  Idiopathic gout, unspecified chronicity, unspecified site [M10.00]  Anemia due to other cause, not classified [D64.89]     Procedures: none    Imaging since last note: n/a    Barriers to Discharge: Hospice following, family trying to decide direction of care and discharge disposition.     PCP: Dannielle Steward MD  Readmission Risk Score: 19.6    Patient Goals/Plan/Treatment Preferences: Home with family vs ECF vs return to [place], possibly with hospice.

## 2024-09-04 NOTE — CARE COORDINATION
9/4/24, 1:14 PM EDT    DISCHARGE PLANNING EVALUATION       MICHELLE met with Patient, friend, and son, daughter Arielle was on the phone. Answered questions regarding changing medicare and possible admission to [place] [facility]. MICHELLE explained that Patient does not meet criteria for [facility] admission.  MICHELLE provided spouse with her preferred ECF costs and that was [facility] at $299 a day plus level of care and [facility] at $330 a day.  [facility] and [facility] do not have open hospice beds at this time.     EVARISTO Solis also in to speak with family and explained that Patient was ready for discharge medically and planning for discharge tomorrow but needed to proceed with a discharge plan. Family preferring  Hospice at this time and plan to return to [facility].  MICHELLE updated Ashley TIM at [facility] regarding Patient's return possibly tomorrow or Friday and updated of [facility] at discharge.      MICHELLE updated Tana with [facility] regarding above.

## 2024-09-04 NOTE — PLAN OF CARE
Problem: Discharge Planning  Goal: Discharge to home or other facility with appropriate resources  9/4/2024 0504 by Jordan Dang RN  Outcome: Progressing  Flowsheets (Taken 9/4/2024 0504)  Discharge to home or other facility with appropriate resources: Identify barriers to discharge with patient and caregiver  9/3/2024 1650 by Yanci Castellanos RN  Outcome: Progressing  Note: Home or new facility with hospice.     Problem: Pain  Goal: Verbalizes/displays adequate comfort level or baseline comfort level  9/4/2024 0504 by Jordan Dang RN  Outcome: Progressing  Flowsheets (Taken 9/4/2024 0504)  Verbalizes/displays adequate comfort level or baseline comfort level: Encourage patient to monitor pain and request assistance  9/3/2024 1650 by Yanci Castellanos RN  Outcome: Progressing  Note: Appears comfortable.     Problem: Skin/Tissue Integrity  Goal: Absence of new skin breakdown  Description: 1.  Monitor for areas of redness and/or skin breakdown  2.  Assess vascular access sites hourly  3.  Every 4-6 hours minimum:  Change oxygen saturation probe site  4.  Every 4-6 hours:  If on nasal continuous positive airway pressure, respiratory therapy assess nares and determine need for appliance change or resting period.  9/4/2024 0504 by Jordan Dang RN  Outcome: Progressing  9/3/2024 1650 by Yanci Castellanos RN  Outcome: Progressing  Note: No new breakdown this shift.   Pillow support.  Encourage repositioning.     Problem: Safety - Adult  Goal: Free from fall injury  9/4/2024 0504 by Jordan Dang RN  Outcome: Progressing  Flowsheets (Taken 9/4/2024 0504)  Free From Fall Injury: Instruct family/caregiver on patient safety  9/3/2024 1650 by Yanci Castellanos RN  Outcome: Progressing  Note: No falls this shift.  Call light within reach.  Bed alarm in use.     Problem: ABCDS Injury Assessment  Goal: Absence of physical injury  9/4/2024 0504 by Jordan Dang RN  Outcome:  Progressing  Flowsheets (Taken 9/3/2024 0304)  Absence of Physical Injury: Implement safety measures based on patient assessment  9/3/2024 1650 by Yanci Castellanos, RN  Outcome: Progressing  Note: No injury this shift.     Problem: Nutrition Deficit:  Goal: Optimize nutritional status  9/4/2024 0504 by Jordan Dang, RN  Outcome: Progressing  Flowsheets (Taken 9/4/2024 0504)  Nutrient intake appropriate for improving, restoring, or maintaining nutritional needs: Assess nutritional status and recommend course of action  9/3/2024 1650 by Yanci Castellanos, RN  Outcome: Progressing

## 2024-09-04 NOTE — PROGRESS NOTES
Updated by MICHELLE Ruby, family likely going to want hospice when discharged and son has mentioned [facility] hosp[facility]. Shared that family is wanting him to stay in hospital till Friday but uncertain of direction things are going to go. 6K MICHELLE will keep hospice liaison updated.

## 2024-09-04 NOTE — PROGRESS NOTES
Hospitalist Progress Note      Patient:  Osmany Castellanos 89 y.o. male       : 1935  Unit/Bed:Critical access hospital20/020-A    Date of Admission: 2024      ASSESSMENT AND PLAN    Active Problems  Advanced care planning  DNR-CC. Discussed with family at bedside. Plan for discharge to facility with Hospice. Would like [facility].   Hospice updated. SW assisting with placement.   Complicated UTI  +UA, mild diffuse urinary bladder wall thickening seen on CT. Culture was not sent.   On day  ceftriaxone.   Chronic HFrEF  Echo 24 EF 30-35%, severe global hypokinesis, G1DD, mild AR/MR/TR, small (<1 cm) anterior pericardial effusion without tamponade.   Suspected chronic and progressive.   Code status DNR-CC. Family agreeable with medical management.   Dysphagia  Seen by SLP, recommending puree and thin liquids, DIRECT 1:1, upright positioning, SMALL sips via straw, alternate solids + liquids     Resolved Problems  Hematuria: Prior history of cystoscopy with urethral dilation, greenlight photo vaporization of prostate under care of Dr. Nichols, urology in . Guy placed, s/p CBI. Resolved.   Constipation: Continue stool softeners       Chronic Conditions (reviewed, stable, and home medications resumed, unless otherwise stated)  Vascular dementia   Essential HTN  HLD  GERD  BPH  Gout      LDA: []CVC / []PICC / []Midline / [x]Guy / []Drains / []Mediport / []None  Antibiotics: ceftriaxone   Steroids: no  Labs (still needed?): []Yes / [x]No  IVF (still needed?): []Yes / [x]No    Level of care: []Step Down / [x]Med-Surg  Bed Status: [x]Inpatient / []Observation  Telemetry: []Yes / [x]No  PT/OT: []Yes / [x]No    DVT Prophylaxis: [x] Lovenox / [] Heparin / [] SCDs / [] Already on Systemic Anticoagulation / [] None     Expected discharge date:    Disposition: ECF  Code status: DNR-CC     ===================================================================    Chief Complaint: weakness  Subjective (past 24 hours):

## 2024-09-05 PROCEDURE — 2580000003 HC RX 258: Performed by: NURSE PRACTITIONER

## 2024-09-05 PROCEDURE — 1200000000 HC SEMI PRIVATE

## 2024-09-05 PROCEDURE — 6360000002 HC RX W HCPCS: Performed by: NURSE PRACTITIONER

## 2024-09-05 PROCEDURE — 6370000000 HC RX 637 (ALT 250 FOR IP): Performed by: HOSPITALIST

## 2024-09-05 PROCEDURE — 2580000003 HC RX 258: Performed by: HOSPITALIST

## 2024-09-05 PROCEDURE — 6370000000 HC RX 637 (ALT 250 FOR IP): Performed by: NURSE PRACTITIONER

## 2024-09-05 PROCEDURE — 99232 SBSQ HOSP IP/OBS MODERATE 35: CPT | Performed by: PHYSICIAN ASSISTANT

## 2024-09-05 RX ORDER — MORPHINE SULFATE 100 MG/5ML
2.5-5 SOLUTION ORAL
Qty: 10 ML | Refills: 0 | Status: SHIPPED | OUTPATIENT
Start: 2024-09-05 | End: 2024-09-12

## 2024-09-05 RX ORDER — LORAZEPAM 0.5 MG/1
.5-1 TABLET ORAL EVERY 4 HOURS PRN
Qty: 30 TABLET | Refills: 0 | Status: SHIPPED | OUTPATIENT
Start: 2024-09-05 | End: 2024-09-12

## 2024-09-05 RX ORDER — SENNOSIDES A AND B 8.6 MG/1
1 TABLET, FILM COATED ORAL NIGHTLY PRN
DISCHARGE
Start: 2024-09-05 | End: 2024-10-05

## 2024-09-05 RX ADMIN — DOXAZOSIN MESYLATE 2 MG: 2 TABLET ORAL at 20:47

## 2024-09-05 RX ADMIN — MIRTAZAPINE 15 MG: 15 TABLET, FILM COATED ORAL at 20:47

## 2024-09-05 RX ADMIN — METOPROLOL TARTRATE 25 MG: 25 TABLET, FILM COATED ORAL at 20:48

## 2024-09-05 RX ADMIN — DONEPEZIL HYDROCHLORIDE 10 MG: 10 TABLET, FILM COATED ORAL at 20:47

## 2024-09-05 RX ADMIN — SODIUM CHLORIDE: 9 INJECTION, SOLUTION INTRAVENOUS at 04:43

## 2024-09-05 RX ADMIN — METOPROLOL TARTRATE 25 MG: 25 TABLET, FILM COATED ORAL at 09:10

## 2024-09-05 RX ADMIN — MEMANTINE HYDROCHLORIDE 5 MG: 5 TABLET ORAL at 09:10

## 2024-09-05 RX ADMIN — SENNOSIDES 8.6 MG: 8.6 TABLET, FILM COATED ORAL at 20:46

## 2024-09-05 RX ADMIN — CEFTRIAXONE SODIUM 1000 MG: 1 INJECTION, POWDER, FOR SOLUTION INTRAMUSCULAR; INTRAVENOUS at 02:07

## 2024-09-05 RX ADMIN — ATORVASTATIN CALCIUM 10 MG: 10 TABLET, FILM COATED ORAL at 09:10

## 2024-09-05 RX ADMIN — SODIUM CHLORIDE, PRESERVATIVE FREE 10 ML: 5 INJECTION INTRAVENOUS at 20:48

## 2024-09-05 RX ADMIN — MEGESTROL ACETATE 200 MG: 400 SUSPENSION ORAL at 09:10

## 2024-09-05 RX ADMIN — TROSPIUM CHLORIDE 20 MG: 20 TABLET, FILM COATED ORAL at 20:47

## 2024-09-05 ASSESSMENT — PAIN SCALES - GENERAL: PAINLEVEL_OUTOF10: 0

## 2024-09-05 NOTE — PROGRESS NOTES
Patient will need face to face completed prior to admission to hospice due to being on hospice previously.  This will not be able to be completed until later in the afternoon Friday when Dr Mendieta is available.  Message left for hospital .    Dr Landeros will be in Friday morning to complete the face to face so patient can be discharged as previously scheduled and admitted to hospice at [facility].

## 2024-09-05 NOTE — PROGRESS NOTES
Hospitalist Progress Note      Patient:  Osmany Castellanos 89 y.o. male       : 1935  Unit/Bed:6-20/020-A    Date of Admission: 2024      ASSESSMENT AND PLAN    Active Problems  Advanced care planning  DNR-CC. Discussed with family at bedside. Plan for discharge to facility with Hospice. Would like [facility].   Hospice updated. SW assisting with placement.   Complicated UTI  +UA, mild diffuse urinary bladder wall thickening seen on CT. Culture was not sent.   Completed 7 days IV ceftriaxone.   Chronic HFrEF  Echo 24 EF 30-35%, severe global hypokinesis, G1DD, mild AR/MR/TR, small (<1 cm) anterior pericardial effusion without tamponade.   Suspected chronic and progressive.   Code status DNR-CC. Family agreeable with medical management.   Dysphagia  Seen by SLP, recommending puree and thin liquids, DIRECT 1:1, upright positioning, SMALL sips via straw, alternate solids + liquids.   Pt can have comfort feeds as he wishes given code status.     Resolved Problems  Hematuria: Prior history of cystoscopy with urethral dilation, greenlight photo vaporization of prostate under care of Dr. Nichols, urology in . Guy placed, s/p CBI. Resolved.   Constipation: Continue stool softeners       Chronic Conditions (reviewed, stable, and home medications resumed, unless otherwise stated)  Vascular dementia   Essential HTN  HLD  GERD  BPH  Gout      LDA: []CVC / []PICC / []Midline / [x]Guy / []Drains / []Mediport / []None  Antibiotics: ceftriaxone   Steroids: no  Labs (still needed?): []Yes / [x]No  IVF (still needed?): []Yes / [x]No    Level of care: []Step Down / [x]Med-Surg  Bed Status: [x]Inpatient / []Observation  Telemetry: []Yes / [x]No  PT/OT: []Yes / [x]No    DVT Prophylaxis: [x] Lovenox / [] Heparin / [] SCDs / [] Already on Systemic Anticoagulation / [] None     Expected discharge date:    Disposition: ECF  Code status: DNR-CC

## 2024-09-05 NOTE — PROGRESS NOTES
Met with family in patient room on 6k.  Wife and 2 daughters present.  Family talking with Irma Melara. Daughter still has lots of questions for her.  When they were done, daughter had lots of questions for me.  She definitely wants patient to have more care than he was getting at [facility] previously, she want hha at least 5 times a week and rails on the bed.  I attempted to explain to her that it was a medicare rule that rails no be on the beds, but she doesn't feel that is true.  They mention during the visit that they want him comfortable above all, but want treatments as well.  Case reviewed with Dr Landeros, will accept patient for hospice when discharged to facility. Patient can have a PT evaluation but will likely not meet, will ask facility to do restorative care.

## 2024-09-05 NOTE — CARE COORDINATION
9/5/24, 3:13 PM EDT    DISCHARGE PLANNING EVALUATION    SW spoke with RN at [facility], they are able to accept pt back at anytime.      MICHELLE met with pts spouse Eileen and daughter Arlin.  SW answered multiple questions regarding hospice care and [facility] Care.    SW requested Hospice come and answer additional questions.  Landy contacted- she stated RN Opal would be stopping by within the hour.    Spouse also asking to speak with attending. Irma PAS to speak with family.

## 2024-09-05 NOTE — PLAN OF CARE
Problem: Discharge Planning  Goal: Discharge to home or other facility with appropriate resources  9/5/2024 0041 by Jordan Dang RN  Outcome: Progressing  Flowsheets (Taken 9/5/2024 0041)  Discharge to home or other facility with appropriate resources: Identify barriers to discharge with patient and caregiver  9/4/2024 1432 by Yanci Castellanos RN  Outcome: Progressing     Problem: Pain  Goal: Verbalizes/displays adequate comfort level or baseline comfort level  9/5/2024 0041 by Jordan Dang RN  Outcome: Progressing  Flowsheets (Taken 9/5/2024 0041)  Verbalizes/displays adequate comfort level or baseline comfort level: Encourage patient to monitor pain and request assistance  9/4/2024 1432 by Yanci Castellanos RN  Outcome: Progressing     Problem: Skin/Tissue Integrity  Goal: Absence of new skin breakdown  Description: 1.  Monitor for areas of redness and/or skin breakdown  2.  Assess vascular access sites hourly  3.  Every 4-6 hours minimum:  Change oxygen saturation probe site  4.  Every 4-6 hours:  If on nasal continuous positive airway pressure, respiratory therapy assess nares and determine need for appliance change or resting period.  9/5/2024 0041 by Jordan Dang RN  Outcome: Progressing  9/4/2024 1432 by Yanci Castellanos RN  Outcome: Progressing     Problem: Safety - Adult  Goal: Free from fall injury  9/5/2024 0041 by Jordan Dang RN  Outcome: Progressing  Flowsheets (Taken 9/5/2024 0041)  Free From Fall Injury: Instruct family/caregiver on patient safety  9/4/2024 1432 by Yanci Castellanos RN  Outcome: Progressing     Problem: ABCDS Injury Assessment  Goal: Absence of physical injury  9/5/2024 0041 by Jordan Dang RN  Outcome: Progressing  Flowsheets (Taken 9/5/2024 0041)  Absence of Physical Injury: Implement safety measures based on patient assessment  9/4/2024 1432 by Yanci Castellanos RN  Outcome: Progressing     Problem: Nutrition Deficit:  Goal: Optimize

## 2024-09-06 VITALS
HEART RATE: 78 BPM | BODY MASS INDEX: 29.13 KG/M2 | DIASTOLIC BLOOD PRESSURE: 73 MMHG | HEIGHT: 60 IN | RESPIRATION RATE: 16 BRPM | OXYGEN SATURATION: 97 % | SYSTOLIC BLOOD PRESSURE: 155 MMHG | TEMPERATURE: 98.4 F | WEIGHT: 148.37 LBS

## 2024-09-06 PROCEDURE — 2580000003 HC RX 258: Performed by: NURSE PRACTITIONER

## 2024-09-06 PROCEDURE — 99239 HOSP IP/OBS DSCHRG MGMT >30: CPT | Performed by: PHYSICIAN ASSISTANT

## 2024-09-06 PROCEDURE — 6370000000 HC RX 637 (ALT 250 FOR IP): Performed by: NURSE PRACTITIONER

## 2024-09-06 RX ADMIN — MEGESTROL ACETATE 200 MG: 400 SUSPENSION ORAL at 09:36

## 2024-09-06 RX ADMIN — ATORVASTATIN CALCIUM 10 MG: 10 TABLET, FILM COATED ORAL at 09:37

## 2024-09-06 RX ADMIN — METOPROLOL TARTRATE 25 MG: 25 TABLET, FILM COATED ORAL at 09:36

## 2024-09-06 RX ADMIN — SODIUM CHLORIDE, PRESERVATIVE FREE 10 ML: 5 INJECTION INTRAVENOUS at 09:36

## 2024-09-06 RX ADMIN — MEMANTINE HYDROCHLORIDE 5 MG: 5 TABLET ORAL at 09:37

## 2024-09-06 ASSESSMENT — PAIN SCALES - GENERAL
PAINLEVEL_OUTOF10: 0
PAINLEVEL_OUTOF10: 0

## 2024-09-06 NOTE — PROGRESS NOTES
Discussion with [facility] this morning, hospice is able to admit to hospice service this date. Noted transport set for 1300, updated by primary nurse Hope TIM, transport going to  at 1230. Primary nurse will fax AVS and copy of DNR-CC to hospice office at 1003 and call office at [phone] when patient leaving.   Hospice medical director Dr. Liz Landeros had visited earlier in day and answered family questions.   Hospice nurse Liz Rn will go to [facility] as soon as possible, when patient leaving hospital, per family wishes.   Present when primary nurse giving report to [facility], equipment has not been delivered to facility yet. Call placed to [facility], equipment able to be at facility by 1330. Transport updated of this and has been asked for 1330 if able. They will update primary nurse Hope TIM and she will update this nurse of known time.

## 2024-09-06 NOTE — PROGRESS NOTES
Hospice F2F Visit    I certify that I completed a F2F encounter on Osmany Castellanos on 9/6/24 and that the information is shared with the physician completing the hospice certification and narrative.      89 yo with terminal dx of cerebral atherosclerosis.  He has head CT 5/2024 with 'mild atrophy and dilatation of the third and lateral ventricles. There is diminished attenuation in the white matter most likely representing ischemic changes.'  He is currently hospitalized with decline and failure to thrive.  Son reports he had clots blocking his ability to urinate, so now has a catheter, and he reports that pt was so constipated that he was impacted of stool when arrived at hospital.  Unit nurse verifies information from hospice nurse- pt with very poor intake, less than 25% when does eat meal.  Hospice nurse reports skipping meals also.  He has new dysphagia and speech therapist recommends  puree and thin and only SMALL sips with straw.    PPS is  30% with performance status very impaired.  He is unable to even do bed mobility and during visit required assistance to even lift his arm to the siderail during turning.  He was also visualized being entirely fed including drink and making no attempts to do this himself, instead sitting in front of his food tray and ignoring it.    Change in wt is not yet present.  During hospitalization pt has had significant decline and the tray in front of him has only about 25% of the food missing.  He has a liquid nutritional supplement which is at least 2/3 full.    Infections including complicated UTI this admission with antibiotic completed during hospitalization.    PE   Gen- fair skinned or pale AA gentleman sitting up in bed, he has grimace and furrowed brow during turning.  Eyes- Sclera non icteric  ENT- Wearing amplifying earphones due to [place]. Oral moist (just had bite of applesauce fed to him).  No choking or coughing witnessd with po.  Lungs- clear with pt on L

## 2024-09-06 NOTE — PROGRESS NOTES
Nutrition Note:  Due to change in medical/code status, dietitian will sign off.  Note plans for discharge with hospice. Diet: Pureed, offering Ensure Plus TID; meal intake 25% or less of meals. If nutrition intervention is required, please submit a dietitian consult.

## 2024-09-06 NOTE — CARE COORDINATION
9/6/24, 8:06 AM EDT    Patient goals/plan/ treatment preferences discussed by  and .  Patient goals/plan/ treatment preferences reviewed with patient/ family.  Patient/ family verbalize understanding of discharge plan and are in agreement with goal/plan/treatment preferences.  Understanding was demonstrated using the teach back method.  AVS provided by RN at time of discharge, which includes all necessary medical information pertaining to the patients current course of illness, treatment, post-discharge goals of care, and treatment preferences.     Services At/After Discharge: Assisted Living, Aide services, Hospice, In ambulance, and Nursing service    Pt is being discharged today back to [facility] with [facility].  MICHELLE confirmed with hospice RN Landy that they are able to admit pt to services today.    LACP is scheduled for 1 pm .  MICHELLE notified attending.  MICHELLE left message with pts spouse Eileen and daughter Arlin.    MICHELLE updated Nursing staff at [facility].  MICHELLE faxed AVS.      8:30 AM update:  received call from daughter Arlin, they are all in agreement with discharge plan for today,  at 1 pm

## 2024-09-06 NOTE — PALLIATIVE CARE
Hospice F2F encounter.    I met with pt's son from [place] and confirmed family desire for hospice and completed the Medicare Hospice required F2F visit.  Please see the separate Abstract Encounter for the F2F.

## 2024-09-07 NOTE — DISCHARGE SUMMARY
Liver:   Lab Results   Component Value Date/Time    AST 15 08/29/2024 05:20 PM    ALT <5 08/29/2024 05:20 PM         Significant Diagnostic Studies    Radiology:   CT ABDOMEN PELVIS W IV CONTRAST Additional Contrast? None   Final Result   1. Moderate-to-large colonic stool burden. No bowel obstruction.   2. Appendix is not definitively seen. No secondary evidence of    appendicitis.   3. Cardiomegaly with coronary artery atherosclerosis and small pericardial    effusion.      This document has been electronically signed by: Killian Chaney MD on    08/29/2024 08:28 PM      All CTs at this facility use dose modulation techniques and iterative    reconstructions, and/or weight-based dosing   when appropriate to reduce radiation to a low as reasonably achievable.      XR CHEST PORTABLE   Final Result   1. No consolidation.      This document has been electronically signed by: Killian Chaney MD on    08/29/2024 06:10 PM          Consults:   IP CONSULT TO UROLOGY  IP CONSULT TO SOCIAL WORK  PALLIATIVE CARE EVAL  IP CONSULT TO HOSPICE    Discharge Medications:      Medication List        START taking these medications      senna 8.6 MG tablet  Commonly known as: SENOKOT  Take 1 tablet by mouth nightly as needed for Constipation            CHANGE how you take these medications      LORazepam 0.5 MG tablet  Commonly known as: Ativan  Take 1-2 tablets by mouth every 4 hours as needed for Anxiety (prn mild to moderate agitation) for up to 7 days. Max Daily Amount: 6 mg  What changed:   how much to take  Another medication with the same name was removed. Continue taking this medication, and follow the directions you see here.     morphine sulfate 20 MG/ML concentrated oral solution  Take 0.125-0.25 mLs by mouth every 2 hours as needed for Pain or Shortness of Breath (mild to moderate pain) for up to 7 days. Max Daily Amount: 60 mg  What changed:   how much to take  Another medication with the same name was removed. Continue  MG/ML concentrated oral solution       Information about where to get these medications is not yet available    Ask your nurse or doctor about these medications  senna 8.6 MG tablet          Patient Instructions:    Discharge lab work: none  Activity: activity as tolerated  Diet: No diet orders on file      Follow-up visits:   Dannielle Steward MD  [address]  [place] [id number]  [phone]    Schedule an appointment as soon as possible for a visit in 1 week(s)  Hospital follow up appointment, Appt time    Jennifer Lyons, APRN - CNP  [address]  [place] [id number]  [phone]    Schedule an appointment as soon as possible for a visit  Hospital follow up appointment, Appt time         Disposition: AL with Hospice   Condition at Discharge:  Terminal     Time Spent: 35 minutes    Signed:    Thank you Dannielle Steward MD for the opportunity to be involved in this patient's care.    Electronically signed by Irma Rendon PA-C on 9/7/2024 at 9:32 AM  Discharging Hospitalist

## 2024-09-07 NOTE — PROGRESS NOTES
Physician Progress Note      PATIENT:               CATIA CARNES  [facility] #:                  [id number]  :                       1935  ADMIT DATE:       2024 3:39 PM  DISCH DATE:        2024 3:45 PM  RESPONDING  PROVIDER #:        Irma Leyva PA-C          QUERY TEXT:    Patient admitted with UTI.  Noted RD note.  If possible, please document in   progress notes and discharge summary if you are evaluating and /or treating   any of the following:    The medical record reflects the following:  Risk Factors: Dysphagia, poor appetite  Clinical Indicators: RD note with severe malnutrition documented. Energy   Intake:  75% or less estimated energy requirements for 1 month or longer (50%   or less per family report).  Body Fat Loss:  Severe body fat loss Triceps, Fat   Overlying Ribs, Orbital .Muscle Mass Loss:  Severe muscle mass loss Temples   (temporalis), Clavicles (pectoralis & deltoids), Thigh (quadriceps), Calf   (gastrocnemius)  Treatment: RD consult.    ASPEN Criteria:    https://aspenjournals.onlinelibrary.guerrier.com/doi/full/10.1177/269387344411961  5  Options provided:  -- Protein calorie malnutrition severe  -- Other - I will add my own diagnosis  -- Disagree - Not applicable / Not valid  -- Disagree - Clinically unable to determine / Unknown  -- Refer to Clinical Documentation Reviewer    PROVIDER RESPONSE TEXT:    This patient has severe protein calorie malnutrition.    Query created by: MATTHEW MEEKS on 2024 2:27 PM      Electronically signed by:  Irma Leyva PA-C 2024 7:48 AM

## 2024-11-18 NOTE — PROGRESS NOTES
Hospice ReCertification of Terminal Illness    I verify that I reviewed and incorporated the F2F completed 11/1/24.      88 yo with terminal dx of cerebral atherosclerosis.  He has head CT 5/2024 with 'mild atrophy and dilatation of the third and lateral ventricles. There is diminished attenuation in the white matter most likely representing ischemic changes.'  Prior to hospice admission, he was hospitalized with decline and failure to thrive.  Family reported he had clots blocking his ability to urinate, so required CBI and now has a catheter, and that pt was so constipated that he was impacted of stool when arrived at hospital.  During hospitalization new dysphagia was identified and speech therapist recommended  puree and thin and only SMALL sips with straw. Dysphagia is still present.  3 falls this period.  Pt does not have cognition to know cannot get up on his own.He has a high fall risk score of 16.     PPS is  30% with performance status very impaired from 50% in February 2024.   He is dependent for all ADLs including feeding, bathing, dressing, grooming, toileting, mobility.  He is incontinent of bowel and has a chronic colvin.  He was able to bear weight with transfer initially, but now requires a mechanical lift for dependent transfers.  He receives 24 hour care at a facility, as well as additional services from hospice aide.  Fatigue is noted with sleeping in the day.   Weight was 148# during Sept hospitalization.  Current wt is  This is declined from appetite 'fair' in February.  At F2F pt with no verbalization, noted to be pale and have colvin catheter.  Skin intact, PPS 30 and intake is down.  Nurse reports 50-75% of 2 meals per day and sometimes refuses.     Infections including Vascular dementia, complicated UTI this admission with antibiotic completed during hospitalization.  Comorbid Conditions include complicated UTI, hematuria, obstructive uropathy due to clots and BPH, chronic HFrEF 30-35% which

## 2024-11-27 ENCOUNTER — OUTSIDE SERVICES (OUTPATIENT)
Age: 89
End: 2024-11-27

## 2024-11-27 NOTE — PROGRESS NOTES
160s. Afebrile. RR normal, no use of accessory muscles to breathe.  General: Comfortable appearing without grimace or furrowed brow.  He does have verticle furrows mid brow and family states these are his normal relaxed baseline when asked. He is on room air.  HEENT: Mucous membranes are dry, sclerae are clear   Heart: irregularly irregular with controlled rate, no murmurs, radial pulse is easy to find and irregular.  Lungs:  rather shallow breath sounds bilaterally, diminished at the bases, without rales, rhonchi or wheeze  Abdomen: soft, bowel sounds quietly present, no tenderness, nondistended,   Uro: colvin catheter in place  Extremities:  No  edema, Calves are roughly equal and soft  Neurologic: Minimally responsive to gentle verbal and tactile stim with opening of one eye briefly but no attempt to make eye contact or speak.  He is restful and not restless.  Integument: No visible mottling knees or feet    Data:  GFR 58-72 Aug 29-Sept2    Echo resulted with a EF 30-35%, severe global hypokinesis, G1 DD, mild AR/MR/TR, small (<1 cm) anterior pericardial effusion without tamponade 8/30/2024.     Assesment/Plan:    Cerebral atherosclerosis/vascular dementia  Afib on b blocker for rate control- can continue metooprolol tartrate for HR control to prevent tachycardia and possible chest pain or other symptoms.  May be given buccal/SL.  CHFrEF- currently not in acute chf and does not appear sob.  Dysphagia which has progressed to unable to swallow at all- po meds have been discontinued for safety, comfort meds including metoprolol are ordered SL/buccal.  Chronic anemia- without plan for hospitalization or transfusion.  Hx constipation/impaction- has been well managed at facility and pt currently with benign and nontender abdomen.  Hx urinary obstruction and clots- appears to be draining well.  Can flush prn.  Actively dying - Met with family and reviewed some changes they may see as well as how to assess comfort if he

## (undated) DEVICE — CATHETER URETH 22FR BLLN 30CC SIL HYDRGEL 2 W F BARDX LUB

## (undated) DEVICE — CYSTO PACK: Brand: MEDLINE INDUSTRIES, INC.

## (undated) DEVICE — LASER SURG W22XH58IN D36IN 475LB SLD STATE FREQ DOUBLED

## (undated) DEVICE — SOLUTION IRRIG 3000ML 0.9% SOD CHL USP UROMATIC PLAS CONT

## (undated) DEVICE — HF-RESECTION ELECTRODE PLASMALOOP LOOP, MEDIUM, 24 FR., 12°-30°, ESG TURIS: Brand: OLYMPUS

## (undated) DEVICE — SET IRRIG L94IN ID0.281IN W/ 4.5IN DST FLX CONN 2 LD ON OFF

## (undated) DEVICE — S-CURVE URETHRAL DILATOR SET WITH AQ, HYDROPHILIC COATING: Brand: S~CURVE

## (undated) DEVICE — SOLUTION IRRIG 1000ML STRL H2O USP PLAS POUR BTL

## (undated) DEVICE — DRAINBAG,ANTI-REFLUX TOWER,L/F,2000ML,LL: Brand: MEDLINE